# Patient Record
Sex: MALE | Race: WHITE | NOT HISPANIC OR LATINO
[De-identification: names, ages, dates, MRNs, and addresses within clinical notes are randomized per-mention and may not be internally consistent; named-entity substitution may affect disease eponyms.]

---

## 2016-12-06 RX ORDER — CELECOXIB 200 MG/1
2 CAPSULE ORAL
Qty: 0 | Refills: 0 | COMMUNITY
Start: 2016-12-06

## 2017-01-02 ENCOUNTER — RX RENEWAL (OUTPATIENT)
Age: 62
End: 2017-01-02

## 2017-01-24 ENCOUNTER — RX RENEWAL (OUTPATIENT)
Age: 62
End: 2017-01-24

## 2017-02-01 ENCOUNTER — RX RENEWAL (OUTPATIENT)
Age: 62
End: 2017-02-01

## 2017-02-03 ENCOUNTER — APPOINTMENT (OUTPATIENT)
Dept: ORTHOPEDIC SURGERY | Facility: CLINIC | Age: 62
End: 2017-02-03

## 2017-02-03 VITALS
SYSTOLIC BLOOD PRESSURE: 178 MMHG | HEART RATE: 64 BPM | DIASTOLIC BLOOD PRESSURE: 76 MMHG | WEIGHT: 164 LBS | BODY MASS INDEX: 22.96 KG/M2 | HEIGHT: 71 IN

## 2017-02-06 ENCOUNTER — APPOINTMENT (OUTPATIENT)
Dept: DERMATOLOGY | Facility: CLINIC | Age: 62
End: 2017-02-06

## 2017-02-06 VITALS — SYSTOLIC BLOOD PRESSURE: 142 MMHG | DIASTOLIC BLOOD PRESSURE: 70 MMHG

## 2017-02-06 DIAGNOSIS — Z98.890 OTHER SPECIFIED POSTPROCEDURAL STATES: ICD-10-CM

## 2017-03-06 ENCOUNTER — APPOINTMENT (OUTPATIENT)
Dept: ENDOCRINOLOGY | Facility: CLINIC | Age: 62
End: 2017-03-06

## 2017-03-10 ENCOUNTER — APPOINTMENT (OUTPATIENT)
Dept: INTERNAL MEDICINE | Facility: CLINIC | Age: 62
End: 2017-03-10

## 2017-03-10 VITALS
RESPIRATION RATE: 14 BRPM | BODY MASS INDEX: 22.04 KG/M2 | WEIGHT: 158 LBS | DIASTOLIC BLOOD PRESSURE: 60 MMHG | HEART RATE: 72 BPM | SYSTOLIC BLOOD PRESSURE: 140 MMHG

## 2017-03-10 LAB — HBA1C MFR BLD HPLC: 7

## 2017-03-13 ENCOUNTER — RX RENEWAL (OUTPATIENT)
Age: 62
End: 2017-03-13

## 2017-04-23 ENCOUNTER — RESULT REVIEW (OUTPATIENT)
Age: 62
End: 2017-04-23

## 2017-05-04 ENCOUNTER — APPOINTMENT (OUTPATIENT)
Dept: MRI IMAGING | Facility: CLINIC | Age: 62
End: 2017-05-04

## 2017-05-10 ENCOUNTER — OUTPATIENT (OUTPATIENT)
Dept: OUTPATIENT SERVICES | Facility: HOSPITAL | Age: 62
LOS: 1 days | End: 2017-05-10
Payer: COMMERCIAL

## 2017-05-10 ENCOUNTER — APPOINTMENT (OUTPATIENT)
Dept: MRI IMAGING | Facility: CLINIC | Age: 62
End: 2017-05-10

## 2017-05-10 DIAGNOSIS — Z00.8 ENCOUNTER FOR OTHER GENERAL EXAMINATION: ICD-10-CM

## 2017-05-10 DIAGNOSIS — Z98.52 VASECTOMY STATUS: Chronic | ICD-10-CM

## 2017-05-10 DIAGNOSIS — Z98.890 OTHER SPECIFIED POSTPROCEDURAL STATES: Chronic | ICD-10-CM

## 2017-05-10 DIAGNOSIS — Z98.89 OTHER SPECIFIED POSTPROCEDURAL STATES: Chronic | ICD-10-CM

## 2017-05-10 DIAGNOSIS — Z90.49 ACQUIRED ABSENCE OF OTHER SPECIFIED PARTS OF DIGESTIVE TRACT: Chronic | ICD-10-CM

## 2017-05-10 PROCEDURE — A9585: CPT

## 2017-05-10 PROCEDURE — 74183 MRI ABD W/O CNTR FLWD CNTR: CPT

## 2017-07-27 ENCOUNTER — APPOINTMENT (OUTPATIENT)
Dept: ENDOCRINOLOGY | Facility: CLINIC | Age: 62
End: 2017-07-27
Payer: COMMERCIAL

## 2017-07-27 VITALS
HEIGHT: 71 IN | WEIGHT: 162 LBS | SYSTOLIC BLOOD PRESSURE: 130 MMHG | BODY MASS INDEX: 22.68 KG/M2 | OXYGEN SATURATION: 98 % | DIASTOLIC BLOOD PRESSURE: 70 MMHG | HEART RATE: 74 BPM

## 2017-07-27 LAB
GLUCOSE BLDC GLUCOMTR-MCNC: 177
HBA1C MFR BLD HPLC: 6.8

## 2017-07-27 PROCEDURE — 82962 GLUCOSE BLOOD TEST: CPT

## 2017-07-27 PROCEDURE — 99214 OFFICE O/P EST MOD 30 MIN: CPT | Mod: 25

## 2017-07-27 PROCEDURE — 83036 HEMOGLOBIN GLYCOSYLATED A1C: CPT | Mod: QW

## 2017-08-14 ENCOUNTER — APPOINTMENT (OUTPATIENT)
Dept: DERMATOLOGY | Facility: CLINIC | Age: 62
End: 2017-08-14

## 2017-08-28 ENCOUNTER — APPOINTMENT (OUTPATIENT)
Dept: DERMATOLOGY | Facility: CLINIC | Age: 62
End: 2017-08-28
Payer: COMMERCIAL

## 2017-08-28 VITALS — SYSTOLIC BLOOD PRESSURE: 148 MMHG | DIASTOLIC BLOOD PRESSURE: 78 MMHG

## 2017-08-28 DIAGNOSIS — L60.8 OTHER NAIL DISORDERS: ICD-10-CM

## 2017-08-28 PROCEDURE — 99214 OFFICE O/P EST MOD 30 MIN: CPT | Mod: GC

## 2017-08-31 ENCOUNTER — APPOINTMENT (OUTPATIENT)
Dept: ORTHOPEDIC SURGERY | Facility: CLINIC | Age: 62
End: 2017-08-31
Payer: COMMERCIAL

## 2017-08-31 VITALS
HEIGHT: 71 IN | SYSTOLIC BLOOD PRESSURE: 137 MMHG | DIASTOLIC BLOOD PRESSURE: 73 MMHG | WEIGHT: 162 LBS | BODY MASS INDEX: 22.68 KG/M2 | HEART RATE: 80 BPM

## 2017-08-31 DIAGNOSIS — Z96.641 PRESENCE OF RIGHT ARTIFICIAL HIP JOINT: ICD-10-CM

## 2017-08-31 DIAGNOSIS — M25.551 PAIN IN RIGHT HIP: ICD-10-CM

## 2017-08-31 PROCEDURE — 73502 X-RAY EXAM HIP UNI 2-3 VIEWS: CPT | Mod: RT

## 2017-08-31 PROCEDURE — 99214 OFFICE O/P EST MOD 30 MIN: CPT

## 2017-09-11 ENCOUNTER — APPOINTMENT (OUTPATIENT)
Dept: ENDOCRINOLOGY | Facility: CLINIC | Age: 62
End: 2017-09-11

## 2017-09-11 ENCOUNTER — APPOINTMENT (OUTPATIENT)
Dept: INTERNAL MEDICINE | Facility: CLINIC | Age: 62
End: 2017-09-11
Payer: COMMERCIAL

## 2017-09-11 VITALS
HEART RATE: 103 BPM | OXYGEN SATURATION: 97 % | WEIGHT: 160 LBS | DIASTOLIC BLOOD PRESSURE: 80 MMHG | RESPIRATION RATE: 12 BRPM | SYSTOLIC BLOOD PRESSURE: 142 MMHG | BODY MASS INDEX: 22.4 KG/M2 | HEIGHT: 71 IN

## 2017-09-11 PROCEDURE — 99396 PREV VISIT EST AGE 40-64: CPT

## 2017-09-13 ENCOUNTER — LABORATORY RESULT (OUTPATIENT)
Age: 62
End: 2017-09-13

## 2017-09-14 LAB — CRP SERPL-MCNC: <0.2 MG/DL

## 2017-11-27 ENCOUNTER — APPOINTMENT (OUTPATIENT)
Dept: ENDOCRINOLOGY | Facility: CLINIC | Age: 62
End: 2017-11-27
Payer: COMMERCIAL

## 2017-11-27 VITALS
HEART RATE: 90 BPM | OXYGEN SATURATION: 97 % | HEIGHT: 71 IN | WEIGHT: 160 LBS | BODY MASS INDEX: 22.4 KG/M2 | DIASTOLIC BLOOD PRESSURE: 70 MMHG | SYSTOLIC BLOOD PRESSURE: 140 MMHG

## 2017-11-27 LAB
GLUCOSE BLDC GLUCOMTR-MCNC: 133
HBA1C MFR BLD HPLC: 6.5

## 2017-11-27 PROCEDURE — 99214 OFFICE O/P EST MOD 30 MIN: CPT | Mod: 25

## 2017-11-27 PROCEDURE — 95251 CONT GLUC MNTR ANALYSIS I&R: CPT

## 2017-11-27 PROCEDURE — 82962 GLUCOSE BLOOD TEST: CPT | Mod: GC

## 2017-11-27 PROCEDURE — 83036 HEMOGLOBIN GLYCOSYLATED A1C: CPT | Mod: QW,GC

## 2017-12-03 ENCOUNTER — RX RENEWAL (OUTPATIENT)
Age: 62
End: 2017-12-03

## 2017-12-05 ENCOUNTER — RX RENEWAL (OUTPATIENT)
Age: 62
End: 2017-12-05

## 2017-12-12 ENCOUNTER — MEDICATION RENEWAL (OUTPATIENT)
Age: 62
End: 2017-12-12

## 2018-01-15 LAB
BASOPHILS # BLD AUTO: 0.09 K/UL
BASOPHILS NFR BLD AUTO: 0.9 %
CHOLEST SERPL-MCNC: 96 MG/DL
CHOLEST/HDLC SERPL: 2.7 RATIO
CREAT SPEC-SCNC: 174 MG/DL
EOSINOPHIL # BLD AUTO: 0.25 K/UL
EOSINOPHIL NFR BLD AUTO: 2.6 %
ERYTHROCYTE [SEDIMENTATION RATE] IN BLOOD BY WESTERGREN METHOD: 32 MM/HR
HCT VFR BLD CALC: 32.7 %
HDLC SERPL-MCNC: 35 MG/DL
HGB BLD-MCNC: 11 G/DL
LDLC SERPL CALC-MCNC: 32 MG/DL
LYMPHOCYTES # BLD AUTO: 2.7 K/UL
LYMPHOCYTES NFR BLD AUTO: 28.4 %
MAN DIFF?: NORMAL
MCHC RBC-ENTMCNC: 32.2 PG
MCHC RBC-ENTMCNC: 33.6 GM/DL
MCV RBC AUTO: 95.6 FL
MICROALBUMIN 24H UR DL<=1MG/L-MCNC: 23.8 MG/DL
MICROALBUMIN/CREAT 24H UR-RTO: 137 MG/G
MONOCYTES # BLD AUTO: 0.82 K/UL
MONOCYTES NFR BLD AUTO: 8.6 %
NEUTROPHILS # BLD AUTO: 5.49 K/UL
NEUTROPHILS NFR BLD AUTO: 57.7 %
PLATELET # BLD AUTO: 181 K/UL
RBC # BLD: 3.42 M/UL
RBC # FLD: 12.5 %
TRIGL SERPL-MCNC: 143 MG/DL
WBC # FLD AUTO: 9.52 K/UL

## 2018-01-16 ENCOUNTER — APPOINTMENT (OUTPATIENT)
Dept: ENDOCRINOLOGY | Facility: CLINIC | Age: 63
End: 2018-01-16
Payer: COMMERCIAL

## 2018-01-16 PROCEDURE — G0108 DIAB MANAGE TRN  PER INDIV: CPT

## 2018-01-16 PROCEDURE — 95251 CONT GLUC MNTR ANALYSIS I&R: CPT

## 2018-02-15 ENCOUNTER — RX RENEWAL (OUTPATIENT)
Age: 63
End: 2018-02-15

## 2018-02-23 ENCOUNTER — APPOINTMENT (OUTPATIENT)
Dept: DERMATOLOGY | Facility: CLINIC | Age: 63
End: 2018-02-23

## 2018-03-01 ENCOUNTER — APPOINTMENT (OUTPATIENT)
Dept: RHEUMATOLOGY | Facility: CLINIC | Age: 63
End: 2018-03-01
Payer: COMMERCIAL

## 2018-03-01 ENCOUNTER — LABORATORY RESULT (OUTPATIENT)
Age: 63
End: 2018-03-01

## 2018-03-01 VITALS
WEIGHT: 165 LBS | TEMPERATURE: 98.7 F | SYSTOLIC BLOOD PRESSURE: 176 MMHG | OXYGEN SATURATION: 98 % | BODY MASS INDEX: 23.1 KG/M2 | HEIGHT: 71 IN | HEART RATE: 67 BPM | DIASTOLIC BLOOD PRESSURE: 70 MMHG

## 2018-03-01 DIAGNOSIS — B35.1 TINEA UNGUIUM: ICD-10-CM

## 2018-03-01 DIAGNOSIS — M40.209 UNSPECIFIED KYPHOSIS, SITE UNSPECIFIED: ICD-10-CM

## 2018-03-01 DIAGNOSIS — K22.70 BARRETT'S ESOPHAGUS W/OUT DYSPLASIA: ICD-10-CM

## 2018-03-01 LAB
ALBUMIN SERPL ELPH-MCNC: 4.2 G/DL
ALP BLD-CCNC: 106 U/L
ALT SERPL-CCNC: 39 U/L
ANION GAP SERPL CALC-SCNC: 11 MMOL/L
APPEARANCE: CLEAR
AST SERPL-CCNC: 58 U/L
BASOPHILS # BLD AUTO: 0.06 K/UL
BASOPHILS NFR BLD AUTO: 0.9 %
BILIRUB SERPL-MCNC: 0.4 MG/DL
BILIRUBIN URINE: NEGATIVE
BLOOD URINE: ABNORMAL
BUN SERPL-MCNC: 17 MG/DL
CALCIUM SERPL-MCNC: 8.8 MG/DL
CHLORIDE SERPL-SCNC: 100 MMOL/L
CO2 SERPL-SCNC: 25 MMOL/L
COLOR: YELLOW
CREAT SERPL-MCNC: 1.86 MG/DL
CRP SERPL-MCNC: <0.2 MG/DL
EOSINOPHIL # BLD AUTO: 0.24 K/UL
EOSINOPHIL NFR BLD AUTO: 3.8 %
GLUCOSE QUALITATIVE U: NEGATIVE MG/DL
GLUCOSE SERPL-MCNC: 108 MG/DL
HCT VFR BLD CALC: 34.6 %
HGB BLD-MCNC: 11.7 G/DL
IMM GRANULOCYTES NFR BLD AUTO: 0.5 %
KETONES URINE: NEGATIVE
LEUKOCYTE ESTERASE URINE: NEGATIVE
LYMPHOCYTES # BLD AUTO: 2.45 K/UL
LYMPHOCYTES NFR BLD AUTO: 38.6 %
MAN DIFF?: NORMAL
MCHC RBC-ENTMCNC: 32.7 PG
MCHC RBC-ENTMCNC: 33.8 GM/DL
MCV RBC AUTO: 96.6 FL
MONOCYTES # BLD AUTO: 0.51 K/UL
MONOCYTES NFR BLD AUTO: 8 %
NEUTROPHILS # BLD AUTO: 3.06 K/UL
NEUTROPHILS NFR BLD AUTO: 48.2 %
NITRITE URINE: NEGATIVE
PH URINE: 6
PLATELET # BLD AUTO: 146 K/UL
POTASSIUM SERPL-SCNC: 4.7 MMOL/L
PROT SERPL-MCNC: 7.7 G/DL
PROTEIN URINE: ABNORMAL MG/DL
RBC # BLD: 3.58 M/UL
RBC # FLD: 13.1 %
RHEUMATOID FACT SER QL: <7 IU/ML
SODIUM SERPL-SCNC: 136 MMOL/L
SPECIFIC GRAVITY URINE: 1.01
UROBILINOGEN URINE: NEGATIVE MG/DL
WBC # FLD AUTO: 6.35 K/UL

## 2018-03-01 PROCEDURE — 99245 OFF/OP CONSLTJ NEW/EST HI 55: CPT

## 2018-03-02 LAB
25(OH)D3 SERPL-MCNC: 23.8 NG/ML
CCP AB SER IA-ACNC: <8 UNITS
DSDNA AB SER-ACNC: 89 IU/ML
ENA RNP AB SER IA-ACNC: 0.3 AL
ENA SM AB SER IA-ACNC: <0.2 AL
ENA SS-A AB SER IA-ACNC: <0.2 AL
ENA SS-B AB SER IA-ACNC: <0.2 AL
HBV CORE IGG+IGM SER QL: NONREACTIVE
HBV SURFACE AB SER QL: NONREACTIVE
HBV SURFACE AG SER QL: NONREACTIVE
HCV AB SER QL: NONREACTIVE
HCV S/CO RATIO: 0.12 S/CO
RF+CCP IGG SER-IMP: NEGATIVE

## 2018-03-06 LAB
ADJUSTED MITOGEN: >10 IU/ML
ADJUSTED TB AG: -0.01 IU/ML
ANA PAT FLD IF-IMP: ABNORMAL
ANA SER IF-ACNC: ABNORMAL
G6PD SER-CCNC: 12.3 U/G HGB
M TB IFN-G BLD-IMP: NEGATIVE
QUANTIFERON GOLD NIL: 0.05 IU/ML

## 2018-03-16 ENCOUNTER — APPOINTMENT (OUTPATIENT)
Dept: INTERNAL MEDICINE | Facility: CLINIC | Age: 63
End: 2018-03-16
Payer: COMMERCIAL

## 2018-03-16 VITALS
HEART RATE: 68 BPM | OXYGEN SATURATION: 98 % | SYSTOLIC BLOOD PRESSURE: 150 MMHG | BODY MASS INDEX: 23.29 KG/M2 | WEIGHT: 167 LBS | DIASTOLIC BLOOD PRESSURE: 75 MMHG

## 2018-03-16 PROCEDURE — 99214 OFFICE O/P EST MOD 30 MIN: CPT

## 2018-03-19 VITALS
RESPIRATION RATE: 14 BRPM | WEIGHT: 167 LBS | SYSTOLIC BLOOD PRESSURE: 152 MMHG | HEIGHT: 71 IN | BODY MASS INDEX: 23.38 KG/M2 | HEART RATE: 62 BPM | DIASTOLIC BLOOD PRESSURE: 76 MMHG

## 2018-03-19 RX ORDER — MULTIVITAMIN
CAPSULE ORAL
Refills: 0 | Status: COMPLETED | COMMUNITY

## 2018-03-19 RX ORDER — CHROMIUM 200 MCG
TABLET ORAL
Refills: 0 | Status: COMPLETED | COMMUNITY

## 2018-03-19 RX ORDER — CLOPIDOGREL 75 MG/1
75 TABLET, FILM COATED ORAL
Refills: 0 | Status: COMPLETED | COMMUNITY

## 2018-03-19 RX ORDER — HYDROMORPHONE HYDROCHLORIDE 4 MG/1
4 TABLET ORAL
Refills: 0 | Status: COMPLETED | COMMUNITY

## 2018-04-04 ENCOUNTER — APPOINTMENT (OUTPATIENT)
Dept: CARDIOLOGY | Facility: CLINIC | Age: 63
End: 2018-04-04
Payer: COMMERCIAL

## 2018-04-04 ENCOUNTER — NON-APPOINTMENT (OUTPATIENT)
Age: 63
End: 2018-04-04

## 2018-04-04 VITALS
WEIGHT: 161 LBS | HEART RATE: 56 BPM | OXYGEN SATURATION: 100 % | DIASTOLIC BLOOD PRESSURE: 70 MMHG | BODY MASS INDEX: 22.46 KG/M2 | SYSTOLIC BLOOD PRESSURE: 158 MMHG

## 2018-04-04 VITALS — DIASTOLIC BLOOD PRESSURE: 70 MMHG | SYSTOLIC BLOOD PRESSURE: 160 MMHG

## 2018-04-04 PROCEDURE — 99406 BEHAV CHNG SMOKING 3-10 MIN: CPT

## 2018-04-04 PROCEDURE — 93000 ELECTROCARDIOGRAM COMPLETE: CPT

## 2018-04-04 PROCEDURE — 99245 OFF/OP CONSLTJ NEW/EST HI 55: CPT | Mod: 25

## 2018-04-04 RX ORDER — RIVAROXABAN 10 MG/1
10 TABLET, FILM COATED ORAL
Qty: 35 | Refills: 0 | Status: DISCONTINUED | COMMUNITY
Start: 2017-01-02 | End: 2018-04-04

## 2018-04-16 ENCOUNTER — LABORATORY RESULT (OUTPATIENT)
Age: 63
End: 2018-04-16

## 2018-04-16 ENCOUNTER — APPOINTMENT (OUTPATIENT)
Dept: RHEUMATOLOGY | Facility: CLINIC | Age: 63
End: 2018-04-16
Payer: COMMERCIAL

## 2018-04-16 VITALS
HEART RATE: 71 BPM | SYSTOLIC BLOOD PRESSURE: 158 MMHG | WEIGHT: 165 LBS | HEIGHT: 71 IN | DIASTOLIC BLOOD PRESSURE: 77 MMHG | TEMPERATURE: 98.7 F | OXYGEN SATURATION: 98 % | BODY MASS INDEX: 23.1 KG/M2

## 2018-04-16 LAB
ALBUMIN SERPL ELPH-MCNC: 3.5 G/DL
ALP BLD-CCNC: 99 U/L
ALT SERPL-CCNC: 29 U/L
ANION GAP SERPL CALC-SCNC: 9 MMOL/L
AST SERPL-CCNC: 42 U/L
BILIRUB SERPL-MCNC: 0.4 MG/DL
BUN SERPL-MCNC: 20 MG/DL
CALCIUM SERPL-MCNC: 8.3 MG/DL
CHLORIDE SERPL-SCNC: 103 MMOL/L
CO2 SERPL-SCNC: 25 MMOL/L
CREAT SERPL-MCNC: 1.9 MG/DL
GLUCOSE SERPL-MCNC: 255 MG/DL
POTASSIUM SERPL-SCNC: 5.9 MMOL/L
PROT SERPL-MCNC: 6.6 G/DL
SODIUM SERPL-SCNC: 137 MMOL/L

## 2018-04-16 PROCEDURE — 99214 OFFICE O/P EST MOD 30 MIN: CPT

## 2018-04-17 ENCOUNTER — FORM ENCOUNTER (OUTPATIENT)
Age: 63
End: 2018-04-17

## 2018-04-17 ENCOUNTER — APPOINTMENT (OUTPATIENT)
Dept: CARDIOLOGY | Facility: CLINIC | Age: 63
End: 2018-04-17
Payer: COMMERCIAL

## 2018-04-17 PROCEDURE — 93306 TTE W/DOPPLER COMPLETE: CPT

## 2018-04-18 ENCOUNTER — APPOINTMENT (OUTPATIENT)
Dept: RADIOLOGY | Facility: CLINIC | Age: 63
End: 2018-04-18

## 2018-04-18 ENCOUNTER — OUTPATIENT (OUTPATIENT)
Dept: OUTPATIENT SERVICES | Facility: HOSPITAL | Age: 63
LOS: 1 days | End: 2018-04-18
Payer: COMMERCIAL

## 2018-04-18 DIAGNOSIS — Z98.52 VASECTOMY STATUS: Chronic | ICD-10-CM

## 2018-04-18 DIAGNOSIS — Z98.890 OTHER SPECIFIED POSTPROCEDURAL STATES: Chronic | ICD-10-CM

## 2018-04-18 DIAGNOSIS — Z98.89 OTHER SPECIFIED POSTPROCEDURAL STATES: Chronic | ICD-10-CM

## 2018-04-18 DIAGNOSIS — M06.9 RHEUMATOID ARTHRITIS, UNSPECIFIED: ICD-10-CM

## 2018-04-18 DIAGNOSIS — L40.9 PSORIASIS, UNSPECIFIED: ICD-10-CM

## 2018-04-18 DIAGNOSIS — Z90.49 ACQUIRED ABSENCE OF OTHER SPECIFIED PARTS OF DIGESTIVE TRACT: Chronic | ICD-10-CM

## 2018-04-18 PROCEDURE — 72040 X-RAY EXAM NECK SPINE 2-3 VW: CPT | Mod: 26

## 2018-04-18 PROCEDURE — 73130 X-RAY EXAM OF HAND: CPT

## 2018-04-18 PROCEDURE — 73630 X-RAY EXAM OF FOOT: CPT | Mod: 26,50

## 2018-04-18 PROCEDURE — 73521 X-RAY EXAM HIPS BI 2 VIEWS: CPT

## 2018-04-18 PROCEDURE — 77080 DXA BONE DENSITY AXIAL: CPT | Mod: 26

## 2018-04-18 PROCEDURE — 72040 X-RAY EXAM NECK SPINE 2-3 VW: CPT

## 2018-04-18 PROCEDURE — 72070 X-RAY EXAM THORAC SPINE 2VWS: CPT

## 2018-04-18 PROCEDURE — 72070 X-RAY EXAM THORAC SPINE 2VWS: CPT | Mod: 26

## 2018-04-18 PROCEDURE — 77080 DXA BONE DENSITY AXIAL: CPT

## 2018-04-18 PROCEDURE — 72114 X-RAY EXAM L-S SPINE BENDING: CPT

## 2018-04-18 PROCEDURE — 73130 X-RAY EXAM OF HAND: CPT | Mod: 26,50

## 2018-04-18 PROCEDURE — 73630 X-RAY EXAM OF FOOT: CPT

## 2018-04-18 PROCEDURE — 72114 X-RAY EXAM L-S SPINE BENDING: CPT | Mod: 26

## 2018-04-18 PROCEDURE — 73521 X-RAY EXAM HIPS BI 2 VIEWS: CPT | Mod: 26

## 2018-04-19 LAB
APPEARANCE: CLEAR
BASOPHILS # BLD AUTO: 0.04 K/UL
BASOPHILS NFR BLD AUTO: 0.7 %
BILIRUBIN URINE: NEGATIVE
BLOOD URINE: NEGATIVE
C3 SERPL-MCNC: 49 MG/DL
C4 SERPL-MCNC: 7 MG/DL
COLOR: YELLOW
DSDNA AB SER-ACNC: 38 IU/ML
EOSINOPHIL # BLD AUTO: 0.11 K/UL
EOSINOPHIL NFR BLD AUTO: 1.9 %
GLUCOSE QUALITATIVE U: NEGATIVE MG/DL
HCT VFR BLD CALC: 31.8 %
HGB BLD-MCNC: 11.2 G/DL
IMM GRANULOCYTES NFR BLD AUTO: 0.4 %
KETONES URINE: NEGATIVE
LEUKOCYTE ESTERASE URINE: NEGATIVE
LYMPHOCYTES # BLD AUTO: 1.48 K/UL
LYMPHOCYTES NFR BLD AUTO: 26 %
MAN DIFF?: NORMAL
MCHC RBC-ENTMCNC: 33.3 PG
MCHC RBC-ENTMCNC: 35.2 GM/DL
MCV RBC AUTO: 94.6 FL
MONOCYTES # BLD AUTO: 0.44 K/UL
MONOCYTES NFR BLD AUTO: 7.7 %
NEUTROPHILS # BLD AUTO: 3.61 K/UL
NEUTROPHILS NFR BLD AUTO: 63.3 %
NITRITE URINE: NEGATIVE
PH URINE: 5.5
PLATELET # BLD AUTO: 132 K/UL
PROTEIN URINE: 30 MG/DL
RBC # BLD: 3.36 M/UL
RBC # FLD: 12.2 %
SPECIFIC GRAVITY URINE: 1.01
UROBILINOGEN URINE: NEGATIVE MG/DL
WBC # FLD AUTO: 5.7 K/UL

## 2018-04-23 ENCOUNTER — APPOINTMENT (OUTPATIENT)
Dept: ENDOCRINOLOGY | Facility: CLINIC | Age: 63
End: 2018-04-23
Payer: COMMERCIAL

## 2018-04-23 VITALS
DIASTOLIC BLOOD PRESSURE: 70 MMHG | WEIGHT: 166 LBS | BODY MASS INDEX: 23.24 KG/M2 | HEART RATE: 71 BPM | HEIGHT: 71 IN | OXYGEN SATURATION: 98 % | SYSTOLIC BLOOD PRESSURE: 140 MMHG

## 2018-04-23 LAB
GLUCOSE BLDC GLUCOMTR-MCNC: 224
HBA1C MFR BLD HPLC: 6.8

## 2018-04-23 PROCEDURE — 95251 CONT GLUC MNTR ANALYSIS I&R: CPT

## 2018-04-23 PROCEDURE — 82962 GLUCOSE BLOOD TEST: CPT

## 2018-04-23 PROCEDURE — 99214 OFFICE O/P EST MOD 30 MIN: CPT | Mod: 25

## 2018-04-23 PROCEDURE — 83036 HEMOGLOBIN GLYCOSYLATED A1C: CPT | Mod: QW

## 2018-05-08 ENCOUNTER — APPOINTMENT (OUTPATIENT)
Dept: CARDIOLOGY | Facility: CLINIC | Age: 63
End: 2018-05-08
Payer: COMMERCIAL

## 2018-05-08 ENCOUNTER — NON-APPOINTMENT (OUTPATIENT)
Age: 63
End: 2018-05-08

## 2018-05-08 VITALS
HEART RATE: 67 BPM | HEIGHT: 71 IN | BODY MASS INDEX: 23.8 KG/M2 | TEMPERATURE: 98.6 F | SYSTOLIC BLOOD PRESSURE: 146 MMHG | OXYGEN SATURATION: 100 % | DIASTOLIC BLOOD PRESSURE: 62 MMHG | WEIGHT: 170 LBS

## 2018-05-08 PROCEDURE — 93000 ELECTROCARDIOGRAM COMPLETE: CPT

## 2018-05-08 PROCEDURE — 99215 OFFICE O/P EST HI 40 MIN: CPT

## 2018-05-10 ENCOUNTER — APPOINTMENT (OUTPATIENT)
Dept: RHEUMATOLOGY | Facility: CLINIC | Age: 63
End: 2018-05-10
Payer: COMMERCIAL

## 2018-05-10 ENCOUNTER — LABORATORY RESULT (OUTPATIENT)
Age: 63
End: 2018-05-10

## 2018-05-10 VITALS
BODY MASS INDEX: 23.52 KG/M2 | DIASTOLIC BLOOD PRESSURE: 71 MMHG | HEART RATE: 82 BPM | SYSTOLIC BLOOD PRESSURE: 170 MMHG | TEMPERATURE: 98.2 F | WEIGHT: 168 LBS | HEIGHT: 71 IN | OXYGEN SATURATION: 98 %

## 2018-05-10 LAB
APPEARANCE: CLEAR
BASOPHILS # BLD AUTO: 0.07 K/UL
BASOPHILS NFR BLD AUTO: 0.7 %
BILIRUBIN URINE: NEGATIVE
BLOOD URINE: ABNORMAL
COLOR: YELLOW
DIRECT COOMBS: NORMAL
EOSINOPHIL # BLD AUTO: 0.14 K/UL
EOSINOPHIL NFR BLD AUTO: 1.4 %
ERYTHROCYTE [SEDIMENTATION RATE] IN BLOOD BY WESTERGREN METHOD: 32 MM/HR
GLUCOSE QUALITATIVE U: 250 MG/DL
HCT VFR BLD CALC: 33.5 %
HGB BLD-MCNC: 11.1 G/DL
IMM GRANULOCYTES NFR BLD AUTO: 0.8 %
KETONES URINE: NEGATIVE
LEUKOCYTE ESTERASE URINE: NEGATIVE
LYMPHOCYTES # BLD AUTO: 2.02 K/UL
LYMPHOCYTES NFR BLD AUTO: 19.7 %
MAN DIFF?: NORMAL
MCHC RBC-ENTMCNC: 33 PG
MCHC RBC-ENTMCNC: 33.1 GM/DL
MCV RBC AUTO: 99.7 FL
MONOCYTES # BLD AUTO: 0.82 K/UL
MONOCYTES NFR BLD AUTO: 8 %
NEUTROPHILS # BLD AUTO: 7.11 K/UL
NEUTROPHILS NFR BLD AUTO: 69.4 %
NITRITE URINE: NEGATIVE
PH URINE: 5.5
PLATELET # BLD AUTO: 153 K/UL
PROTEIN URINE: 30 MG/DL
RBC # BLD: 3.35 M/UL
RBC # BLD: 3.36 M/UL
RBC # FLD: 12.7 %
RETICS # AUTO: 2.5 %
RETICS AGGREG/RBC NFR: 82.4 K/UL
SPECIFIC GRAVITY URINE: 1.01
UROBILINOGEN URINE: NEGATIVE MG/DL
WBC # FLD AUTO: 10.24 K/UL

## 2018-05-10 PROCEDURE — 99215 OFFICE O/P EST HI 40 MIN: CPT

## 2018-05-11 ENCOUNTER — OTHER (OUTPATIENT)
Age: 63
End: 2018-05-11

## 2018-05-11 DIAGNOSIS — R80.9 PROTEINURIA, UNSPECIFIED: ICD-10-CM

## 2018-05-11 LAB
ALBUMIN SERPL ELPH-MCNC: 3.9 G/DL
ALP BLD-CCNC: 101 U/L
ALT SERPL-CCNC: 29 U/L
ANION GAP SERPL CALC-SCNC: 12 MMOL/L
AST SERPL-CCNC: 35 U/L
BILIRUB SERPL-MCNC: 0.5 MG/DL
BUN SERPL-MCNC: 26 MG/DL
C3 SERPL-MCNC: 76 MG/DL
C4 SERPL-MCNC: 19 MG/DL
CALCIUM SERPL-MCNC: 9 MG/DL
CHLORIDE SERPL-SCNC: 106 MMOL/L
CO2 SERPL-SCNC: 21 MMOL/L
CREAT SERPL-MCNC: 2.08 MG/DL
CREAT SPEC-SCNC: 22 MG/DL
CREAT/PROT UR: 1.9 RATIO
CRP SERPL-MCNC: 0.2 MG/DL
DSDNA AB SER-ACNC: 46 IU/ML
GLUCOSE SERPL-MCNC: 204 MG/DL
HAPTOGLOB SERPL-MCNC: 87 MG/DL
LDH SERPL-CCNC: 241 U/L
POTASSIUM SERPL-SCNC: 5.4 MMOL/L
PROT SERPL-MCNC: 7.2 G/DL
PROT UR-MCNC: 42 MG/DL
SODIUM SERPL-SCNC: 139 MMOL/L

## 2018-05-15 ENCOUNTER — APPOINTMENT (OUTPATIENT)
Dept: NEPHROLOGY | Facility: CLINIC | Age: 63
End: 2018-05-15
Payer: COMMERCIAL

## 2018-05-15 ENCOUNTER — LABORATORY RESULT (OUTPATIENT)
Age: 63
End: 2018-05-15

## 2018-05-15 VITALS
DIASTOLIC BLOOD PRESSURE: 72 MMHG | BODY MASS INDEX: 23.46 KG/M2 | HEIGHT: 71 IN | SYSTOLIC BLOOD PRESSURE: 152 MMHG | OXYGEN SATURATION: 97 % | HEART RATE: 83 BPM | WEIGHT: 167.55 LBS

## 2018-05-15 PROCEDURE — 99244 OFF/OP CNSLTJ NEW/EST MOD 40: CPT

## 2018-05-15 RX ORDER — AMLODIPINE BESYLATE 5 MG/1
5 TABLET ORAL DAILY
Qty: 30 | Refills: 1 | Status: DISCONTINUED | COMMUNITY
Start: 2018-04-04 | End: 2018-05-15

## 2018-05-15 RX ORDER — TACROLIMUS 1 MG/G
0.1 OINTMENT TOPICAL
Qty: 1 | Refills: 1 | Status: DISCONTINUED | COMMUNITY
Start: 2017-08-28 | End: 2018-05-15

## 2018-05-15 RX ORDER — UREA 40 G/100G
40 CREAM TOPICAL
Qty: 1 | Refills: 0 | Status: DISCONTINUED | COMMUNITY
Start: 2017-08-28 | End: 2018-05-15

## 2018-05-16 ENCOUNTER — INPATIENT (INPATIENT)
Facility: HOSPITAL | Age: 63
LOS: 0 days | Discharge: HOME | DRG: 641 | End: 2018-05-17
Attending: INTERNAL MEDICINE | Admitting: HOSPITALIST
Payer: COMMERCIAL

## 2018-05-16 VITALS
DIASTOLIC BLOOD PRESSURE: 74 MMHG | WEIGHT: 164.02 LBS | RESPIRATION RATE: 16 BRPM | TEMPERATURE: 98 F | SYSTOLIC BLOOD PRESSURE: 155 MMHG | HEART RATE: 86 BPM | OXYGEN SATURATION: 100 %

## 2018-05-16 DIAGNOSIS — E87.5 HYPERKALEMIA: ICD-10-CM

## 2018-05-16 DIAGNOSIS — Z29.9 ENCOUNTER FOR PROPHYLACTIC MEASURES, UNSPECIFIED: ICD-10-CM

## 2018-05-16 DIAGNOSIS — I10 ESSENTIAL (PRIMARY) HYPERTENSION: ICD-10-CM

## 2018-05-16 DIAGNOSIS — M06.9 RHEUMATOID ARTHRITIS, UNSPECIFIED: ICD-10-CM

## 2018-05-16 DIAGNOSIS — N17.9 ACUTE KIDNEY FAILURE, UNSPECIFIED: ICD-10-CM

## 2018-05-16 DIAGNOSIS — R60.0 LOCALIZED EDEMA: ICD-10-CM

## 2018-05-16 DIAGNOSIS — Z98.89 OTHER SPECIFIED POSTPROCEDURAL STATES: Chronic | ICD-10-CM

## 2018-05-16 DIAGNOSIS — E11.65 TYPE 2 DIABETES MELLITUS WITH HYPERGLYCEMIA: ICD-10-CM

## 2018-05-16 DIAGNOSIS — I25.10 ATHEROSCLEROTIC HEART DISEASE OF NATIVE CORONARY ARTERY WITHOUT ANGINA PECTORIS: ICD-10-CM

## 2018-05-16 DIAGNOSIS — Z90.49 ACQUIRED ABSENCE OF OTHER SPECIFIED PARTS OF DIGESTIVE TRACT: Chronic | ICD-10-CM

## 2018-05-16 DIAGNOSIS — Z98.890 OTHER SPECIFIED POSTPROCEDURAL STATES: Chronic | ICD-10-CM

## 2018-05-16 DIAGNOSIS — K86.1 OTHER CHRONIC PANCREATITIS: ICD-10-CM

## 2018-05-16 DIAGNOSIS — Z98.52 VASECTOMY STATUS: Chronic | ICD-10-CM

## 2018-05-16 LAB
ALBUMIN SERPL ELPH-MCNC: 3.7 G/DL — SIGNIFICANT CHANGE UP (ref 3.3–5)
ALP SERPL-CCNC: 85 U/L — SIGNIFICANT CHANGE UP (ref 40–120)
ALT FLD-CCNC: 31 U/L — SIGNIFICANT CHANGE UP (ref 10–45)
ANION GAP SERPL CALC-SCNC: 10 MMOL/L — SIGNIFICANT CHANGE UP (ref 5–17)
ANION GAP SERPL CALC-SCNC: 12 MMOL/L — SIGNIFICANT CHANGE UP (ref 5–17)
APTT BLD: 27.9 SEC — SIGNIFICANT CHANGE UP (ref 27.5–37.4)
AST SERPL-CCNC: 47 U/L — HIGH (ref 10–40)
BASOPHILS # BLD AUTO: 0 K/UL — SIGNIFICANT CHANGE UP (ref 0–0.2)
BASOPHILS NFR BLD AUTO: 0.5 % — SIGNIFICANT CHANGE UP (ref 0–2)
BILIRUB SERPL-MCNC: 0.3 MG/DL — SIGNIFICANT CHANGE UP (ref 0.2–1.2)
BUN SERPL-MCNC: 28 MG/DL — HIGH (ref 7–23)
BUN SERPL-MCNC: 28 MG/DL — HIGH (ref 7–23)
CALCIUM SERPL-MCNC: 8.2 MG/DL — LOW (ref 8.4–10.5)
CALCIUM SERPL-MCNC: 9 MG/DL — SIGNIFICANT CHANGE UP (ref 8.4–10.5)
CHLORIDE SERPL-SCNC: 102 MMOL/L — SIGNIFICANT CHANGE UP (ref 96–108)
CHLORIDE SERPL-SCNC: 102 MMOL/L — SIGNIFICANT CHANGE UP (ref 96–108)
CO2 SERPL-SCNC: 20 MMOL/L — LOW (ref 22–31)
CO2 SERPL-SCNC: 24 MMOL/L — SIGNIFICANT CHANGE UP (ref 22–31)
CREAT SERPL-MCNC: 2.15 MG/DL — HIGH (ref 0.5–1.3)
CREAT SERPL-MCNC: 2.35 MG/DL — HIGH (ref 0.5–1.3)
EOSINOPHIL # BLD AUTO: 0 K/UL — SIGNIFICANT CHANGE UP (ref 0–0.5)
EOSINOPHIL NFR BLD AUTO: 0.6 % — SIGNIFICANT CHANGE UP (ref 0–6)
G6PD SER-CCNC: 14.4 U/G HGB
GAS PNL BLDV: SIGNIFICANT CHANGE UP
GLUCOSE BLDC GLUCOMTR-MCNC: 279 MG/DL — HIGH (ref 70–99)
GLUCOSE SERPL-MCNC: 253 MG/DL — HIGH (ref 70–99)
GLUCOSE SERPL-MCNC: 368 MG/DL — HIGH (ref 70–99)
HCT VFR BLD CALC: 31.5 % — LOW (ref 39–50)
HGB BLD-MCNC: 10.8 G/DL — LOW (ref 13–17)
INR BLD: 1.04 RATIO — SIGNIFICANT CHANGE UP (ref 0.88–1.16)
LYMPHOCYTES # BLD AUTO: 0.8 K/UL — LOW (ref 1–3.3)
LYMPHOCYTES # BLD AUTO: 9.6 % — LOW (ref 13–44)
MAGNESIUM SERPL-MCNC: 1.7 MG/DL — SIGNIFICANT CHANGE UP (ref 1.6–2.6)
MCHC RBC-ENTMCNC: 34.2 GM/DL — SIGNIFICANT CHANGE UP (ref 32–36)
MCHC RBC-ENTMCNC: 34.8 PG — HIGH (ref 27–34)
MCV RBC AUTO: 102 FL — HIGH (ref 80–100)
MONOCYTES # BLD AUTO: 0.2 K/UL — SIGNIFICANT CHANGE UP (ref 0–0.9)
MONOCYTES NFR BLD AUTO: 2.2 % — SIGNIFICANT CHANGE UP (ref 2–14)
NEUTROPHILS # BLD AUTO: 7.5 K/UL — HIGH (ref 1.8–7.4)
NEUTROPHILS NFR BLD AUTO: 87.2 % — HIGH (ref 43–77)
PHOSPHATE SERPL-MCNC: 4.3 MG/DL — SIGNIFICANT CHANGE UP (ref 2.5–4.5)
PLATELET # BLD AUTO: 181 K/UL — SIGNIFICANT CHANGE UP (ref 150–400)
POTASSIUM SERPL-MCNC: 6.1 MMOL/L — HIGH (ref 3.5–5.3)
POTASSIUM SERPL-MCNC: 6.3 MMOL/L — CRITICAL HIGH (ref 3.5–5.3)
POTASSIUM SERPL-SCNC: 6.1 MMOL/L — HIGH (ref 3.5–5.3)
POTASSIUM SERPL-SCNC: 6.3 MMOL/L — CRITICAL HIGH (ref 3.5–5.3)
PROT SERPL-MCNC: 6.9 G/DL — SIGNIFICANT CHANGE UP (ref 6–8.3)
PROTHROM AB SERPL-ACNC: 11.4 SEC — SIGNIFICANT CHANGE UP (ref 9.8–12.7)
RBC # BLD: 3.09 M/UL — LOW (ref 4.2–5.8)
RBC # FLD: 11.1 % — SIGNIFICANT CHANGE UP (ref 10.3–14.5)
SODIUM SERPL-SCNC: 134 MMOL/L — LOW (ref 135–145)
SODIUM SERPL-SCNC: 136 MMOL/L — SIGNIFICANT CHANGE UP (ref 135–145)
WBC # BLD: 8.6 K/UL — SIGNIFICANT CHANGE UP (ref 3.8–10.5)
WBC # FLD AUTO: 8.6 K/UL — SIGNIFICANT CHANGE UP (ref 3.8–10.5)

## 2018-05-16 PROCEDURE — 99233 SBSQ HOSP IP/OBS HIGH 50: CPT

## 2018-05-16 PROCEDURE — 93971 EXTREMITY STUDY: CPT | Mod: 26

## 2018-05-16 PROCEDURE — 93010 ELECTROCARDIOGRAM REPORT: CPT

## 2018-05-16 PROCEDURE — 76770 US EXAM ABDO BACK WALL COMP: CPT | Mod: 26

## 2018-05-16 PROCEDURE — 99223 1ST HOSP IP/OBS HIGH 75: CPT | Mod: GC

## 2018-05-16 PROCEDURE — 99285 EMERGENCY DEPT VISIT HI MDM: CPT | Mod: 25

## 2018-05-16 RX ORDER — DEXTROSE 50 % IN WATER 50 %
25 SYRINGE (ML) INTRAVENOUS ONCE
Qty: 0 | Refills: 0 | Status: DISCONTINUED | OUTPATIENT
Start: 2018-05-16 | End: 2018-05-17

## 2018-05-16 RX ORDER — FUROSEMIDE 40 MG
2 TABLET ORAL
Qty: 0 | Refills: 0 | COMMUNITY

## 2018-05-16 RX ORDER — INSULIN LISPRO 100/ML
VIAL (ML) SUBCUTANEOUS AT BEDTIME
Qty: 0 | Refills: 0 | Status: DISCONTINUED | OUTPATIENT
Start: 2018-05-16 | End: 2018-05-17

## 2018-05-16 RX ORDER — PANTOPRAZOLE SODIUM 20 MG/1
40 TABLET, DELAYED RELEASE ORAL
Qty: 0 | Refills: 0 | Status: DISCONTINUED | OUTPATIENT
Start: 2018-05-16 | End: 2018-05-17

## 2018-05-16 RX ORDER — SODIUM CHLORIDE 9 MG/ML
1000 INJECTION, SOLUTION INTRAVENOUS
Qty: 0 | Refills: 0 | Status: DISCONTINUED | OUTPATIENT
Start: 2018-05-16 | End: 2018-05-16

## 2018-05-16 RX ORDER — ALBUTEROL 90 UG/1
2.5 AEROSOL, METERED ORAL ONCE
Qty: 0 | Refills: 0 | Status: COMPLETED | OUTPATIENT
Start: 2018-05-16 | End: 2018-05-16

## 2018-05-16 RX ORDER — INSULIN LISPRO 100/ML
VIAL (ML) SUBCUTANEOUS
Qty: 0 | Refills: 0 | Status: DISCONTINUED | OUTPATIENT
Start: 2018-05-16 | End: 2018-05-16

## 2018-05-16 RX ORDER — AMLODIPINE BESYLATE 2.5 MG/1
10 TABLET ORAL DAILY
Qty: 0 | Refills: 0 | Status: DISCONTINUED | OUTPATIENT
Start: 2018-05-16 | End: 2018-05-17

## 2018-05-16 RX ORDER — CLOPIDOGREL BISULFATE 75 MG/1
75 TABLET, FILM COATED ORAL DAILY
Qty: 0 | Refills: 0 | Status: DISCONTINUED | OUTPATIENT
Start: 2018-05-16 | End: 2018-05-17

## 2018-05-16 RX ORDER — DEXTROSE 50 % IN WATER 50 %
15 SYRINGE (ML) INTRAVENOUS ONCE
Qty: 0 | Refills: 0 | Status: DISCONTINUED | OUTPATIENT
Start: 2018-05-16 | End: 2018-05-17

## 2018-05-16 RX ORDER — ATORVASTATIN CALCIUM 80 MG/1
10 TABLET, FILM COATED ORAL AT BEDTIME
Qty: 0 | Refills: 0 | Status: DISCONTINUED | OUTPATIENT
Start: 2018-05-16 | End: 2018-05-17

## 2018-05-16 RX ORDER — HYDROMORPHONE HYDROCHLORIDE 2 MG/ML
8 INJECTION INTRAMUSCULAR; INTRAVENOUS; SUBCUTANEOUS EVERY 6 HOURS
Qty: 0 | Refills: 0 | Status: DISCONTINUED | OUTPATIENT
Start: 2018-05-16 | End: 2018-05-17

## 2018-05-16 RX ORDER — CALCIUM GLUCONATE 100 MG/ML
1 VIAL (ML) INTRAVENOUS ONCE
Qty: 0 | Refills: 0 | Status: COMPLETED | OUTPATIENT
Start: 2018-05-16 | End: 2018-05-16

## 2018-05-16 RX ORDER — FUROSEMIDE 40 MG
40 TABLET ORAL ONCE
Qty: 0 | Refills: 0 | Status: COMPLETED | OUTPATIENT
Start: 2018-05-16 | End: 2018-05-16

## 2018-05-16 RX ORDER — FUROSEMIDE 40 MG
40 TABLET ORAL AT BEDTIME
Qty: 0 | Refills: 0 | Status: DISCONTINUED | OUTPATIENT
Start: 2018-05-17 | End: 2018-05-17

## 2018-05-16 RX ORDER — SODIUM BICARBONATE 1 MEQ/ML
50 SYRINGE (ML) INTRAVENOUS ONCE
Qty: 0 | Refills: 0 | Status: COMPLETED | OUTPATIENT
Start: 2018-05-16 | End: 2018-05-16

## 2018-05-16 RX ORDER — AMLODIPINE BESYLATE 2.5 MG/1
1 TABLET ORAL
Qty: 0 | Refills: 0 | COMMUNITY

## 2018-05-16 RX ORDER — CLOPIDOGREL BISULFATE 75 MG/1
1 TABLET, FILM COATED ORAL
Qty: 0 | Refills: 0 | COMMUNITY

## 2018-05-16 RX ORDER — NICOTINE POLACRILEX 2 MG
1 GUM BUCCAL DAILY
Qty: 0 | Refills: 0 | Status: DISCONTINUED | OUTPATIENT
Start: 2018-05-16 | End: 2018-05-17

## 2018-05-16 RX ORDER — SODIUM BICARBONATE 1 MEQ/ML
650 SYRINGE (ML) INTRAVENOUS THREE TIMES A DAY
Qty: 0 | Refills: 0 | Status: DISCONTINUED | OUTPATIENT
Start: 2018-05-16 | End: 2018-05-17

## 2018-05-16 RX ORDER — HYDROXYCHLOROQUINE SULFATE 200 MG
200 TABLET ORAL
Qty: 0 | Refills: 0 | Status: DISCONTINUED | OUTPATIENT
Start: 2018-05-16 | End: 2018-05-17

## 2018-05-16 RX ORDER — INSULIN GLARGINE 100 [IU]/ML
12 INJECTION, SOLUTION SUBCUTANEOUS AT BEDTIME
Qty: 0 | Refills: 0 | Status: DISCONTINUED | OUTPATIENT
Start: 2018-05-16 | End: 2018-05-16

## 2018-05-16 RX ORDER — HEPARIN SODIUM 5000 [USP'U]/ML
5000 INJECTION INTRAVENOUS; SUBCUTANEOUS EVERY 8 HOURS
Qty: 0 | Refills: 0 | Status: DISCONTINUED | OUTPATIENT
Start: 2018-05-16 | End: 2018-05-17

## 2018-05-16 RX ORDER — INSULIN GLARGINE 100 [IU]/ML
12 INJECTION, SOLUTION SUBCUTANEOUS AT BEDTIME
Qty: 0 | Refills: 0 | Status: DISCONTINUED | OUTPATIENT
Start: 2018-05-16 | End: 2018-05-17

## 2018-05-16 RX ORDER — ALBUTEROL 90 UG/1
2.5 AEROSOL, METERED ORAL ONCE
Qty: 0 | Refills: 0 | Status: DISCONTINUED | OUTPATIENT
Start: 2018-05-16 | End: 2018-05-16

## 2018-05-16 RX ORDER — DEXTROSE 50 % IN WATER 50 %
12.5 SYRINGE (ML) INTRAVENOUS ONCE
Qty: 0 | Refills: 0 | Status: DISCONTINUED | OUTPATIENT
Start: 2018-05-16 | End: 2018-05-17

## 2018-05-16 RX ORDER — INSULIN LISPRO 100/ML
VIAL (ML) SUBCUTANEOUS AT BEDTIME
Qty: 0 | Refills: 0 | Status: DISCONTINUED | OUTPATIENT
Start: 2018-05-16 | End: 2018-05-16

## 2018-05-16 RX ORDER — SODIUM POLYSTYRENE SULFONATE 4.1 MEQ/G
30 POWDER, FOR SUSPENSION ORAL ONCE
Qty: 0 | Refills: 0 | Status: COMPLETED | OUTPATIENT
Start: 2018-05-16 | End: 2018-05-16

## 2018-05-16 RX ORDER — INSULIN LISPRO 100/ML
VIAL (ML) SUBCUTANEOUS
Qty: 0 | Refills: 0 | Status: DISCONTINUED | OUTPATIENT
Start: 2018-05-16 | End: 2018-05-17

## 2018-05-16 RX ORDER — GLUCAGON INJECTION, SOLUTION 0.5 MG/.1ML
1 INJECTION, SOLUTION SUBCUTANEOUS ONCE
Qty: 0 | Refills: 0 | Status: DISCONTINUED | OUTPATIENT
Start: 2018-05-16 | End: 2018-05-17

## 2018-05-16 RX ORDER — METOPROLOL TARTRATE 50 MG
25 TABLET ORAL DAILY
Qty: 0 | Refills: 0 | Status: DISCONTINUED | OUTPATIENT
Start: 2018-05-16 | End: 2018-05-17

## 2018-05-16 RX ADMIN — ATORVASTATIN CALCIUM 10 MILLIGRAM(S): 80 TABLET, FILM COATED ORAL at 22:10

## 2018-05-16 RX ADMIN — INSULIN GLARGINE 12 UNIT(S): 100 INJECTION, SOLUTION SUBCUTANEOUS at 22:29

## 2018-05-16 RX ADMIN — HEPARIN SODIUM 5000 UNIT(S): 5000 INJECTION INTRAVENOUS; SUBCUTANEOUS at 22:11

## 2018-05-16 RX ADMIN — Medication 1 PATCH: at 22:11

## 2018-05-16 RX ADMIN — HYDROMORPHONE HYDROCHLORIDE 8 MILLIGRAM(S): 2 INJECTION INTRAMUSCULAR; INTRAVENOUS; SUBCUTANEOUS at 22:11

## 2018-05-16 RX ADMIN — Medication 1: at 22:24

## 2018-05-16 RX ADMIN — Medication 50 MILLIEQUIVALENT(S): at 18:30

## 2018-05-16 RX ADMIN — Medication 40 MILLIGRAM(S): at 18:30

## 2018-05-16 RX ADMIN — ALBUTEROL 2.5 MILLIGRAM(S): 90 AEROSOL, METERED ORAL at 17:47

## 2018-05-16 RX ADMIN — Medication 200 MILLIGRAM(S): at 22:11

## 2018-05-16 RX ADMIN — SODIUM POLYSTYRENE SULFONATE 30 GRAM(S): 4.1 POWDER, FOR SUSPENSION ORAL at 22:10

## 2018-05-16 RX ADMIN — Medication 200 GRAM(S): at 17:47

## 2018-05-16 RX ADMIN — Medication 25 MILLIGRAM(S): at 22:11

## 2018-05-16 RX ADMIN — Medication 20 MILLIGRAM(S): at 22:10

## 2018-05-16 RX ADMIN — AMLODIPINE BESYLATE 10 MILLIGRAM(S): 2.5 TABLET ORAL at 22:10

## 2018-05-16 RX ADMIN — Medication 650 MILLIGRAM(S): at 22:10

## 2018-05-16 RX ADMIN — HYDROMORPHONE HYDROCHLORIDE 8 MILLIGRAM(S): 2 INJECTION INTRAMUSCULAR; INTRAVENOUS; SUBCUTANEOUS at 23:20

## 2018-05-16 NOTE — H&P ADULT - PROBLEM SELECTOR PLAN 6
- LE swelling at baseline. Likely component for CKD fluid overloading and amlodipine use.  - Continue to monitor fluid status. C/w lasix 40mg daily recommended per nephrology.

## 2018-05-16 NOTE — H&P ADULT - HISTORY OF PRESENT ILLNESS
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 Castro nephrology's office for elevated potassium level. Patient was seen by Dr. Castro yesterday in clinic for IVA on CKD. Extensive w/u including renal and bladder US underway. Patient had incidental finding of K 6.0 in office and subsequently sent in. In the ER, patient was again noted to have elevated K 6.3, s/p albuterol x 2, lasix 40mg IVP & calcium gluconate and repeat K 6.1. Patient again denied sob, chest pain, palpitation, dizziness, HA, abd pain, n/v at the time of interview. Continue to have chronic diarrhea at baseline, nonbloody in nature.    Of note, patient was last seen in rheumatology clinic 5/11. Finished with prednisone taper 5mg on that day and was started on Plaquenil 200mg BID. Was placed back on 20mg prednisone daily the next day after phone notice from rheumatology's office. Patient was sent to Dr. Castro's office on 5/15 for IVA on CKD with worsening proteinuria. This is believed likely from lupus nephritis despite dsDNA, haptoglobin all improved with prednisone and stopping biologicals. Patient continue to have bilateral LE swelling at baseline, unchanged after amlodipine dose increase after last Cardiology visit for BP control.

## 2018-05-16 NOTE — H&P ADULT - PROBLEM SELECTOR PLAN 5
- BP slightly elevated to 165/78 on admission. Consistent with outpatient record trend.  - Monitor VS per unit routine. C/w outpatient regimen 10mg amlodipine daily.  - Will give additional hydralazine as needed for bp control.

## 2018-05-16 NOTE — H&P ADULT - PROBLEM SELECTOR PLAN 9
- DVT ppx w/ heparin sq.  - Diabetic diet with low K ordered. -Spent 5-minutes counseling patient on importance of tobacco cessation. D/w patient risks of ongoing tobacco use increases risk of MI, COPD, malignancy, stroke, and other conditions. Patient reports understand and agrees with trial of nicotine patch while in the hospital. Questions answered.

## 2018-05-16 NOTE — H&P ADULT - ATTENDING COMMENTS
Patient assigned to me by night hospitalist in charge for management and care for patient for this evening only. Care to be resumed by day hospitalist in the morning and thereafter.     Patient seen and examined at bedside. Agree with the resident note above and changes made to note above where appropriate.

## 2018-05-16 NOTE — H&P ADULT - ASSESSMENT
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 Castro nephrology's office for elevated potassium level in the setting of IVA on CKD.

## 2018-05-16 NOTE — H&P ADULT - NSHPPHYSICALEXAM_GEN_ALL_CORE
T(C): 36.7 (05-16-18 @ 19:57), Max: 36.9 (05-16-18 @ 14:32)  HR: 75 (05-16-18 @ 19:57) (75 - 88)  BP: 162/70 (05-16-18 @ 19:57) (155/74 - 165/78)  RR: 18 (05-16-18 @ 19:57) (16 - 18)  SpO2: 98% (05-16-18 @ 19:57) (98% - 100%)  Wt(kg): --  GENERAL: NAD, well-developed  HEAD:  Atraumatic, Normocephalic  EYES: EOMI, PERRLA, conjunctiva and sclera clear  NECK: Supple, No JVD  CHEST/LUNG: Clear to auscultation bilaterally; No wheeze  HEART: Regular rate and rhythm; No murmurs, rubs, or gallops  ABDOMEN: Soft, Nontender, Nondistended; Bowel sounds present  EXTREMITIES:  2+ Peripheral Pulses, No clubbing, cyanosis, or edema  PSYCH: AAOx3  NEUROLOGY: non-focal  SKIN: No rashes or lesions

## 2018-05-16 NOTE — ED PROVIDER NOTE - PMH
Carotid stenosis, left    Cigarette smoker    Coronary artery disease    Diabetes mellitus  Type II, diagnosed in 2000  Gastroesophageal reflux disease    H/O acute pancreatitis  first attack 1999 then 2003  on medication-  H/O rheumatoid arthritis  on medication  H/O sleep apnea    H/O: HTN (hypertension)    History of blood clots  h/o hepatic vein thrombosis 2014- tx with blood thinnes  History of MI (myocardial infarction)  in 2002  Hyperlipidemia    Osteoarthritis    Rheumatoid arthritis

## 2018-05-16 NOTE — PROGRESS NOTE ADULT - SUBJECTIVE AND OBJECTIVE BOX
[facility] Division of Kidney Diseases & Hypertension  FOLLOW UP NOTE  --------------------------------------------------------------------------------    HPI: 63 years old man with extensive past medical history but most recently drug-induced lupus from biologics for RA.  I saw and evaluated him in the office yesterday for acute kidney injury.  On repeat labs his potassium was found to be elevated and I asked him to come to the ED.  The patient was seen and evaluated in the ED.  He has no dyspnea no chest pain.  His swelling is stable.        PAST HISTORY  --------------------------------------------------------------------------------  No significant changes to PMH, PSH, FHx, SHx, unless otherwise noted    ALLERGIES & MEDICATIONS  --------------------------------------------------------------------------------  Allergies    No Known Allergies    Intolerances      Standing Inpatient Medications  ALBUTerol    0.083%. 2.5 milliGRAM(s) Nebulizer once  calcium gluconate IVPB 1 Gram(s) IV Intermittent Once  furosemide   Injectable 40 milliGRAM(s) IV Push Once  sodium bicarbonate  Injectable 50 milliEquivalent(s) IV Push Once    PRN Inpatient Medications      REVIEW OF SYSTEMS  --------------------------------------------------------------------------------  Gen: No fever  CV: no CP  Pulm: no dyspnea  GI: no abdominal pain    VITALS/PHYSICAL EXAM  --------------------------------------------------------------------------------  T(C): 36.9 (05-16-18 @ 14:32), Max: 36.9 (05-16-18 @ 14:32)  HR: 86 (05-16-18 @ 14:32) (86 - 86)  BP: 155/74 (05-16-18 @ 14:32) (155/74 - 155/74)  RR: 16 (05-16-18 @ 14:32) (16 - 16)  SpO2: 100% (05-16-18 @ 14:32) (100% - 100%)  Wt(kg): --    Weight (kg): 74.4 (05-16-18 @ 14:32)      Physical Exam:  	Gen: NAD  	HEENT: anicteric  	Pulm: CTA B/L  	CV: S1S2; no rub  	Abd: distended non tender   	: No suprapubic tenderness  	Neuro: No focal deficits, intact gait  	Psych: Normal affect and mood  	Skin: Warm, without rashes    LABS/STUDIES  --------------------------------------------------------------------------------              10.8   8.6   >-----------<  181      [05-16-18 @ 15:53]              31.5     134  |  102  |  28  ----------------------------<  368      [05-16-18 @ 15:53]  6.3   |  20  |  2.35        Ca     8.2     [05-16-18 @ 15:53]      Mg     1.7     [05-16-18 @ 15:53]      Phos  4.3     [05-16-18 @ 15:53]    TPro  6.9  /  Alb  3.7  /  TBili  0.3  /  DBili  x   /  AST  47  /  ALT  31  /  AlkPhos  85  [05-16-18 @ 15:53]          Creatinine Trend:  SCr 2.35 [05-16 @ 15:53]

## 2018-05-16 NOTE — ED ADULT NURSE NOTE - CHPI ED SYMPTOMS NEG
no decreased eating/drinking/no chills/no pain/no nausea/no fever/no headache/no loss of consciousness/no back pain/no dizziness/no vomiting

## 2018-05-16 NOTE — H&P ADULT - NSHPLABSRESULTS_GEN_ALL_CORE
(05-16 @ 15:53)                      10.8  8.6 )-----------( 181                 31.5    Neutrophils = 7.5 (87.2%)  Lymphocytes = 0.8 (9.6%)  Eosinophils = 0.0 (0.6%)  Basophils = 0.0 (0.5%)  Monocytes = 0.2 (2.2%)  Bands = --%    05-16    136  |  102  |  28<H>  ----------------------------<  253<H>  6.1<H>   |  24  |  2.15<H>    Ca    9.0      16 May 2018 18:56  Phos  4.3     05-16  Mg     1.7     05-16    TPro  6.9  /  Alb  3.7  /  TBili  0.3  /  DBili  x   /  AST  47<H>  /  ALT  31  /  AlkPhos  85  05-16    ( 16 May 2018 16:51 )   PT: 11.4 sec;   INR: 1.04 ratio;       PTT:27.9 sec      RVP:    Venous Blood Gas:  05-16 @ 15:53  7.28/49/24/22/41  VBG Lactate: 1.6    EKG: precordial leads with peaked T waves  LE doppler: No DVT  Renal US: medical dx

## 2018-05-16 NOTE — H&P ADULT - PROBLEM SELECTOR PLAN 4
- Patient has chronic diarrhea from pancreatitis (alcohol and gallstone)  - C/w pain control dilaudid 8mg QID (outpatient use 16mg BID, istop [id number], last prescription 3/12 for one month supply.)

## 2018-05-16 NOTE — H&P ADULT - NSHPREVIEWOFSYSTEMS_GEN_ALL_CORE
REVIEW OF SYSTEMS:    CONSTITUTIONAL: No weakness, fevers or chills  EYES/ENT: No visual changes;  No vertigo or throat pain   NECK: No pain or stiffness  RESPIRATORY: No cough, wheezing, hemoptysis; No shortness of breath  CARDIOVASCULAR: No chest pain or palpitations  GASTROINTESTINAL: Chronic abd pain. Chronic diarrhea.  GENITOURINARY: No dysuria, frequency or hematuria  NEUROLOGICAL: No numbness or weakness  SKIN: No itching, burning, rashes, or lesions   All other review of systems is negative unless indicated above.

## 2018-05-16 NOTE — H&P ADULT - PROBLEM SELECTOR PLAN 8
- RA sx at baseline. Pain controlled.  - C/w outpatient regimen HCQ 200mg BID and prednisone 20mg qday.

## 2018-05-16 NOTE — PROGRESS NOTE ADULT - PROBLEM SELECTOR PLAN 2
Recommend bladder and kidney sonogram to rule out obstruction.  Likely hemodyanmic in the setting of NSAIDs (last week), Lasix, and ACE I.  Less likely related to drug-induced lupus nephritis as all parameters (hapto, complements dsdna) are getting better.  Recommend hold ACE I.  Although the patient has edema he may actually be hypovolemic as he has no dyspnea and lungs are clear.  Would decrease maintenance lasix dose to 40 daily.

## 2018-05-16 NOTE — H&P ADULT - PROBLEM SELECTOR PLAN 3
- Patient has IVA on CKD evaluated by Dr. Matthew razo in clinic.   - Renal US today c/w medical dx without structural damage at this time.   - Likely component of lupus nephritis and diabetic nephropathy.   - Trend renal functions and f/u further renal recs.  - Started sodium bicarb 650 TID.

## 2018-05-16 NOTE — PROGRESS NOTE ADULT - PROBLEM SELECTOR PLAN 1
Hyperkalemia in the setting of IVA, hyperglycemia.  For acute management agree with lasix 40 IV X 1, albuterol, bicarb, calcium.  For chronic management would recommend discontinue ACE I, start sodium bicarbonate 650 po TID.  Monitor serum potassium, resume lasix but at 40 daily.

## 2018-05-16 NOTE — H&P ADULT - PROBLEM SELECTOR PLAN 1
- Patient potassium stable 6.1 from 6.3 on admission after albuterol nebs x 2, lasix 40mg IVP with adequate UOP.  - s/p calcium gluconate x 1 for peaked T wave on EKG. Monitor on telemetry overnight.  - Will have patient pause insulin pump at bedtime, and switch to SSI with qHS basal bolus for potassium control.  - Continue to trend potassium level overnight.

## 2018-05-16 NOTE — H&P ADULT - PROBLEM SELECTOR PLAN 2
- On admission, hyperglycemic to 350s with BMP. Patient asymptomatic at this time. No anion gap noted on BMP.  - Will have patient pause insulin pump and use SSI + bedtime basal lantus to control FSG overnight.   - Likely additional insulin needed. Will have patient pause insulin pump and use solely hospital SSI in order to prevent hypoglycemia.  - Endocrine consult in the am. - On admission, hyperglycemic to 350s with BMP. Patient asymptomatic at this time. No anion gap noted on BMP.  - Will have patient pause insulin pump and use SSI + bedtime basal lantus to control FSG overnight. (Will give 12u lantus, same as patient's basal bolus).  - Likely additional insulin needed. Will have patient pause insulin pump and use solely hospital SSI in order to prevent hypoglycemia.  - Endocrine consult in the am.

## 2018-05-16 NOTE — ED ADULT NURSE NOTE - OBJECTIVE STATEMENT
63M comes to ED sent by nephrologist (Dr Castro) for hyperkalemia. He has PMH HTN, MI, DM, kidney disease. He states "I feel fine". He denies chest pain/palpitations/muscle cramps/weakness/SOB/N/V/D/dizziness/fever/chills. States no change in urine output. He has insulin dependent DM- has insulin 63M comes to ED sent by nephrologist (Dr Castro) for hyperkalemia. He has PMH HTN, MI, DM, kidney disease. He states "I feel fine". He denies chest pain/palpitations/muscle cramps/weakness/SOB/N/V/D/dizziness/fever/chills. States no change in urine output. He has insulin dependent DM- has insulin pump to LLQ abdomen. He has right lower extremity edema, but has been exacerbated over the past 3 weeks. He has soft nondistended abdomen, clear lungs, moves all extremities. A&Ox3 in no acute distress. EKG was performed immediately. Will continue to monitor.

## 2018-05-16 NOTE — H&P ADULT - NSHPOUTPATIENTPROVIDERS_GEN_ALL_CORE
Dr. Benjamin Catsro nephrology  Dr. Coello rheumatology  Dr. Lisker cardiology  Dr. Kidd [facility]

## 2018-05-16 NOTE — H&P ADULT - PROBLEM SELECTOR PLAN 7
- No signs of active CAD sx: no angina, sob, RICARDO, dizziness, palpitations.   - C/w outpatient regimen ASA and plavix.   - C/w metoprolol succinate 25mg daily.

## 2018-05-17 ENCOUNTER — TRANSCRIPTION ENCOUNTER (OUTPATIENT)
Age: 63
End: 2018-05-17

## 2018-05-17 VITALS — WEIGHT: 164.02 LBS

## 2018-05-17 DIAGNOSIS — E78.5 HYPERLIPIDEMIA, UNSPECIFIED: ICD-10-CM

## 2018-05-17 DIAGNOSIS — I10 ESSENTIAL (PRIMARY) HYPERTENSION: ICD-10-CM

## 2018-05-17 DIAGNOSIS — F17.210 NICOTINE DEPENDENCE, CIGARETTES, UNCOMPLICATED: ICD-10-CM

## 2018-05-17 DIAGNOSIS — E11.65 TYPE 2 DIABETES MELLITUS WITH HYPERGLYCEMIA: ICD-10-CM

## 2018-05-17 LAB
ALBUMIN MFR SERPL ELPH: 53 %
ALBUMIN SERPL ELPH-MCNC: 4.3 G/DL
ALBUMIN SERPL-MCNC: 3.7 G/DL
ALBUMIN/GLOB SERPL: 1.2 RATIO
ALPHA1 GLOB MFR SERPL ELPH: 5.1 %
ALPHA1 GLOB SERPL ELPH-MCNC: 0.4 G/DL
ALPHA2 GLOB MFR SERPL ELPH: 10.7 %
ALPHA2 GLOB SERPL ELPH-MCNC: 0.7 G/DL
ANION GAP SERPL CALC-SCNC: 11 MMOL/L — SIGNIFICANT CHANGE UP (ref 5–17)
ANION GAP SERPL CALC-SCNC: 12 MMOL/L
ANION GAP SERPL CALC-SCNC: 13 MMOL/L — SIGNIFICANT CHANGE UP (ref 5–17)
ANION GAP SERPL CALC-SCNC: 14 MMOL/L — SIGNIFICANT CHANGE UP (ref 5–17)
APPEARANCE: CLEAR
APTT BLD: 28.9 SEC
B-GLOBULIN MFR SERPL ELPH: 11.7 %
B-GLOBULIN SERPL ELPH-MCNC: 0.8 G/DL
BACTERIA: NEGATIVE
BILIRUBIN URINE: NEGATIVE
BLOOD URINE: ABNORMAL
BUN SERPL-MCNC: 28 MG/DL
BUN SERPL-MCNC: 28 MG/DL — HIGH (ref 7–23)
BUN SERPL-MCNC: 29 MG/DL — HIGH (ref 7–23)
BUN SERPL-MCNC: 29 MG/DL — HIGH (ref 7–23)
CALCIUM SERPL-MCNC: 7.9 MG/DL — LOW (ref 8.4–10.5)
CALCIUM SERPL-MCNC: 8.5 MG/DL — SIGNIFICANT CHANGE UP (ref 8.4–10.5)
CALCIUM SERPL-MCNC: 9.1 MG/DL — SIGNIFICANT CHANGE UP (ref 8.4–10.5)
CALCIUM SERPL-MCNC: 9.4 MG/DL
CHLORIDE SERPL-SCNC: 102 MMOL/L — SIGNIFICANT CHANGE UP (ref 96–108)
CHLORIDE SERPL-SCNC: 104 MMOL/L
CHLORIDE SERPL-SCNC: 104 MMOL/L — SIGNIFICANT CHANGE UP (ref 96–108)
CHLORIDE SERPL-SCNC: 98 MMOL/L — SIGNIFICANT CHANGE UP (ref 96–108)
CHLORIDE UR-SCNC: <35 MMOL/L — SIGNIFICANT CHANGE UP
CO2 SERPL-SCNC: 21 MMOL/L
CO2 SERPL-SCNC: 23 MMOL/L — SIGNIFICANT CHANGE UP (ref 22–31)
CO2 SERPL-SCNC: 24 MMOL/L — SIGNIFICANT CHANGE UP (ref 22–31)
CO2 SERPL-SCNC: 24 MMOL/L — SIGNIFICANT CHANGE UP (ref 22–31)
COLOR: YELLOW
CREAT ?TM UR-MCNC: 241 MG/DL — SIGNIFICANT CHANGE UP
CREAT SERPL-MCNC: 2.11 MG/DL — HIGH (ref 0.5–1.3)
CREAT SERPL-MCNC: 2.16 MG/DL — HIGH (ref 0.5–1.3)
CREAT SERPL-MCNC: 2.19 MG/DL — HIGH (ref 0.5–1.3)
CREAT SERPL-MCNC: 2.38 MG/DL
CREAT SPEC-SCNC: 81 MG/DL
CREAT/PROT UR: 1 RATIO
DEPRECATED KAPPA LC FREE/LAMBDA SER: 1.03 RATIO
DEPRECATED KAPPA LC FREE/LAMBDA SER: 1.03 RATIO
GAMMA GLOB FLD ELPH-MCNC: 1.3 G/DL
GAMMA GLOB MFR SERPL ELPH: 19.5 %
GLUCOSE BLDC GLUCOMTR-MCNC: 127 MG/DL — HIGH (ref 70–99)
GLUCOSE BLDC GLUCOMTR-MCNC: 205 MG/DL — HIGH (ref 70–99)
GLUCOSE BLDC GLUCOMTR-MCNC: 212 MG/DL — HIGH (ref 70–99)
GLUCOSE BLDC GLUCOMTR-MCNC: 220 MG/DL — HIGH (ref 70–99)
GLUCOSE QUALITATIVE U: NEGATIVE MG/DL
GLUCOSE SERPL-MCNC: 167 MG/DL — HIGH (ref 70–99)
GLUCOSE SERPL-MCNC: 177 MG/DL — HIGH (ref 70–99)
GLUCOSE SERPL-MCNC: 224 MG/DL
GLUCOSE SERPL-MCNC: 352 MG/DL — HIGH (ref 70–99)
HAPTOGLOB SERPL-MCNC: 95 MG/DL — SIGNIFICANT CHANGE UP (ref 34–200)
HBA1C BLD-MCNC: 7.1 % — HIGH (ref 4–5.6)
HCT VFR BLD CALC: 35.1 % — LOW (ref 39–50)
HGB BLD-MCNC: 11.6 G/DL — LOW (ref 13–17)
HYALINE CASTS: 1 /LPF
IGA SER QL IEP: 320 MG/DL
IGG SER QL IEP: 1290 MG/DL
IGM SER QL IEP: 277 MG/DL
INR PPP: 0.95 RATIO
INTERPRETATION SERPL IEP-IMP: NORMAL
KAPPA LC CSF-MCNC: 7.31 MG/DL
KAPPA LC CSF-MCNC: 7.31 MG/DL
KAPPA LC SERPL-MCNC: 7.53 MG/DL
KAPPA LC SERPL-MCNC: 7.53 MG/DL
KETONES URINE: NEGATIVE
LDH SERPL L TO P-CCNC: 260 U/L — HIGH (ref 50–242)
LEUKOCYTE ESTERASE URINE: NEGATIVE
M PROTEIN SPEC IFE-MCNC: NORMAL
MAGNESIUM SERPL-MCNC: 1.8 MG/DL — SIGNIFICANT CHANGE UP (ref 1.6–2.6)
MCHC RBC-ENTMCNC: 33.2 GM/DL — SIGNIFICANT CHANGE UP (ref 32–36)
MCHC RBC-ENTMCNC: 33.5 PG — SIGNIFICANT CHANGE UP (ref 27–34)
MCV RBC AUTO: 101 FL — HIGH (ref 80–100)
MICROSCOPIC-UA: NORMAL
MPO AB + PR3 PNL SER: NORMAL
NITRITE URINE: NEGATIVE
OSMOLALITY UR: 519 MOS/KG — SIGNIFICANT CHANGE UP (ref 300–900)
PH URINE: 5.5
PHOSPHATE SERPL-MCNC: 4 MG/DL
PHOSPHATE SERPL-MCNC: 4.9 MG/DL — HIGH (ref 2.5–4.5)
PLATELET # BLD AUTO: 218 K/UL — SIGNIFICANT CHANGE UP (ref 150–400)
POTASSIUM SERPL-MCNC: 4.7 MMOL/L — SIGNIFICANT CHANGE UP (ref 3.5–5.3)
POTASSIUM SERPL-MCNC: 5 MMOL/L — SIGNIFICANT CHANGE UP (ref 3.5–5.3)
POTASSIUM SERPL-MCNC: 6 MMOL/L — HIGH (ref 3.5–5.3)
POTASSIUM SERPL-SCNC: 4.7 MMOL/L — SIGNIFICANT CHANGE UP (ref 3.5–5.3)
POTASSIUM SERPL-SCNC: 5 MMOL/L — SIGNIFICANT CHANGE UP (ref 3.5–5.3)
POTASSIUM SERPL-SCNC: 6 MMOL/L
POTASSIUM SERPL-SCNC: 6 MMOL/L — HIGH (ref 3.5–5.3)
POTASSIUM UR-SCNC: 62 MMOL/L — SIGNIFICANT CHANGE UP
PROT SERPL-MCNC: 6.9 G/DL
PROT UR-MCNC: 85 MG/DL
PROTEIN URINE: 100 MG/DL
PT BLD: 10.7 SEC
RBC # BLD: 3.47 M/UL — LOW (ref 4.2–5.8)
RBC # FLD: 11.3 % — SIGNIFICANT CHANGE UP (ref 10.3–14.5)
RED BLOOD CELLS URINE: 3 /HPF
SODIUM SERPL-SCNC: 135 MMOL/L — SIGNIFICANT CHANGE UP (ref 135–145)
SODIUM SERPL-SCNC: 137 MMOL/L
SODIUM SERPL-SCNC: 139 MMOL/L — SIGNIFICANT CHANGE UP (ref 135–145)
SODIUM SERPL-SCNC: 139 MMOL/L — SIGNIFICANT CHANGE UP (ref 135–145)
SODIUM UR-SCNC: 36 MMOL/L — SIGNIFICANT CHANGE UP
SPECIFIC GRAVITY URINE: 1.01
SQUAMOUS EPITHELIAL CELLS: 0 /HPF
UROBILINOGEN URINE: NEGATIVE MG/DL
UUN UR-MCNC: 665 MG/DL — SIGNIFICANT CHANGE UP
WBC # BLD: 12.6 K/UL — HIGH (ref 3.8–10.5)
WBC # FLD AUTO: 12.6 K/UL — HIGH (ref 3.8–10.5)
WHITE BLOOD CELLS URINE: 0 /HPF

## 2018-05-17 PROCEDURE — 76770 US EXAM ABDO BACK WALL COMP: CPT

## 2018-05-17 PROCEDURE — 93971 EXTREMITY STUDY: CPT

## 2018-05-17 PROCEDURE — 99285 EMERGENCY DEPT VISIT HI MDM: CPT | Mod: 25

## 2018-05-17 PROCEDURE — 85730 THROMBOPLASTIN TIME PARTIAL: CPT

## 2018-05-17 PROCEDURE — 99239 HOSP IP/OBS DSCHRG MGMT >30: CPT

## 2018-05-17 PROCEDURE — 12345: CPT | Mod: NC

## 2018-05-17 PROCEDURE — 80053 COMPREHEN METABOLIC PANEL: CPT

## 2018-05-17 PROCEDURE — 82435 ASSAY OF BLOOD CHLORIDE: CPT

## 2018-05-17 PROCEDURE — 82947 ASSAY GLUCOSE BLOOD QUANT: CPT

## 2018-05-17 PROCEDURE — 96375 TX/PRO/DX INJ NEW DRUG ADDON: CPT

## 2018-05-17 PROCEDURE — 83935 ASSAY OF URINE OSMOLALITY: CPT

## 2018-05-17 PROCEDURE — 82436 ASSAY OF URINE CHLORIDE: CPT

## 2018-05-17 PROCEDURE — 85610 PROTHROMBIN TIME: CPT

## 2018-05-17 PROCEDURE — 96374 THER/PROPH/DIAG INJ IV PUSH: CPT

## 2018-05-17 PROCEDURE — 84295 ASSAY OF SERUM SODIUM: CPT

## 2018-05-17 PROCEDURE — 82962 GLUCOSE BLOOD TEST: CPT

## 2018-05-17 PROCEDURE — 82088 ASSAY OF ALDOSTERONE: CPT

## 2018-05-17 PROCEDURE — 83605 ASSAY OF LACTIC ACID: CPT

## 2018-05-17 PROCEDURE — 84132 ASSAY OF SERUM POTASSIUM: CPT

## 2018-05-17 PROCEDURE — 85014 HEMATOCRIT: CPT

## 2018-05-17 PROCEDURE — 94640 AIRWAY INHALATION TREATMENT: CPT

## 2018-05-17 PROCEDURE — 93005 ELECTROCARDIOGRAM TRACING: CPT

## 2018-05-17 PROCEDURE — 82330 ASSAY OF CALCIUM: CPT

## 2018-05-17 PROCEDURE — 84244 ASSAY OF RENIN: CPT

## 2018-05-17 PROCEDURE — 83010 ASSAY OF HAPTOGLOBIN QUANT: CPT

## 2018-05-17 PROCEDURE — 99233 SBSQ HOSP IP/OBS HIGH 50: CPT

## 2018-05-17 PROCEDURE — 84133 ASSAY OF URINE POTASSIUM: CPT

## 2018-05-17 PROCEDURE — 85027 COMPLETE CBC AUTOMATED: CPT

## 2018-05-17 PROCEDURE — 83615 LACTATE (LD) (LDH) ENZYME: CPT

## 2018-05-17 PROCEDURE — 99223 1ST HOSP IP/OBS HIGH 75: CPT | Mod: GC

## 2018-05-17 PROCEDURE — 82570 ASSAY OF URINE CREATININE: CPT

## 2018-05-17 PROCEDURE — 83036 HEMOGLOBIN GLYCOSYLATED A1C: CPT

## 2018-05-17 PROCEDURE — 84100 ASSAY OF PHOSPHORUS: CPT

## 2018-05-17 PROCEDURE — 80048 BASIC METABOLIC PNL TOTAL CA: CPT

## 2018-05-17 PROCEDURE — 84540 ASSAY OF URINE/UREA-N: CPT

## 2018-05-17 PROCEDURE — 83735 ASSAY OF MAGNESIUM: CPT

## 2018-05-17 PROCEDURE — 82803 BLOOD GASES ANY COMBINATION: CPT

## 2018-05-17 PROCEDURE — 84300 ASSAY OF URINE SODIUM: CPT

## 2018-05-17 RX ORDER — FUROSEMIDE 40 MG
2 TABLET ORAL
Qty: 0 | Refills: 0 | COMMUNITY

## 2018-05-17 RX ORDER — AMLODIPINE BESYLATE 2.5 MG/1
1 TABLET ORAL
Qty: 30 | Refills: 0
Start: 2018-05-17 | End: 2018-06-15

## 2018-05-17 RX ORDER — CELECOXIB 200 MG/1
400 CAPSULE ORAL DAILY
Qty: 0 | Refills: 0 | Status: DISCONTINUED | OUTPATIENT
Start: 2018-05-17 | End: 2018-05-17

## 2018-05-17 RX ORDER — SODIUM BICARBONATE 1 MEQ/ML
1 SYRINGE (ML) INTRAVENOUS
Qty: 0 | Refills: 0 | DISCHARGE
Start: 2018-05-17 | End: 2018-06-15

## 2018-05-17 RX ORDER — AMLODIPINE BESYLATE 2.5 MG/1
1 TABLET ORAL
Qty: 30 | Refills: 0 | OUTPATIENT
Start: 2018-05-17 | End: 2018-06-15

## 2018-05-17 RX ORDER — SODIUM BICARBONATE 1 MEQ/ML
1 SYRINGE (ML) INTRAVENOUS
Qty: 90 | Refills: 0
Start: 2018-05-17 | End: 2018-06-15

## 2018-05-17 RX ORDER — FUROSEMIDE 40 MG
40 TABLET ORAL ONCE
Qty: 0 | Refills: 0 | Status: COMPLETED | OUTPATIENT
Start: 2018-05-17 | End: 2018-05-17

## 2018-05-17 RX ORDER — INSULIN LISPRO 100/ML
5 VIAL (ML) SUBCUTANEOUS
Qty: 0 | Refills: 0 | Status: DISCONTINUED | OUTPATIENT
Start: 2018-05-17 | End: 2018-05-17

## 2018-05-17 RX ORDER — AMLODIPINE BESYLATE AND BENAZEPRIL HYDROCHLORIDE 10; 20 MG/1; MG/1
1 CAPSULE ORAL
Qty: 0 | Refills: 0 | COMMUNITY

## 2018-05-17 RX ORDER — SODIUM CHLORIDE 9 MG/ML
250 INJECTION INTRAMUSCULAR; INTRAVENOUS; SUBCUTANEOUS ONCE
Qty: 0 | Refills: 0 | Status: COMPLETED | OUTPATIENT
Start: 2018-05-17 | End: 2018-05-17

## 2018-05-17 RX ORDER — METOPROLOL TARTRATE 50 MG
1 TABLET ORAL
Qty: 0 | Refills: 0 | DISCHARGE
Start: 2018-05-17

## 2018-05-17 RX ORDER — METOPROLOL TARTRATE 50 MG
1 TABLET ORAL
Qty: 0 | Refills: 0 | COMMUNITY
Start: 2018-05-17

## 2018-05-17 RX ORDER — ASPIRIN/CALCIUM CARB/MAGNESIUM 324 MG
81 TABLET ORAL DAILY
Qty: 0 | Refills: 0 | Status: DISCONTINUED | OUTPATIENT
Start: 2018-05-17 | End: 2018-05-17

## 2018-05-17 RX ORDER — AMLODIPINE BESYLATE 2.5 MG/1
1 TABLET ORAL
Qty: 0 | Refills: 0 | COMMUNITY

## 2018-05-17 RX ORDER — SODIUM POLYSTYRENE SULFONATE 4.1 MEQ/G
30 POWDER, FOR SUSPENSION ORAL ONCE
Qty: 0 | Refills: 0 | Status: COMPLETED | OUTPATIENT
Start: 2018-05-17 | End: 2018-05-17

## 2018-05-17 RX ADMIN — Medication 650 MILLIGRAM(S): at 13:15

## 2018-05-17 RX ADMIN — Medication 1 PATCH: at 11:29

## 2018-05-17 RX ADMIN — Medication 650 MILLIGRAM(S): at 06:22

## 2018-05-17 RX ADMIN — PANTOPRAZOLE SODIUM 40 MILLIGRAM(S): 20 TABLET, DELAYED RELEASE ORAL at 06:22

## 2018-05-17 RX ADMIN — SODIUM CHLORIDE 1000 MILLILITER(S): 9 INJECTION INTRAMUSCULAR; INTRAVENOUS; SUBCUTANEOUS at 13:12

## 2018-05-17 RX ADMIN — Medication 200 MILLIGRAM(S): at 06:22

## 2018-05-17 RX ADMIN — HEPARIN SODIUM 5000 UNIT(S): 5000 INJECTION INTRAVENOUS; SUBCUTANEOUS at 06:22

## 2018-05-17 RX ADMIN — SODIUM POLYSTYRENE SULFONATE 30 GRAM(S): 4.1 POWDER, FOR SUSPENSION ORAL at 12:57

## 2018-05-17 RX ADMIN — Medication 20 MILLIGRAM(S): at 06:22

## 2018-05-17 RX ADMIN — HYDROMORPHONE HYDROCHLORIDE 8 MILLIGRAM(S): 2 INJECTION INTRAMUSCULAR; INTRAVENOUS; SUBCUTANEOUS at 07:13

## 2018-05-17 RX ADMIN — Medication 40 MILLIGRAM(S): at 14:01

## 2018-05-17 RX ADMIN — HEPARIN SODIUM 5000 UNIT(S): 5000 INJECTION INTRAVENOUS; SUBCUTANEOUS at 13:14

## 2018-05-17 RX ADMIN — Medication 2: at 09:27

## 2018-05-17 RX ADMIN — Medication 200 MILLIGRAM(S): at 17:20

## 2018-05-17 RX ADMIN — HYDROMORPHONE HYDROCHLORIDE 8 MILLIGRAM(S): 2 INJECTION INTRAMUSCULAR; INTRAVENOUS; SUBCUTANEOUS at 12:30

## 2018-05-17 RX ADMIN — HYDROMORPHONE HYDROCHLORIDE 8 MILLIGRAM(S): 2 INJECTION INTRAMUSCULAR; INTRAVENOUS; SUBCUTANEOUS at 06:22

## 2018-05-17 RX ADMIN — CLOPIDOGREL BISULFATE 75 MILLIGRAM(S): 75 TABLET, FILM COATED ORAL at 11:29

## 2018-05-17 RX ADMIN — Medication 25 MILLIGRAM(S): at 06:21

## 2018-05-17 RX ADMIN — AMLODIPINE BESYLATE 10 MILLIGRAM(S): 2.5 TABLET ORAL at 06:21

## 2018-05-17 RX ADMIN — HYDROMORPHONE HYDROCHLORIDE 8 MILLIGRAM(S): 2 INJECTION INTRAMUSCULAR; INTRAVENOUS; SUBCUTANEOUS at 11:28

## 2018-05-17 RX ADMIN — HYDROMORPHONE HYDROCHLORIDE 8 MILLIGRAM(S): 2 INJECTION INTRAMUSCULAR; INTRAVENOUS; SUBCUTANEOUS at 17:20

## 2018-05-17 NOTE — PROGRESS NOTE ADULT - ASSESSMENT
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 Castro nephrology's office for elevated potassium level in the setting of IVA on CKD.

## 2018-05-17 NOTE — CONSULT NOTE ADULT - PROBLEM SELECTOR RECOMMENDATION 9
- patient's insulin pump was discontinued last evening and patient was given Lantus 12u and started on low correctional scale  - hyperglycemia exacerbated by high dose steroids  - would start Humalog 5u TID with meals  - if patient discharged today, can restart insulin pump around 8pm this evening. Patient does not want to make any adjustments to insulin pump at this time  - patient to follow up with Dr. Estrada (endocrinology) at [address] [place] [address] [phone].

## 2018-05-17 NOTE — DISCHARGE NOTE ADULT - CARE PLAN
Principal Discharge DX:	Hyperkalemia  Goal:	Management  Assessment and plan of treatment:	Likely due to your chronic kidney disease and use of NSAIDs and benazepril, your kidneys have difficulty clearing potassium.  Please stop taking celecoxib and benazepril, continue to take sodium bicarbonate as prescribed, and follow up with your Nephrologist, Dr José Luis Castro, within a week of discharge.  Secondary Diagnosis:	IVA (acute kidney injury)  Goal:	Resolution  Assessment and plan of treatment:	Due to NSAID use, your kidneys became injured.  Please avoid use of celecoxib and follow up with your Nephrologist, Dr José Luis Castro, within a week of discharge. Principal Discharge DX:	Hyperkalemia  Goal:	Management  Assessment and plan of treatment:	Likely due to your chronic kidney disease and use of NSAIDs and benazepril, your kidneys have difficulty clearing potassium.  Please stop taking celecoxib and benazepril, take only one Lasix 40mg by mouth daily, and continue to take sodium bicarbonate as prescribed, and follow up with your Nephrologist, Dr José Luis Castro, within a week of discharge.  Secondary Diagnosis:	IVA (acute kidney injury)  Goal:	Resolution  Assessment and plan of treatment:	Due to NSAID use, your kidneys became injured.  Please avoid use of celecoxib and follow up with your Nephrologist, Dr José Luis Castro, within a week of discharge.

## 2018-05-17 NOTE — PROGRESS NOTE ADULT - PROBLEM SELECTOR PLAN 2
- On admission, hyperglycemic to 350s with BMP. Patient asymptomatic at this time. No anion gap noted on BMP.  - insulin pump paused and given 12u lantus overnight - same as patient's basal bolus  - Likely additional insulin needed. Will have patient pause insulin pump and use solely hospital SSI in order to prevent hypoglycemia.  - Endocrine following

## 2018-05-17 NOTE — PROGRESS NOTE ADULT - PROBLEM SELECTOR PLAN 3
- Patient has IVA on CKD evaluated by Dr. Castro yesterday in clinic.   - Renal US today c/w medical renal dz without structural damage or hydronephrosis seen at this time   - Likely component of lupus nephritis and diabetic nephropathy.   - Trend renal functions and f/u further renal recs.  - c/w sodium bicarb 650 TID. - Patient has IVA on CKD evaluated by Dr. Castro yesterday in clinic - Scr on this admission was 2.35 now downtrending to 2.15 this AM (baseline in 12/2016 ~1.6-1.7)  - Renal US today c/w medical renal dz without structural damage or hydronephrosis seen at this time   - Likely component of lupus nephritis and diabetic nephropathy - possibly POD  - Trend renal functions and f/u further renal recs.  - c/w sodium bicarb 650 TID.

## 2018-05-17 NOTE — ADVANCED PRACTICE NURSE CONSULT - RECOMMEDATIONS
S/S, prevention and appropriate treatment of hypo-hyperglycemia reviewed with pt and verbalized understanding obtained via the teach-back method.  Endocrine team to follow.

## 2018-05-17 NOTE — DISCHARGE NOTE ADULT - MEDICATION SUMMARY - MEDICATIONS TO STOP TAKING
I will STOP taking the medications listed below when I get home from the hospital:    CeleBREX 200 mg oral capsule  -- 1 cap(s) by mouth 2 times a day, As Needed, last dose 11/29/2016 MDD:2    amlodipine-benazepril 5 mg-20 mg oral capsule  -- 1 cap(s) by mouth once a day    ***see internal teressa***    CeleBREX 200 mg oral capsule  -- 2 cap(s) by mouth , As Needed

## 2018-05-17 NOTE — PROGRESS NOTE ADULT - ATTENDING COMMENTS
Hyperkalemia improved with saline and lasix. D/c home today. Discussed with Dr Castro nephrologist.     D/c time 40 mins.

## 2018-05-17 NOTE — PROGRESS NOTE ADULT - SUBJECTIVE AND OBJECTIVE BOX
Patient is a 63y old  Male who presents with a chief complaint of hyperkalemia (16 May 2018 19:58)      SUBJECTIVE / OVERNIGHT EVENTS:    MEDICATIONS  (STANDING):  amLODIPine   Tablet 10 milliGRAM(s) Oral daily  atorvastatin 10 milliGRAM(s) Oral at bedtime  clopidogrel Tablet 75 milliGRAM(s) Oral daily  dextrose 50% Injectable 12.5 Gram(s) IV Push once  dextrose 50% Injectable 25 Gram(s) IV Push once  dextrose 50% Injectable 25 Gram(s) IV Push once  furosemide    Tablet 40 milliGRAM(s) Oral at bedtime  heparin  Injectable 5000 Unit(s) SubCutaneous every 8 hours  HYDROmorphone   Tablet 8 milliGRAM(s) Oral every 6 hours  hydroxychloroquine 200 milliGRAM(s) Oral two times a day  insulin glargine Injectable (LANTUS) 12 Unit(s) SubCutaneous at bedtime  insulin lispro (HumaLOG) corrective regimen sliding scale   SubCutaneous three times a day before meals  insulin lispro (HumaLOG) corrective regimen sliding scale   SubCutaneous at bedtime  metoprolol succinate ER 25 milliGRAM(s) Oral daily  nicotine -   7 mG/24Hr(s) Patch 1 patch Transdermal daily  pantoprazole    Tablet 40 milliGRAM(s) Oral before breakfast  predniSONE   Tablet 20 milliGRAM(s) Oral daily  sodium bicarbonate 650 milliGRAM(s) Oral three times a day    MEDICATIONS  (PRN):  dextrose 40% Gel 15 Gram(s) Oral once PRN Blood Glucose LESS THAN 70 milliGRAM(s)/deciliter  glucagon  Injectable 1 milliGRAM(s) IntraMuscular once PRN Glucose LESS THAN 70 milligrams/deciliter        CAPILLARY BLOOD GLUCOSE      POCT Blood Glucose.: 279 mg/dL (16 May 2018 22:18)  POCT Blood Glucose.: 380 mg/dL (16 May 2018 15:39)    I&O's Summary      PHYSICAL EXAM:  VS: T 97.8, HR 85, /79, RR 18, SpO2 96  GENERAL: NAD, well-developed  HEAD:  Atraumatic, Normocephalic  EYES: EOMI, PERRLA, conjunctiva and sclera clear  NECK: Supple, No JVD  CHEST/LUNG: Clear to auscultation bilaterally; No wheeze  HEART: Regular rate and rhythm; No murmurs, rubs, or gallops  ABDOMEN: Soft, Nontender, Nondistended; Bowel sounds present  EXTREMITIES:  2+ Peripheral Pulses, No clubbing, cyanosis, or edema  PSYCH: AAOx3  NEUROLOGY: non-focal  SKIN: No rashes or lesions    LABS:                        10.8   8.6   )-----------( 181      ( 16 May 2018 15:53 )             31.5     05-17    139  |  104  |  29<H>  ----------------------------<  177<H>  4.7   |  24  |  2.19<H>    Ca    8.5      17 May 2018 00:32  Phos  4.3     05-16  Mg     1.7     05-16    TPro  6.9  /  Alb  3.7  /  TBili  0.3  /  DBili  x   /  AST  47<H>  /  ALT  31  /  AlkPhos  85  05-16    PT/INR - ( 16 May 2018 16:51 )   PT: 11.4 sec;   INR: 1.04 ratio         PTT - ( 16 May 2018 16:51 )  PTT:27.9 sec          RADIOLOGY & ADDITIONAL TESTS:    No new additional studies. Patient is a 63y old  Male who presents with a chief complaint of hyperkalemia (16 May 2018 19:58)      SUBJECTIVE / OVERNIGHT EVENTS:  Pt feels well this morning complaining only of chronic lower back pain but well-controlled on current pain regimen. Pt still w/ LE swelling but he states that it is much improved since admission and at his baseline. LE swelling has been an intermittent issue for him chronically over the past few years - does not recall an inciting factor/event. Reports having multiple US to r/o DVT that have all been neg. Denies SOB, CP, palps, fevers, chills, n/v/d/c.     MEDICATIONS  (STANDING):  amLODIPine   Tablet 10 milliGRAM(s) Oral daily  atorvastatin 10 milliGRAM(s) Oral at bedtime  clopidogrel Tablet 75 milliGRAM(s) Oral daily  dextrose 50% Injectable 12.5 Gram(s) IV Push once  dextrose 50% Injectable 25 Gram(s) IV Push once  dextrose 50% Injectable 25 Gram(s) IV Push once  furosemide    Tablet 40 milliGRAM(s) Oral at bedtime  heparin  Injectable 5000 Unit(s) SubCutaneous every 8 hours  HYDROmorphone   Tablet 8 milliGRAM(s) Oral every 6 hours  hydroxychloroquine 200 milliGRAM(s) Oral two times a day  insulin glargine Injectable (LANTUS) 12 Unit(s) SubCutaneous at bedtime  insulin lispro (HumaLOG) corrective regimen sliding scale   SubCutaneous three times a day before meals  insulin lispro (HumaLOG) corrective regimen sliding scale   SubCutaneous at bedtime  metoprolol succinate ER 25 milliGRAM(s) Oral daily  nicotine -   7 mG/24Hr(s) Patch 1 patch Transdermal daily  pantoprazole    Tablet 40 milliGRAM(s) Oral before breakfast  predniSONE   Tablet 20 milliGRAM(s) Oral daily  sodium bicarbonate 650 milliGRAM(s) Oral three times a day    MEDICATIONS  (PRN):  dextrose 40% Gel 15 Gram(s) Oral once PRN Blood Glucose LESS THAN 70 milliGRAM(s)/deciliter  glucagon  Injectable 1 milliGRAM(s) IntraMuscular once PRN Glucose LESS THAN 70 milligrams/deciliter        CAPILLARY BLOOD GLUCOSE      POCT Blood Glucose.: 279 mg/dL (16 May 2018 22:18)  POCT Blood Glucose.: 380 mg/dL (16 May 2018 15:39)    I&O's Summary      PHYSICAL EXAM:  VS: T 97.8, HR 85, /79, RR 18, SpO2 96  GENERAL: NAD, well-developed  HEAD:  Atraumatic, Normocephalic  EYES: EOMI, PERRLA, conjunctiva and sclera clear  NECK: Supple, No JVD  CHEST/LUNG: Clear to auscultation bilaterally; No wheeze  HEART: Regular rate and rhythm; No murmurs, rubs, or gallops  ABDOMEN: Soft, Nontender, Nondistended; Bowel sounds present  EXTREMITIES:  2+ Peripheral Pulses, No clubbing, cyanosis, or edema  PSYCH: AAOx3  NEUROLOGY: non-focal  SKIN: No rashes or lesions    LABS:                        10.8   8.6   )-----------( 181      ( 16 May 2018 15:53 )             31.5     05-17    139  |  104  |  29<H>  ----------------------------<  177<H>  4.7   |  24  |  2.19<H>    Ca    8.5      17 May 2018 00:32  Phos  4.3     05-16  Mg     1.7     05-16    TPro  6.9  /  Alb  3.7  /  TBili  0.3  /  DBili  x   /  AST  47<H>  /  ALT  31  /  AlkPhos  85  05-16    PT/INR - ( 16 May 2018 16:51 )   PT: 11.4 sec;   INR: 1.04 ratio         PTT - ( 16 May 2018 16:51 )  PTT:27.9 sec          RADIOLOGY & ADDITIONAL TESTS:    No new additional studies.

## 2018-05-17 NOTE — DISCHARGE NOTE ADULT - PATIENT PORTAL LINK FT
You can access the [facility] Patient Portal, offered by [facility], by registering with the following website: [email or web address]

## 2018-05-17 NOTE — DISCHARGE NOTE ADULT - MEDICATION SUMMARY - MEDICATIONS TO TAKE
I will START or STAY ON the medications listed below when I get home from the hospital:    predniSONE 5 mg oral tablet  -- 4 tab(s) by mouth once a day  -- Indication: For Rheumatoid arthritis    aspirin 81 mg oral tablet  -- 1 tab(s) by mouth 2 times a week on Mondays and Fridays  -- Indication: For Coronary artery disease    HYDROmorphone 16 mg oral tablet, extended release  -- 1 tab(s) by mouth 2 times a day  -- Indication: For Pain management    HYDROmorphone 4 mg oral tablet  -- 1 to 2 tab(s) by mouth 2 times a day, As Needed  -- Indication: For Pain management    sodium bicarbonate 650 mg oral tablet  -- 1 tab(s) by mouth 3 times a day  -- Indication: For Hyperkalemia    HumaLOG 100 units/mL subcutaneous solution  -- BASAL subcutaneous 12 units/day via insulin pump,   plus BOLUS to cover meals    -- Indication: For Diabetes mellitus type II    lovastatin 20 mg oral tablet  -- 1 tab(s) by mouth once a day (at bedtime)  -- Indication: For Coronary artery disease    hydroxychloroquine 200 mg oral tablet  -- 1 tab(s) by mouth 2 times a day  -- Indication: For Rheumatoid arthritis    clopidogrel 75 mg oral tablet  -- 1 tab(s) by mouth once a day (at bedtime)  -- Indication: For Coronary artery disease    metoprolol succinate 25 mg oral tablet, extended release  -- 1 tab(s) by mouth once a day  -- Indication: For Coronary artery disease    amLODIPine 10 mg oral tablet  -- 1 tab(s) by mouth once a day   -- It is very important that you take or use this exactly as directed.  Do not skip doses or discontinue unless directed by your doctor.  Some non-prescription drugs may aggravate your condition.  Read all labels carefully.  If a warning appears, check with your doctor before taking.    -- Indication: For HTN (hypertension)    calcipotriene 0.005% topical cream  -- Apply on skin to affected area , As Needed  -- Indication: For Rash    furosemide 40 mg oral tablet  -- 2 tab(s) by mouth once a day  -- Indication: For Lower extremity edema    omeprazole 20 mg oral delayed release tablet  -- 1 tab(s) by mouth once a day  -- Indication: For GERD    multivitamin  -- 1 tab(s) by mouth once a day  -- Indication: For Health maintenance    Vitamin D3  -- Indication: For Health maintenance I will START or STAY ON the medications listed below when I get home from the hospital:    predniSONE 5 mg oral tablet  -- 4 tab(s) by mouth once a day  -- Indication: For Rheumatoid arthritis    HYDROmorphone 16 mg oral tablet, extended release  -- 1 tab(s) by mouth 2 times a day  -- Indication: For Pain management    HYDROmorphone 4 mg oral tablet  -- 1 to 2 tab(s) by mouth 2 times a day, As Needed  -- Indication: For Pain management    aspirin 81 mg oral tablet  -- 1 tab(s) by mouth 2 times a week on Mondays and Fridays  -- Indication: For Coronary artery disease    sodium bicarbonate 650 mg oral tablet  -- 1 tab(s) by mouth 3 times a day  -- Indication: For Hyperkalemia    HumaLOG 100 units/mL subcutaneous solution  -- BASAL subcutaneous 12 units/day via insulin pump,   plus BOLUS to cover meals    -- Indication: For Diabetes mellitus type II    lovastatin 20 mg oral tablet  -- 1 tab(s) by mouth once a day (at bedtime)  -- Indication: For Coronary artery disease    hydroxychloroquine 200 mg oral tablet  -- 1 tab(s) by mouth 2 times a day  -- Indication: For Rheumatoid arthritis    clopidogrel 75 mg oral tablet  -- 1 tab(s) by mouth once a day (at bedtime)  -- Indication: For Coronary artery disease    metoprolol succinate 25 mg oral tablet, extended release  -- 1 tab(s) by mouth once a day  -- Indication: For Coronary artery disease    amLODIPine 10 mg oral tablet  -- 1 tab(s) by mouth once a day   -- It is very important that you take or use this exactly as directed.  Do not skip doses or discontinue unless directed by your doctor.  Some non-prescription drugs may aggravate your condition.  Read all labels carefully.  If a warning appears, check with your doctor before taking.    -- Indication: For HTN (hypertension)    calcipotriene 0.005% topical cream  -- Apply on skin to affected area , As Needed  -- Indication: For Rash    furosemide 40 mg oral tablet  -- 1 tab(s) by mouth once a day  -- Indication: For Lower extremity edema    omeprazole 20 mg oral delayed release tablet  -- 1 tab(s) by mouth once a day  -- Indication: For GERD    multivitamin  -- 1 tab(s) by mouth once a day  -- Indication: For Health maintenance    Vitamin D3  -- Indication: For Health maintenance

## 2018-05-17 NOTE — CONSULT NOTE ADULT - ATTENDING COMMENTS
Agree with assessment and plan as above by Dr. Kirby. Reviewed all pertinent labs, glucose values, and imaging studies. Modifications made as indicated above.     Manuel Jackson D.O  [phone]

## 2018-05-17 NOTE — PROGRESS NOTE ADULT - PROBLEM SELECTOR PLAN 2
Recommend bladder and kidney sonogram to rule out obstruction.  Likely hemodyanmic in the setting of NSAIDs (last week), Lasix, and ACE I.  Less likely related to drug-induced lupus nephritis as all parameters (hapto, complements dsdna) are getting better.  Recommend hold ACE I.  Although the patient has edema he may actually be hypovolemic as he has no dyspnea and lungs are clear.  Would decreased maintenance lasix dose to 40 daily.  Lasix being used now for potassium excretion more than volume.  Hold off on kayexelate given that patient already has diarrhea at baseline.

## 2018-05-17 NOTE — DISCHARGE NOTE ADULT - PLAN OF CARE
Management Likely due to your chronic kidney disease and use of NSAIDs and benazepril, your kidneys have difficulty clearing potassium.  Please stop taking celecoxib and benazepril, continue to take sodium bicarbonate as prescribed, and follow up with your Nephrologist, Dr José Luis Castro, within a week of discharge. Resolution Due to NSAID use, your kidneys became injured.  Please avoid use of celecoxib and follow up with your Nephrologist, Dr José Luis Castro, within a week of discharge. Likely due to your chronic kidney disease and use of NSAIDs and benazepril, your kidneys have difficulty clearing potassium.  Please stop taking celecoxib and benazepril, take only one Lasix 40mg by mouth daily, and continue to take sodium bicarbonate as prescribed, and follow up with your Nephrologist, Dr José Luis Castro, within a week of discharge.

## 2018-05-17 NOTE — DISCHARGE NOTE ADULT - MEDICATION SUMMARY - MEDICATIONS TO CHANGE
I will SWITCH the dose or number of times a day I take the medications listed below when I get home from the hospital:    amLODIPine 5 mg oral tablet  -- 1 tab(s) by mouth once a day    ***see internal teressa*** I will SWITCH the dose or number of times a day I take the medications listed below when I get home from the hospital:    Lasix 20 mg oral tablet  -- 2 tab(s) by mouth once a day (at bedtime)    amLODIPine 5 mg oral tablet  -- 1 tab(s) by mouth once a day    ***see internal teressa***

## 2018-05-17 NOTE — PROGRESS NOTE ADULT - PROBLEM SELECTOR PLAN 9
-Pt counseled on importance of tobacco cessation in ED. Risks of ongoing tobacco use increases risk of MI, COPD, malignancy, stroke, and other conditions d/w pt. Patient reports understand and agrees with trial of nicotine patch while in the hospital. Questions answered.

## 2018-05-17 NOTE — CONSULT NOTE ADULT - ASSESSMENT
62 yo male with PMH significant for T2DM (HbA1c 6.8%) c/b neuropathy, nephropathy, CAD, RA, chronic pancreatitis presents from nephrology office for elevated potassium levels. (high risk patient with high level decision making) 62 yo male with PMH significant for T2DM (HbA1c 6.8%) on insulin pump c/b neuropathy, nephropathy, CAD, RA, chronic pancreatitis presents from nephrology office for elevated potassium levels. (high risk patient with high level decision making)

## 2018-05-17 NOTE — PROGRESS NOTE ADULT - PROBLEM SELECTOR PLAN 1
Hyperkalemia in the setting of IVA, hyperglycemia.  For acute management recommend 250 cc bolus of normal saline followed by 40 IV lasix to promote tubular flow of sodium and potassium excretion.  For chronic management would continue to hold ACE I, continue sodium bicarbonate 650 po TID.  Monitor serum potassium, continue lasix at 40 po daily.  recheck serum potassium after two hours.  Please check urine electrolytes and serum sebastian / renin.

## 2018-05-17 NOTE — PROGRESS NOTE ADULT - SUBJECTIVE AND OBJECTIVE BOX
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  I saw and evaluated him in the office yesterday for acute kidney injury.  On repeat labs his potassium was found to be elevated and I asked him to come to the ED.  The patient was seen and evaluated in the ED.  Overnight repeat potassium was improved but then gilberto to 6.0.  He is asymptomatic on examination today.    PAST HISTORY  --------------------------------------------------------------------------------  No significant changes to PMH, PSH, FHx, SHx, unless otherwise noted    ALLERGIES & MEDICATIONS  --------------------------------------------------------------------------------  Allergies    No Known Allergies    Intolerances      Standing Inpatient Medications  amLODIPine   Tablet 10 milliGRAM(s) Oral daily  atorvastatin 10 milliGRAM(s) Oral at bedtime  clopidogrel Tablet 75 milliGRAM(s) Oral daily  dextrose 50% Injectable 12.5 Gram(s) IV Push once  dextrose 50% Injectable 25 Gram(s) IV Push once  dextrose 50% Injectable 25 Gram(s) IV Push once  furosemide    Tablet 40 milliGRAM(s) Oral at bedtime  furosemide   Injectable 40 milliGRAM(s) IV Push once  heparin  Injectable 5000 Unit(s) SubCutaneous every 8 hours  HYDROmorphone   Tablet 8 milliGRAM(s) Oral every 6 hours  hydroxychloroquine 200 milliGRAM(s) Oral two times a day  insulin glargine Injectable (LANTUS) 12 Unit(s) SubCutaneous at bedtime  insulin lispro (HumaLOG) corrective regimen sliding scale   SubCutaneous three times a day before meals  insulin lispro (HumaLOG) corrective regimen sliding scale   SubCutaneous at bedtime  metoprolol succinate ER 25 milliGRAM(s) Oral daily  nicotine -   7 mG/24Hr(s) Patch 1 patch Transdermal daily  pantoprazole    Tablet 40 milliGRAM(s) Oral before breakfast  predniSONE   Tablet 20 milliGRAM(s) Oral daily  sodium bicarbonate 650 milliGRAM(s) Oral three times a day    PRN Inpatient Medications  dextrose 40% Gel 15 Gram(s) Oral once PRN  glucagon  Injectable 1 milliGRAM(s) IntraMuscular once PRN      REVIEW OF SYSTEMS  --------------------------------------------------------------------------------  Gen: No fevers  CV: no CP  Pulm: no dyspnea  GI: no abdominal pain      VITALS/PHYSICAL EXAM  --------------------------------------------------------------------------------  T(C): 36.7 (05-17-18 @ 13:27), Max: 36.9 (05-16-18 @ 14:32)  HR: 73 (05-17-18 @ 13:27) (73 - 88)  BP: 114/60 (05-17-18 @ 13:27) (114/60 - 165/78)  RR: 19 (05-17-18 @ 13:27) (16 - 19)  SpO2: 96% (05-17-18 @ 13:27) (96% - 100%)  Wt(kg): --  Height (cm): 180.34 (05-16-18 @ 19:57)  Weight (kg): 74.4 (05-16-18 @ 19:57)  BMI (kg/m2): 22.9 (05-16-18 @ 19:57)  BSA (m2): 1.94 (05-16-18 @ 19:57)      Physical Exam:    	Gen: NAD  	HEENT: anicteric  	Pulm: CTA B/L  	CV: S1S2; no rub  	Abd: distended non tender   	: No suprapubic tenderness  	Neuro: No focal deficits, intact gait  	Psych: Normal affect and mood  	Skin: Warm, without rashes    LABS/STUDIES  --------------------------------------------------------------------------------              11.6   12.6  >-----------<  218      [05-17-18 @ 11:18]              35.1     139  |  102  |  28  ----------------------------<  167      [05-17-18 @ 11:19]  6.0   |  23  |  2.11        Ca     9.1     [05-17-18 @ 11:19]      Mg     1.8     [05-17-18 @ 11:19]      Phos  4.9     [05-17-18 @ 11:19]    TPro  6.9  /  Alb  3.7  /  TBili  0.3  /  DBili  x   /  AST  47  /  ALT  31  /  AlkPhos  85  [05-16-18 @ 15:53]    PT/INR: PT 11.4 , INR 1.04       [05-16-18 @ 16:51]  PTT: 27.9       [05-16-18 @ 16:51]          [05-17-18 @ 11:19]    Creatinine Trend:  SCr 2.11 [05-17 @ 11:19]  SCr 2.19 [05-17 @ 00:32]  SCr 2.15 [05-16 @ 18:56]  SCr 2.35 [05-16 @ 15:53]        HbA1c 6.4      [11-22-16 @ 09:15]

## 2018-05-17 NOTE — PROGRESS NOTE ADULT - PROBLEM SELECTOR PLAN 4
- Patient has chronic diarrhea from pancreatitis (alcohol and gallstone)  - C/w pain control dilaudid 8mg QID (outpatient use 16mg BID, istop [id number], last prescription 3/12 for one month supply.)

## 2018-05-17 NOTE — PROGRESS NOTE ADULT - PROBLEM SELECTOR PLAN 5
- BP slightly elevated to 165/78 on admission now with most recent SBP in 130s.  - Monitor VS per unit routine. C/w outpatient regimen 10mg amlodipine daily.  - Will give additional hydralazine as needed for bp control.

## 2018-05-17 NOTE — DISCHARGE NOTE ADULT - HOSPITAL COURSE
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 Castro nephrology's office for elevated potassium level. Patient was seen by Dr. Castro yesterday in clinic for IVA on CKD.  Patient was given treatments to lower potassium with interval success.  Patient was also initiated on sodium bicarbonate.  However, the morning after admission, patient's hyperkalemia had returned.  As such, patient's Nephrologist recommended giving the patient NS bolus 250 followed by Lasix 40 IVP, which mitigated patient's hyperkalemia.    Patient was advised to discontinue celecoxib and ACE benazepril as they likely contributed to patient's hyperkalemia and IVA.  Patient discharged on NaHCO3 and to follow up with Dr Castro within a week of discharge. 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 Castro nephrology's office for elevated potassium level. Patient was seen by Dr. aCstro yesterday in clinic for IVA on CKD.  Patient was given treatments to lower potassium with interval success.  Patient was also initiated on sodium bicarbonate.  However, the morning after admission, patient's hyperkalemia had returned.  As such, patient's Nephrologist recommended giving the patient NS bolus 250 followed by Lasix 40 IVP, which mitigated patient's hyperkalemia.    Patient was advised to discontinue celecoxib and ACE benazepril as they likely contributed to patient's hyperkalemia and IVA.  Patient discharged on half daily dose of Lasix (40 PO QD) and NaHCO3 and to follow up with Dr Castro within a week of discharge. 62yo M w/ hx of DMII on pump, HTN, CKD, RA, CAD, chronic pancreatitis with chronic diarrhea, on dilaudid for pain, RA, drug induced lupus from RA biologicals sent in from Dr. Castro nephrology's office for elevated potassium level. Patient was seen by Dr. Castro yesterday in clinic for IVA on CKD.      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 aCstro nephrology's office for elevated potassium level. Patient was seen by Dr. Castro yesterday in clinic for IVA on CKD. Extensive w/u including renal and bladder US underway. Patient had incidental finding of K 6.0 in office and subsequently sent in. In the ER, patient was again noted to have elevated K 6.3, s/p albuterol x 2, lasix 40mg IVP & calcium gluconate and repeat K 6.1. Patient again denied sob, chest pain, palpitation, dizziness, HA, abd pain, n/v at the time of interview. Continue to have chronic diarrhea at baseline, nonbloody in nature.    Of note, patient was last seen in rheumatology clinic 5/11. Finished with prednisone taper 5mg on that day and was started on Plaquenil 200mg BID. Was placed back on 20mg prednisone daily the next day after phone notice from rheumatology's office. Patient was sent to Dr. Castro's office on 5/15 for IVA on CKD with worsening proteinuria. This is believed likely from lupus nephritis despite dsDNA, haptoglobin all improved with prednisone and stopping biologicals. Patient continue to have bilateral LE swelling at baseline, unchanged after amlodipine dose increase after last Cardiology visit for BP control.      Patient was given treatments to lower potassium with interval success.  Patient was also initiated on sodium bicarbonate.  However, the morning after admission, patient's hyperkalemia had returned.  As such, patient's Nephrologist recommended giving the patient NS bolus 250 followed by Lasix 40 IVP, which mitigated patient's hyperkalemia.    Patient was advised to discontinue celecoxib and ACE benazepril as they likely contributed to patient's hyperkalemia and IVA.  Patient discharged on half daily dose of Lasix (40 PO QD) and NaHCO3 and to follow up with Dr Castro within a week of discharge.    Diagnoses: Hyperkalemia; CKD stage 3; Hyponatremia; DM-2; Chronic pancreatitis; HTN; Tobacco abuse 64yo M w/ DMII on pump, HTN, CKD, RA, CAD, chronic pancreatitis with chronic diarrhea on dilaudid for pain, RA, drug induced lupus from RA biologicals sent in from Dr. Castro nephrology's office for elevated potassium level. Seen in clinic for IVA on CKD. Extensive w/u including renal and bladder US underway.     In the ER, noted to have elevated K 6.3. Peaked T waves seen on EKG. Given albuterol x 2, lasix 40mg IVP & calcium gluconate and repeat K 6.1. Admitted to telemetry.    K subsequently improved but worsened to 6.0 again the next day. Given a bolus of NS with 40mg iv lasix. K decreased to 5.0.    Pt insisted on going home. Advised to discontinue celecoxib and ACE benazepril as they likely contributed to hyperkalemia and IVA.      Discharged on half daily dose of Lasix (40 PO QD) and NaHCO3 and to follow up with Dr Castro within a week of discharge.    Diagnoses: Hyperkalemia; CKD stage 3; Hyponatremia; Uncontrolled DM-2; Chronic pancreatitis; HTN; Tobacco abuse 64yo M w/ DMII on pump, HTN, CKD, RA, CAD, chronic pancreatitis with chronic diarrhea on dilaudid for pain, RA, drug induced lupus from RA biologicals sent in from Dr. Castro nephrology's office for elevated potassium level. Seen in clinic for IVA on CKD. Extensive w/u including renal and bladder US underway.     In the ER, noted to have elevated K 6.3. Peaked T waves seen on EKG. Given albuterol x 2, lasix 40mg IVP & calcium gluconate and repeat K 6.1. Admitted to telemetry.    K subsequently improved but worsened to 6.0 again the next day. Given a bolus of NS with 40mg iv lasix. K decreased to 5.0.    Pt insisted on going home. Advised to discontinue celecoxib and ACE benazepril as they likely contributed to hyperkalemia and IVA.      Discharged on Lasix (40 PO QD) and NaHCO3 and to follow up with Dr Castro within a week of discharge.    Diagnoses: Hyperkalemia; CKD stage 3; Hyponatremia; Uncontrolled DM-2; Chronic pancreatitis; HTN; Tobacco abuse

## 2018-05-17 NOTE — DISCHARGE NOTE ADULT - INSTRUCTIONS
please continue to eat a carbohydrate steady/diabetic diet with low potassium, sodium and cholesterol

## 2018-05-17 NOTE — DISCHARGE NOTE ADULT - CARE PROVIDER_API CALL
José Luis Castro), Internal Medicine  [address] [place] [address]  Phone: [phone]  Fax: [phone]

## 2018-05-17 NOTE — CONSULT NOTE ADULT - SUBJECTIVE AND OBJECTIVE BOX
HPI:  64 yo male with PMH significant for T2DM (HbA1c 6.8%) c/b neuropathy, nephropathy, CAD, RA, chronic pancreatitis presents from nephrology office for elevated potassium levels.    T2DM- diagnosed approximately ~2000, has been on Omnipod insulin pump since 2015. Follows with Dr. Estrada (endocrinology). Patient uses Omnipod with U-100 Humalog. Also wear Dexcom CGM however not wearing at this time. Checks FS 2x daily, AM 80-140s, however recently has been treated with Prednisone for past 3.5 weeks and fasting FS noted to be in 160-240s. Patient did not want to adjust his pump settings at this time. Settings are as follows:  Basal-0.5u/hr  ICR: 1:6  ISF: 1:65  Glucose target 120    Complications- no retinopathy, last optho visit 8/2017. + neuropathy in lower extremities, CAD (MI in 2002, no stents). + Nephropathy follows with nephrology.  Diet- AM- bagel, cereal, muffin, lunch-sandwich, dinner-meat, veggies. Poor intake. Plays golf and walks for exercise.      PAST MEDICAL & SURGICAL HISTORY:  Gastroesophageal reflux disease  Hyperlipidemia  Osteoarthritis  Cigarette smoker  H/O rheumatoid arthritis: on medication  History of blood clots: h/o hepatic vein thrombosis 2014- tx with blood thinnes  H/O sleep apnea  H/O acute pancreatitis: first attack 1999 then 2003  on medication-  Carotid stenosis, left  History of MI (myocardial infarction): in 2002  H/O: HTN (hypertension)  Coronary artery disease  Diabetes mellitus: Type II, diagnosed in 2000  Rheumatoid arthritis  S/P carotid endarterectomy: left 11/9/2016  History of inguinal herniorrhaphy: 1998, 1997  S/P insertion of intrathecal pump: placed in 2007  removed in 2008  H/O vasectomy  S/P cholecystectomy: 2003      FAMILY HISTORY:  No pertinent family history in first degree relatives  Adopted    Social History: Smokes 1 PPD for 40+ years, stopped drinking 14 years ago    Outpatient Medications:  nicotine 7 mg/24 hr transdermal film, extended release: 1 patch transdermal once a day MDD:1, Last Dose Taken:    · 	oxyCODONE 5 mg oral tablet: 2-3 tab(s) orally every 6 hours PRN Pain MDD:10, Last Dose Taken:    · 	CeleBREX 200 mg oral capsule: 1 cap(s) orally 2 times a day, As Needed, last dose 11/29/2016 MDD:2, Last Dose Taken:    · 	Dilaudid XR: 16 milligram(s) orally 2 times a day, Last Dose Taken:    · 	lovastatin 20 mg oral tablet: 1 tab(s) orally once a day (at bedtime), Last Dose Taken:    · 	metoprolol succinate 25 mg oral tablet, extended release: 1 tab(s) orally once a day (at bedtime), Last Dose Taken:    · 	omeprazole 20 mg oral delayed release tablet: 1 tab(s) orally once a day (at bedtime), Last Dose Taken:    · 	amLODIPine 10 mg oral tablet: 1 tab(s) orally once a day, Last Dose Taken:    · 	Lasix 20 mg oral tablet: 2 tab(s) orally once a day (at bedtime), Last Dose Taken:    · 	hydroxychloroquine 200 mg oral tablet: 1 tab(s) orally 2 times a day  · 	HumaLOG 100 units/mL subcutaneous solution:  subcutaneous   	12 units/day  	and insulin pump, Last Dose Taken:    · 	multivitamin: 1 tab(s) orally once a day, Last Dose Taken:      MEDICATIONS  (STANDING):  amLODIPine   Tablet 10 milliGRAM(s) Oral daily  aspirin  chewable 81 milliGRAM(s) Oral daily  atorvastatin 10 milliGRAM(s) Oral at bedtime  clopidogrel Tablet 75 milliGRAM(s) Oral daily  dextrose 50% Injectable 12.5 Gram(s) IV Push once  dextrose 50% Injectable 25 Gram(s) IV Push once  dextrose 50% Injectable 25 Gram(s) IV Push once  furosemide    Tablet 40 milliGRAM(s) Oral at bedtime  heparin  Injectable 5000 Unit(s) SubCutaneous every 8 hours  HYDROmorphone   Tablet 8 milliGRAM(s) Oral every 6 hours  hydroxychloroquine 200 milliGRAM(s) Oral two times a day  insulin glargine Injectable (LANTUS) 12 Unit(s) SubCutaneous at bedtime  insulin lispro (HumaLOG) corrective regimen sliding scale   SubCutaneous three times a day before meals  insulin lispro (HumaLOG) corrective regimen sliding scale   SubCutaneous at bedtime  metoprolol succinate ER 25 milliGRAM(s) Oral daily  nicotine -   7 mG/24Hr(s) Patch 1 patch Transdermal daily  pantoprazole    Tablet 40 milliGRAM(s) Oral before breakfast  predniSONE   Tablet 20 milliGRAM(s) Oral daily  sodium bicarbonate 650 milliGRAM(s) Oral three times a day    MEDICATIONS  (PRN):  dextrose 40% Gel 15 Gram(s) Oral once PRN Blood Glucose LESS THAN 70 milliGRAM(s)/deciliter  glucagon  Injectable 1 milliGRAM(s) IntraMuscular once PRN Glucose LESS THAN 70 milligrams/deciliter      Allergies    No Known Allergies    Review of Systems:  Constitutional: No fever  Eyes: No blurry vision  Neuro: No tremors  HEENT: No pain  Cardiovascular: No chest pain, palpitations  Respiratory: No SOB, no cough  GI: No nausea, vomiting, abdominal pain  Endocrine: no polyuria, polydipsia      ALL OTHER SYSTEMS REVIEWED AND NEGATIVE    PHYSICAL EXAM:  VITALS: T(C): 36.7 (05-17-18 @ 13:27)  T(F): 98.1 (05-17-18 @ 13:27), Max: 98.5 (05-16-18 @ 18:36)  HR: 73 (05-17-18 @ 13:27) (73 - 88)  BP: 114/60 (05-17-18 @ 13:27) (114/60 - 165/78)  RR:  (16 - 19)  SpO2:  (96% - 100%)  Wt(kg): --  GENERAL: NAD  EYES: No proptosis, no lid lag, anicteric  HEENT:  Atraumatic, Normocephalic, moist mucous membranes  THYROID: Normal size, no palpable nodules  RESPIRATORY: Clear to auscultation bilaterally; No rales, rhonchi, wheezing, or rubs  CARDIOVASCULAR: Regular rate and rhythm; No murmurs; RLE 2+ peripheral edema  GI: Soft, nontender, non distended, normal bowel sounds  SKIN: Dry, intact, No rashes or lesions  PSYCH: Alert and oriented x 3    POCT Blood Glucose.: 127 mg/dL (05-17-18 @ 12:55)  POCT Blood Glucose.: 220 mg/dL (05-17-18 @ 09:15)  POCT Blood Glucose.: 279 mg/dL (05-16-18 @ 22:18)  POCT Blood Glucose.: 205 mg/dL (05-16-18 @ 21:27)  POCT Blood Glucose.: 212 mg/dL (05-16-18 @ 20:02)  POCT Blood Glucose.: 380 mg/dL (05-16-18 @ 15:39)                            11.6   12.6  )-----------( 218      ( 17 May 2018 11:18 )             35.1       05-17    139  |  102  |  28<H>  ----------------------------<  167<H>  6.0<H>   |  23  |  2.11<H>    EGFR if : 37<L>  EGFR if non : 32<L>    Ca    9.1      05-17  Mg     1.8     05-17  Phos  4.9     05-17    TPro  6.9  /  Alb  3.7  /  TBili  0.3  /  DBili  x   /  AST  47<H>  /  ALT  31  /  AlkPhos  85  05-16

## 2018-05-17 NOTE — ADVANCED PRACTICE NURSE CONSULT - ASSESSMENT
62 y/o male with H/O T2DM for 15 plus years on Omnipod insulin pump about 2 years at home, HTN, CKD, RA, CAD, chronic pancreatitis with chronic diarrhea on dilaudid for pain.  Pt found to have K of 6.0 in office and subsequently sent in. In the ER, patient was again noted to have elevated K 6.3 when seen by Dr. Castro yesterday in clinic for IVA on CKD.  Pt denies sob, chest pain, palpitation, dizziness, HA, abd pain, and n/v.  Pt followed by Dr. Estrada for diabetes.  Pr transitioned off insulin pump to Lantus 12 units qhs and correctional scale by Dr. Hector Hernández last night.  Insulin pump disconnected and insulin pump forms all completed and in chart.  Pt has 1 extra POD with him (wife bought last night).  Pt reports he has been having extremely high BG levels r/t prednisone use and has been having a hard time managing his BG levels.  At 22:18 pt FS was 279 and at 9:15am pt FS was 220 for which pt reports he received correctional injection doses.  Endocrine team made aware and to follow.  Pt current insulin pump rates as follows:      Basal  12am -0.5 units/hour  Total daily basal – 12 units    ICR  12am-12am – 1:6    ISF  12am-12am – 65    BGT  12am-12am – 120 [150]    Insulin duration – 4 hours

## 2018-05-17 NOTE — PROGRESS NOTE ADULT - PROBLEM SELECTOR PLAN 1
- Patient potassium now 4.7 from 6.3 on admission after albuterol nebs x 2, lasix 40mg IVP with adequate UOP.  - s/p calcium gluconate x 1 for peaked T wave on EKG. CTM via telemetry  -trend BMPs

## 2018-05-18 LAB — ALDOST SERPL-MCNC: <3 NG/DL — SIGNIFICANT CHANGE UP

## 2018-05-21 ENCOUNTER — APPOINTMENT (OUTPATIENT)
Dept: ULTRASOUND IMAGING | Facility: CLINIC | Age: 63
End: 2018-05-21

## 2018-05-22 ENCOUNTER — APPOINTMENT (OUTPATIENT)
Dept: CARDIOLOGY | Facility: CLINIC | Age: 63
End: 2018-05-22
Payer: COMMERCIAL

## 2018-05-22 ENCOUNTER — NON-APPOINTMENT (OUTPATIENT)
Age: 63
End: 2018-05-22

## 2018-05-22 VITALS
DIASTOLIC BLOOD PRESSURE: 70 MMHG | WEIGHT: 173 LBS | OXYGEN SATURATION: 99 % | HEIGHT: 71 IN | TEMPERATURE: 98.5 F | HEART RATE: 76 BPM | SYSTOLIC BLOOD PRESSURE: 142 MMHG | BODY MASS INDEX: 24.22 KG/M2

## 2018-05-22 LAB
ALBUMIN SERPL ELPH-MCNC: 3.7 G/DL
ANION GAP SERPL CALC-SCNC: 13 MMOL/L
BUN SERPL-MCNC: 19 MG/DL
CALCIUM SERPL-MCNC: 8.8 MG/DL
CHLORIDE SERPL-SCNC: 97 MMOL/L
CO2 SERPL-SCNC: 27 MMOL/L
CREAT SERPL-MCNC: 2.1 MG/DL
GLUCOSE SERPL-MCNC: 305 MG/DL
MAGNESIUM SERPL-MCNC: 1.8 MG/DL
PHOSPHATE SERPL-MCNC: 4.1 MG/DL
POTASSIUM SERPL-SCNC: 5.6 MMOL/L
RENIN PLAS-CCNC: 1.07 NG/ML/HR — SIGNIFICANT CHANGE UP (ref 0.17–5.38)
SODIUM SERPL-SCNC: 137 MMOL/L

## 2018-05-22 PROCEDURE — 99214 OFFICE O/P EST MOD 30 MIN: CPT

## 2018-05-22 PROCEDURE — 93000 ELECTROCARDIOGRAM COMPLETE: CPT

## 2018-05-22 RX ORDER — AMLODIPINE BESYLATE AND BENAZEPRIL HYDROCHLORIDE 10; 20 MG/1; MG/1
10-20 CAPSULE ORAL
Qty: 30 | Refills: 0 | Status: DISCONTINUED | COMMUNITY
Start: 2017-11-20 | End: 2018-05-22

## 2018-05-22 RX ORDER — FUROSEMIDE 20 MG/1
20 TABLET ORAL
Qty: 180 | Refills: 0 | Status: DISCONTINUED | COMMUNITY
Start: 2017-12-03

## 2018-05-22 RX ORDER — AMLODIPINE BESYLATE AND BENAZEPRIL HYDROCHLORIDE 5; 20 MG/1; MG/1
5-20 CAPSULE ORAL
Qty: 90 | Refills: 0 | Status: DISCONTINUED | COMMUNITY
Start: 2017-11-20

## 2018-05-25 LAB
ALBUMIN SERPL ELPH-MCNC: 3.6 G/DL
ANION GAP SERPL CALC-SCNC: 16 MMOL/L
BUN SERPL-MCNC: 26 MG/DL
CALCIUM SERPL-MCNC: 8.6 MG/DL
CHLORIDE SERPL-SCNC: 97 MMOL/L
CO2 SERPL-SCNC: 24 MMOL/L
CREAT SERPL-MCNC: 2.17 MG/DL
GLUCOSE SERPL-MCNC: 97 MG/DL
PHOSPHATE SERPL-MCNC: 5.1 MG/DL
POTASSIUM SERPL-SCNC: 5.6 MMOL/L
SODIUM SERPL-SCNC: 137 MMOL/L

## 2018-05-29 ENCOUNTER — OTHER (OUTPATIENT)
Age: 63
End: 2018-05-29

## 2018-05-30 ENCOUNTER — APPOINTMENT (OUTPATIENT)
Dept: INTERNAL MEDICINE | Facility: CLINIC | Age: 63
End: 2018-05-30
Payer: COMMERCIAL

## 2018-05-30 VITALS
SYSTOLIC BLOOD PRESSURE: 152 MMHG | WEIGHT: 169 LBS | OXYGEN SATURATION: 98 % | BODY MASS INDEX: 23.66 KG/M2 | HEART RATE: 83 BPM | DIASTOLIC BLOOD PRESSURE: 70 MMHG | HEIGHT: 71 IN | RESPIRATION RATE: 12 BRPM

## 2018-05-30 DIAGNOSIS — R21 RASH AND OTHER NONSPECIFIC SKIN ERUPTION: ICD-10-CM

## 2018-05-30 PROCEDURE — 99214 OFFICE O/P EST MOD 30 MIN: CPT

## 2018-05-31 ENCOUNTER — LABORATORY RESULT (OUTPATIENT)
Age: 63
End: 2018-05-31

## 2018-05-31 ENCOUNTER — APPOINTMENT (OUTPATIENT)
Dept: RHEUMATOLOGY | Facility: CLINIC | Age: 63
End: 2018-05-31
Payer: COMMERCIAL

## 2018-05-31 VITALS
TEMPERATURE: 98.5 F | SYSTOLIC BLOOD PRESSURE: 154 MMHG | HEART RATE: 85 BPM | WEIGHT: 168 LBS | OXYGEN SATURATION: 98 % | HEIGHT: 71 IN | DIASTOLIC BLOOD PRESSURE: 57 MMHG | BODY MASS INDEX: 23.52 KG/M2

## 2018-05-31 LAB
ALBUMIN SERPL ELPH-MCNC: 3.6 G/DL
ANION GAP SERPL CALC-SCNC: 14 MMOL/L
APPEARANCE: CLEAR
BILIRUBIN URINE: NEGATIVE
BLOOD URINE: ABNORMAL
BUN SERPL-MCNC: 20 MG/DL
CALCIUM SERPL-MCNC: 8.7 MG/DL
CHLORIDE SERPL-SCNC: 98 MMOL/L
CO2 SERPL-SCNC: 28 MMOL/L
COLOR: YELLOW
CREAT SERPL-MCNC: 2.05 MG/DL
GLUCOSE QUALITATIVE U: 100 MG/DL
GLUCOSE SERPL-MCNC: 233 MG/DL
KETONES URINE: NEGATIVE
LEUKOCYTE ESTERASE URINE: NEGATIVE
NITRITE URINE: NEGATIVE
PH URINE: 6
PHOSPHATE SERPL-MCNC: 4 MG/DL
POTASSIUM SERPL-SCNC: 4.6 MMOL/L
PROTEIN URINE: 300 MG/DL
SODIUM SERPL-SCNC: 140 MMOL/L
SPECIFIC GRAVITY URINE: 1.02
UROBILINOGEN URINE: NEGATIVE MG/DL

## 2018-05-31 PROCEDURE — 99215 OFFICE O/P EST HI 40 MIN: CPT

## 2018-05-31 NOTE — HISTORY OF PRESENT ILLNESS
[FreeTextEntry8] : Here with rash.  Explains that he has had 1-2 days with splotchy red rash on chest and back mostly.  Itches slightly.  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  Is s/p hospitalization for inc K in light of acute on chronic RF.  Now being followed by renal along with rheum, with last K improved at 4.6.

## 2018-05-31 NOTE — PHYSICAL EXAM
[No Acute Distress] : no acute distress [No JVD] : no jugular venous distention [Supple] : supple [No Lymphadenopathy] : no lymphadenopathy [No Respiratory Distress] : no respiratory distress  [Clear to Auscultation] : lungs were clear to auscultation bilaterally [No Accessory Muscle Use] : no accessory muscle use [Normal Percussion] : the chest was normal to percussion [Normal Rate] : normal rate  [Regular Rhythm] : with a regular rhythm [Normal S1, S2] : normal S1 and S2 [No Carotid Bruits] : no carotid bruits [No Extremity Clubbing/Cyanosis] : no extremity clubbing/cyanosis [Soft] : abdomen soft [Non Tender] : non-tender [No HSM] : no HSM [Normal Supraclavicular Nodes] : no supraclavicular lymphadenopathy [Normal Posterior Cervical Nodes] : no posterior cervical lymphadenopathy [Normal Anterior Cervical Nodes] : no anterior cervical lymphadenopathy [de-identified] : no icterus, pallor [de-identified] : 3+ LE edema to knees [de-identified] : sl distended; + dullness noted to sides; ?shifting dullness note on side [de-identified] : has partly blanching splotchy patches of erythema with tengiectatic elements on close inspection; no lichenification, scale, excoriation, vesicles noted - mostly concentrates on chest, back, upper arms.

## 2018-05-31 NOTE — ASSESSMENT
[FreeTextEntry1] : 1) believe rash is either drug rash or less likely related to lupus itself.  Wonder if plaquenil is culprit.  Doesn't seem to be completely in photosensitive distribution.  Will discuss possibility of holding plaquenil, although pt explains it was being used to see if it would have a positive impact on nephritis/proteinuria.  To see rhuem tomorrow; given names/# derm to ?consider skin bx if that would be helpful.  Also seeing renal next few days - may have to proceed with renal bx silvia in light of rash with plaquenil, to help clarify overall treatment options/preferences.  2) also his bp has been up since the bump in Cr/proteinuria.  Perhaps with inc edema we can push furoseamide to BID for bp effect as well, silvia given relative hyperK lately.  On max Ca ch blocker, beta blocker since old hx CAD; being kept off ACEi.

## 2018-05-31 NOTE — REVIEW OF SYSTEMS
[Fever] : no fever [Chills] : no chills [Night Sweats] : no night sweats [Discharge] : no discharge [Pain] : no pain [Redness] : no redness [Joint Pain] : joint pain [Joint Stiffness] : joint stiffness [Negative] : Respiratory [de-identified] : see hpi

## 2018-06-01 ENCOUNTER — APPOINTMENT (OUTPATIENT)
Dept: DERMATOLOGY | Facility: CLINIC | Age: 63
End: 2018-06-01
Payer: COMMERCIAL

## 2018-06-01 ENCOUNTER — LABORATORY RESULT (OUTPATIENT)
Age: 63
End: 2018-06-01

## 2018-06-01 VITALS
DIASTOLIC BLOOD PRESSURE: 70 MMHG | BODY MASS INDEX: 23.52 KG/M2 | WEIGHT: 168 LBS | SYSTOLIC BLOOD PRESSURE: 142 MMHG | HEIGHT: 71 IN

## 2018-06-01 DIAGNOSIS — Z86.03 PERSONAL HISTORY OF NEOPLASM OF UNCERTAIN BEHAVIOR: ICD-10-CM

## 2018-06-01 DIAGNOSIS — Z87.2 PERSONAL HISTORY OF DISEASES OF THE SKIN AND SUBCUTANEOUS TISSUE: ICD-10-CM

## 2018-06-01 LAB
ALBUMIN SERPL ELPH-MCNC: 3.8 G/DL
ALP BLD-CCNC: 84 U/L
ALT SERPL-CCNC: 56 U/L
ANION GAP SERPL CALC-SCNC: 15 MMOL/L
AST SERPL-CCNC: 52 U/L
BASOPHILS # BLD AUTO: 0.03 K/UL
BASOPHILS NFR BLD AUTO: 0.3 %
BILIRUB SERPL-MCNC: 0.4 MG/DL
BUN SERPL-MCNC: 22 MG/DL
C3 SERPL-MCNC: 77 MG/DL
C4 SERPL-MCNC: 18 MG/DL
CALCIUM SERPL-MCNC: 8.4 MG/DL
CHLORIDE SERPL-SCNC: 97 MMOL/L
CO2 SERPL-SCNC: 25 MMOL/L
CREAT SERPL-MCNC: 2.51 MG/DL
CREAT SPEC-SCNC: 119 MG/DL
CREAT/PROT UR: 2 RATIO
CRP SERPL-MCNC: <0.2 MG/DL
DSDNA AB SER-ACNC: 42 IU/ML
EOSINOPHIL # BLD AUTO: 0.03 K/UL
EOSINOPHIL NFR BLD AUTO: 0.3 %
ERYTHROCYTE [SEDIMENTATION RATE] IN BLOOD BY WESTERGREN METHOD: 17 MM/HR
GLUCOSE SERPL-MCNC: 276 MG/DL
HCT VFR BLD CALC: 32.1 %
HGB BLD-MCNC: 10.8 G/DL
IGE SER-MCNC: 9 IU/ML
IMM GRANULOCYTES NFR BLD AUTO: 0.9 %
LYMPHOCYTES # BLD AUTO: 0.85 K/UL
LYMPHOCYTES NFR BLD AUTO: 7.6 %
MAN DIFF?: NORMAL
MCHC RBC-ENTMCNC: 33.2 PG
MCHC RBC-ENTMCNC: 33.6 GM/DL
MCV RBC AUTO: 98.8 FL
MONOCYTES # BLD AUTO: 0.25 K/UL
MONOCYTES NFR BLD AUTO: 2.2 %
MPO AB + PR3 PNL SER: NORMAL
NEUTROPHILS # BLD AUTO: 9.87 K/UL
NEUTROPHILS NFR BLD AUTO: 88.7 %
PLATELET # BLD AUTO: 166 K/UL
POTASSIUM SERPL-SCNC: 5.1 MMOL/L
PROT SERPL-MCNC: 6.7 G/DL
PROT UR-MCNC: 240 MG/DL
RBC # BLD: 3.25 M/UL
RBC # FLD: 12.5 %
SODIUM SERPL-SCNC: 137 MMOL/L
WBC # FLD AUTO: 11.13 K/UL

## 2018-06-01 PROCEDURE — 11100 BX SKIN SUBCUTANEOUS&/MUCOUS MEMBRANE 1 LESION: CPT

## 2018-06-01 PROCEDURE — 99213 OFFICE O/P EST LOW 20 MIN: CPT | Mod: 25

## 2018-06-05 LAB
ANA PAT FLD IF-IMP: ABNORMAL
ANA SER IF-ACNC: ABNORMAL
HISTONE AB SER QL: 0.4 UNITS

## 2018-06-13 ENCOUNTER — OUTPATIENT (OUTPATIENT)
Dept: OUTPATIENT SERVICES | Facility: HOSPITAL | Age: 63
LOS: 1 days | End: 2018-06-13
Payer: COMMERCIAL

## 2018-06-13 ENCOUNTER — RESULT REVIEW (OUTPATIENT)
Age: 63
End: 2018-06-13

## 2018-06-13 ENCOUNTER — APPOINTMENT (OUTPATIENT)
Dept: ULTRASOUND IMAGING | Facility: HOSPITAL | Age: 63
End: 2018-06-13
Payer: COMMERCIAL

## 2018-06-13 ENCOUNTER — FORM ENCOUNTER (OUTPATIENT)
Age: 63
End: 2018-06-13

## 2018-06-13 DIAGNOSIS — Z98.52 VASECTOMY STATUS: Chronic | ICD-10-CM

## 2018-06-13 DIAGNOSIS — Z98.890 OTHER SPECIFIED POSTPROCEDURAL STATES: Chronic | ICD-10-CM

## 2018-06-13 DIAGNOSIS — Z98.89 OTHER SPECIFIED POSTPROCEDURAL STATES: Chronic | ICD-10-CM

## 2018-06-13 DIAGNOSIS — Z90.49 ACQUIRED ABSENCE OF OTHER SPECIFIED PARTS OF DIGESTIVE TRACT: Chronic | ICD-10-CM

## 2018-06-13 DIAGNOSIS — N18.3 CHRONIC KIDNEY DISEASE, STAGE 3 (MODERATE): ICD-10-CM

## 2018-06-13 DIAGNOSIS — Z00.00 ENCOUNTER FOR GENERAL ADULT MEDICAL EXAMINATION WITHOUT ABNORMAL FINDINGS: ICD-10-CM

## 2018-06-13 PROCEDURE — 88313 SPECIAL STAINS GROUP 2: CPT

## 2018-06-13 PROCEDURE — 50200 RENAL BIOPSY PERQ: CPT

## 2018-06-13 PROCEDURE — 88350 IMFLUOR EA ADDL 1ANTB STN PX: CPT

## 2018-06-13 PROCEDURE — 88350 IMFLUOR EA ADDL 1ANTB STN PX: CPT | Mod: 26

## 2018-06-13 PROCEDURE — 88305 TISSUE EXAM BY PATHOLOGIST: CPT

## 2018-06-13 PROCEDURE — 76942 ECHO GUIDE FOR BIOPSY: CPT | Mod: 26

## 2018-06-13 PROCEDURE — 88312 SPECIAL STAINS GROUP 1: CPT | Mod: 26

## 2018-06-13 PROCEDURE — 76942 ECHO GUIDE FOR BIOPSY: CPT

## 2018-06-13 PROCEDURE — 50200 RENAL BIOPSY PERQ: CPT | Mod: LT

## 2018-06-13 PROCEDURE — 88348 ELECTRON MICROSCOPY DX: CPT | Mod: 26

## 2018-06-13 PROCEDURE — 88312 SPECIAL STAINS GROUP 1: CPT

## 2018-06-13 PROCEDURE — 88346 IMFLUOR 1ST 1ANTB STAIN PX: CPT | Mod: 26

## 2018-06-13 PROCEDURE — 88348 ELECTRON MICROSCOPY DX: CPT

## 2018-06-13 PROCEDURE — 88305 TISSUE EXAM BY PATHOLOGIST: CPT | Mod: 26

## 2018-06-13 PROCEDURE — 88313 SPECIAL STAINS GROUP 2: CPT | Mod: 26

## 2018-06-13 PROCEDURE — 88346 IMFLUOR 1ST 1ANTB STAIN PX: CPT

## 2018-06-14 ENCOUNTER — OUTPATIENT (OUTPATIENT)
Dept: OUTPATIENT SERVICES | Facility: HOSPITAL | Age: 63
LOS: 1 days | End: 2018-06-14
Payer: COMMERCIAL

## 2018-06-14 ENCOUNTER — APPOINTMENT (OUTPATIENT)
Dept: ULTRASOUND IMAGING | Facility: CLINIC | Age: 63
End: 2018-06-14
Payer: COMMERCIAL

## 2018-06-14 ENCOUNTER — APPOINTMENT (OUTPATIENT)
Dept: DERMATOLOGY | Facility: CLINIC | Age: 63
End: 2018-06-14
Payer: COMMERCIAL

## 2018-06-14 VITALS — SYSTOLIC BLOOD PRESSURE: 120 MMHG | DIASTOLIC BLOOD PRESSURE: 62 MMHG

## 2018-06-14 DIAGNOSIS — Z90.49 ACQUIRED ABSENCE OF OTHER SPECIFIED PARTS OF DIGESTIVE TRACT: Chronic | ICD-10-CM

## 2018-06-14 DIAGNOSIS — Z98.89 OTHER SPECIFIED POSTPROCEDURAL STATES: Chronic | ICD-10-CM

## 2018-06-14 DIAGNOSIS — Z98.890 OTHER SPECIFIED POSTPROCEDURAL STATES: Chronic | ICD-10-CM

## 2018-06-14 DIAGNOSIS — L30.9 DERMATITIS, UNSPECIFIED: ICD-10-CM

## 2018-06-14 DIAGNOSIS — Z98.52 VASECTOMY STATUS: Chronic | ICD-10-CM

## 2018-06-14 DIAGNOSIS — Z00.8 ENCOUNTER FOR OTHER GENERAL EXAMINATION: ICD-10-CM

## 2018-06-14 PROCEDURE — 99213 OFFICE O/P EST LOW 20 MIN: CPT

## 2018-06-14 PROCEDURE — 93975 VASCULAR STUDY: CPT

## 2018-06-14 PROCEDURE — 93975 VASCULAR STUDY: CPT | Mod: 26

## 2018-06-15 ENCOUNTER — OTHER (OUTPATIENT)
Age: 63
End: 2018-06-15

## 2018-06-15 ENCOUNTER — MEDICATION RENEWAL (OUTPATIENT)
Age: 63
End: 2018-06-15

## 2018-06-15 LAB — SURGICAL PATHOLOGY STUDY: SIGNIFICANT CHANGE UP

## 2018-06-20 ENCOUNTER — MEDICATION RENEWAL (OUTPATIENT)
Age: 63
End: 2018-06-20

## 2018-06-21 ENCOUNTER — OTHER (OUTPATIENT)
Age: 63
End: 2018-06-21

## 2018-06-21 LAB
ALBUMIN SERPL ELPH-MCNC: 3.4 G/DL
ANION GAP SERPL CALC-SCNC: 15 MMOL/L
BASOPHILS # BLD AUTO: 0.01 K/UL
BASOPHILS NFR BLD AUTO: 0.1 %
BUN SERPL-MCNC: 27 MG/DL
CALCIUM SERPL-MCNC: 8.2 MG/DL
CHLORIDE SERPL-SCNC: 103 MMOL/L
CO2 SERPL-SCNC: 22 MMOL/L
CREAT SERPL-MCNC: 2.37 MG/DL
EOSINOPHIL # BLD AUTO: 0.01 K/UL
EOSINOPHIL NFR BLD AUTO: 0.1 %
GLUCOSE SERPL-MCNC: 171 MG/DL
HCT VFR BLD CALC: 31.4 %
HGB BLD-MCNC: 10.5 G/DL
IMM GRANULOCYTES NFR BLD AUTO: 1 %
LYMPHOCYTES # BLD AUTO: 0.84 K/UL
LYMPHOCYTES NFR BLD AUTO: 9.4 %
MAN DIFF?: NORMAL
MCHC RBC-ENTMCNC: 33.4 GM/DL
MCHC RBC-ENTMCNC: 33.4 PG
MCV RBC AUTO: 100 FL
MONOCYTES # BLD AUTO: 0.23 K/UL
MONOCYTES NFR BLD AUTO: 2.6 %
NEUTROPHILS # BLD AUTO: 7.77 K/UL
NEUTROPHILS NFR BLD AUTO: 86.8 %
PHOSPHATE SERPL-MCNC: 4.2 MG/DL
PLATELET # BLD AUTO: 142 K/UL
POTASSIUM SERPL-SCNC: 4.7 MMOL/L
RBC # BLD: 3.14 M/UL
RBC # FLD: 13.3 %
SODIUM SERPL-SCNC: 140 MMOL/L
WBC # FLD AUTO: 8.95 K/UL

## 2018-06-22 ENCOUNTER — RX RENEWAL (OUTPATIENT)
Age: 63
End: 2018-06-22

## 2018-06-27 ENCOUNTER — MEDICATION RENEWAL (OUTPATIENT)
Age: 63
End: 2018-06-27

## 2018-06-28 ENCOUNTER — MEDICATION RENEWAL (OUTPATIENT)
Age: 63
End: 2018-06-28

## 2018-06-29 ENCOUNTER — MEDICATION RENEWAL (OUTPATIENT)
Age: 63
End: 2018-06-29

## 2018-07-06 LAB
ALBUMIN SERPL ELPH-MCNC: 3.7 G/DL
ANION GAP SERPL CALC-SCNC: 13 MMOL/L
BUN SERPL-MCNC: 34 MG/DL
CALCIUM SERPL-MCNC: 8.5 MG/DL
CHLORIDE SERPL-SCNC: 95 MMOL/L
CO2 SERPL-SCNC: 27 MMOL/L
CREAT SERPL-MCNC: 2.55 MG/DL
GLUCOSE SERPL-MCNC: 107 MG/DL
PHOSPHATE SERPL-MCNC: 4.3 MG/DL
POTASSIUM SERPL-SCNC: 4.4 MMOL/L
SODIUM SERPL-SCNC: 135 MMOL/L

## 2018-07-11 ENCOUNTER — LABORATORY RESULT (OUTPATIENT)
Age: 63
End: 2018-07-11

## 2018-07-11 ENCOUNTER — APPOINTMENT (OUTPATIENT)
Dept: RHEUMATOLOGY | Facility: CLINIC | Age: 63
End: 2018-07-11
Payer: COMMERCIAL

## 2018-07-11 VITALS
SYSTOLIC BLOOD PRESSURE: 156 MMHG | WEIGHT: 165 LBS | TEMPERATURE: 98.7 F | HEART RATE: 70 BPM | OXYGEN SATURATION: 98 % | DIASTOLIC BLOOD PRESSURE: 76 MMHG | BODY MASS INDEX: 23.1 KG/M2 | HEIGHT: 71 IN

## 2018-07-11 DIAGNOSIS — M25.472 EFFUSION, RIGHT ANKLE: ICD-10-CM

## 2018-07-11 DIAGNOSIS — M25.471 EFFUSION, RIGHT ANKLE: ICD-10-CM

## 2018-07-11 LAB
APPEARANCE: CLEAR
BASOPHILS # BLD AUTO: 0.01 K/UL
BASOPHILS NFR BLD AUTO: 0.1 %
BILIRUBIN URINE: NEGATIVE
BLOOD URINE: ABNORMAL
COLOR: YELLOW
EOSINOPHIL # BLD AUTO: 0.01 K/UL
EOSINOPHIL NFR BLD AUTO: 0.1 %
GLUCOSE QUALITATIVE U: NEGATIVE MG/DL
HCT VFR BLD CALC: 32.7 %
HGB BLD-MCNC: 11 G/DL
IMM GRANULOCYTES NFR BLD AUTO: 1 %
KETONES URINE: NEGATIVE
LEUKOCYTE ESTERASE URINE: NEGATIVE
LYMPHOCYTES # BLD AUTO: 1.23 K/UL
LYMPHOCYTES NFR BLD AUTO: 10.7 %
MAN DIFF?: NORMAL
MCHC RBC-ENTMCNC: 33.3 PG
MCHC RBC-ENTMCNC: 33.6 GM/DL
MCV RBC AUTO: 99.1 FL
MONOCYTES # BLD AUTO: 0.42 K/UL
MONOCYTES NFR BLD AUTO: 3.6 %
NEUTROPHILS # BLD AUTO: 9.74 K/UL
NEUTROPHILS NFR BLD AUTO: 84.5 %
NITRITE URINE: NEGATIVE
PH URINE: 5.5
PLATELET # BLD AUTO: 166 K/UL
PROTEIN URINE: 100 MG/DL
RBC # BLD: 3.3 M/UL
RBC # FLD: 12.4 %
SPECIFIC GRAVITY URINE: 1.01
UROBILINOGEN URINE: NEGATIVE MG/DL
WBC # FLD AUTO: 11.53 K/UL

## 2018-07-11 PROCEDURE — 99215 OFFICE O/P EST HI 40 MIN: CPT

## 2018-07-12 LAB
ALBUMIN SERPL ELPH-MCNC: 3.7 G/DL
ALP BLD-CCNC: 79 U/L
ALT SERPL-CCNC: 38 U/L
ANION GAP SERPL CALC-SCNC: 16 MMOL/L
AST SERPL-CCNC: 51 U/L
BILIRUB SERPL-MCNC: 0.3 MG/DL
BUN SERPL-MCNC: 30 MG/DL
CALCIUM SERPL-MCNC: 8.4 MG/DL
CHLORIDE SERPL-SCNC: 96 MMOL/L
CO2 SERPL-SCNC: 26 MMOL/L
CREAT SERPL-MCNC: 2.4 MG/DL
CREAT SPEC-SCNC: 79 MG/DL
CREAT/PROT UR: 1.7 RATIO
CRP SERPL-MCNC: <0.1 MG/DL
DSDNA AB SER-ACNC: 35 IU/ML
GLUCOSE SERPL-MCNC: 166 MG/DL
POTASSIUM SERPL-SCNC: 4.5 MMOL/L
PROT SERPL-MCNC: 6.1 G/DL
PROT UR-MCNC: 136 MG/DL
SODIUM SERPL-SCNC: 138 MMOL/L

## 2018-07-13 ENCOUNTER — APPOINTMENT (OUTPATIENT)
Dept: NEPHROLOGY | Facility: CLINIC | Age: 63
End: 2018-07-13
Payer: COMMERCIAL

## 2018-07-13 VITALS
WEIGHT: 168.65 LBS | DIASTOLIC BLOOD PRESSURE: 66 MMHG | SYSTOLIC BLOOD PRESSURE: 131 MMHG | HEART RATE: 72 BPM | HEIGHT: 71 IN | BODY MASS INDEX: 23.61 KG/M2 | OXYGEN SATURATION: 97 %

## 2018-07-13 LAB
C3 SERPL-MCNC: 90 MG/DL
C4 SERPL-MCNC: 20 MG/DL

## 2018-07-13 PROCEDURE — 99214 OFFICE O/P EST MOD 30 MIN: CPT

## 2018-07-13 RX ORDER — HYDROXYCHLOROQUINE SULFATE 200 MG/1
200 TABLET, FILM COATED ORAL TWICE DAILY
Qty: 60 | Refills: 3 | Status: DISCONTINUED | COMMUNITY
Start: 2018-05-10 | End: 2018-07-13

## 2018-07-16 ENCOUNTER — OTHER (OUTPATIENT)
Age: 63
End: 2018-07-16

## 2018-07-17 ENCOUNTER — MEDICATION RENEWAL (OUTPATIENT)
Age: 63
End: 2018-07-17

## 2018-07-17 ENCOUNTER — OTHER (OUTPATIENT)
Age: 63
End: 2018-07-17

## 2018-07-18 ENCOUNTER — MEDICATION RENEWAL (OUTPATIENT)
Age: 63
End: 2018-07-18

## 2018-07-23 ENCOUNTER — MEDICATION RENEWAL (OUTPATIENT)
Age: 63
End: 2018-07-23

## 2018-07-23 ENCOUNTER — RX RENEWAL (OUTPATIENT)
Age: 63
End: 2018-07-23

## 2018-07-23 ENCOUNTER — APPOINTMENT (OUTPATIENT)
Dept: ENDOCRINOLOGY | Facility: CLINIC | Age: 63
End: 2018-07-23
Payer: COMMERCIAL

## 2018-07-23 VITALS
OXYGEN SATURATION: 99 % | SYSTOLIC BLOOD PRESSURE: 130 MMHG | DIASTOLIC BLOOD PRESSURE: 80 MMHG | HEIGHT: 71 IN | WEIGHT: 165 LBS | HEART RATE: 80 BPM | BODY MASS INDEX: 23.1 KG/M2

## 2018-07-23 LAB
GLUCOSE BLDC GLUCOMTR-MCNC: 72
HBA1C MFR BLD HPLC: 7.2

## 2018-07-23 PROCEDURE — 82962 GLUCOSE BLOOD TEST: CPT | Mod: GC

## 2018-07-23 PROCEDURE — 83036 HEMOGLOBIN GLYCOSYLATED A1C: CPT | Mod: QW,GC

## 2018-07-23 PROCEDURE — 99214 OFFICE O/P EST MOD 30 MIN: CPT | Mod: 25,GC

## 2018-07-23 RX ORDER — CELECOXIB 200 MG/1
200 CAPSULE ORAL
Qty: 180 | Refills: 0 | Status: DISCONTINUED | COMMUNITY
Start: 2017-12-28 | End: 2018-07-23

## 2018-07-23 RX ORDER — METOLAZONE 5 MG/1
5 TABLET ORAL DAILY
Qty: 30 | Refills: 0 | Status: DISCONTINUED | COMMUNITY
Start: 2018-06-19 | End: 2018-07-23

## 2018-07-23 RX ORDER — ADALIMUMAB 40MG/0.8ML
40 KIT SUBCUTANEOUS
Qty: 4 | Refills: 0 | Status: DISCONTINUED | COMMUNITY
Start: 2016-11-23 | End: 2018-07-23

## 2018-07-23 RX ORDER — LANCETS 33 GAUGE
EACH MISCELLANEOUS
Qty: 6 | Refills: 0 | Status: ACTIVE | COMMUNITY
Start: 2018-07-23 | End: 1900-01-01

## 2018-07-23 RX ORDER — SARILUMAB 200 MG/1.14ML
200 INJECTION, SOLUTION SUBCUTANEOUS
Qty: 2 | Refills: 0 | Status: DISCONTINUED | COMMUNITY
Start: 2017-12-14 | End: 2018-07-23

## 2018-07-24 ENCOUNTER — MEDICATION RENEWAL (OUTPATIENT)
Age: 63
End: 2018-07-24

## 2018-07-27 ENCOUNTER — NON-APPOINTMENT (OUTPATIENT)
Age: 63
End: 2018-07-27

## 2018-07-27 ENCOUNTER — APPOINTMENT (OUTPATIENT)
Dept: CARDIOLOGY | Facility: CLINIC | Age: 63
End: 2018-07-27
Payer: COMMERCIAL

## 2018-07-27 VITALS
HEART RATE: 66 BPM | HEIGHT: 71 IN | TEMPERATURE: 97.9 F | BODY MASS INDEX: 23.66 KG/M2 | WEIGHT: 169 LBS | SYSTOLIC BLOOD PRESSURE: 120 MMHG | DIASTOLIC BLOOD PRESSURE: 60 MMHG | OXYGEN SATURATION: 98 %

## 2018-07-27 PROCEDURE — 99215 OFFICE O/P EST HI 40 MIN: CPT

## 2018-07-27 PROCEDURE — 93000 ELECTROCARDIOGRAM COMPLETE: CPT

## 2018-08-22 ENCOUNTER — MEDICATION RENEWAL (OUTPATIENT)
Age: 63
End: 2018-08-22

## 2018-08-27 ENCOUNTER — MOBILE ON CALL (OUTPATIENT)
Age: 63
End: 2018-08-27

## 2018-08-28 LAB
25(OH)D3 SERPL-MCNC: 22.4 NG/ML
ALBUMIN SERPL ELPH-MCNC: 3.8 G/DL
ANION GAP SERPL CALC-SCNC: 12 MMOL/L
APPEARANCE: CLEAR
BACTERIA: NEGATIVE
BASOPHILS # BLD AUTO: 0.03 K/UL
BASOPHILS NFR BLD AUTO: 0.2 %
BILIRUBIN URINE: NEGATIVE
BLOOD URINE: ABNORMAL
BUN SERPL-MCNC: 26 MG/DL
CALCIUM SERPL-MCNC: 8.8 MG/DL
CALCIUM SERPL-MCNC: 8.8 MG/DL
CHLORIDE SERPL-SCNC: 99 MMOL/L
CO2 SERPL-SCNC: 26 MMOL/L
COLOR: YELLOW
CREAT SERPL-MCNC: 2.28 MG/DL
CREAT SPEC-SCNC: 61 MG/DL
CREAT/PROT UR: 2 RATIO
EOSINOPHIL # BLD AUTO: 0.01 K/UL
EOSINOPHIL NFR BLD AUTO: 0.1 %
GLUCOSE QUALITATIVE U: NEGATIVE MG/DL
GLUCOSE SERPL-MCNC: 170 MG/DL
HCT VFR BLD CALC: 35.7 %
HGB BLD-MCNC: 12 G/DL
HYALINE CASTS: 1 /LPF
IMM GRANULOCYTES NFR BLD AUTO: 1.4 %
KETONES URINE: NEGATIVE
LEUKOCYTE ESTERASE URINE: NEGATIVE
LYMPHOCYTES # BLD AUTO: 1.07 K/UL
LYMPHOCYTES NFR BLD AUTO: 8 %
MAN DIFF?: NORMAL
MCHC RBC-ENTMCNC: 33.5 PG
MCHC RBC-ENTMCNC: 33.6 GM/DL
MCV RBC AUTO: 99.7 FL
MICROSCOPIC-UA: NORMAL
MONOCYTES # BLD AUTO: 0.31 K/UL
MONOCYTES NFR BLD AUTO: 2.3 %
NEUTROPHILS # BLD AUTO: 11.75 K/UL
NEUTROPHILS NFR BLD AUTO: 88 %
NITRITE URINE: NEGATIVE
PARATHYROID HORMONE INTACT: 146 PG/ML
PH URINE: 6
PHOSPHATE SERPL-MCNC: 4.1 MG/DL
PLATELET # BLD AUTO: 181 K/UL
POTASSIUM SERPL-SCNC: 5.7 MMOL/L
PROT UR-MCNC: 120 MG/DL
PROTEIN URINE: 100 MG/DL
RBC # BLD: 3.58 M/UL
RBC # FLD: 12.6 %
RED BLOOD CELLS URINE: 3 /HPF
SODIUM SERPL-SCNC: 137 MMOL/L
SPECIFIC GRAVITY URINE: 1.01
SQUAMOUS EPITHELIAL CELLS: 0 /HPF
UROBILINOGEN URINE: NEGATIVE MG/DL
WBC # FLD AUTO: 13.36 K/UL
WHITE BLOOD CELLS URINE: 0 /HPF

## 2018-08-29 ENCOUNTER — FORM ENCOUNTER (OUTPATIENT)
Age: 63
End: 2018-08-29

## 2018-08-30 ENCOUNTER — OUTPATIENT (OUTPATIENT)
Dept: OUTPATIENT SERVICES | Facility: HOSPITAL | Age: 63
LOS: 1 days | End: 2018-08-30
Payer: COMMERCIAL

## 2018-08-30 ENCOUNTER — APPOINTMENT (OUTPATIENT)
Dept: ULTRASOUND IMAGING | Facility: CLINIC | Age: 63
End: 2018-08-30
Payer: COMMERCIAL

## 2018-08-30 DIAGNOSIS — Z98.890 OTHER SPECIFIED POSTPROCEDURAL STATES: Chronic | ICD-10-CM

## 2018-08-30 DIAGNOSIS — Z00.8 ENCOUNTER FOR OTHER GENERAL EXAMINATION: ICD-10-CM

## 2018-08-30 DIAGNOSIS — Z98.89 OTHER SPECIFIED POSTPROCEDURAL STATES: Chronic | ICD-10-CM

## 2018-08-30 DIAGNOSIS — Z90.49 ACQUIRED ABSENCE OF OTHER SPECIFIED PARTS OF DIGESTIVE TRACT: Chronic | ICD-10-CM

## 2018-08-30 DIAGNOSIS — Z98.52 VASECTOMY STATUS: Chronic | ICD-10-CM

## 2018-08-30 PROCEDURE — 93971 EXTREMITY STUDY: CPT

## 2018-08-30 PROCEDURE — 93971 EXTREMITY STUDY: CPT | Mod: 26,LT

## 2018-08-31 ENCOUNTER — RX RENEWAL (OUTPATIENT)
Age: 63
End: 2018-08-31

## 2018-09-14 ENCOUNTER — APPOINTMENT (OUTPATIENT)
Dept: INTERNAL MEDICINE | Facility: CLINIC | Age: 63
End: 2018-09-14
Payer: COMMERCIAL

## 2018-09-14 VITALS
BODY MASS INDEX: 24.22 KG/M2 | OXYGEN SATURATION: 96 % | DIASTOLIC BLOOD PRESSURE: 78 MMHG | HEART RATE: 80 BPM | SYSTOLIC BLOOD PRESSURE: 158 MMHG | WEIGHT: 173 LBS | RESPIRATION RATE: 14 BRPM | HEIGHT: 71 IN

## 2018-09-14 PROCEDURE — 99397 PER PM REEVAL EST PAT 65+ YR: CPT

## 2018-09-21 LAB
ANION GAP SERPL CALC-SCNC: 14 MMOL/L
BUN SERPL-MCNC: 30 MG/DL
CALCIUM SERPL-MCNC: 8.7 MG/DL
CHLORIDE SERPL-SCNC: 102 MMOL/L
CHOLEST SERPL-MCNC: 129 MG/DL
CHOLEST/HDLC SERPL: 2.4 RATIO
CO2 SERPL-SCNC: 26 MMOL/L
CREAT SERPL-MCNC: 2.27 MG/DL
GLUCOSE SERPL-MCNC: 131 MG/DL
HDLC SERPL-MCNC: 53 MG/DL
LDLC SERPL CALC-MCNC: 50 MG/DL
POTASSIUM SERPL-SCNC: 4.6 MMOL/L
SODIUM SERPL-SCNC: 142 MMOL/L
TRIGL SERPL-MCNC: 131 MG/DL

## 2018-09-27 ENCOUNTER — APPOINTMENT (OUTPATIENT)
Dept: RHEUMATOLOGY | Facility: CLINIC | Age: 63
End: 2018-09-27
Payer: COMMERCIAL

## 2018-09-27 VITALS
HEART RATE: 71 BPM | DIASTOLIC BLOOD PRESSURE: 72 MMHG | TEMPERATURE: 98.1 F | HEIGHT: 71 IN | SYSTOLIC BLOOD PRESSURE: 131 MMHG | WEIGHT: 173 LBS | BODY MASS INDEX: 24.22 KG/M2 | OXYGEN SATURATION: 98 %

## 2018-09-27 PROCEDURE — 99214 OFFICE O/P EST MOD 30 MIN: CPT

## 2018-10-11 LAB
ALBUMIN SERPL ELPH-MCNC: 3.8 G/DL
ALP BLD-CCNC: 97 U/L
ALT SERPL-CCNC: 52 U/L
ANION GAP SERPL CALC-SCNC: 14 MMOL/L
APPEARANCE: CLEAR
AST SERPL-CCNC: 79 U/L
BILIRUB SERPL-MCNC: 0.4 MG/DL
BILIRUBIN URINE: NEGATIVE
BLOOD URINE: ABNORMAL
BUN SERPL-MCNC: 26 MG/DL
C3 SERPL-MCNC: 92 MG/DL
C4 SERPL-MCNC: 22 MG/DL
CALCIUM SERPL-MCNC: 8.7 MG/DL
CHLORIDE SERPL-SCNC: 97 MMOL/L
CO2 SERPL-SCNC: 26 MMOL/L
COLOR: YELLOW
CREAT SERPL-MCNC: 2.24 MG/DL
CREAT SPEC-SCNC: 79 MG/DL
CREAT/PROT UR: 1.9 RATIO
DSDNA AB SER-ACNC: 36 IU/ML
GLUCOSE QUALITATIVE U: NEGATIVE MG/DL
GLUCOSE SERPL-MCNC: 163 MG/DL
KETONES URINE: NEGATIVE
LEUKOCYTE ESTERASE URINE: NEGATIVE
NITRITE URINE: NEGATIVE
PH URINE: 5.5
POTASSIUM SERPL-SCNC: 4.3 MMOL/L
PROT SERPL-MCNC: 6.6 G/DL
PROT UR-MCNC: 149 MG/DL
PROTEIN URINE: 300 MG/DL
SODIUM SERPL-SCNC: 137 MMOL/L
SPECIFIC GRAVITY URINE: 1.01
UROBILINOGEN URINE: NEGATIVE MG/DL

## 2018-10-19 ENCOUNTER — APPOINTMENT (OUTPATIENT)
Dept: NEPHROLOGY | Facility: CLINIC | Age: 63
End: 2018-10-19
Payer: COMMERCIAL

## 2018-10-19 VITALS
DIASTOLIC BLOOD PRESSURE: 69 MMHG | HEIGHT: 71 IN | HEART RATE: 64 BPM | WEIGHT: 171.96 LBS | SYSTOLIC BLOOD PRESSURE: 163 MMHG | BODY MASS INDEX: 24.07 KG/M2 | OXYGEN SATURATION: 98 %

## 2018-10-19 PROCEDURE — 99214 OFFICE O/P EST MOD 30 MIN: CPT

## 2018-10-25 ENCOUNTER — OUTPATIENT (OUTPATIENT)
Dept: OUTPATIENT SERVICES | Facility: HOSPITAL | Age: 63
LOS: 1 days | End: 2018-10-25
Payer: COMMERCIAL

## 2018-10-25 ENCOUNTER — APPOINTMENT (OUTPATIENT)
Dept: MRI IMAGING | Facility: CLINIC | Age: 63
End: 2018-10-25
Payer: COMMERCIAL

## 2018-10-25 DIAGNOSIS — Z90.49 ACQUIRED ABSENCE OF OTHER SPECIFIED PARTS OF DIGESTIVE TRACT: Chronic | ICD-10-CM

## 2018-10-25 DIAGNOSIS — Z98.89 OTHER SPECIFIED POSTPROCEDURAL STATES: Chronic | ICD-10-CM

## 2018-10-25 DIAGNOSIS — K74.60 UNSPECIFIED CIRRHOSIS OF LIVER: ICD-10-CM

## 2018-10-25 DIAGNOSIS — Z98.890 OTHER SPECIFIED POSTPROCEDURAL STATES: Chronic | ICD-10-CM

## 2018-10-25 DIAGNOSIS — I81 PORTAL VEIN THROMBOSIS: ICD-10-CM

## 2018-10-25 DIAGNOSIS — K86.1 OTHER CHRONIC PANCREATITIS: ICD-10-CM

## 2018-10-25 DIAGNOSIS — Z98.52 VASECTOMY STATUS: Chronic | ICD-10-CM

## 2018-10-25 DIAGNOSIS — Z00.8 ENCOUNTER FOR OTHER GENERAL EXAMINATION: ICD-10-CM

## 2018-10-25 PROCEDURE — 74181 MRI ABDOMEN W/O CONTRAST: CPT | Mod: 26

## 2018-10-25 PROCEDURE — 82565 ASSAY OF CREATININE: CPT

## 2018-10-25 PROCEDURE — 74181 MRI ABDOMEN W/O CONTRAST: CPT

## 2018-11-01 NOTE — HISTORY OF PRESENT ILLNESS
[FreeTextEntry1] : Here for annual [de-identified] : Here for annual exam and to f/u on liver dz, CKD (Cr 2.2 range), DM, give update on PsA/RA, HTN, CAD s/p remote intervention w/o recurrence of syx x years, chronic pancreatitis/chronic pain, carotid endarterectomy, THR.  Doing all right.  For all his docs/follow ups, overall is a very consistent patient.  Pain from multiple sources/issues stays with him, but managed by pain doc, and has been on a number of biologics last few years; currently on xeljanz with Dr. Longo after had a +JIM/dsDNA perhaps associated with one of the drugs.  Has on and off exacerbation of his (likely multifactorial) LE edema.  Having no cp, sob, shah, palpitations, orthopnea.  Still able to walk nine holes with his friends, which is very important to him.  Through all his medical issues, has not given up his cigs and will not consider

## 2018-11-01 NOTE — COUNSELING
[Weight management counseling provided] : Weight management [Healthy eating counseling provided] : healthy eating [Activity counseling provided] : activity [Smoking cessation counseling provided] : smoking cessation [Decrease Portions] : Decrease food portions [Walking] : Walking [Quit Smoking] : Quit smoking [Engage in a relaxing activity] : Engage in a relaxing activity [Good understanding] : Patient has a good understanding of lifestyle changes and the steps needed to achieve self management goals

## 2018-11-01 NOTE — HEALTH RISK ASSESSMENT
[Fair] :  ~his/her~ mood as fair [] : No [No falls in past year] : Patient reported no falls in the past year [0] : 1) Little interest or pleasure doing things: Not at all (0) [1] : 2) Feeling down, depressed, or hopeless for several days (1) [Patient reported bone density results were normal] : Patient reported bone density results were normal [Patient reported colonoscopy was normal] : Patient reported colonoscopy was normal [None] : None [With Significant Other] : lives with significant other [Retired] : retired [Graduate School] : graduate school [] :  [# Of Children ___] : has [unfilled] children [High Risk Behavior] : no high risk behavior [Feels Safe at Home] : Feels safe at home [Fully functional (bathing, dressing, toileting, transferring, walking, feeding)] : Fully functional (bathing, dressing, toileting, transferring, walking, feeding) [Fully functional (using the telephone, shopping, preparing meals, housekeeping, doing laundry, using] : Fully functional and needs no help or supervision to perform IADLs (using the telephone, shopping, preparing meals, housekeeping, doing laundry, using transportation, managing medications and managing finances) [Reports changes in hearing] : Reports no changes in hearing [Reports changes in vision] : Reports no changes in vision [Reports changes in dental health] : Reports no changes in dental health [Smoke Detector] : smoke detector [Carbon Monoxide Detector] : carbon monoxide detector [Guns at Home] : no guns at home [Safety elements used in home] : safety elements used in home [Seat Belt] :  uses seat belt [Sunscreen] : uses sunscreen [Travel to Developing Areas] : travel to developing areas [TB Exposure] : is not being exposed to tuberculosis [Caregiver Concerns] : does not have caregiver concerns [BoneDensityDate] : 4/18/18 [ColonoscopyDate] : 1/17/07 [ColonoscopyComments] : FITs more recently, neg

## 2018-11-01 NOTE — ASSESSMENT
[FreeTextEntry1] : 1) HCM - getting flu vacc in a few weeks at local pharm.  Rest of vaccines utd, inc shingrix x 2.  SB, SD, sunblock, exercise/walking as tolerated.  Doesn't want optical colon screen, worries prep will be very hard on him;  FIT given.  Has multiple labs from endo/renal/rheum.  2) bp fair, has been somewhat higher since onset of CKD/Cr rise.  Lower Na diet reminded.  3) on statin since CAD, has been lovastatin x years, doesn't want to switch.  4) not even precontemplative re: cigs.  Fatalistic, has poor quality of life medically, doesn't want to give them up because one fo few things he enjoys.  Will continue to discuss.  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  s/p renal bx that favors DM as insult, however.  Now on prednisone via rheum.  See notes.  6) appreciate input of all on very complicated case.

## 2018-11-01 NOTE — PHYSICAL EXAM
[No Acute Distress] : no acute distress [Well Nourished] : well nourished [Well Developed] : well developed [Well-Appearing] : well-appearing [Normal Sclera/Conjunctiva] : normal sclera/conjunctiva [PERRL] : pupils equal round and reactive to light [EOMI] : extraocular movements intact [Normal Outer Ear/Nose] : the outer ears and nose were normal in appearance [Normal Oropharynx] : the oropharynx was normal [Normal TMs] : both tympanic membranes were normal [Normal Nasal Mucosa] : the nasal mucosa was normal [No JVD] : no jugular venous distention [Supple] : supple [No Lymphadenopathy] : no lymphadenopathy [Thyroid Normal, No Nodules] : the thyroid was normal and there were no nodules present [No Respiratory Distress] : no respiratory distress  [Clear to Auscultation] : lungs were clear to auscultation bilaterally [No Accessory Muscle Use] : no accessory muscle use [Normal Percussion] : the chest was normal to percussion [Normal Rate] : normal rate  [Regular Rhythm] : with a regular rhythm [Normal S1, S2] : normal S1 and S2 [No Murmur] : no murmur heard [No Carotid Bruits] : no carotid bruits [No Abdominal Bruit] : a ~M bruit was not heard ~T in the abdomen [No Extremity Clubbing/Cyanosis] : no extremity clubbing/cyanosis [No Palpable Aorta] : no palpable aorta [Soft] : abdomen soft [Non Tender] : non-tender [Non-distended] : non-distended [No Masses] : no abdominal mass palpated [No HSM] : no HSM [Normal Bowel Sounds] : normal bowel sounds [No Hernias] : no hernias [Normal Supraclavicular Nodes] : no supraclavicular lymphadenopathy [Normal Posterior Cervical Nodes] : no posterior cervical lymphadenopathy [Normal Anterior Cervical Nodes] : no anterior cervical lymphadenopathy [Normal Inguinal Nodes] : no inguinal lymphadenopathy [No CVA Tenderness] : no CVA  tenderness [No Spinal Tenderness] : no spinal tenderness [No Joint Swelling] : no joint swelling [Grossly Normal Strength/Tone] : grossly normal strength/tone [No Rash] : no rash [No Skin Lesions] : no skin lesions [Normal Gait] : normal gait [Coordination Grossly Intact] : coordination grossly intact [No Focal Deficits] : no focal deficits [Deep Tendon Reflexes (DTR)] : deep tendon reflexes were 2+ and symmetric [Speech Grossly Normal] : speech grossly normal [Memory Grossly Normal] : memory grossly normal [Normal Affect] : the affect was normal [Alert and Oriented x3] : oriented to person, place, and time [Normal Mood] : the mood was normal [Normal Insight/Judgement] : insight and judgment were intact [Right Foot Was Examined] : Right foot ~C was examined [Left Foot Was Examined] : left foot ~C was examined [None] : no ulcers in either foot were found [] : both feet [de-identified] : no icterus, pallor [de-identified] : 2+ non pitting edema LEs [de-identified] : no suspicious nevi

## 2018-11-01 NOTE — REVIEW OF SYSTEMS
[Fever] : no fever [Chills] : no chills [Night Sweats] : no night sweats [Chest Pain] : no chest pain [Lower Ext Edema] : lower extremity edema [Orthopena] : no orthopnea [Paroysmal Nocturnal Dyspnea] : no paroysmal nocturnal dyspnea [Joint Pain] : joint pain [Joint Stiffness] : joint stiffness [Joint Swelling] : joint swelling [Negative] : Heme/Lymph

## 2018-11-08 ENCOUNTER — FORM ENCOUNTER (OUTPATIENT)
Age: 63
End: 2018-11-08

## 2018-11-09 ENCOUNTER — OUTPATIENT (OUTPATIENT)
Dept: OUTPATIENT SERVICES | Facility: HOSPITAL | Age: 63
LOS: 1 days | End: 2018-11-09
Payer: COMMERCIAL

## 2018-11-09 ENCOUNTER — APPOINTMENT (OUTPATIENT)
Dept: MRI IMAGING | Facility: CLINIC | Age: 63
End: 2018-11-09
Payer: COMMERCIAL

## 2018-11-09 DIAGNOSIS — Z98.89 OTHER SPECIFIED POSTPROCEDURAL STATES: Chronic | ICD-10-CM

## 2018-11-09 DIAGNOSIS — Z98.890 OTHER SPECIFIED POSTPROCEDURAL STATES: Chronic | ICD-10-CM

## 2018-11-09 DIAGNOSIS — Z90.49 ACQUIRED ABSENCE OF OTHER SPECIFIED PARTS OF DIGESTIVE TRACT: Chronic | ICD-10-CM

## 2018-11-09 DIAGNOSIS — Z00.8 ENCOUNTER FOR OTHER GENERAL EXAMINATION: ICD-10-CM

## 2018-11-09 DIAGNOSIS — Z98.52 VASECTOMY STATUS: Chronic | ICD-10-CM

## 2018-11-09 PROCEDURE — 73218 MRI UPPER EXTREMITY W/O DYE: CPT | Mod: 26,RT

## 2018-11-09 PROCEDURE — 73218 MRI UPPER EXTREMITY W/O DYE: CPT

## 2018-11-12 ENCOUNTER — NON-APPOINTMENT (OUTPATIENT)
Age: 63
End: 2018-11-12

## 2018-11-12 ENCOUNTER — APPOINTMENT (OUTPATIENT)
Dept: ENDOCRINOLOGY | Facility: CLINIC | Age: 63
End: 2018-11-12
Payer: COMMERCIAL

## 2018-11-12 ENCOUNTER — APPOINTMENT (OUTPATIENT)
Dept: CARDIOLOGY | Facility: CLINIC | Age: 63
End: 2018-11-12
Payer: COMMERCIAL

## 2018-11-12 ENCOUNTER — MEDICATION RENEWAL (OUTPATIENT)
Age: 63
End: 2018-11-12

## 2018-11-12 VITALS
HEIGHT: 71 IN | OXYGEN SATURATION: 98 % | WEIGHT: 166 LBS | DIASTOLIC BLOOD PRESSURE: 60 MMHG | TEMPERATURE: 98.4 F | SYSTOLIC BLOOD PRESSURE: 118 MMHG | HEART RATE: 64 BPM | BODY MASS INDEX: 23.24 KG/M2

## 2018-11-12 VITALS
BODY MASS INDEX: 22.82 KG/M2 | WEIGHT: 163 LBS | HEIGHT: 71 IN | HEART RATE: 60 BPM | DIASTOLIC BLOOD PRESSURE: 74 MMHG | SYSTOLIC BLOOD PRESSURE: 126 MMHG | OXYGEN SATURATION: 99 %

## 2018-11-12 DIAGNOSIS — F17.210 NICOTINE DEPENDENCE, CIGARETTES, UNCOMPLICATED: ICD-10-CM

## 2018-11-12 LAB
ALBUMIN SERPL ELPH-MCNC: 3.7 G/DL
ANION GAP SERPL CALC-SCNC: 14 MMOL/L
APPEARANCE: CLEAR
BACTERIA: NEGATIVE
BASOPHILS # BLD AUTO: 0.04 K/UL
BASOPHILS NFR BLD AUTO: 0.4 %
BILIRUBIN URINE: NEGATIVE
BLOOD URINE: ABNORMAL
BUN SERPL-MCNC: 21 MG/DL
CALCIUM SERPL-MCNC: 8.6 MG/DL
CHLORIDE SERPL-SCNC: 103 MMOL/L
CHOLEST SERPL-MCNC: 129 MG/DL
CHOLEST/HDLC SERPL: 2.6 RATIO
CO2 SERPL-SCNC: 27 MMOL/L
COLOR: YELLOW
CREAT SERPL-MCNC: 2.25 MG/DL
CREAT SPEC-SCNC: 43 MG/DL
CREAT/PROT UR: 0.9 RATIO
EOSINOPHIL # BLD AUTO: 0.14 K/UL
EOSINOPHIL NFR BLD AUTO: 1.4 %
FERRITIN SERPL-MCNC: 377 NG/ML
GLUCOSE QUALITATIVE U: NEGATIVE MG/DL
GLUCOSE SERPL-MCNC: 213 MG/DL
HBA1C MFR BLD HPLC: 7.4 %
HCT VFR BLD CALC: 37.8 %
HDLC SERPL-MCNC: 50 MG/DL
HGB BLD-MCNC: 13 G/DL
HYALINE CASTS: 0 /LPF
IMM GRANULOCYTES NFR BLD AUTO: 1 %
IRON SATN MFR SERPL: 35 %
IRON SERPL-MCNC: 96 UG/DL
KETONES URINE: NEGATIVE
LDLC SERPL CALC-MCNC: 48 MG/DL
LEUKOCYTE ESTERASE URINE: NEGATIVE
LYMPHOCYTES # BLD AUTO: 2.35 K/UL
LYMPHOCYTES NFR BLD AUTO: 24.1 %
MAN DIFF?: NORMAL
MCHC RBC-ENTMCNC: 33.3 PG
MCHC RBC-ENTMCNC: 34.4 GM/DL
MCV RBC AUTO: 96.9 FL
MICROSCOPIC-UA: NORMAL
MONOCYTES # BLD AUTO: 0.82 K/UL
MONOCYTES NFR BLD AUTO: 8.4 %
NEUTROPHILS # BLD AUTO: 6.31 K/UL
NEUTROPHILS NFR BLD AUTO: 64.7 %
NITRITE URINE: NEGATIVE
PH URINE: 6.5
PHOSPHATE SERPL-MCNC: 4.1 MG/DL
PLATELET # BLD AUTO: 186 K/UL
POTASSIUM SERPL-SCNC: 4.7 MMOL/L
PROT UR-MCNC: 38 MG/DL
PROTEIN URINE: 30 MG/DL
RBC # BLD: 3.9 M/UL
RBC # FLD: 13 %
RED BLOOD CELLS URINE: 2 /HPF
SODIUM SERPL-SCNC: 144 MMOL/L
SPECIFIC GRAVITY URINE: 1.01
SQUAMOUS EPITHELIAL CELLS: 1 /HPF
TIBC SERPL-MCNC: 271 UG/DL
TRIGL SERPL-MCNC: 154 MG/DL
UIBC SERPL-MCNC: 175 UG/DL
URATE SERPL-MCNC: 10 MG/DL
UROBILINOGEN URINE: NEGATIVE MG/DL
WBC # FLD AUTO: 9.76 K/UL
WHITE BLOOD CELLS URINE: 0 /HPF

## 2018-11-12 PROCEDURE — 93000 ELECTROCARDIOGRAM COMPLETE: CPT

## 2018-11-12 PROCEDURE — 99214 OFFICE O/P EST MOD 30 MIN: CPT | Mod: 25,GC

## 2018-11-12 PROCEDURE — 99215 OFFICE O/P EST HI 40 MIN: CPT

## 2018-11-12 PROCEDURE — 95251 CONT GLUC MNTR ANALYSIS I&R: CPT | Mod: GC

## 2018-11-12 NOTE — PHYSICAL EXAM
[General Appearance - Well Developed] : well developed [General Appearance - Well Nourished] : well nourished [General Appearance - In No Acute Distress] : no acute distress [Normal Conjunctiva] : the conjunctiva exhibited no abnormalities [No Oral Pallor] : no oral pallor [Normal Jugular Venous V Waves Present] : normal jugular venous V waves present [Respiration, Rhythm And Depth] : normal respiratory rhythm and effort [Auscultation Breath Sounds / Voice Sounds] : lungs were clear to auscultation bilaterally [Heart Sounds] : normal S1 and S2 [Murmurs] : no murmurs present [Arterial Pulses Normal] : the arterial pulses were normal [Edema] : no peripheral edema present [Regular] : the rhythm was regular [Bowel Sounds] : normal bowel sounds [Abdomen Soft] : soft [Abnormal Walk] : normal gait [Nail Clubbing] : no clubbing of the fingernails [Cyanosis, Localized] : no localized cyanosis [Skin Turgor] : normal skin turgor [Oriented To Time, Place, And Person] : oriented to person, place, and time [Impaired Insight] : insight and judgment were intact [Affect] : the affect was normal

## 2018-11-12 NOTE — REVIEW OF SYSTEMS
[Recent Weight Loss (___ Lbs)] : no recent weight loss [Dyspnea on exertion] : dyspnea during exertion [Lower Ext Edema] : lower extremity edema [Cough] : no cough [see HPI] : see HPI [Negative] : Heme/Lymph

## 2018-11-12 NOTE — DISCUSSION/SUMMARY
[FreeTextEntry1] : He is a 63-year-old with a history of myocardial infarctions with minimal or no LV dysfunction, improved leg edema and improved hypertension now off prednisone. ARF on CRI improved.\par His blood pressure control is perfect. LDL is okay.\par Continue amlodipine to 10 mg daily and lasix 80 qd.\par He should continue plavix daily and ASA biw.\par We discussed the need for smoking cessation as the best way to improve his overall cardiovascular health. \par Plans to transition from opiods to med MJ\par

## 2018-11-12 NOTE — HISTORY OF PRESENT ILLNESS
[FreeTextEntry1] : Was golfing without difficulty.\par Still smokes a pack a day.\par He continues on lasix 80 bid\par No chest pains or dyspnea.\par Improved leg edema.

## 2018-11-26 ENCOUNTER — APPOINTMENT (OUTPATIENT)
Dept: RHEUMATOLOGY | Facility: CLINIC | Age: 63
End: 2018-11-26

## 2018-11-29 ENCOUNTER — APPOINTMENT (OUTPATIENT)
Dept: RHEUMATOLOGY | Facility: CLINIC | Age: 63
End: 2018-11-29
Payer: COMMERCIAL

## 2018-11-29 VITALS
HEIGHT: 71 IN | DIASTOLIC BLOOD PRESSURE: 72 MMHG | WEIGHT: 167 LBS | BODY MASS INDEX: 23.38 KG/M2 | SYSTOLIC BLOOD PRESSURE: 136 MMHG | TEMPERATURE: 98.5 F | OXYGEN SATURATION: 98 % | HEART RATE: 69 BPM

## 2018-11-29 DIAGNOSIS — M16.11 UNILATERAL PRIMARY OSTEOARTHRITIS, RIGHT HIP: ICD-10-CM

## 2018-11-29 PROCEDURE — 99214 OFFICE O/P EST MOD 30 MIN: CPT

## 2018-11-30 LAB
25(OH)D3 SERPL-MCNC: 44.3 NG/ML
ALBUMIN SERPL ELPH-MCNC: 3.7 G/DL
ALP BLD-CCNC: 105 U/L
ALT SERPL-CCNC: 40 U/L
ANION GAP SERPL CALC-SCNC: 11 MMOL/L
APPEARANCE: CLEAR
AST SERPL-CCNC: 61 U/L
BASOPHILS # BLD AUTO: 0.03 K/UL
BASOPHILS NFR BLD AUTO: 0.3 %
BILIRUB SERPL-MCNC: 0.3 MG/DL
BILIRUBIN URINE: NEGATIVE
BLOOD URINE: NEGATIVE
BUN SERPL-MCNC: 28 MG/DL
C3 SERPL-MCNC: 97 MG/DL
C4 SERPL-MCNC: 24 MG/DL
CALCIUM SERPL-MCNC: 8.8 MG/DL
CHLORIDE SERPL-SCNC: 102 MMOL/L
CO2 SERPL-SCNC: 29 MMOL/L
COLOR: YELLOW
CREAT SERPL-MCNC: 2.23 MG/DL
CREAT SPEC-SCNC: 38 MG/DL
CREAT/PROT UR: 0.4 RATIO
CRP SERPL-MCNC: <0.1 MG/DL
DSDNA AB SER-ACNC: 18 IU/ML
EOSINOPHIL # BLD AUTO: 0.14 K/UL
EOSINOPHIL NFR BLD AUTO: 1.4 %
ERYTHROCYTE [SEDIMENTATION RATE] IN BLOOD BY WESTERGREN METHOD: 18 MM/HR
GLUCOSE QUALITATIVE U: NEGATIVE MG/DL
GLUCOSE SERPL-MCNC: 147 MG/DL
HCT VFR BLD CALC: 32.9 %
HGB BLD-MCNC: 10.9 G/DL
IMM GRANULOCYTES NFR BLD AUTO: 1 %
KETONES URINE: NEGATIVE
LEUKOCYTE ESTERASE URINE: NEGATIVE
LYMPHOCYTES # BLD AUTO: 2.55 K/UL
LYMPHOCYTES NFR BLD AUTO: 25.6 %
MAN DIFF?: NORMAL
MCHC RBC-ENTMCNC: 31.1 PG
MCHC RBC-ENTMCNC: 33.1 GM/DL
MCV RBC AUTO: 93.7 FL
MONOCYTES # BLD AUTO: 0.77 K/UL
MONOCYTES NFR BLD AUTO: 7.7 %
NEUTROPHILS # BLD AUTO: 6.36 K/UL
NEUTROPHILS NFR BLD AUTO: 64 %
NITRITE URINE: NEGATIVE
PH URINE: 6
PLATELET # BLD AUTO: 177 K/UL
POTASSIUM SERPL-SCNC: 4.6 MMOL/L
PROT SERPL-MCNC: 6.5 G/DL
PROT UR-MCNC: 15 MG/DL
PROTEIN URINE: NEGATIVE MG/DL
RBC # BLD: 3.51 M/UL
RBC # FLD: 12.9 %
SODIUM SERPL-SCNC: 142 MMOL/L
SPECIFIC GRAVITY URINE: 1.01
TSH SERPL-ACNC: 2.57 UIU/ML
UROBILINOGEN URINE: NEGATIVE MG/DL
WBC # FLD AUTO: 9.95 K/UL

## 2018-12-04 LAB
ANA PAT FLD IF-IMP: ABNORMAL
ANA SER IF-ACNC: ABNORMAL

## 2018-12-06 ENCOUNTER — FORM ENCOUNTER (OUTPATIENT)
Age: 63
End: 2018-12-06

## 2018-12-07 ENCOUNTER — APPOINTMENT (OUTPATIENT)
Dept: RADIOLOGY | Facility: CLINIC | Age: 63
End: 2018-12-07
Payer: COMMERCIAL

## 2018-12-07 ENCOUNTER — OUTPATIENT (OUTPATIENT)
Dept: OUTPATIENT SERVICES | Facility: HOSPITAL | Age: 63
LOS: 1 days | End: 2018-12-07
Payer: COMMERCIAL

## 2018-12-07 DIAGNOSIS — Z98.52 VASECTOMY STATUS: Chronic | ICD-10-CM

## 2018-12-07 DIAGNOSIS — Z98.89 OTHER SPECIFIED POSTPROCEDURAL STATES: Chronic | ICD-10-CM

## 2018-12-07 DIAGNOSIS — E11.9 TYPE 2 DIABETES MELLITUS WITHOUT COMPLICATIONS: ICD-10-CM

## 2018-12-07 DIAGNOSIS — Z90.49 ACQUIRED ABSENCE OF OTHER SPECIFIED PARTS OF DIGESTIVE TRACT: Chronic | ICD-10-CM

## 2018-12-07 DIAGNOSIS — Z98.890 OTHER SPECIFIED POSTPROCEDURAL STATES: Chronic | ICD-10-CM

## 2018-12-07 PROCEDURE — 72040 X-RAY EXAM NECK SPINE 2-3 VW: CPT | Mod: 26

## 2018-12-07 PROCEDURE — 72040 X-RAY EXAM NECK SPINE 2-3 VW: CPT

## 2018-12-11 ENCOUNTER — APPOINTMENT (OUTPATIENT)
Dept: ORTHOPEDIC SURGERY | Facility: CLINIC | Age: 63
End: 2018-12-11
Payer: COMMERCIAL

## 2018-12-11 VITALS
HEART RATE: 65 BPM | WEIGHT: 164 LBS | BODY MASS INDEX: 22.96 KG/M2 | SYSTOLIC BLOOD PRESSURE: 139 MMHG | HEIGHT: 71 IN | DIASTOLIC BLOOD PRESSURE: 76 MMHG

## 2018-12-11 DIAGNOSIS — Z87.448 PERSONAL HISTORY OF OTHER DISEASES OF URINARY SYSTEM: ICD-10-CM

## 2018-12-11 DIAGNOSIS — Z87.19 PERSONAL HISTORY OF OTHER DISEASES OF THE DIGESTIVE SYSTEM: ICD-10-CM

## 2018-12-11 DIAGNOSIS — Z87.39 PERSONAL HISTORY OF OTHER DISEASES OF THE MUSCULOSKELETAL SYSTEM AND CONNECTIVE TISSUE: ICD-10-CM

## 2018-12-11 DIAGNOSIS — Z86.79 PERSONAL HISTORY OF OTHER DISEASES OF THE CIRCULATORY SYSTEM: ICD-10-CM

## 2018-12-11 PROCEDURE — 73130 X-RAY EXAM OF HAND: CPT | Mod: RT

## 2018-12-11 PROCEDURE — 99203 OFFICE O/P NEW LOW 30 MIN: CPT | Mod: 25

## 2018-12-11 PROCEDURE — 73110 X-RAY EXAM OF WRIST: CPT | Mod: RT

## 2018-12-11 PROCEDURE — 99213 OFFICE O/P EST LOW 20 MIN: CPT | Mod: 25

## 2018-12-11 PROCEDURE — 20600 DRAIN/INJ JOINT/BURSA W/O US: CPT | Mod: F5

## 2018-12-13 ENCOUNTER — RX RENEWAL (OUTPATIENT)
Age: 63
End: 2018-12-13

## 2018-12-17 ENCOUNTER — RX RENEWAL (OUTPATIENT)
Age: 63
End: 2018-12-17

## 2019-01-17 ENCOUNTER — MEDICATION RENEWAL (OUTPATIENT)
Age: 64
End: 2019-01-17

## 2019-01-17 ENCOUNTER — RX RENEWAL (OUTPATIENT)
Age: 64
End: 2019-01-17

## 2019-01-25 ENCOUNTER — APPOINTMENT (OUTPATIENT)
Dept: NEPHROLOGY | Facility: CLINIC | Age: 64
End: 2019-01-25
Payer: COMMERCIAL

## 2019-01-25 VITALS
BODY MASS INDEX: 22.96 KG/M2 | SYSTOLIC BLOOD PRESSURE: 135 MMHG | DIASTOLIC BLOOD PRESSURE: 68 MMHG | HEART RATE: 65 BPM | HEIGHT: 71 IN | WEIGHT: 164 LBS | OXYGEN SATURATION: 99 %

## 2019-01-25 PROCEDURE — 99214 OFFICE O/P EST MOD 30 MIN: CPT

## 2019-01-25 RX ORDER — PREDNISONE 5 MG/1
5 TABLET ORAL
Qty: 60 | Refills: 3 | Status: DISCONTINUED | COMMUNITY
Start: 2018-04-19 | End: 2019-01-25

## 2019-01-25 NOTE — HISTORY OF PRESENT ILLNESS
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  His medical history is significant for rheumatoid arthritis which was diagnosed decades ago.  He has followed regularly with a physician and about 18 years ago he was diagnosed with diabetes, HTN.  He also describes having had a heart attack in the past.  He reports that his diabetes has recently been well controlled on insulin pump.  He say he has neuropathy from diabetes but no retinopathy.  He says he has been aware of an abnormality in his kidney function for years and upon review of available records he has had a creatinine of about 1.5 in 2015 and has intermittently had microalbuminuria usually about 30.  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  His creatinine has gone up, his proteinuria has increased.  Since stopping biologics his complements, hapto, and dsDNA have improved and yet his proteinuria and creatinine continue to worsen.  Of note the patient has also recently had worsening swelling and worsening hypertension over the last few months.  His dose of lasix was recently increased by his cardiologist and this has subsequently improved.  On evaluation today he denies any sob or cp, he has no urinary complaints and voids normally, he has some intermittent joint pains for which he continues to take celecoxib.  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  His steroids are being tapered down.  We have been working on his swelling he was on lasix 80 BID with metolazone as needed to get weight down to 157 at home.  With this reduction in weight his edema is better, he can get on his shoes.  He has not had any dyspnea, CP, NVD, he has not been taking any NSAIDs.  His creatinine checked recently with Dr. Polo has been stable.  He continues to maintain a low potassium diet.  \par \par 10/19/18 Since our last meeting Julián is feeling markedly better and his edema is vastly improved.  His blood pressure at home has been ranging 120 to 130 / 60 to 70.  He has not been using NSAIDs at all.  He has not had any chest pain or urination issues.  He is still smoking.  He is thinking about trying to taper himself off of dilaudid.\par \par 1/25/19 -- Julián is doing really well today.  His weight has been very stable recently.  He has some body aches which is chronic for him.  He reports that his edema is substantially improved.

## 2019-01-25 NOTE — ASSESSMENT
[FreeTextEntry1] : The patient is a 63 years old man with a history of CAD, DM, and RA here for evaluation of IVA in the setting of drug induced lupus\par \par Chronic Kidney Disease Stage IV -- The etiology is biopsy proven diabetic nephropathy.  The patient's creatinine has been stable and he has no uremic signs or symptoms.  I recommended that he continue diuretics.  I spoke to him at length today about the meaning of CKD, its stages, risk for progression and strategies for prevention.  He is tolerating ramipril and his proteinuria has appropriately reducted.\par \par Hypertension -- Patient has good bp readings at home and here.  continue present management\par \par Hyperkalemia -- Most recent potassiun WNL on ramipril..  continue bicarb. low K diet.  Monitor potassium in two weeks.\par \par RTO 4 to 5 months.

## 2019-01-25 NOTE — REVIEW OF SYSTEMS
[Lower Ext Edema] : lower extremity edema [Fever] : no fever [Feeling Poorly] : not feeling poorly [Eyesight Problems] : no eyesight problems [Nosebleeds] : no nosebleeds [Chest Pain] : no chest pain [Shortness Of Breath] : no shortness of breath [Cough] : no cough [Orthopnea] : no orthopnea [Abdominal Pain] : no abdominal pain [Vomiting] : no vomiting [Dysuria] : no dysuria [Incontinence] : no incontinence [Joint Swelling] : no joint swelling [Joint Stiffness] : no joint stiffness [Convulsions] : no convulsions [Dizziness] : no dizziness [Fainting] : no fainting [Easy Bleeding] : no tendency for easy bleeding [Easy Bruising] : no tendency for easy bruising

## 2019-01-25 NOTE — PHYSICAL EXAM
[General Appearance - Alert] : alert [General Appearance - In No Acute Distress] : in no acute distress [General Appearance - Well Nourished] : well nourished [General Appearance - Well Developed] : well developed [Sclera] : the sclera and conjunctiva were normal [Examination Of The Oral Cavity] : the lips and gums were normal [Oropharynx] : the oropharynx was normal [Neck Appearance] : the appearance of the neck was normal [Neck Cervical Mass (___cm)] : no neck mass was observed [Jugular Venous Distention Increased] : there was no jugular-venous distention [Respiration, Rhythm And Depth] : normal respiratory rhythm and effort [Exaggerated Use Of Accessory Muscles For Inspiration] : no accessory muscle use [Auscultation Breath Sounds / Voice Sounds] : lungs were clear to auscultation bilaterally [Heart Sounds] : normal S1 and S2 [Heart Sounds Gallop] : no gallops [Murmurs] : no murmurs [Heart Sounds Pericardial Friction Rub] : no pericardial rub [___ +] : bilateral [unfilled]+ pretibial pitting edema [Abdomen Soft] : soft [Abdomen Tenderness] : non-tender [] : no hepato-splenomegaly [Abdomen Mass (___ Cm)] : no abdominal mass palpated [Cervical Lymph Nodes Enlarged Posterior Bilaterally] : posterior cervical [Cervical Lymph Nodes Enlarged Anterior Bilaterally] : anterior cervical [Supraclavicular Lymph Nodes Enlarged Bilaterally] : supraclavicular [No CVA Tenderness] : no ~M costovertebral angle tenderness [No Spinal Tenderness] : no spinal tenderness [Abnormal Walk] : normal gait [Oriented To Time, Place, And Person] : oriented to person, place, and time [Impaired Insight] : insight and judgment were intact [Affect] : the affect was normal [Mood] : the mood was normal [FreeTextEntry1] : midline hernia noted

## 2019-01-28 LAB
ALBUMIN SERPL ELPH-MCNC: 4 G/DL
ANION GAP SERPL CALC-SCNC: 13 MMOL/L
APPEARANCE: CLEAR
BACTERIA: NEGATIVE
BASOPHILS # BLD AUTO: 0.05 K/UL
BASOPHILS NFR BLD AUTO: 0.5 %
BILIRUBIN URINE: NEGATIVE
BLOOD URINE: NEGATIVE
BUN SERPL-MCNC: 25 MG/DL
CALCIUM SERPL-MCNC: 8.6 MG/DL
CHLORIDE SERPL-SCNC: 101 MMOL/L
CHOLEST SERPL-MCNC: 110 MG/DL
CHOLEST/HDLC SERPL: 2.4 RATIO
CO2 SERPL-SCNC: 27 MMOL/L
COLOR: YELLOW
CREAT SERPL-MCNC: 2.5 MG/DL
CREAT SPEC-SCNC: 57 MG/DL
CREAT/PROT UR: 0.2 RATIO
EOSINOPHIL # BLD AUTO: 0.13 K/UL
EOSINOPHIL NFR BLD AUTO: 1.4 %
FERRITIN SERPL-MCNC: 388 NG/ML
GLUCOSE QUALITATIVE U: NEGATIVE MG/DL
GLUCOSE SERPL-MCNC: 153 MG/DL
HBA1C MFR BLD HPLC: 7.4 %
HCT VFR BLD CALC: 34.7 %
HDLC SERPL-MCNC: 46 MG/DL
HGB BLD-MCNC: 11.4 G/DL
HYALINE CASTS: 3 /LPF
IMM GRANULOCYTES NFR BLD AUTO: 0.9 %
IRON SATN MFR SERPL: 55 %
IRON SERPL-MCNC: 148 UG/DL
KETONES URINE: NEGATIVE
LDLC SERPL CALC-MCNC: 40 MG/DL
LEUKOCYTE ESTERASE URINE: NEGATIVE
LYMPHOCYTES # BLD AUTO: 2.95 K/UL
LYMPHOCYTES NFR BLD AUTO: 31.6 %
MAN DIFF?: NORMAL
MCHC RBC-ENTMCNC: 32.3 PG
MCHC RBC-ENTMCNC: 32.9 GM/DL
MCV RBC AUTO: 98.3 FL
MICROSCOPIC-UA: NORMAL
MONOCYTES # BLD AUTO: 0.94 K/UL
MONOCYTES NFR BLD AUTO: 10.1 %
NEUTROPHILS # BLD AUTO: 5.19 K/UL
NEUTROPHILS NFR BLD AUTO: 55.5 %
NITRITE URINE: NEGATIVE
PH URINE: 6
PHOSPHATE SERPL-MCNC: 5.4 MG/DL
PLATELET # BLD AUTO: 209 K/UL
POTASSIUM SERPL-SCNC: 4.9 MMOL/L
PROT UR-MCNC: 12 MG/DL
PROTEIN URINE: NEGATIVE MG/DL
RBC # BLD: 3.53 M/UL
RBC # FLD: 13.2 %
RED BLOOD CELLS URINE: 0 /HPF
SODIUM SERPL-SCNC: 141 MMOL/L
SPECIFIC GRAVITY URINE: 1.01
SQUAMOUS EPITHELIAL CELLS: 0 /HPF
TIBC SERPL-MCNC: 268 UG/DL
TRIGL SERPL-MCNC: 118 MG/DL
UIBC SERPL-MCNC: 120 UG/DL
URATE SERPL-MCNC: 10.4 MG/DL
UROBILINOGEN URINE: NEGATIVE MG/DL
WBC # FLD AUTO: 9.34 K/UL
WHITE BLOOD CELLS URINE: 0 /HPF

## 2019-02-04 ENCOUNTER — APPOINTMENT (OUTPATIENT)
Dept: RHEUMATOLOGY | Facility: CLINIC | Age: 64
End: 2019-02-04
Payer: COMMERCIAL

## 2019-02-04 VITALS
TEMPERATURE: 98 F | HEIGHT: 71 IN | SYSTOLIC BLOOD PRESSURE: 130 MMHG | DIASTOLIC BLOOD PRESSURE: 62 MMHG | OXYGEN SATURATION: 98 % | BODY MASS INDEX: 22.96 KG/M2 | HEART RATE: 69 BPM | WEIGHT: 164 LBS

## 2019-02-04 PROCEDURE — 99213 OFFICE O/P EST LOW 20 MIN: CPT

## 2019-02-05 LAB
ALBUMIN SERPL ELPH-MCNC: 4 G/DL
ALP BLD-CCNC: 101 U/L
ALT SERPL-CCNC: 35 U/L
ANION GAP SERPL CALC-SCNC: 12 MMOL/L
APPEARANCE: CLEAR
AST SERPL-CCNC: 41 U/L
BASOPHILS # BLD AUTO: 0.03 K/UL
BASOPHILS NFR BLD AUTO: 0.3 %
BILIRUB SERPL-MCNC: 0.3 MG/DL
BILIRUBIN URINE: NEGATIVE
BLOOD URINE: NEGATIVE
BUN SERPL-MCNC: 28 MG/DL
C3 SERPL-MCNC: 101 MG/DL
C4 SERPL-MCNC: 24 MG/DL
CALCIUM SERPL-MCNC: 9 MG/DL
CHLORIDE SERPL-SCNC: 101 MMOL/L
CO2 SERPL-SCNC: 26 MMOL/L
COLOR: YELLOW
CREAT SERPL-MCNC: 2.61 MG/DL
CREAT SPEC-SCNC: 32 MG/DL
CREAT/PROT UR: 0.4 RATIO
EOSINOPHIL # BLD AUTO: 0.13 K/UL
EOSINOPHIL NFR BLD AUTO: 1.3 %
GLUCOSE QUALITATIVE U: NEGATIVE MG/DL
GLUCOSE SERPL-MCNC: 185 MG/DL
HCT VFR BLD CALC: 30.3 %
HGB BLD-MCNC: 10.1 G/DL
IMM GRANULOCYTES NFR BLD AUTO: 0.5 %
KETONES URINE: NEGATIVE
LEUKOCYTE ESTERASE URINE: NEGATIVE
LYMPHOCYTES # BLD AUTO: 2.44 K/UL
LYMPHOCYTES NFR BLD AUTO: 25 %
MAN DIFF?: NORMAL
MCHC RBC-ENTMCNC: 32.2 PG
MCHC RBC-ENTMCNC: 33.3 GM/DL
MCV RBC AUTO: 96.5 FL
MONOCYTES # BLD AUTO: 0.81 K/UL
MONOCYTES NFR BLD AUTO: 8.3 %
NEUTROPHILS # BLD AUTO: 6.29 K/UL
NEUTROPHILS NFR BLD AUTO: 64.6 %
NITRITE URINE: NEGATIVE
PH URINE: 6.5
PLATELET # BLD AUTO: 164 K/UL
POTASSIUM SERPL-SCNC: 4.9 MMOL/L
PROT SERPL-MCNC: 6.5 G/DL
PROT UR-MCNC: 12 MG/DL
PROTEIN URINE: NEGATIVE MG/DL
RBC # BLD: 3.14 M/UL
RBC # FLD: 13.3 %
SODIUM SERPL-SCNC: 139 MMOL/L
SPECIFIC GRAVITY URINE: 1.01
UROBILINOGEN URINE: NEGATIVE MG/DL
WBC # FLD AUTO: 9.75 K/UL

## 2019-02-06 LAB — DSDNA AB SER-ACNC: 22 IU/ML

## 2019-03-15 ENCOUNTER — RX RENEWAL (OUTPATIENT)
Age: 64
End: 2019-03-15

## 2019-03-20 ENCOUNTER — RX RENEWAL (OUTPATIENT)
Age: 64
End: 2019-03-20

## 2019-03-21 ENCOUNTER — TRANSCRIPTION ENCOUNTER (OUTPATIENT)
Age: 64
End: 2019-03-21

## 2019-04-19 ENCOUNTER — APPOINTMENT (OUTPATIENT)
Dept: INTERNAL MEDICINE | Facility: CLINIC | Age: 64
End: 2019-04-19
Payer: COMMERCIAL

## 2019-04-19 VITALS
HEIGHT: 71 IN | HEART RATE: 75 BPM | SYSTOLIC BLOOD PRESSURE: 118 MMHG | OXYGEN SATURATION: 98 % | WEIGHT: 163 LBS | DIASTOLIC BLOOD PRESSURE: 66 MMHG | BODY MASS INDEX: 22.82 KG/M2

## 2019-04-19 PROCEDURE — 99214 OFFICE O/P EST MOD 30 MIN: CPT

## 2019-04-21 ENCOUNTER — TRANSCRIPTION ENCOUNTER (OUTPATIENT)
Age: 64
End: 2019-04-21

## 2019-04-21 RX ORDER — HYDROMORPHONE HYDROCHLORIDE 8 MG/1
TABLET ORAL
Refills: 0 | Status: COMPLETED | COMMUNITY
End: 2019-04-21

## 2019-04-21 NOTE — HISTORY OF PRESENT ILLNESS
[FreeTextEntry1] : Here for f/u of HTN, lipids, update on RA/OA/PSA/drug induced SLE abs, CAD, chronic pain, chronic pancreatitis, cirrhosis/fatty liver (remote EtOH), CKD, DM.   [de-identified] : Doing all right; has son's wedding coming up.  Golfing will be part of the 'bachelor weekend' - still walks/plays a bit of gold when he can.  From rheum standpoint, things seem stable - on xeljans now, seems to be doing well with it.  Had labs w rheum last visit, w stable hep/renal indices.  Non oliguric with his CKD - has normal volumes/frequency.  Followed by renal/rheum/card/pain/GI.  Happy to report he has been able to wean slowly off all opiates.  Feels his pain levels haven't suffered too much - does take medical marijuana for his pain through his pain doc, that helps him to some degree both for musc skel and abd pain.  DM fair - has an appt w endo coming up, still on pump.  Over his abdomen has noticed some dusky lattice like lines on skin - showing me today.  Has not had LE edema, denies cp, sob, shah.

## 2019-04-21 NOTE — PHYSICAL EXAM
[Well Nourished] : well nourished [No Acute Distress] : no acute distress [Normal Sclera/Conjunctiva] : normal sclera/conjunctiva [Supple] : supple [No JVD] : no jugular venous distention [PERRL] : pupils equal round and reactive to light [No Respiratory Distress] : no respiratory distress  [Clear to Auscultation] : lungs were clear to auscultation bilaterally [No Accessory Muscle Use] : no accessory muscle use [Normal Rate] : normal rate  [Normal Percussion] : the chest was normal to percussion [Regular Rhythm] : with a regular rhythm [Normal S1, S2] : normal S1 and S2 [No Murmur] : no murmur heard [No Carotid Bruits] : no carotid bruits [No Edema] : there was no peripheral edema [No Extremity Clubbing/Cyanosis] : no extremity clubbing/cyanosis [No Masses] : no abdominal mass palpated [Non Tender] : non-tender [Soft] : abdomen soft [No HSM] : no HSM [Normal Bowel Sounds] : normal bowel sounds [No Joint Swelling] : no joint swelling [Coordination Grossly Intact] : coordination grossly intact [Normal Gait] : normal gait [No Focal Deficits] : no focal deficits [de-identified] : no pallor/icterus [de-identified] : no ascites appreciated; no shifting dullness - see derm exam [de-identified] : R base thumb w some squeeze tenderness [de-identified] : no tremor [de-identified] : on abd has wide dusky non blanching pattern of pigment change, lattice like - not venous appearing; has insulin pump R lower abd in place

## 2019-04-21 NOTE — ASSESSMENT
[FreeTextEntry1] : 1) had flu vacc in community.  2) continues w endo for DM, has minimal pr in urine at moment, utd; reminded retinal exam, self foot inspection.  On ins pump b/c of how brittle he'd been with his chronic pancreatitis. 3) rheum for PsA, RA serologies, drug induced SLE abs, on xeljanz seems to be doing reasonably well.  Has extensive DJD also, may be having a procedure at R thumb base - sees Dr. Troy, will provide any clearance if that proceeds.  4) keep up with GI - asking him if Dr. Culver might begin including a hep doc in their practice.  5) keeps up with renal for monitoring.  Has cr in 2.2 - 2.6 range, holding his electrolytes; on ramipril, amlodipine, furoseamide for bp and fluid management.  6) not sure if eruption on abd is caput (does not really appear like venous dilatation) vs. a livedo.  Not tender/sensitive on surface, ?related to pump/skin - doubt.  Will observe - no signs ascites today, has no syx of hep failure systemically.  7) can have next labs w endo, no need today.  8) wants no part of smoking cessation conversation - do bring it up at each visit.  Will continue - on asa/statin since CAD/intervention x 1 artery/negative ischemia evals since.

## 2019-04-21 NOTE — REVIEW OF SYSTEMS
[Fever] : no fever [Chills] : no chills [Night Sweats] : no night sweats [Recent Change In Weight] : ~T no recent weight change [Redness] : no redness [Discharge] : no discharge [Vision Problems] : no vision problems [Negative] : Genitourinary [de-identified] : see hpi [FreeTextEntry9] : see hpi

## 2019-04-22 ENCOUNTER — RX RENEWAL (OUTPATIENT)
Age: 64
End: 2019-04-22

## 2019-04-26 ENCOUNTER — MEDICATION RENEWAL (OUTPATIENT)
Age: 64
End: 2019-04-26

## 2019-05-01 ENCOUNTER — OUTPATIENT (OUTPATIENT)
Dept: OUTPATIENT SERVICES | Facility: HOSPITAL | Age: 64
LOS: 1 days | End: 2019-05-01
Payer: COMMERCIAL

## 2019-05-01 ENCOUNTER — NON-APPOINTMENT (OUTPATIENT)
Age: 64
End: 2019-05-01

## 2019-05-01 ENCOUNTER — APPOINTMENT (OUTPATIENT)
Dept: CARDIOLOGY | Facility: CLINIC | Age: 64
End: 2019-05-01
Payer: COMMERCIAL

## 2019-05-01 ENCOUNTER — APPOINTMENT (OUTPATIENT)
Dept: INTERNAL MEDICINE | Facility: CLINIC | Age: 64
End: 2019-05-01

## 2019-05-01 VITALS
HEART RATE: 72 BPM | BODY MASS INDEX: 22.46 KG/M2 | WEIGHT: 161 LBS | DIASTOLIC BLOOD PRESSURE: 58 MMHG | SYSTOLIC BLOOD PRESSURE: 122 MMHG | OXYGEN SATURATION: 100 %

## 2019-05-01 VITALS
DIASTOLIC BLOOD PRESSURE: 75 MMHG | RESPIRATION RATE: 14 BRPM | TEMPERATURE: 98 F | OXYGEN SATURATION: 98 % | WEIGHT: 162.92 LBS | HEIGHT: 71 IN | HEART RATE: 71 BPM | SYSTOLIC BLOOD PRESSURE: 143 MMHG

## 2019-05-01 DIAGNOSIS — Z98.89 OTHER SPECIFIED POSTPROCEDURAL STATES: Chronic | ICD-10-CM

## 2019-05-01 DIAGNOSIS — Z98.890 OTHER SPECIFIED POSTPROCEDURAL STATES: Chronic | ICD-10-CM

## 2019-05-01 DIAGNOSIS — Z90.49 ACQUIRED ABSENCE OF OTHER SPECIFIED PARTS OF DIGESTIVE TRACT: Chronic | ICD-10-CM

## 2019-05-01 DIAGNOSIS — M05.79 RHEUMATOID ARTHRITIS WITH RHEUMATOID FACTOR OF MULTIPLE SITES WITHOUT ORGAN OR SYSTEMS INVOLVEMENT: ICD-10-CM

## 2019-05-01 DIAGNOSIS — Z90.89 ACQUIRED ABSENCE OF OTHER ORGANS: Chronic | ICD-10-CM

## 2019-05-01 DIAGNOSIS — M79.644 PAIN IN RIGHT FINGER(S): ICD-10-CM

## 2019-05-01 DIAGNOSIS — Z98.52 VASECTOMY STATUS: Chronic | ICD-10-CM

## 2019-05-01 DIAGNOSIS — E11.9 TYPE 2 DIABETES MELLITUS WITHOUT COMPLICATIONS: ICD-10-CM

## 2019-05-01 DIAGNOSIS — M18.11 UNILATERAL PRIMARY OSTEOARTHRITIS OF FIRST CARPOMETACARPAL JOINT, RIGHT HAND: ICD-10-CM

## 2019-05-01 LAB
ALBUMIN SERPL ELPH-MCNC: 3.9 G/DL — SIGNIFICANT CHANGE UP (ref 3.3–5)
ALP SERPL-CCNC: 130 U/L — HIGH (ref 30–120)
ALT FLD-CCNC: 67 U/L DA — HIGH (ref 10–60)
AMYLASE P1 CFR SERPL: 61 U/L — SIGNIFICANT CHANGE UP (ref 25–125)
ANION GAP SERPL CALC-SCNC: 11 MMOL/L — SIGNIFICANT CHANGE UP (ref 5–17)
AST SERPL-CCNC: 69 U/L — HIGH (ref 10–40)
BILIRUB SERPL-MCNC: 0.3 MG/DL — SIGNIFICANT CHANGE UP (ref 0.2–1.2)
BUN SERPL-MCNC: 27 MG/DL — HIGH (ref 7–23)
CALCIUM SERPL-MCNC: 9.1 MG/DL — SIGNIFICANT CHANGE UP (ref 8.4–10.5)
CHLORIDE SERPL-SCNC: 103 MMOL/L — SIGNIFICANT CHANGE UP (ref 96–108)
CO2 SERPL-SCNC: 25 MMOL/L — SIGNIFICANT CHANGE UP (ref 22–31)
CREAT SERPL-MCNC: 2.26 MG/DL — HIGH (ref 0.5–1.3)
GLUCOSE SERPL-MCNC: 195 MG/DL — HIGH (ref 70–99)
HCT VFR BLD CALC: 32.1 % — LOW (ref 39–50)
HGB BLD-MCNC: 11.2 G/DL — LOW (ref 13–17)
LIDOCAIN IGE QN: 35 U/L — LOW (ref 73–393)
MCHC RBC-ENTMCNC: 34.5 PG — HIGH (ref 27–34)
MCHC RBC-ENTMCNC: 34.9 GM/DL — SIGNIFICANT CHANGE UP (ref 32–36)
MCV RBC AUTO: 98.8 FL — SIGNIFICANT CHANGE UP (ref 80–100)
NRBC # BLD: 0 /100 WBCS — SIGNIFICANT CHANGE UP (ref 0–0)
PLATELET # BLD AUTO: 170 K/UL — SIGNIFICANT CHANGE UP (ref 150–400)
POTASSIUM SERPL-MCNC: 5.1 MMOL/L — SIGNIFICANT CHANGE UP (ref 3.5–5.3)
POTASSIUM SERPL-SCNC: 5.1 MMOL/L — SIGNIFICANT CHANGE UP (ref 3.5–5.3)
PROT SERPL-MCNC: 8 G/DL — SIGNIFICANT CHANGE UP (ref 6–8.3)
RBC # BLD: 3.25 M/UL — LOW (ref 4.2–5.8)
RBC # FLD: 12.2 % — SIGNIFICANT CHANGE UP (ref 10.3–14.5)
SODIUM SERPL-SCNC: 139 MMOL/L — SIGNIFICANT CHANGE UP (ref 135–145)
WBC # BLD: 9.07 K/UL — SIGNIFICANT CHANGE UP (ref 3.8–10.5)
WBC # FLD AUTO: 9.07 K/UL — SIGNIFICANT CHANGE UP (ref 3.8–10.5)

## 2019-05-01 PROCEDURE — G0463: CPT

## 2019-05-01 PROCEDURE — 83690 ASSAY OF LIPASE: CPT

## 2019-05-01 PROCEDURE — 85027 COMPLETE CBC AUTOMATED: CPT

## 2019-05-01 PROCEDURE — 36415 COLL VENOUS BLD VENIPUNCTURE: CPT

## 2019-05-01 PROCEDURE — 80053 COMPREHEN METABOLIC PANEL: CPT

## 2019-05-01 PROCEDURE — 99215 OFFICE O/P EST HI 40 MIN: CPT

## 2019-05-01 PROCEDURE — 82150 ASSAY OF AMYLASE: CPT

## 2019-05-01 PROCEDURE — 83036 HEMOGLOBIN GLYCOSYLATED A1C: CPT

## 2019-05-01 PROCEDURE — 93000 ELECTROCARDIOGRAM COMPLETE: CPT

## 2019-05-01 RX ORDER — CHOLECALCIFEROL (VITAMIN D3) 125 MCG
0 CAPSULE ORAL
Qty: 0 | Refills: 0 | COMMUNITY

## 2019-05-01 RX ORDER — FUROSEMIDE 40 MG
1 TABLET ORAL
Qty: 0 | Refills: 0 | COMMUNITY

## 2019-05-01 NOTE — DISCUSSION/SUMMARY
[FreeTextEntry1] : He is a 64-year-old with a history of myocardial infarctions with minimal or no LV dysfunction, improved leg edema and improved hypertension now off prednisone. ARF on CRI improved.\par His blood pressure control is perfect. LDL is okay.\par Continue amlodipine to 10 mg daily and lasix 80 qd.\par He should continue plavix daily and ASA biw.\par We discussed the need for smoking cessation, again.\par \par He is stable from a cardiovascular standpoint and may proceed with the planned surgery.\par He  may discontinue plavix prior to surgery. Continue aspirin.

## 2019-05-01 NOTE — H&P PST ADULT - NSICDXPASTMEDICALHX_GEN_ALL_CORE_FT
PAST MEDICAL HISTORY:  Alcohol abuse recovering 2003, now only has an occasional drink    Carotid stenosis, left     Cataract bilateral    Cigarette smoker     Coronary artery disease     Diabetes mellitus Type II, diagnosed in 2000, insulin pump    Fatty liver     Gastroesophageal reflux disease     H/O hyperkalemia 6.4 8 months ago, treated with sodium bicarbonate    H/O insertion of insulin pump 2015, omnipod    H/O rheumatoid arthritis on medication    H/O sleep apnea mild, unable to use CPAP    H/O: HTN (hypertension)     Hearing loss bilateral    History of chronic pancreatitis started in 1999, followed by pain management for pain control    History of MI (myocardial infarction) in 2002    Hyperlipidemia     Nasal drainage post nasal drip    Osteoarthritis     Peripheral neuropathy feet    Portal vein thrombosis 2004 and treated with blood thinners    Renal failure, chronic, stage 3 (moderate)     Thumb pain, right     Vitamin D deficiency PAST MEDICAL HISTORY:  Alcohol abuse recovering 2003, now only has an occasional drink    Carotid stenosis, left     Cataract bilateral    Cigarette smoker     Coronary artery disease     Diabetes mellitus Type II, diagnosed in 2000, insulin pump    Fatty liver     Gastroesophageal reflux disease     H/O hyperkalemia 6.4 8 months ago, treated with sodium bicarbonate    H/O insertion of insulin pump 2015, omnipod    H/O rheumatoid arthritis on medication    H/O sleep apnea mild, unable to use CPAP    H/O: HTN (hypertension)     Hearing loss bilateral    History of chronic pancreatitis started in 1999, followed by pain management for pain control    History of MI (myocardial infarction) in 2002    Hyperlipidemia     Nasal drainage post nasal drip    Osteoarthritis     Peripheral neuropathy feet    Portal vein thrombosis 2004 and treated with blood thinners, no longer follows with hepatology    Renal failure, chronic, stage 3 (moderate)     Thumb pain, right     Vitamin D deficiency

## 2019-05-01 NOTE — REVIEW OF SYSTEMS
[Recent Weight Loss (___ Lbs)] : no recent weight loss [Lower Ext Edema] : lower extremity edema [Dyspnea on exertion] : dyspnea during exertion [see HPI] : see HPI [Cough] : no cough [Negative] : Psychiatric

## 2019-05-01 NOTE — H&P PST ADULT - GASTROINTESTINAL DETAILS
bowel sounds normal/soft/nontender/no distention/no masses palpable/non tender, large ventral hernia noted/no rebound tenderness

## 2019-05-01 NOTE — H&P PST ADULT - RS GEN PE MLT RESP DETAILS PC
no chest wall tenderness/no rales/no rhonchi/respirations non-labored/clear to auscultation bilaterally/airway patent/breath sounds equal/good air movement/no wheezes

## 2019-05-01 NOTE — H&P PST ADULT - MUSCULOSKELETAL
detailed exam decreased ROM due to pain/diminished strength/no joint swelling/no joint erythema/no joint warmth/no calf tenderness/decreased ROM details…

## 2019-05-01 NOTE — H&P PST ADULT - HISTORY OF PRESENT ILLNESS
65 yo male presents with right thumb pain since November 2018. Received 2 cortisone injections with only temporary relief. Pain 3/10 at rest and increased to 7/10 with activity. Pain mildly relieved with voltaren gel. Pain radiates to the wrist and intermittently to the other fingers.

## 2019-05-01 NOTE — H&P PST ADULT - NSICDXPASTSURGICALHX_GEN_ALL_CORE_FT
PAST SURGICAL HISTORY:  H/O arthroscopy of right knee 1988    H/O umbilical hernia repair 2014    H/O vasectomy 1994    History of inguinal herniorrhaphy right and left, total of 6    S/P carotid endarterectomy left 11/9/2016    S/P cholecystectomy 2003, laparoscopic    S/P colonoscopy 2014, benign polyps    S/P insertion of intrathecal pump placed in 2007  removed in 2008    S/P insertion of spinal cord stimulator inserted 2006 and removed 2007    S/P tonsillectomy and adenoidectomy age 16

## 2019-05-01 NOTE — PHYSICAL EXAM
[General Appearance - Well Nourished] : well nourished [General Appearance - In No Acute Distress] : no acute distress [General Appearance - Well Developed] : well developed [Normal Conjunctiva] : the conjunctiva exhibited no abnormalities [No Oral Pallor] : no oral pallor [Respiration, Rhythm And Depth] : normal respiratory rhythm and effort [Normal Jugular Venous V Waves Present] : normal jugular venous V waves present [Auscultation Breath Sounds / Voice Sounds] : lungs were clear to auscultation bilaterally [Arterial Pulses Normal] : the arterial pulses were normal [Murmurs] : no murmurs present [Heart Sounds] : normal S1 and S2 [Edema] : no peripheral edema present [Abdomen Soft] : soft [Bowel Sounds] : normal bowel sounds [Regular] : the rhythm was regular [Nail Clubbing] : no clubbing of the fingernails [Abnormal Walk] : normal gait [Skin Turgor] : normal skin turgor [Cyanosis, Localized] : no localized cyanosis [Oriented To Time, Place, And Person] : oriented to person, place, and time [Affect] : the affect was normal [Impaired Insight] : insight and judgment were intact

## 2019-05-01 NOTE — H&P PST ADULT - NSICDXPROBLEM_GEN_ALL_CORE_FT
PROBLEM DIAGNOSES  Problem: Thumb pain, right  Assessment and Plan: right thumb carpometacarpal arthroplasty. medical and cardiac clearances requested. stop MVI and voltaren gel today. stop plavix as directed by cardiology Patient states last dose today 5/1/19). amlodipine and omeprazole AM of surgery with sips of water. Will check LFTs today. surgical wash instructions reviewed and verbalized understanding    Problem: Type 2 diabetes mellitus  Assessment and Plan: endocrine note regarding insulin pump requested. patient asked to filled out insulin pump attestation paperwork and bring that along with extra supplies morning of surgery

## 2019-05-02 PROBLEM — I81 PORTAL VEIN THROMBOSIS: Chronic | Status: ACTIVE | Noted: 2019-05-01

## 2019-05-02 PROBLEM — G62.9 POLYNEUROPATHY, UNSPECIFIED: Chronic | Status: ACTIVE | Noted: 2019-05-01

## 2019-05-02 PROBLEM — H91.90 UNSPECIFIED HEARING LOSS, UNSPECIFIED EAR: Chronic | Status: ACTIVE | Noted: 2019-05-01

## 2019-05-02 PROBLEM — F10.10 ALCOHOL ABUSE, UNCOMPLICATED: Chronic | Status: ACTIVE | Noted: 2019-05-01

## 2019-05-02 PROBLEM — J34.89 OTHER SPECIFIED DISORDERS OF NOSE AND NASAL SINUSES: Chronic | Status: ACTIVE | Noted: 2019-05-01

## 2019-05-02 PROBLEM — H26.9 UNSPECIFIED CATARACT: Chronic | Status: ACTIVE | Noted: 2019-05-01

## 2019-05-02 PROBLEM — Z87.19 PERSONAL HISTORY OF OTHER DISEASES OF THE DIGESTIVE SYSTEM: Chronic | Status: ACTIVE | Noted: 2019-05-01

## 2019-05-02 RX ORDER — CHLORHEXIDINE GLUCONATE 213 G/1000ML
1 SOLUTION TOPICAL ONCE
Qty: 0 | Refills: 0 | Status: COMPLETED | OUTPATIENT
Start: 2019-05-06 | End: 2019-05-06

## 2019-05-02 RX ORDER — CEFAZOLIN SODIUM 1 G
2000 VIAL (EA) INJECTION ONCE
Qty: 0 | Refills: 0 | Status: COMPLETED | OUTPATIENT
Start: 2019-05-06 | End: 2019-05-06

## 2019-05-02 RX ORDER — SODIUM CHLORIDE 9 MG/ML
1000 INJECTION, SOLUTION INTRAVENOUS
Qty: 0 | Refills: 0 | Status: DISCONTINUED | OUTPATIENT
Start: 2019-05-06 | End: 2019-05-06

## 2019-05-03 NOTE — ASU PATIENT PROFILE, ADULT - PSH
H/O arthroscopy of right knee  1988  H/O umbilical hernia repair  2014  H/O vasectomy  1994  History of inguinal herniorrhaphy  right and left, total of 6  S/P carotid endarterectomy  left 11/9/2016  S/P cholecystectomy  2003, laparoscopic  S/P colonoscopy  2014, benign polyps  S/P insertion of intrathecal pump  placed in 2007  removed in 2008  S/P insertion of spinal cord stimulator  inserted 2006 and removed 2007  S/P tonsillectomy and adenoidectomy  age 16

## 2019-05-03 NOTE — ASU PATIENT PROFILE, ADULT - PMH
Alcohol abuse  recovering 2003, now only has an occasional drink  Carotid stenosis, left    Cataract  bilateral  Cigarette smoker    Coronary artery disease    Diabetes mellitus  Type II, diagnosed in 2000, insulin pump  Fatty liver    Gastroesophageal reflux disease    H/O hyperkalemia  6.4 8 months ago, treated with sodium bicarbonate  H/O insertion of insulin pump  2015, omnipod  H/O rheumatoid arthritis  on medication  H/O sleep apnea  mild, unable to use CPAP  H/O: HTN (hypertension)    Hearing loss  bilateral  History of chronic pancreatitis  started in 1999, followed by pain management for pain control  History of MI (myocardial infarction)  in 2002  Hyperlipidemia    Nasal drainage  post nasal drip  Osteoarthritis    Peripheral neuropathy  feet  Portal vein thrombosis  2004 and treated with blood thinners, no longer follows with hepatology  Renal failure, chronic, stage 3 (moderate)    Thumb pain, right    Vitamin D deficiency

## 2019-05-03 NOTE — ASU PATIENT PROFILE, ADULT - [facility] [employer]
[facility] [address]    [address] [place] [address]    Phone:  [phone]    Fax:  [phone]       Thank You for choosing us for your health care visit. We are glad to serve you and happy to provide you with this summary of your visit. Please help us to ensure we have accurate records. If you find anything that needs to be changed, please let our staff know as soon as possible.          Your Demographic Information     Patient Name Sex Nacho Coates Male 1963       Ethnic Group Patient Race    Not of  or  Origin White      Your Visit Details     Date & Time Provider Department    2017 2:15 PM MD [facility] [address]      Your To Do List     Follow-Up    Return in about 1 year (around 2018).      We Ordered or Performed the Following     SERVICE TO HOME CARE RESPIRATORY THERAPY       Conditions Discussed Today or Order-Related Diagnoses        Comments    STEPHAN (obstructive sleep apnea)    -  Primary       Your Vitals Were     BP Pulse Height Weight BMI Smoking Status    120/90 68 5' 7\" (1.702 m) 223 lb (101.2 kg) 34.93 kg/m2 Never Smoker      Medications Prescribed or Re-Ordered Today     None      Your Current Medications Are        Disp Refills Start End    DISPENSE 1 each 3 2015     Sig: CPAP supplies as needed. Dx G47.33. NPI [id number]      Allergies     Ketoconazole HIVES      Immunizations History as of 2017     Name Date    Influenza 10/7/2014      Problem List as of 2017     Preventative health care    STEPHAN (obstructive sleep apnea)    Internal hemorrhoids    Diverticulosis    Colon polyps    External hemorrhoids    Obesity (BMI 30-39.9)            Patient Instructions     None       (3) slightly limited

## 2019-05-05 ENCOUNTER — TRANSCRIPTION ENCOUNTER (OUTPATIENT)
Age: 64
End: 2019-05-05

## 2019-05-06 ENCOUNTER — RESULT REVIEW (OUTPATIENT)
Age: 64
End: 2019-05-06

## 2019-05-06 ENCOUNTER — OUTPATIENT (OUTPATIENT)
Dept: OUTPATIENT SERVICES | Facility: HOSPITAL | Age: 64
LOS: 1 days | End: 2019-05-06
Payer: COMMERCIAL

## 2019-05-06 VITALS
SYSTOLIC BLOOD PRESSURE: 135 MMHG | OXYGEN SATURATION: 97 % | HEART RATE: 69 BPM | RESPIRATION RATE: 18 BRPM | TEMPERATURE: 98 F | DIASTOLIC BLOOD PRESSURE: 57 MMHG

## 2019-05-06 VITALS
HEIGHT: 71 IN | TEMPERATURE: 97 F | SYSTOLIC BLOOD PRESSURE: 104 MMHG | OXYGEN SATURATION: 99 % | WEIGHT: 159.17 LBS | DIASTOLIC BLOOD PRESSURE: 87 MMHG | RESPIRATION RATE: 20 BRPM | HEART RATE: 67 BPM

## 2019-05-06 DIAGNOSIS — Z98.89 OTHER SPECIFIED POSTPROCEDURAL STATES: Chronic | ICD-10-CM

## 2019-05-06 DIAGNOSIS — Z90.49 ACQUIRED ABSENCE OF OTHER SPECIFIED PARTS OF DIGESTIVE TRACT: Chronic | ICD-10-CM

## 2019-05-06 DIAGNOSIS — M05.79 RHEUMATOID ARTHRITIS WITH RHEUMATOID FACTOR OF MULTIPLE SITES WITHOUT ORGAN OR SYSTEMS INVOLVEMENT: ICD-10-CM

## 2019-05-06 DIAGNOSIS — M18.11 UNILATERAL PRIMARY OSTEOARTHRITIS OF FIRST CARPOMETACARPAL JOINT, RIGHT HAND: ICD-10-CM

## 2019-05-06 DIAGNOSIS — Z98.52 VASECTOMY STATUS: Chronic | ICD-10-CM

## 2019-05-06 DIAGNOSIS — Z98.890 OTHER SPECIFIED POSTPROCEDURAL STATES: Chronic | ICD-10-CM

## 2019-05-06 DIAGNOSIS — Z90.89 ACQUIRED ABSENCE OF OTHER ORGANS: Chronic | ICD-10-CM

## 2019-05-06 LAB
HCT VFR BLD CALC: 28.4 % — LOW (ref 39–50)
HGB BLD-MCNC: 9.8 G/DL — LOW (ref 13–17)

## 2019-05-06 PROCEDURE — 76000 FLUOROSCOPY <1 HR PHYS/QHP: CPT

## 2019-05-06 PROCEDURE — C1713: CPT

## 2019-05-06 PROCEDURE — 88305 TISSUE EXAM BY PATHOLOGIST: CPT

## 2019-05-06 PROCEDURE — 36415 COLL VENOUS BLD VENIPUNCTURE: CPT

## 2019-05-06 PROCEDURE — 85014 HEMATOCRIT: CPT

## 2019-05-06 PROCEDURE — 25447 ARTHRP NTRCRP/CRP/MTCR NTRPS: CPT | Mod: RT

## 2019-05-06 PROCEDURE — 82962 GLUCOSE BLOOD TEST: CPT

## 2019-05-06 PROCEDURE — 88305 TISSUE EXAM BY PATHOLOGIST: CPT | Mod: 26

## 2019-05-06 PROCEDURE — 85018 HEMOGLOBIN: CPT

## 2019-05-06 RX ORDER — SODIUM CHLORIDE 9 MG/ML
1000 INJECTION, SOLUTION INTRAVENOUS
Qty: 0 | Refills: 0 | Status: DISCONTINUED | OUTPATIENT
Start: 2019-05-06 | End: 2019-05-06

## 2019-05-06 RX ORDER — SODIUM CHLORIDE 9 MG/ML
1000 INJECTION, SOLUTION INTRAVENOUS
Qty: 0 | Refills: 0 | Status: DISCONTINUED | OUTPATIENT
Start: 2019-05-06 | End: 2019-05-21

## 2019-05-06 RX ORDER — GLUCAGON INJECTION, SOLUTION 0.5 MG/.1ML
1 INJECTION, SOLUTION SUBCUTANEOUS ONCE
Qty: 0 | Refills: 0 | Status: DISCONTINUED | OUTPATIENT
Start: 2019-05-06 | End: 2019-05-21

## 2019-05-06 RX ORDER — DEXTROSE 50 % IN WATER 50 %
15 SYRINGE (ML) INTRAVENOUS ONCE
Qty: 0 | Refills: 0 | Status: DISCONTINUED | OUTPATIENT
Start: 2019-05-06 | End: 2019-05-21

## 2019-05-06 RX ORDER — HYDROMORPHONE HYDROCHLORIDE 2 MG/ML
0.5 INJECTION INTRAMUSCULAR; INTRAVENOUS; SUBCUTANEOUS
Qty: 0 | Refills: 0 | Status: DISCONTINUED | OUTPATIENT
Start: 2019-05-06 | End: 2019-05-06

## 2019-05-06 RX ORDER — DEXTROSE 50 % IN WATER 50 %
25 SYRINGE (ML) INTRAVENOUS ONCE
Qty: 0 | Refills: 0 | Status: DISCONTINUED | OUTPATIENT
Start: 2019-05-06 | End: 2019-05-21

## 2019-05-06 RX ORDER — DEXTROSE 50 % IN WATER 50 %
12.5 SYRINGE (ML) INTRAVENOUS ONCE
Qty: 0 | Refills: 0 | Status: DISCONTINUED | OUTPATIENT
Start: 2019-05-06 | End: 2019-05-21

## 2019-05-06 RX ORDER — INSULIN LISPRO 100/ML
1 VIAL (ML) SUBCUTANEOUS
Qty: 0 | Refills: 0 | Status: DISCONTINUED | OUTPATIENT
Start: 2019-05-06 | End: 2019-05-21

## 2019-05-06 RX ADMIN — SODIUM CHLORIDE 75 MILLILITER(S): 9 INJECTION, SOLUTION INTRAVENOUS at 09:10

## 2019-05-06 RX ADMIN — SODIUM CHLORIDE 50 MILLILITER(S): 9 INJECTION, SOLUTION INTRAVENOUS at 08:59

## 2019-05-06 RX ADMIN — CHLORHEXIDINE GLUCONATE 1 APPLICATION(S): 213 SOLUTION TOPICAL at 09:10

## 2019-05-06 NOTE — ASU DISCHARGE PLAN (ADULT/PEDIATRIC) - CARE PROVIDER_API CALL
Andres Lea)  Orthopaedic Surgery  [address] [place] [address]  Phone: [phone]  Fax: [phone]  Follow Up Time:

## 2019-05-06 NOTE — BRIEF OPERATIVE NOTE - NSICDXBRIEFPROCEDURE_GEN_ALL_CORE_FT
PROCEDURES:  Arthroplasty, carpometacarpal 06-May-2019 09:45:00 RIGHT thumb suspensionplasty Gerda Goss

## 2019-05-06 NOTE — BRIEF OPERATIVE NOTE - NSICDXBRIEFPOSTOP_GEN_ALL_CORE_FT
POST-OP DIAGNOSIS:  Rheumatoid arthritis with rheumatoid factor of multiple sites without organ or systems involvement 06-May-2019 09:47:51  Gerda Goss  Primary osteoarthritis of first carpometacarpal joint of right hand 06-May-2019 09:47:23  Gerda Goss

## 2019-05-06 NOTE — BRIEF OPERATIVE NOTE - NSICDXBRIEFPREOP_GEN_ALL_CORE_FT
PRE-OP DIAGNOSIS:  Rheu arthritis w rheu factor mult site w/o org/sys involv 06-May-2019 09:46:55  Gerda Goss  Unilateral primary osteoarthritis of first carpometacarpal joint, right hand 06-May-2019 09:46:25  Gerda Goss

## 2019-05-06 NOTE — ADVANCED PRACTICE NURSE CONSULT - ASSESSMENT
Patient is a 64y old  Male who presents  for     ROS:  Cardiovascular: No chest Pain, palpitations  Respiratory No SOB, No cough  GI: No nausea,Vomiting,abdominal pain  Endocrine: no polyuria,polydipsia    Daily Height in cm: 180.34 (06 May 2019 06:49)        Vital Signs Last 24 Hrs  T(F): 97.5 (06 May 2019 08:47), Max: 97.5 (06 May 2019 08:47)  HR: 72 (06 May 2019 09:16) (67 - 76)  BP: 131/57 (06 May 2019 09:16) (104/87 - 131/57)  RR: 17 (06 May 2019 09:16) (17 - 20)  SpO2: 97% (06 May 2019 09:16) (97% - 100%)    Physical Exam:  General:NAD well groomed well developed  HEENT: normocephalic,   Cardiovascular Regular rate and rhythm  REspiratory: easy and unlabored  Psych: Alert and oriented x3 normal affect normal mood        PAST MEDICAL & SURGICAL HISTORY:  Thumb pain, right  Fatty liver  Peripheral neuropathy: feet  Nasal drainage: post nasal drip  Cataract: bilateral  Hearing loss: bilateral  Renal failure, chronic, stage 3 (moderate)  Vitamin D deficiency  H/O hyperkalemia: 6.4 8 months ago, treated with sodium bicarbonate  H/O insertion of insulin pump: 2015, omnipod  Portal vein thrombosis: 2004 and treated with blood thinners, no longer follows with hepatology  Alcohol abuse: recovering 2003, now only has an occasional drink  History of chronic pancreatitis: started in 1999, followed by pain management for pain control  Osteoarthritis  Gastroesophageal reflux disease  Hyperlipidemia  Cigarette smoker  H/O rheumatoid arthritis: on medication  H/O sleep apnea: mild, unable to use CPAP  Carotid stenosis, left  History of MI (myocardial infarction): in 2002  H/O: HTN (hypertension)  Coronary artery disease  Diabetes mellitus: Type II, diagnosed in 2000, insulin pump  S/P colonoscopy: 2014, benign polyps  H/O arthroscopy of right knee: 1988  S/P insertion of spinal cord stimulator: inserted 2006 and removed 2007  S/P carotid endarterectomy: left 11/9/2016          No Known Allergies      MEDICATIONS  (STANDING):  dextrose 5%. 1000 milliLiter(s) (50 mL/Hr) IV Continuous <Continuous>  dextrose 50% Injectable 12.5 Gram(s) IV Push once  dextrose 50% Injectable 25 Gram(s) IV Push once  dextrose 50% Injectable 25 Gram(s) IV Push once  insulin lispro (HumaLOG) Pump 1 Each SubCutaneous Continuous Pump  lactated ringers. 1000 milliLiter(s) (50 mL/Hr) IV Continuous <Continuous>      DM Home Medications:  HumaLOG 100 units/mL subcutaneous solution: BASAL subcutaneous 12 units/day via insulin pump,   plus BOLUS to cover meals   (06 May 2019 06:32)  multivitamin: 1 tab(s) orally once a day (at bedtime) (06 May 2019 06:32)  omeprazole 20 mg oral delayed release tablet: 1 tab(s) orally once a day (06 May 2019 06:32)  ramipril 5 mg oral capsule: 1 cap(s) orally once a day (at bedtime) (06 May 2019 06:32)    Diet: Consistant Carbohydrates    A1c:        POCT Blood Glucose.: 125 mg/dL (06 May 2019 08:53)  POCT Blood Glucose.: 123 mg/dL (06 May 2019 06:43)

## 2019-05-06 NOTE — ASU DISCHARGE PLAN (ADULT/PEDIATRIC) - CALL YOUR DOCTOR IF YOU HAVE ANY OF THE FOLLOWING:
Pain not relieved by Medications/Fever greater than (need to indicate Fahrenheit or Celsius)/Numbness, tingling, color or temperature change to extremity/Swelling that gets worse/Wound/Surgical Site with redness, or foul smelling discharge or pus

## 2019-05-06 NOTE — ASU DISCHARGE PLAN (ADULT/PEDIATRIC) - ASU DC SPECIAL INSTRUCTIONSFT
Call Dr. Campos's office at [facility] [phone] to make an appointment to be seen on Tuesday 5/14. Address: [address] [place] [address]    Follow all verbal and written instructions. Take medications as prescribed. DO NOT drive, operate machinery, and/or make important decisions while on prescription pain medication. DO NOT hesitate to call Doctor's office with questions or concerns. Certain prescription pain medication can cause constipation; take a stool softener such as Colace 100mg 3 x a day, to avoid the constipating effects of prescription pain medication. Call Dr. Campos's office at [facility] [phone] to make an appointment to be seen on Tuesday 5/14. Address: [address] [place] [address]    Follow all verbal and written instructions. Take medications as prescribed. DO NOT drive, operate machinery, and/or make important decisions while on prescription pain medication. DO NOT hesitate to call Doctor's office with questions or concerns. Certain prescription pain medication can cause constipation; take a stool softener such as Colace 100mg 3 x a day, to avoid the constipating effects of prescription pain medication.    Pan medication being prescribed by MD electronically from Dr. Campos's office in [place]

## 2019-05-09 ENCOUNTER — RX RENEWAL (OUTPATIENT)
Age: 64
End: 2019-05-09

## 2019-05-20 ENCOUNTER — APPOINTMENT (OUTPATIENT)
Dept: RHEUMATOLOGY | Facility: CLINIC | Age: 64
End: 2019-05-20
Payer: COMMERCIAL

## 2019-05-20 VITALS
DIASTOLIC BLOOD PRESSURE: 71 MMHG | OXYGEN SATURATION: 98 % | SYSTOLIC BLOOD PRESSURE: 137 MMHG | HEIGHT: 71 IN | BODY MASS INDEX: 23.1 KG/M2 | WEIGHT: 165 LBS | TEMPERATURE: 98.3 F | HEART RATE: 76 BPM

## 2019-05-20 DIAGNOSIS — M18.11 UNILATERAL PRIMARY OSTEOARTHRITIS OF FIRST CARPOMETACARPAL JOINT, RIGHT HAND: ICD-10-CM

## 2019-05-20 PROBLEM — M19.90 UNSPECIFIED OSTEOARTHRITIS, UNSPECIFIED SITE: Chronic | Status: ACTIVE | Noted: 2019-05-01

## 2019-05-20 PROBLEM — M79.644 PAIN IN RIGHT FINGER(S): Chronic | Status: ACTIVE | Noted: 2019-05-01

## 2019-05-20 PROBLEM — N18.3 CHRONIC KIDNEY DISEASE, STAGE 3 (MODERATE): Chronic | Status: ACTIVE | Noted: 2019-05-01

## 2019-05-20 PROBLEM — K76.0 FATTY (CHANGE OF) LIVER, NOT ELSEWHERE CLASSIFIED: Chronic | Status: ACTIVE | Noted: 2019-05-01

## 2019-05-20 PROBLEM — E55.9 VITAMIN D DEFICIENCY, UNSPECIFIED: Chronic | Status: ACTIVE | Noted: 2019-05-01

## 2019-05-20 PROCEDURE — 99214 OFFICE O/P EST MOD 30 MIN: CPT

## 2019-05-28 ENCOUNTER — APPOINTMENT (OUTPATIENT)
Dept: NEPHROLOGY | Facility: CLINIC | Age: 64
End: 2019-05-28
Payer: COMMERCIAL

## 2019-05-28 VITALS
HEIGHT: 71 IN | HEART RATE: 77 BPM | OXYGEN SATURATION: 95 % | BODY MASS INDEX: 23.06 KG/M2 | DIASTOLIC BLOOD PRESSURE: 61 MMHG | WEIGHT: 164.68 LBS | SYSTOLIC BLOOD PRESSURE: 137 MMHG

## 2019-05-28 LAB
25(OH)D3 SERPL-MCNC: 28.5 NG/ML
ALBUMIN SERPL ELPH-MCNC: 4.4 G/DL
ALP BLD-CCNC: 124 U/L
ALT SERPL-CCNC: 54 U/L
ANA PAT FLD IF-IMP: ABNORMAL
ANA SER IF-ACNC: ABNORMAL
ANION GAP SERPL CALC-SCNC: 14 MMOL/L
APPEARANCE: CLEAR
AST SERPL-CCNC: 70 U/L
BASOPHILS # BLD AUTO: 0.08 K/UL
BASOPHILS NFR BLD AUTO: 0.7 %
BILIRUB SERPL-MCNC: 0.3 MG/DL
BILIRUBIN URINE: NEGATIVE
BLOOD URINE: NEGATIVE
BUN SERPL-MCNC: 24 MG/DL
C3 SERPL-MCNC: 98 MG/DL
C4 SERPL-MCNC: 25 MG/DL
CALCIUM SERPL-MCNC: 9.4 MG/DL
CHLORIDE SERPL-SCNC: 104 MMOL/L
CO2 SERPL-SCNC: 23 MMOL/L
COLOR: NORMAL
CREAT SERPL-MCNC: 2.24 MG/DL
CREAT SPEC-SCNC: 60 MG/DL
CREAT/PROT UR: 0.4 RATIO
CRP SERPL-MCNC: <0.1 MG/DL
DSDNA AB SER-ACNC: 51 IU/ML
EOSINOPHIL # BLD AUTO: 0.16 K/UL
EOSINOPHIL NFR BLD AUTO: 1.4 %
ERYTHROCYTE [SEDIMENTATION RATE] IN BLOOD BY WESTERGREN METHOD: 20 MM/HR
GLUCOSE QUALITATIVE U: NEGATIVE
GLUCOSE SERPL-MCNC: 142 MG/DL
HCT VFR BLD CALC: 32 %
HGB BLD-MCNC: 10.4 G/DL
IMM GRANULOCYTES NFR BLD AUTO: 1 %
KETONES URINE: NEGATIVE
LEUKOCYTE ESTERASE URINE: NEGATIVE
LYMPHOCYTES # BLD AUTO: 2.26 K/UL
LYMPHOCYTES NFR BLD AUTO: 19.6 %
MAN DIFF?: NORMAL
MCHC RBC-ENTMCNC: 32.5 GM/DL
MCHC RBC-ENTMCNC: 33.2 PG
MCV RBC AUTO: 102.2 FL
MONOCYTES # BLD AUTO: 0.79 K/UL
MONOCYTES NFR BLD AUTO: 6.9 %
NEUTROPHILS # BLD AUTO: 8.12 K/UL
NEUTROPHILS NFR BLD AUTO: 70.4 %
NITRITE URINE: NEGATIVE
PH URINE: 6
PLATELET # BLD AUTO: 183 K/UL
POTASSIUM SERPL-SCNC: 5.1 MMOL/L
PROT SERPL-MCNC: 7.3 G/DL
PROT UR-MCNC: 25 MG/DL
PROTEIN URINE: NORMAL
RBC # BLD: 3.13 M/UL
RBC # FLD: 13.1 %
SODIUM SERPL-SCNC: 141 MMOL/L
SPECIFIC GRAVITY URINE: 1.01
URATE SERPL-MCNC: 10.8 MG/DL
UROBILINOGEN URINE: NORMAL
WBC # FLD AUTO: 11.53 K/UL

## 2019-05-28 PROCEDURE — 99214 OFFICE O/P EST MOD 30 MIN: CPT

## 2019-05-28 NOTE — HISTORY OF PRESENT ILLNESS
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  His medical history is significant for rheumatoid arthritis which was diagnosed decades ago.  He has followed regularly with a physician and about 18 years ago he was diagnosed with diabetes, HTN.  He also describes having had a heart attack in the past.  He reports that his diabetes has recently been well controlled on insulin pump.  He say he has neuropathy from diabetes but no retinopathy.  He says he has been aware of an abnormality in his kidney function for years and upon review of available records he has had a creatinine of about 1.5 in 2015 and has intermittently had microalbuminuria usually about 30.  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  His creatinine has gone up, his proteinuria has increased.  Since stopping biologics his complements, hapto, and dsDNA have improved and yet his proteinuria and creatinine continue to worsen.  Of note the patient has also recently had worsening swelling and worsening hypertension over the last few months.  His dose of lasix was recently increased by his cardiologist and this has subsequently improved.  On evaluation today he denies any sob or cp, he has no urinary complaints and voids normally, he has some intermittent joint pains for which he continues to take celecoxib.  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  His steroids are being tapered down.  We have been working on his swelling he was on lasix 80 BID with metolazone as needed to get weight down to 157 at home.  With this reduction in weight his edema is better, he can get on his shoes.  He has not had any dyspnea, CP, NVD, he has not been taking any NSAIDs.  His creatinine checked recently with Dr. Polo has been stable.  He continues to maintain a low potassium diet.  \par \par 10/19/18 Since our last meeting Julián is feeling markedly better and his edema is vastly improved.  His blood pressure at home has been ranging 120 to 130 / 60 to 70.  He has not been using NSAIDs at all.  He has not had any chest pain or urination issues.  He is still smoking.  He is thinking about trying to taper himself off of dilaudid.\par \par 1/25/19 -- Julián is doing really well today.  His weight has been very stable recently.  He has some body aches which is chronic for him.  He reports that his edema is substantially improved.\par \par 5/28/19 -- feels well.  had hand surgery.  swelling improved.

## 2019-05-28 NOTE — ASSESSMENT
[FreeTextEntry1] : The patient is a 64 years old man with a history of CAD, DM, and RA here for follow up of CKD.\par \par Chronic Kidney Disease Stage IV -- The etiology is biopsy proven diabetic nephropathy.  The patient's creatinine has been stable and he has no uremic signs or symptoms.  I recommended that he continue diuretics.  I spoke to him at length today about the meaning of CKD, its stages, risk for progression and strategies for prevention.  He is tolerating ramipril and his proteinuria has appropriately reduced.  He will have blood work every three months and see me again in six months.\par \par Hypertension -- Patient has good bp readings at home and here.  continue present management\par \par Hyperkalemia -- Most recent potassiun WNL on ramipril.\par \par RTO 6 months.

## 2019-05-28 NOTE — PHYSICAL EXAM
17-Aug-2017 09:30 [General Appearance - Alert] : alert [General Appearance - In No Acute Distress] : in no acute distress [General Appearance - Well Developed] : well developed [General Appearance - Well Nourished] : well nourished [Sclera] : the sclera and conjunctiva were normal [Oropharynx] : the oropharynx was normal [Examination Of The Oral Cavity] : the lips and gums were normal [Neck Appearance] : the appearance of the neck was normal [Neck Cervical Mass (___cm)] : no neck mass was observed [Jugular Venous Distention Increased] : there was no jugular-venous distention [Respiration, Rhythm And Depth] : normal respiratory rhythm and effort [Exaggerated Use Of Accessory Muscles For Inspiration] : no accessory muscle use [Heart Sounds] : normal S1 and S2 [Heart Sounds Gallop] : no gallops [Auscultation Breath Sounds / Voice Sounds] : lungs were clear to auscultation bilaterally [Murmurs] : no murmurs [Heart Sounds Pericardial Friction Rub] : no pericardial rub [___ +] : bilateral [unfilled]+ pretibial pitting edema [Abdomen Soft] : soft [Abdomen Tenderness] : non-tender [Abdomen Mass (___ Cm)] : no abdominal mass palpated [Cervical Lymph Nodes Enlarged Posterior Bilaterally] : posterior cervical [] : no hepato-splenomegaly [Supraclavicular Lymph Nodes Enlarged Bilaterally] : supraclavicular [Cervical Lymph Nodes Enlarged Anterior Bilaterally] : anterior cervical [No Spinal Tenderness] : no spinal tenderness [Abnormal Walk] : normal gait [No CVA Tenderness] : no ~M costovertebral angle tenderness [Impaired Insight] : insight and judgment were intact [Oriented To Time, Place, And Person] : oriented to person, place, and time [Mood] : the mood was normal [Affect] : the affect was normal [FreeTextEntry1] : midline hernia noted

## 2019-05-28 NOTE — REVIEW OF SYSTEMS
[Lower Ext Edema] : lower extremity edema [Cough] : cough [Arthralgias] : arthralgias [Joint Pain] : joint pain [Fever] : no fever [Chills] : no chills [Feeling Poorly] : not feeling poorly [Feeling Tired] : not feeling tired [Eyesight Problems] : no eyesight problems [Nosebleeds] : no nosebleeds [Palpitations] : no palpitations [Shortness Of Breath] : no shortness of breath [Chest Pain] : no chest pain [Wheezing] : no wheezing [Dysuria] : no dysuria [Abdominal Pain] : no abdominal pain [Vomiting] : no vomiting [Incontinence] : no incontinence [Dizziness] : no dizziness [Fainting] : no fainting [Easy Bleeding] : no tendency for easy bleeding [Easy Bruising] : no tendency for easy bruising

## 2019-06-19 ENCOUNTER — OTHER (OUTPATIENT)
Age: 64
End: 2019-06-19

## 2019-06-19 ENCOUNTER — RX RENEWAL (OUTPATIENT)
Age: 64
End: 2019-06-19

## 2019-07-01 ENCOUNTER — APPOINTMENT (OUTPATIENT)
Dept: ENDOCRINOLOGY | Facility: CLINIC | Age: 64
End: 2019-07-01
Payer: COMMERCIAL

## 2019-07-01 VITALS
HEART RATE: 67 BPM | HEIGHT: 71 IN | BODY MASS INDEX: 22.54 KG/M2 | OXYGEN SATURATION: 97 % | SYSTOLIC BLOOD PRESSURE: 130 MMHG | DIASTOLIC BLOOD PRESSURE: 70 MMHG | WEIGHT: 161.03 LBS

## 2019-07-01 PROCEDURE — 95251 CONT GLUC MNTR ANALYSIS I&R: CPT

## 2019-07-01 PROCEDURE — 99214 OFFICE O/P EST MOD 30 MIN: CPT | Mod: 25

## 2019-07-02 ENCOUNTER — MEDICATION RENEWAL (OUTPATIENT)
Age: 64
End: 2019-07-02

## 2019-07-23 ENCOUNTER — OTHER (OUTPATIENT)
Age: 64
End: 2019-07-23

## 2019-08-06 ENCOUNTER — MEDICATION RENEWAL (OUTPATIENT)
Age: 64
End: 2019-08-06

## 2019-08-08 ENCOUNTER — MEDICATION RENEWAL (OUTPATIENT)
Age: 64
End: 2019-08-08

## 2019-08-09 ENCOUNTER — RX RENEWAL (OUTPATIENT)
Age: 64
End: 2019-08-09

## 2019-08-12 ENCOUNTER — RX RENEWAL (OUTPATIENT)
Age: 64
End: 2019-08-12

## 2019-08-26 ENCOUNTER — APPOINTMENT (OUTPATIENT)
Dept: RHEUMATOLOGY | Facility: CLINIC | Age: 64
End: 2019-08-26
Payer: COMMERCIAL

## 2019-08-26 VITALS
DIASTOLIC BLOOD PRESSURE: 72 MMHG | HEIGHT: 71 IN | HEART RATE: 62 BPM | OXYGEN SATURATION: 98 % | SYSTOLIC BLOOD PRESSURE: 138 MMHG | BODY MASS INDEX: 22.4 KG/M2 | WEIGHT: 160 LBS | TEMPERATURE: 97.8 F

## 2019-08-26 PROCEDURE — 99214 OFFICE O/P EST MOD 30 MIN: CPT

## 2019-08-27 LAB
25(OH)D3 SERPL-MCNC: 36.9 NG/ML
ALBUMIN SERPL ELPH-MCNC: 4.3 G/DL
ALP BLD-CCNC: 127 U/L
ALT SERPL-CCNC: 34 U/L
ANION GAP SERPL CALC-SCNC: 16 MMOL/L
APPEARANCE: CLEAR
AST SERPL-CCNC: 56 U/L
BASOPHILS # BLD AUTO: 0.06 K/UL
BASOPHILS NFR BLD AUTO: 0.5 %
BILIRUB SERPL-MCNC: 0.3 MG/DL
BILIRUBIN URINE: NEGATIVE
BLOOD URINE: NEGATIVE
BUN SERPL-MCNC: 34 MG/DL
C3 SERPL-MCNC: 122 MG/DL
C4 SERPL-MCNC: 30 MG/DL
CALCIUM SERPL-MCNC: 9.2 MG/DL
CHLORIDE SERPL-SCNC: 99 MMOL/L
CO2 SERPL-SCNC: 22 MMOL/L
COLOR: NORMAL
CREAT SERPL-MCNC: 2.46 MG/DL
CREAT SPEC-SCNC: 43 MG/DL
CREAT/PROT UR: 0.1 RATIO
CRP SERPL-MCNC: <0.1 MG/DL
EOSINOPHIL # BLD AUTO: 0.12 K/UL
EOSINOPHIL NFR BLD AUTO: 1.1 %
ERYTHROCYTE [SEDIMENTATION RATE] IN BLOOD BY WESTERGREN METHOD: 77 MM/HR
ESTIMATED AVERAGE GLUCOSE: 177 MG/DL
GLUCOSE QUALITATIVE U: NEGATIVE
GLUCOSE SERPL-MCNC: 171 MG/DL
HBA1C MFR BLD HPLC: 7.8 %
HCT VFR BLD CALC: 32.8 %
HGB BLD-MCNC: 10.7 G/DL
IMM GRANULOCYTES NFR BLD AUTO: 1.9 %
KETONES URINE: NEGATIVE
LEUKOCYTE ESTERASE URINE: NEGATIVE
LYMPHOCYTES # BLD AUTO: 1.97 K/UL
LYMPHOCYTES NFR BLD AUTO: 18 %
MAN DIFF?: NORMAL
MCHC RBC-ENTMCNC: 32.1 PG
MCHC RBC-ENTMCNC: 32.6 GM/DL
MCV RBC AUTO: 98.5 FL
MONOCYTES # BLD AUTO: 0.71 K/UL
MONOCYTES NFR BLD AUTO: 6.5 %
NEUTROPHILS # BLD AUTO: 7.88 K/UL
NEUTROPHILS NFR BLD AUTO: 72 %
NITRITE URINE: NEGATIVE
PH URINE: 6
PLATELET # BLD AUTO: 202 K/UL
POTASSIUM SERPL-SCNC: 5 MMOL/L
PROT SERPL-MCNC: 7.3 G/DL
PROT UR-MCNC: 6 MG/DL
PROTEIN URINE: NEGATIVE
RBC # BLD: 3.33 M/UL
RBC # FLD: 12.3 %
SODIUM SERPL-SCNC: 137 MMOL/L
SPECIFIC GRAVITY URINE: 1.01
TSH SERPL-ACNC: 2.55 UIU/ML
URATE SERPL-MCNC: 10.9 MG/DL
UROBILINOGEN URINE: NORMAL
WBC # FLD AUTO: 10.95 K/UL

## 2019-08-29 LAB — DSDNA AB SER-ACNC: 53 IU/ML

## 2019-09-03 ENCOUNTER — MEDICATION RENEWAL (OUTPATIENT)
Age: 64
End: 2019-09-03

## 2019-09-04 ENCOUNTER — APPOINTMENT (OUTPATIENT)
Dept: CARDIOLOGY | Facility: CLINIC | Age: 64
End: 2019-09-04
Payer: COMMERCIAL

## 2019-09-04 ENCOUNTER — NON-APPOINTMENT (OUTPATIENT)
Age: 64
End: 2019-09-04

## 2019-09-04 VITALS
DIASTOLIC BLOOD PRESSURE: 60 MMHG | OXYGEN SATURATION: 99 % | SYSTOLIC BLOOD PRESSURE: 130 MMHG | WEIGHT: 160 LBS | BODY MASS INDEX: 22.32 KG/M2 | HEART RATE: 68 BPM

## 2019-09-04 DIAGNOSIS — I65.29 OCCLUSION AND STENOSIS OF UNSPECIFIED CAROTID ARTERY: ICD-10-CM

## 2019-09-04 PROCEDURE — 93000 ELECTROCARDIOGRAM COMPLETE: CPT

## 2019-09-04 PROCEDURE — 99214 OFFICE O/P EST MOD 30 MIN: CPT

## 2019-09-04 RX ORDER — TOFACITINIB 11 MG/1
11 TABLET, FILM COATED, EXTENDED RELEASE ORAL
Qty: 30 | Refills: 3 | Status: DISCONTINUED | COMMUNITY
Start: 2018-07-17 | End: 2019-09-04

## 2019-09-04 NOTE — DISCUSSION/SUMMARY
[FreeTextEntry1] : He is a 64-year-old with a history of myocardial infarctions with minimal or no LV dysfunction, improved leg edema and improved hypertension now off prednisone.   CRI stable.\par His blood pressure control is perfect. LDL is well below 70\par BP is good, Continue amlodipine to 10 mg daily and lasix 80 qd.\par He should continue plavix daily and ASA biw.\par We discussed the need for smoking cessation, again. He is not interested.\par Encouraged exercise.\par

## 2019-09-04 NOTE — HISTORY OF PRESENT ILLNESS
[FreeTextEntry1] : s/p Thumb surgery  \par Still smokes a bit less than pack a day.\par He continues on lasix 80 bid\par No chest pains or dyspnea.\par Stable leg edema.\par \par Reticular abdominal pattern for the past 4 months.\par Occasional medical marajana

## 2019-09-10 ENCOUNTER — OTHER (OUTPATIENT)
Age: 64
End: 2019-09-10

## 2019-09-11 ENCOUNTER — RX RENEWAL (OUTPATIENT)
Age: 64
End: 2019-09-11

## 2019-09-13 LAB
M TB IFN-G BLD-IMP: ABNORMAL
QUANTIFERON TB PLUS MITOGEN MINUS NIL: 0.43 IU/ML
QUANTIFERON TB PLUS NIL: 0.02 IU/ML
QUANTIFERON TB PLUS TB1 MINUS NIL: 0 IU/ML
QUANTIFERON TB PLUS TB2 MINUS NIL: 0 IU/ML

## 2019-09-19 ENCOUNTER — RX RENEWAL (OUTPATIENT)
Age: 64
End: 2019-09-19

## 2019-10-23 ENCOUNTER — APPOINTMENT (OUTPATIENT)
Dept: INTERNAL MEDICINE | Facility: CLINIC | Age: 64
End: 2019-10-23
Payer: COMMERCIAL

## 2019-10-23 VITALS
SYSTOLIC BLOOD PRESSURE: 120 MMHG | HEIGHT: 71 IN | DIASTOLIC BLOOD PRESSURE: 40 MMHG | HEART RATE: 72 BPM | BODY MASS INDEX: 22.4 KG/M2 | WEIGHT: 160 LBS | OXYGEN SATURATION: 98 %

## 2019-10-23 DIAGNOSIS — Z72.0 TOBACCO USE: ICD-10-CM

## 2019-10-23 PROCEDURE — G0008: CPT

## 2019-10-23 PROCEDURE — G0442 ANNUAL ALCOHOL SCREEN 15 MIN: CPT | Mod: NC

## 2019-10-23 PROCEDURE — 90653 IIV ADJUVANT VACCINE IM: CPT

## 2019-10-23 PROCEDURE — 99397 PER PM REEVAL EST PAT 65+ YR: CPT | Mod: 25

## 2019-10-23 PROCEDURE — G0444 DEPRESSION SCREEN ANNUAL: CPT | Mod: NC,59

## 2019-11-11 ENCOUNTER — APPOINTMENT (OUTPATIENT)
Dept: ENDOCRINOLOGY | Facility: CLINIC | Age: 64
End: 2019-11-11
Payer: COMMERCIAL

## 2019-11-11 VITALS
SYSTOLIC BLOOD PRESSURE: 122 MMHG | DIASTOLIC BLOOD PRESSURE: 56 MMHG | OXYGEN SATURATION: 97 % | WEIGHT: 160 LBS | BODY MASS INDEX: 22.4 KG/M2 | HEIGHT: 71 IN | HEART RATE: 75 BPM

## 2019-11-11 LAB
GLUCOSE BLDC GLUCOMTR-MCNC: 329
HBA1C MFR BLD HPLC: 7.2

## 2019-11-11 PROCEDURE — 95251 CONT GLUC MNTR ANALYSIS I&R: CPT

## 2019-11-11 PROCEDURE — 83036 HEMOGLOBIN GLYCOSYLATED A1C: CPT | Mod: QW

## 2019-11-11 PROCEDURE — 82962 GLUCOSE BLOOD TEST: CPT

## 2019-11-11 PROCEDURE — 99214 OFFICE O/P EST MOD 30 MIN: CPT | Mod: 25

## 2019-11-11 NOTE — ASSESSMENT
[FreeTextEntry1] : 64 year old man with T2DM and chronic pancreatitis, generally well controlled,\par \par   - Insulin pump and glucose monitor data downloaded and reviewed.  Pt with postprandial hyperglycemia, after breakfast, does not always take bolus of insulin prior to breakfast.  Recommend paying in creased attension to pre-breakfast insulin, continue current pump settings.  Discussed pump options with pt, including hybrid closed loop devices.  He wants to continue with tuibeless, pump, will reorder omnipod\par  - Retinopathy screening up to date\par  - had flu shot\par \par \par HTN - blood pressure at goal, + ckd, on ACE, followed by nephrology\par \par Hyperlipidemia- continue lovastatin. \par \par vitamin D def'y - continue vitamin D supplementation\par \par f/u 3 months

## 2019-11-11 NOTE — DATA REVIEWED
[FreeTextEntry1] : poct hba1c today 6.9%\par Sept 2017 Labs:\par K 6.1\par Cr 2.15\par GFR 31\par [id number]\par -39-32\par

## 2019-11-11 NOTE — HISTORY OF PRESENT ILLNESS
[FreeTextEntry1] : cc: diabetes\par \par 64 year old man with T2DM (diagnosed in 2002, on insulin pump since late 2015), chronic pancreatitis, well controlled. Using omnipod pump (with U100 Humalog) and dexcom sensor. He has been feeling well.  Glucose values are variable.  On occasion forgets to take a bolus before a meal particularly breakfast.  Minimal hypoglycemia (~ 2 x per month) no patterns.  Pump now out of warranty. Does not want tubing.  He walks a little.   He has been limiting carbohydrate intake.\par Had flu shot\par \par Saw opthalmologist over the summer. No retinopathy, +neuropathy. h/o MI\par \par \par HTN - blood pressure has been controlled, no recent change in regimen\par \par Hyperlipidemia - taking statin, no side effects.\par \par Smoking, does not currently want to stop

## 2019-11-19 ENCOUNTER — RX RENEWAL (OUTPATIENT)
Age: 64
End: 2019-11-19

## 2019-11-29 NOTE — HEALTH RISK ASSESSMENT
[Fair] : ~his/her~ current health as fair  [Good] : ~his/her~  mood as  good [] : Yes [21-25] : 21-25 [Yes] : Yes [Monthly or less (1 pt)] : Monthly or less (1 point) [No falls in past year] : Patient reported no falls in the past year [No] : In the past 12 months have you used drugs other than those required for medical reasons? No [0] : 1) Little interest or pleasure doing things: Not at all (0) [de-identified] : medical MJ [de-identified] : occ walks golf course w friends if up for playing [Audit-CScore] : 1 [Patient reported bone density results were normal] : Patient reported bone density results were normal [None] : None [Patient reported colonoscopy was normal] : Patient reported colonoscopy was normal [With Family] : lives with family [] :  [Retired] : retired [Graduate School] : graduate school [# Of Children ___] : has [unfilled] children [High Risk Behavior] : no high risk behavior [Feels Safe at Home] : Feels safe at home [Fully functional (using the telephone, shopping, preparing meals, housekeeping, doing laundry, using] : Fully functional and needs no help or supervision to perform IADLs (using the telephone, shopping, preparing meals, housekeeping, doing laundry, using transportation, managing medications and managing finances) [Fully functional (bathing, dressing, toileting, transferring, walking, feeding)] : Fully functional (bathing, dressing, toileting, transferring, walking, feeding) [Reports changes in hearing] : Reports no changes in hearing [Reports changes in dental health] : Reports no changes in dental health [Carbon Monoxide Detector] : carbon monoxide detector [Reports changes in vision] : Reports no changes in vision [Smoke Detector] : smoke detector [Guns at Home] : no guns at home [Safety elements used in home] : safety elements used in home [TB Exposure] : is not being exposed to tuberculosis [Travel to Developing Areas] : travel to developing areas [Seat Belt] :  uses seat belt [Sunscreen] : uses sunscreen [ColonoscopyDate] : 1/17/07 [BoneDensityDate] : 4/18/18 [Caregiver Concerns] : does not have caregiver concerns [Name: ___] : Health Care Proxy's Name: [unfilled]  [HepatitisCDate] : 03/18 [ColonoscopyComments] : FITs more recently, neg [Relationship: ___] : Relationship: [unfilled]

## 2019-11-29 NOTE — PHYSICAL EXAM
[Well Nourished] : well nourished [No Acute Distress] : no acute distress [Normal Sclera/Conjunctiva] : normal sclera/conjunctiva [Well Developed] : well developed [Well-Appearing] : well-appearing [EOMI] : extraocular movements intact [PERRL] : pupils equal round and reactive to light [Normal Outer Ear/Nose] : the outer ears and nose were normal in appearance [Normal TMs] : both tympanic membranes were normal [Normal Oropharynx] : the oropharynx was normal [Normal Nasal Mucosa] : the nasal mucosa was normal [No JVD] : no jugular venous distention [No Lymphadenopathy] : no lymphadenopathy [Supple] : supple [Thyroid Normal, No Nodules] : the thyroid was normal and there were no nodules present [No Respiratory Distress] : no respiratory distress  [Clear to Auscultation] : lungs were clear to auscultation bilaterally [Normal Rate] : normal rate  [No Accessory Muscle Use] : no accessory muscle use [Normal Percussion] : the chest was normal to percussion [No Murmur] : no murmur heard [Regular Rhythm] : with a regular rhythm [Normal S1, S2] : normal S1 and S2 [No Abdominal Bruit] : a ~M bruit was not heard ~T in the abdomen [No Extremity Clubbing/Cyanosis] : no extremity clubbing/cyanosis [No Carotid Bruits] : no carotid bruits [Soft] : abdomen soft [No Palpable Aorta] : no palpable aorta [Non Tender] : non-tender [No Masses] : no abdominal mass palpated [No HSM] : no HSM [Non-distended] : non-distended [No Hernias] : no hernias [Normal Supraclavicular Nodes] : no supraclavicular lymphadenopathy [Normal Bowel Sounds] : normal bowel sounds [Normal Posterior Cervical Nodes] : no posterior cervical lymphadenopathy [Normal Anterior Cervical Nodes] : no anterior cervical lymphadenopathy [Normal Inguinal Nodes] : no inguinal lymphadenopathy [No CVA Tenderness] : no CVA  tenderness [No Joint Swelling] : no joint swelling [No Spinal Tenderness] : no spinal tenderness [Grossly Normal Strength/Tone] : grossly normal strength/tone [No Rash] : no rash [No Skin Lesions] : no skin lesions [Coordination Grossly Intact] : coordination grossly intact [Normal Gait] : normal gait [Deep Tendon Reflexes (DTR)] : deep tendon reflexes were 2+ and symmetric [No Focal Deficits] : no focal deficits [Speech Grossly Normal] : speech grossly normal [Alert and Oriented x3] : oriented to person, place, and time [Memory Grossly Normal] : memory grossly normal [Normal Affect] : the affect was normal [Normal Insight/Judgement] : insight and judgment were intact [Normal Mood] : the mood was normal [Right Foot Was Examined] : Right foot ~C was examined [None] : no ulcers in either foot were found [Left Foot Was Examined] : left foot ~C was examined [] : with full ROM [de-identified] : trace non pitting in LEs [de-identified] : no icterus, pallor [de-identified] : no suspicious nevi; continues to have livedo like lattice appearance on skin of abdomen [TWNoteComboBox5] : +1 [TWNoteComboBox2] : +1 [TWNoteComboBox1] : +1 [TWNoteComboBox3] : +2 [TWNoteComboBox6] : +1 [TWNoteComboBox4] : +2

## 2019-11-29 NOTE — ASSESSMENT
[FreeTextEntry1] : 1) HCM - flu vacc today; rest of vaccines utd.  Had shingrix x 2.  2) continues w endo for DM, has nl pr in urine at last check, utd; reminded retinal exam, self foot inspection.  On ins pump b/c of how brittle he'd been with his chronic pancreatitis. 3) rheum for PsA, RA serologies, drug induced SLE abs, on xeljanz seems to be doing reasonably well.  Has extensive DJD also, may be having a procedure at [facility] - sees Dr. Troy, will provide any clearance if that proceeds.  4) keep up with GI - asking him if Dr. Culver might begin including a hep doc in their practice.  Also needs to discuss next colon screen. For now accepts a FIT kit.   5) keeps up with renal for monitoring.  Has cr in 2.2 - 2.6 range, holding his electrolytes; on ramipril, amlodipine, furoseamide for bp and fluid management.  6) e. ab igne from heating pad.  Asked to be careful applying pad, number of hours, etc.  7) had larger labs w rheum in August, no need today, could use lipids at next endo or rheum draw.  8) wants no part of smoking cessation conversation - do bring it up at each visit.  Will continue - on asa/statin since CAD/intervention x 1 artery/negative ischemia evals since.

## 2019-11-29 NOTE — HISTORY OF PRESENT ILLNESS
[FreeTextEntry1] : Here for annual [de-identified] : Doing all right; still walks/plays a bit of golf when he can.  From rheum standpoint, things seem stable - on xeljans, seems to be doing well with it.  Had labs w rheum last visit, w stable hep/renal indices.  Non oliguric with his CKD - has normal volumes/frequency.  Followed by renal/rheum/card/pain/GI.  Still off opiates for chronic pain, feels his pain levels haven't suffered too much - does take medical marijuana for his pain through his pain doc, that helps him to some degree both for musc skel and abd pain.  DM marilyn - has an appt w endo coming up, still on pump.  Over his abdomen has noticed some dusky lattice like lines on skin - showing me today - on further review, now clear this is related to heating pad he applies to his abdomen for pain relief.  Has not had LE edema, denies cp, sob, shah.

## 2019-11-29 NOTE — REVIEW OF SYSTEMS
[Fever] : no fever [Chills] : no chills [Lower Ext Edema] : lower extremity edema [Chest Pain] : no chest pain [Night Sweats] : no night sweats [Orthopena] : no orthopnea [Joint Pain] : joint pain [Joint Stiffness] : joint stiffness [Joint Swelling] : joint swelling [Negative] : Heme/Lymph

## 2019-11-29 NOTE — COUNSELING
[Fall prevention counseling provided] : Fall prevention counseling provided [Adequate lighting] : Adequate lighting [Use recommended devices] : Use recommended devices [No throw rugs] : No throw rugs [Use proper foot wear] : Use proper foot wear [Sleep ___ hours/day] : Sleep [unfilled] hours/day [AUDIT-C Screening administered and reviewed] : AUDIT-C Screening administered and reviewed [Hazards of at-risk alcohol use discussed] : Hazards of at-risk alcohol use discussed [Encouraged to increase physical activity] : Encouraged to increase physical activity [____ min/wk Activity] : [unfilled] min/wk activity [Weight management counseling provided] : Weight management [Healthy eating counseling provided] : healthy eating [Activity counseling provided] : activity [Smoking cessation counseling provided] : smoking cessation [Decrease Portions] : Decrease food portions [Walking] : Walking [Quit Smoking] : Quit smoking [Engage in a relaxing activity] : Engage in a relaxing activity [None] : None [Good understanding] : Patient has a good understanding of lifestyle changes and the steps needed to achieve self management goals

## 2019-12-02 ENCOUNTER — LABORATORY RESULT (OUTPATIENT)
Age: 64
End: 2019-12-02

## 2019-12-02 ENCOUNTER — APPOINTMENT (OUTPATIENT)
Dept: RHEUMATOLOGY | Facility: CLINIC | Age: 64
End: 2019-12-02
Payer: COMMERCIAL

## 2019-12-02 VITALS
TEMPERATURE: 97.5 F | HEART RATE: 69 BPM | WEIGHT: 157 LBS | BODY MASS INDEX: 21.98 KG/M2 | DIASTOLIC BLOOD PRESSURE: 72 MMHG | SYSTOLIC BLOOD PRESSURE: 139 MMHG | HEIGHT: 71 IN | OXYGEN SATURATION: 99 %

## 2019-12-02 DIAGNOSIS — M19.90 UNSPECIFIED OSTEOARTHRITIS, UNSPECIFIED SITE: ICD-10-CM

## 2019-12-02 PROCEDURE — 99215 OFFICE O/P EST HI 40 MIN: CPT

## 2019-12-02 RX ORDER — TOFACITINIB 5 MG/1
5 TABLET, FILM COATED ORAL
Qty: 60 | Refills: 6 | Status: DISCONTINUED | COMMUNITY
Start: 2018-09-27 | End: 2019-12-02

## 2019-12-03 LAB
25(OH)D3 SERPL-MCNC: 34.8 NG/ML
ALBUMIN SERPL ELPH-MCNC: 4.2 G/DL
ALP BLD-CCNC: 111 U/L
ALT SERPL-CCNC: 29 U/L
ANION GAP SERPL CALC-SCNC: 13 MMOL/L
APPEARANCE: CLEAR
AST SERPL-CCNC: 43 U/L
BASOPHILS # BLD AUTO: 0.09 K/UL
BASOPHILS NFR BLD AUTO: 0.6 %
BILIRUB SERPL-MCNC: 0.2 MG/DL
BILIRUBIN URINE: NEGATIVE
BLOOD URINE: NEGATIVE
BUN SERPL-MCNC: 33 MG/DL
C3 SERPL-MCNC: 126 MG/DL
C4 SERPL-MCNC: 33 MG/DL
CALCIUM SERPL-MCNC: 9.3 MG/DL
CHLORIDE SERPL-SCNC: 100 MMOL/L
CO2 SERPL-SCNC: 23 MMOL/L
COLOR: YELLOW
CREAT SERPL-MCNC: 2.55 MG/DL
CREAT SPEC-SCNC: 202 MG/DL
CREAT/PROT UR: 0.2 RATIO
CRP SERPL-MCNC: 0.12 MG/DL
DSDNA AB SER-ACNC: 120 IU/ML
EOSINOPHIL # BLD AUTO: 0.16 K/UL
EOSINOPHIL NFR BLD AUTO: 1 %
ERYTHROCYTE [SEDIMENTATION RATE] IN BLOOD BY WESTERGREN METHOD: 65 MM/HR
GLUCOSE QUALITATIVE U: NEGATIVE
GLUCOSE SERPL-MCNC: 72 MG/DL
HCT VFR BLD CALC: 31.4 %
HGB BLD-MCNC: 10.2 G/DL
IMM GRANULOCYTES NFR BLD AUTO: 1.6 %
KETONES URINE: NEGATIVE
LEUKOCYTE ESTERASE URINE: NEGATIVE
LYMPHOCYTES # BLD AUTO: 2.63 K/UL
LYMPHOCYTES NFR BLD AUTO: 16.7 %
MAN DIFF?: NORMAL
MCHC RBC-ENTMCNC: 32.5 GM/DL
MCHC RBC-ENTMCNC: 32.8 PG
MCV RBC AUTO: 101 FL
MONOCYTES # BLD AUTO: 1.33 K/UL
MONOCYTES NFR BLD AUTO: 8.4 %
NEUTROPHILS # BLD AUTO: 11.31 K/UL
NEUTROPHILS NFR BLD AUTO: 71.7 %
NITRITE URINE: NEGATIVE
PH URINE: 6
PLATELET # BLD AUTO: 299 K/UL
POTASSIUM SERPL-SCNC: 4.4 MMOL/L
PROT SERPL-MCNC: 7.8 G/DL
PROT UR-MCNC: 35 MG/DL
PROTEIN URINE: ABNORMAL
RBC # BLD: 3.11 M/UL
RBC # FLD: 12.7 %
SODIUM SERPL-SCNC: 136 MMOL/L
SPECIFIC GRAVITY URINE: 1.02
TSH SERPL-ACNC: 2.18 UIU/ML
UROBILINOGEN URINE: NORMAL
WBC # FLD AUTO: 15.78 K/UL

## 2019-12-06 ENCOUNTER — APPOINTMENT (OUTPATIENT)
Dept: NEPHROLOGY | Facility: CLINIC | Age: 64
End: 2019-12-06
Payer: COMMERCIAL

## 2019-12-06 VITALS — SYSTOLIC BLOOD PRESSURE: 152 MMHG | DIASTOLIC BLOOD PRESSURE: 78 MMHG

## 2019-12-06 PROCEDURE — 99214 OFFICE O/P EST MOD 30 MIN: CPT

## 2019-12-06 NOTE — PHYSICAL EXAM
[General Appearance - Alert] : alert [General Appearance - Well Developed] : well developed [General Appearance - Well Nourished] : well nourished [General Appearance - In No Acute Distress] : in no acute distress [Examination Of The Oral Cavity] : the lips and gums were normal [Sclera] : the sclera and conjunctiva were normal [Oropharynx] : the oropharynx was normal [Neck Appearance] : the appearance of the neck was normal [Neck Cervical Mass (___cm)] : no neck mass was observed [Respiration, Rhythm And Depth] : normal respiratory rhythm and effort [Jugular Venous Distention Increased] : there was no jugular-venous distention [Exaggerated Use Of Accessory Muscles For Inspiration] : no accessory muscle use [Auscultation Breath Sounds / Voice Sounds] : lungs were clear to auscultation bilaterally [Heart Sounds] : normal S1 and S2 [Heart Sounds Gallop] : no gallops [Murmurs] : no murmurs [Heart Sounds Pericardial Friction Rub] : no pericardial rub [Abdomen Tenderness] : non-tender [Edema] : there was no peripheral edema [Abdomen Soft] : soft [] : no hepato-splenomegaly [Abdomen Mass (___ Cm)] : no abdominal mass palpated [FreeTextEntry1] : midline hernia noted [Supraclavicular Lymph Nodes Enlarged Bilaterally] : supraclavicular [Cervical Lymph Nodes Enlarged Posterior Bilaterally] : posterior cervical [Cervical Lymph Nodes Enlarged Anterior Bilaterally] : anterior cervical [No CVA Tenderness] : no ~M costovertebral angle tenderness [No Spinal Tenderness] : no spinal tenderness [Abnormal Walk] : normal gait [Impaired Insight] : insight and judgment were intact [Oriented To Time, Place, And Person] : oriented to person, place, and time [Affect] : the affect was normal [Mood] : the mood was normal

## 2019-12-06 NOTE — REVIEW OF SYSTEMS
[Fever] : no fever [Chills] : no chills [Feeling Poorly] : not feeling poorly [Eyesight Problems] : no eyesight problems [Feeling Tired] : not feeling tired [Chest Pain] : no chest pain [Palpitations] : no palpitations [Nosebleeds] : no nosebleeds [Lower Ext Edema] : no extremity edema [Shortness Of Breath] : no shortness of breath [Abdominal Pain] : no abdominal pain [Wheezing] : no wheezing [Cough] : cough [Vomiting] : no vomiting [Dysuria] : no dysuria [Incontinence] : no incontinence [Joint Pain] : joint pain [Dizziness] : no dizziness [Arthralgias] : arthralgias [Fainting] : no fainting [Easy Bruising] : no tendency for easy bruising [Easy Bleeding] : no tendency for easy bleeding

## 2019-12-06 NOTE — HISTORY OF PRESENT ILLNESS
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  His medical history is significant for rheumatoid arthritis which was diagnosed decades ago.  He has followed regularly with a physician and about 18 years ago he was diagnosed with diabetes, HTN.  He also describes having had a heart attack in the past.  He reports that his diabetes has recently been well controlled on insulin pump.  He say he has neuropathy from diabetes but no retinopathy.  He says he has been aware of an abnormality in his kidney function for years and upon review of available records he has had a creatinine of about 1.5 in 2015 and has intermittently had microalbuminuria usually about 30.  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  His creatinine has gone up, his proteinuria has increased.  Since stopping biologics his complements, hapto, and dsDNA have improved and yet his proteinuria and creatinine continue to worsen.  Of note the patient has also recently had worsening swelling and worsening hypertension over the last few months.  His dose of lasix was recently increased by his cardiologist and this has subsequently improved.  On evaluation today he denies any sob or cp, he has no urinary complaints and voids normally, he has some intermittent joint pains for which he continues to take celecoxib.  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  His steroids are being tapered down.  We have been working on his swelling he was on lasix 80 BID with metolazone as needed to get weight down to 157 at home.  With this reduction in weight his edema is better, he can get on his shoes.  He has not had any dyspnea, CP, NVD, he has not been taking any NSAIDs.  His creatinine checked recently with Dr. Polo has been stable.  He continues to maintain a low potassium diet.  \par \par 10/19/18 Since our last meeting Julián is feeling markedly better and his edema is vastly improved.  His blood pressure at home has been ranging 120 to 130 / 60 to 70.  He has not been using NSAIDs at all.  He has not had any chest pain or urination issues.  He is still smoking.  He is thinking about trying to taper himself off of dilaudid.\par \par 1/25/19 -- Julián is doing really well today.  His weight has been very stable recently.  He has some body aches which is chronic for him.  He reports that his edema is substantially improved.\par \par 5/28/19 -- feels well.  had hand surgery.  swelling improved.\par \par 12/6/19 -- feels well.  recently stopped xeljanz and is on steroids at the moment.  no increase in swelling no sob no cp no nausea or vomiting.  no urinary complaints.

## 2019-12-06 NOTE — ASSESSMENT
[FreeTextEntry1] : The patient is a 64 years old man with a history of CAD, DM, and RA here for follow up of CKD.\par \par Chronic Kidney Disease Stage IV -- The etiology on biopsy proven diabetic nephropathy.  The patient's creatinine has been stable and he has no uremic signs or symptoms.  I recommended that he continue diuretics - his volume status is near euvolemic.  I spoke to him at length today about the meaning of CKD, its stages, risk for progression and strategies for prevention.  He is tolerating ramipril and his proteinuria has appropriately reduced.  He will have blood work every three months and see me again in six months.\par \par Hypertension -- Patient has good bp readings at home but have been elevated here.  We are hesitant to start new medications unless it is a true trend.  he will continue to monitor it at home.  continue present management\par \par Hyperkalemia -- Most recent potassiun WNL on ramipril.\par \par RTO 6 months.

## 2019-12-10 ENCOUNTER — OTHER (OUTPATIENT)
Age: 64
End: 2019-12-10

## 2019-12-10 LAB — HISTONE AB SER QL: 0.6 UNITS

## 2019-12-11 ENCOUNTER — LABORATORY RESULT (OUTPATIENT)
Age: 64
End: 2019-12-11

## 2019-12-12 ENCOUNTER — FORM ENCOUNTER (OUTPATIENT)
Age: 64
End: 2019-12-12

## 2019-12-13 ENCOUNTER — OUTPATIENT (OUTPATIENT)
Dept: OUTPATIENT SERVICES | Facility: HOSPITAL | Age: 64
LOS: 1 days | End: 2019-12-13
Payer: COMMERCIAL

## 2019-12-13 ENCOUNTER — APPOINTMENT (OUTPATIENT)
Dept: ULTRASOUND IMAGING | Facility: CLINIC | Age: 64
End: 2019-12-13
Payer: COMMERCIAL

## 2019-12-13 DIAGNOSIS — Z98.890 OTHER SPECIFIED POSTPROCEDURAL STATES: Chronic | ICD-10-CM

## 2019-12-13 DIAGNOSIS — Z90.89 ACQUIRED ABSENCE OF OTHER ORGANS: Chronic | ICD-10-CM

## 2019-12-13 DIAGNOSIS — Z98.89 OTHER SPECIFIED POSTPROCEDURAL STATES: Chronic | ICD-10-CM

## 2019-12-13 DIAGNOSIS — Z90.49 ACQUIRED ABSENCE OF OTHER SPECIFIED PARTS OF DIGESTIVE TRACT: Chronic | ICD-10-CM

## 2019-12-13 DIAGNOSIS — R76.8 OTHER SPECIFIED ABNORMAL IMMUNOLOGICAL FINDINGS IN SERUM: ICD-10-CM

## 2019-12-13 DIAGNOSIS — Z98.52 VASECTOMY STATUS: Chronic | ICD-10-CM

## 2019-12-13 LAB
BASOPHILS # BLD AUTO: 0.06 K/UL
BASOPHILS NFR BLD AUTO: 0.6 %
DIRECT COOMBS: ABNORMAL
DSDNA AB SER-ACNC: 27 IU/ML
EOSINOPHIL # BLD AUTO: 0.14 K/UL
EOSINOPHIL NFR BLD AUTO: 1.3 %
HAPTOGLOB SERPL-MCNC: 209 MG/DL
HCT VFR BLD CALC: 28.7 %
HGB BLD-MCNC: 9.3 G/DL
IMM GRANULOCYTES NFR BLD AUTO: 1.4 %
LDH SERPL-CCNC: 186 U/L
LYMPHOCYTES # BLD AUTO: 1.78 K/UL
LYMPHOCYTES NFR BLD AUTO: 17 %
MAN DIFF?: NORMAL
MCHC RBC-ENTMCNC: 32.4 GM/DL
MCHC RBC-ENTMCNC: 32.7 PG
MCV RBC AUTO: 101.1 FL
MONOCYTES # BLD AUTO: 0.85 K/UL
MONOCYTES NFR BLD AUTO: 8.1 %
NEUTROPHILS # BLD AUTO: 7.49 K/UL
NEUTROPHILS NFR BLD AUTO: 71.6 %
PLATELET # BLD AUTO: 154 K/UL
RBC # BLD: 2.84 M/UL
RBC # BLD: 2.84 M/UL
RBC # FLD: 13.3 %
RETICS # AUTO: 3.2 %
RETICS AGGREG/RBC NFR: 90 K/UL
WBC # FLD AUTO: 10.47 K/UL

## 2019-12-13 PROCEDURE — 76881 US COMPL JOINT R-T W/IMG: CPT

## 2019-12-13 PROCEDURE — 76882 US LMTD JT/FCL EVL NVASC XTR: CPT | Mod: 26,LT

## 2019-12-19 ENCOUNTER — MEDICATION RENEWAL (OUTPATIENT)
Age: 64
End: 2019-12-19

## 2020-01-01 ENCOUNTER — TRANSCRIPTION ENCOUNTER (OUTPATIENT)
Age: 65
End: 2020-01-01

## 2020-01-01 ENCOUNTER — NON-APPOINTMENT (OUTPATIENT)
Age: 65
End: 2020-01-01

## 2020-01-01 ENCOUNTER — LABORATORY RESULT (OUTPATIENT)
Age: 65
End: 2020-01-01

## 2020-01-01 ENCOUNTER — APPOINTMENT (OUTPATIENT)
Dept: RHEUMATOLOGY | Facility: CLINIC | Age: 65
End: 2020-01-01
Payer: COMMERCIAL

## 2020-01-01 ENCOUNTER — APPOINTMENT (OUTPATIENT)
Dept: ULTRASOUND IMAGING | Facility: CLINIC | Age: 65
End: 2020-01-01

## 2020-01-01 VITALS
BODY MASS INDEX: 29.45 KG/M2 | DIASTOLIC BLOOD PRESSURE: 72 MMHG | SYSTOLIC BLOOD PRESSURE: 146 MMHG | HEART RATE: 54 BPM | OXYGEN SATURATION: 96 % | HEIGHT: 61 IN | WEIGHT: 156 LBS | TEMPERATURE: 97.4 F

## 2020-01-01 VITALS
SYSTOLIC BLOOD PRESSURE: 127 MMHG | TEMPERATURE: 98 F | HEART RATE: 69 BPM | OXYGEN SATURATION: 100 % | RESPIRATION RATE: 17 BRPM | DIASTOLIC BLOOD PRESSURE: 76 MMHG

## 2020-01-01 DIAGNOSIS — I50.9 HEART FAILURE, UNSPECIFIED: ICD-10-CM

## 2020-01-01 DIAGNOSIS — R91.1 SOLITARY PULMONARY NODULE: ICD-10-CM

## 2020-01-01 LAB
24R-OH-CALCIDIOL SERPL-MCNC: 27.3 NG/ML — LOW (ref 30–80)
ALBUMIN SERPL ELPH-MCNC: 3 G/DL — LOW (ref 3.3–5)
ALBUMIN SERPL ELPH-MCNC: 3.9 G/DL
ALP BLD-CCNC: 93 U/L
ALP SERPL-CCNC: 81 U/L — SIGNIFICANT CHANGE UP (ref 40–120)
ALT FLD-CCNC: 16 U/L — SIGNIFICANT CHANGE UP (ref 10–45)
ALT SERPL-CCNC: 14 U/L
ANA SER IF-ACNC: NEGATIVE
ANION GAP SERPL CALC-SCNC: 11 MMOL/L — SIGNIFICANT CHANGE UP (ref 5–17)
ANION GAP SERPL CALC-SCNC: 14 MMOL/L — SIGNIFICANT CHANGE UP (ref 5–17)
ANION GAP SERPL CALC-SCNC: 17 MMOL/L
APPEARANCE: CLEAR
AST SERPL-CCNC: 27 U/L — SIGNIFICANT CHANGE UP (ref 10–40)
AST SERPL-CCNC: 28 U/L
BASOPHILS # BLD AUTO: 0.09 K/UL
BASOPHILS NFR BLD AUTO: 0.8 %
BILIRUB DIRECT SERPL-MCNC: <0.1 MG/DL — SIGNIFICANT CHANGE UP (ref 0–0.2)
BILIRUB INDIRECT FLD-MCNC: >0.2 MG/DL — SIGNIFICANT CHANGE UP (ref 0.2–1)
BILIRUB SERPL-MCNC: 0.2 MG/DL
BILIRUB SERPL-MCNC: 0.3 MG/DL — SIGNIFICANT CHANGE UP (ref 0.2–1.2)
BILIRUBIN URINE: NEGATIVE
BLOOD URINE: NORMAL
BUN SERPL-MCNC: 24 MG/DL
BUN SERPL-MCNC: 27 MG/DL — HIGH (ref 7–23)
BUN SERPL-MCNC: 28 MG/DL — HIGH (ref 7–23)
C3 SERPL-MCNC: 109 MG/DL
C4 SERPL-MCNC: 35 MG/DL
CA-I BLD-SCNC: 0.99 MMOL/L — LOW (ref 1.12–1.3)
CALCIUM SERPL-MCNC: 6.9 MG/DL
CALCIUM SERPL-MCNC: 7 MG/DL — LOW (ref 8.4–10.5)
CALCIUM SERPL-MCNC: 7.1 MG/DL — LOW (ref 8.4–10.5)
CALCIUM SERPL-MCNC: 7.2 MG/DL — LOW (ref 8.4–10.5)
CHLORIDE SERPL-SCNC: 102 MMOL/L
CHLORIDE SERPL-SCNC: 103 MMOL/L — SIGNIFICANT CHANGE UP (ref 96–108)
CHLORIDE SERPL-SCNC: 103 MMOL/L — SIGNIFICANT CHANGE UP (ref 96–108)
CO2 SERPL-SCNC: 19 MMOL/L
CO2 SERPL-SCNC: 21 MMOL/L — LOW (ref 22–31)
CO2 SERPL-SCNC: 21 MMOL/L — LOW (ref 22–31)
COLOR: NORMAL
CREAT SERPL-MCNC: 2.22 MG/DL
CREAT SERPL-MCNC: 2.59 MG/DL — HIGH (ref 0.5–1.3)
CREAT SERPL-MCNC: 2.94 MG/DL — HIGH (ref 0.5–1.3)
CREAT SPEC-SCNC: 34 MG/DL
CREAT/PROT UR: 2.1 RATIO
CRP SERPL-MCNC: 0.25 MG/DL
DSDNA AB SER-ACNC: <12 IU/ML
EOSINOPHIL # BLD AUTO: 0.21 K/UL
EOSINOPHIL NFR BLD AUTO: 1.9 %
ERYTHROCYTE [SEDIMENTATION RATE] IN BLOOD BY WESTERGREN METHOD: 74 MM/HR
GLUCOSE BLDC GLUCOMTR-MCNC: 121 MG/DL — HIGH (ref 70–99)
GLUCOSE BLDC GLUCOMTR-MCNC: 160 MG/DL — HIGH (ref 70–99)
GLUCOSE BLDC GLUCOMTR-MCNC: 173 MG/DL — HIGH (ref 70–99)
GLUCOSE BLDC GLUCOMTR-MCNC: 248 MG/DL — HIGH (ref 70–99)
GLUCOSE BLDC GLUCOMTR-MCNC: 280 MG/DL — HIGH (ref 70–99)
GLUCOSE BLDC GLUCOMTR-MCNC: 82 MG/DL — SIGNIFICANT CHANGE UP (ref 70–99)
GLUCOSE BLDC GLUCOMTR-MCNC: 90 MG/DL — SIGNIFICANT CHANGE UP (ref 70–99)
GLUCOSE QUALITATIVE U: NEGATIVE
GLUCOSE SERPL-MCNC: 100 MG/DL — HIGH (ref 70–99)
GLUCOSE SERPL-MCNC: 129 MG/DL — HIGH (ref 70–99)
GLUCOSE SERPL-MCNC: 93 MG/DL
HCT VFR BLD CALC: 27 % — LOW (ref 39–50)
HCT VFR BLD CALC: 27.3 % — LOW (ref 39–50)
HCT VFR BLD CALC: 32.2 %
HGB BLD-MCNC: 10.2 G/DL
HGB BLD-MCNC: 8.8 G/DL — LOW (ref 13–17)
HGB BLD-MCNC: 9.2 G/DL — LOW (ref 13–17)
HISTONE AB SER QL: 0.3 UNITS
IMM GRANULOCYTES NFR BLD AUTO: 1.1 %
KETONES URINE: NEGATIVE
LEUKOCYTE ESTERASE URINE: NEGATIVE
LYMPHOCYTES # BLD AUTO: 1.45 K/UL
LYMPHOCYTES NFR BLD AUTO: 13.4 %
MAGNESIUM SERPL-MCNC: 1.6 MG/DL — SIGNIFICANT CHANGE UP (ref 1.6–2.6)
MAGNESIUM SERPL-MCNC: 1.8 MG/DL — SIGNIFICANT CHANGE UP (ref 1.6–2.6)
MAN DIFF?: NORMAL
MCHC RBC-ENTMCNC: 31.7 GM/DL
MCHC RBC-ENTMCNC: 32.5 PG
MCHC RBC-ENTMCNC: 32.6 GM/DL — SIGNIFICANT CHANGE UP (ref 32–36)
MCHC RBC-ENTMCNC: 33 PG — SIGNIFICANT CHANGE UP (ref 27–34)
MCHC RBC-ENTMCNC: 33 PG — SIGNIFICANT CHANGE UP (ref 27–34)
MCHC RBC-ENTMCNC: 33.7 GM/DL — SIGNIFICANT CHANGE UP (ref 32–36)
MCV RBC AUTO: 101.1 FL — HIGH (ref 80–100)
MCV RBC AUTO: 102.5 FL
MCV RBC AUTO: 97.8 FL — SIGNIFICANT CHANGE UP (ref 80–100)
MONOCYTES # BLD AUTO: 0.8 K/UL
MONOCYTES NFR BLD AUTO: 7.4 %
NEUTROPHILS # BLD AUTO: 8.18 K/UL
NEUTROPHILS NFR BLD AUTO: 75.4 %
NITRITE URINE: NEGATIVE
NRBC # BLD: 0 /100 WBCS — SIGNIFICANT CHANGE UP (ref 0–0)
NRBC # BLD: 0 /100 WBCS — SIGNIFICANT CHANGE UP (ref 0–0)
PH URINE: 6.5
PHOSPHATE SERPL-MCNC: 3.9 MG/DL — SIGNIFICANT CHANGE UP (ref 2.5–4.5)
PHOSPHATE SERPL-MCNC: 4.5 MG/DL — SIGNIFICANT CHANGE UP (ref 2.5–4.5)
PLATELET # BLD AUTO: 148 K/UL — LOW (ref 150–400)
PLATELET # BLD AUTO: 159 K/UL — SIGNIFICANT CHANGE UP (ref 150–400)
PLATELET # BLD AUTO: 253 K/UL
POTASSIUM SERPL-MCNC: 4.5 MMOL/L — SIGNIFICANT CHANGE UP (ref 3.5–5.3)
POTASSIUM SERPL-MCNC: 4.6 MMOL/L — SIGNIFICANT CHANGE UP (ref 3.5–5.3)
POTASSIUM SERPL-SCNC: 4.3 MMOL/L
POTASSIUM SERPL-SCNC: 4.5 MMOL/L — SIGNIFICANT CHANGE UP (ref 3.5–5.3)
POTASSIUM SERPL-SCNC: 4.6 MMOL/L — SIGNIFICANT CHANGE UP (ref 3.5–5.3)
PROT SERPL-MCNC: 6.2 G/DL — SIGNIFICANT CHANGE UP (ref 6–8.3)
PROT SERPL-MCNC: 7 G/DL
PROT UR-MCNC: 70 MG/DL
PROTEIN URINE: ABNORMAL
PTH-INTACT FLD-MCNC: 312 PG/ML — HIGH (ref 15–65)
RBC # BLD: 2.67 M/UL — LOW (ref 4.2–5.8)
RBC # BLD: 2.79 M/UL — LOW (ref 4.2–5.8)
RBC # BLD: 3.14 M/UL
RBC # FLD: 12.1 % — SIGNIFICANT CHANGE UP (ref 10.3–14.5)
RBC # FLD: 12.4 % — SIGNIFICANT CHANGE UP (ref 10.3–14.5)
RBC # FLD: 12.7 %
SODIUM SERPL-SCNC: 135 MMOL/L — SIGNIFICANT CHANGE UP (ref 135–145)
SODIUM SERPL-SCNC: 138 MMOL/L
SODIUM SERPL-SCNC: 138 MMOL/L — SIGNIFICANT CHANGE UP (ref 135–145)
SPECIFIC GRAVITY URINE: 1.01
UROBILINOGEN URINE: NORMAL
WBC # BLD: 7.69 K/UL — SIGNIFICANT CHANGE UP (ref 3.8–10.5)
WBC # BLD: 7.71 K/UL — SIGNIFICANT CHANGE UP (ref 3.8–10.5)
WBC # FLD AUTO: 10.85 K/UL
WBC # FLD AUTO: 7.69 K/UL — SIGNIFICANT CHANGE UP (ref 3.8–10.5)
WBC # FLD AUTO: 7.71 K/UL — SIGNIFICANT CHANGE UP (ref 3.8–10.5)

## 2020-01-01 PROCEDURE — 90715 TDAP VACCINE 7 YRS/> IM: CPT

## 2020-01-01 PROCEDURE — 73590 X-RAY EXAM OF LOWER LEG: CPT | Mod: 26,LT

## 2020-01-01 PROCEDURE — 99232 SBSQ HOSP IP/OBS MODERATE 35: CPT | Mod: GC

## 2020-01-01 PROCEDURE — 96374 THER/PROPH/DIAG INJ IV PUSH: CPT

## 2020-01-01 PROCEDURE — U0003: CPT

## 2020-01-01 PROCEDURE — 82340 ASSAY OF CALCIUM IN URINE: CPT

## 2020-01-01 PROCEDURE — 80048 BASIC METABOLIC PNL TOTAL CA: CPT

## 2020-01-01 PROCEDURE — 99232 SBSQ HOSP IP/OBS MODERATE 35: CPT

## 2020-01-01 PROCEDURE — 83970 ASSAY OF PARATHORMONE: CPT

## 2020-01-01 PROCEDURE — 99233 SBSQ HOSP IP/OBS HIGH 50: CPT | Mod: GC

## 2020-01-01 PROCEDURE — 86769 SARS-COV-2 COVID-19 ANTIBODY: CPT

## 2020-01-01 PROCEDURE — 84300 ASSAY OF URINE SODIUM: CPT

## 2020-01-01 PROCEDURE — 80053 COMPREHEN METABOLIC PANEL: CPT

## 2020-01-01 PROCEDURE — 85025 COMPLETE CBC W/AUTO DIFF WBC: CPT

## 2020-01-01 PROCEDURE — 85610 PROTHROMBIN TIME: CPT

## 2020-01-01 PROCEDURE — 99072 ADDL SUPL MATRL&STAF TM PHE: CPT

## 2020-01-01 PROCEDURE — 86803 HEPATITIS C AB TEST: CPT

## 2020-01-01 PROCEDURE — 73590 X-RAY EXAM OF LOWER LEG: CPT

## 2020-01-01 PROCEDURE — 83690 ASSAY OF LIPASE: CPT

## 2020-01-01 PROCEDURE — 71250 CT THORAX DX C-: CPT

## 2020-01-01 PROCEDURE — 93970 EXTREMITY STUDY: CPT

## 2020-01-01 PROCEDURE — 93306 TTE W/DOPPLER COMPLETE: CPT

## 2020-01-01 PROCEDURE — 84105 ASSAY OF URINE PHOSPHORUS: CPT

## 2020-01-01 PROCEDURE — 71045 X-RAY EXAM CHEST 1 VIEW: CPT

## 2020-01-01 PROCEDURE — 99215 OFFICE O/P EST HI 40 MIN: CPT

## 2020-01-01 PROCEDURE — 82330 ASSAY OF CALCIUM: CPT

## 2020-01-01 PROCEDURE — 90471 IMMUNIZATION ADMIN: CPT

## 2020-01-01 PROCEDURE — 83036 HEMOGLOBIN GLYCOSYLATED A1C: CPT

## 2020-01-01 PROCEDURE — 81001 URINALYSIS AUTO W/SCOPE: CPT

## 2020-01-01 PROCEDURE — 83735 ASSAY OF MAGNESIUM: CPT

## 2020-01-01 PROCEDURE — 93005 ELECTROCARDIOGRAM TRACING: CPT

## 2020-01-01 PROCEDURE — 80076 HEPATIC FUNCTION PANEL: CPT

## 2020-01-01 PROCEDURE — 82310 ASSAY OF CALCIUM: CPT

## 2020-01-01 PROCEDURE — 83880 ASSAY OF NATRIURETIC PEPTIDE: CPT

## 2020-01-01 PROCEDURE — 96375 TX/PRO/DX INJ NEW DRUG ADDON: CPT

## 2020-01-01 PROCEDURE — 94640 AIRWAY INHALATION TREATMENT: CPT

## 2020-01-01 PROCEDURE — 85027 COMPLETE CBC AUTOMATED: CPT

## 2020-01-01 PROCEDURE — 99285 EMERGENCY DEPT VISIT HI MDM: CPT | Mod: 25

## 2020-01-01 PROCEDURE — 84484 ASSAY OF TROPONIN QUANT: CPT

## 2020-01-01 PROCEDURE — 99239 HOSP IP/OBS DSCHRG MGMT >30: CPT | Mod: GC

## 2020-01-01 PROCEDURE — 84100 ASSAY OF PHOSPHORUS: CPT

## 2020-01-01 PROCEDURE — 82306 VITAMIN D 25 HYDROXY: CPT

## 2020-01-01 PROCEDURE — 82436 ASSAY OF URINE CHLORIDE: CPT

## 2020-01-01 PROCEDURE — 85730 THROMBOPLASTIN TIME PARTIAL: CPT

## 2020-01-01 PROCEDURE — 84133 ASSAY OF URINE POTASSIUM: CPT

## 2020-01-01 PROCEDURE — 82962 GLUCOSE BLOOD TEST: CPT

## 2020-01-01 RX ORDER — CALCIUM CARBONATE 500(1250)
1 TABLET ORAL
Qty: 60 | Refills: 0
Start: 2020-01-01 | End: 2021-01-01

## 2020-01-01 RX ORDER — DICLOFENAC SODIUM 16.05 MG/ML
1.5 SOLUTION TOPICAL
Qty: 450 | Refills: 0 | Status: DISCONTINUED | COMMUNITY
Start: 2017-01-10 | End: 2020-01-01

## 2020-01-01 RX ORDER — HYDRALAZINE HCL 50 MG
10 TABLET ORAL THREE TIMES A DAY
Refills: 0 | Status: DISCONTINUED | OUTPATIENT
Start: 2020-01-01 | End: 2020-01-01

## 2020-01-01 RX ORDER — SYRINGE-NEEDLE,INSULIN,0.5 ML 31 GX5/16"
31G X 5/16" SYRINGE, EMPTY DISPOSABLE MISCELLANEOUS
Qty: 1 | Refills: 3 | Status: DISCONTINUED | COMMUNITY
Start: 2018-07-23 | End: 2020-01-01

## 2020-01-01 RX ORDER — FUROSEMIDE 40 MG
80 TABLET ORAL DAILY
Refills: 0 | Status: DISCONTINUED | OUTPATIENT
Start: 2020-01-01 | End: 2020-01-01

## 2020-01-01 RX ORDER — GABAPENTIN 100 MG/1
100 CAPSULE ORAL
Qty: 30 | Refills: 5 | Status: DISCONTINUED | COMMUNITY
Start: 2020-09-11 | End: 2020-01-01

## 2020-01-01 RX ORDER — RAMIPRIL 10 MG/1
10 CAPSULE ORAL
Qty: 90 | Refills: 2 | Status: DISCONTINUED | COMMUNITY
Start: 2018-10-19 | End: 2020-01-01

## 2020-01-01 RX ORDER — MAGNESIUM SULFATE 500 MG/ML
2 VIAL (ML) INJECTION ONCE
Refills: 0 | Status: COMPLETED | OUTPATIENT
Start: 2020-01-01 | End: 2020-01-01

## 2020-01-01 RX ORDER — ISOSORBIDE MONONITRATE 60 MG/1
30 TABLET, EXTENDED RELEASE ORAL DAILY
Refills: 0 | Status: DISCONTINUED | OUTPATIENT
Start: 2020-01-01 | End: 2020-01-01

## 2020-01-01 RX ORDER — HYDRALAZINE HCL 50 MG
10 TABLET ORAL EVERY 8 HOURS
Refills: 0 | Status: DISCONTINUED | OUTPATIENT
Start: 2020-01-01 | End: 2020-01-01

## 2020-01-01 RX ORDER — SODIUM BICARBONATE 650 MG/1
650 TABLET ORAL EVERY 8 HOURS
Qty: 540 | Refills: 2 | Status: DISCONTINUED | COMMUNITY
Start: 2020-10-14 | End: 2020-01-01

## 2020-01-01 RX ORDER — RAMIPRIL 5 MG
1 CAPSULE ORAL
Qty: 0 | Refills: 0 | DISCHARGE

## 2020-01-01 RX ORDER — SODIUM BICARBONATE 650 MG/1
650 TABLET ORAL
Qty: 270 | Refills: 1 | Status: DISCONTINUED | COMMUNITY
Start: 2018-06-15 | End: 2020-01-01

## 2020-01-01 RX ORDER — ASPIRIN ENTERIC COATED TABLETS 81 MG 81 MG/1
81 TABLET, DELAYED RELEASE ORAL
Refills: 0 | Status: ACTIVE | COMMUNITY

## 2020-01-01 RX ORDER — METOPROLOL TARTRATE 50 MG
50 TABLET ORAL DAILY
Refills: 0 | Status: DISCONTINUED | OUTPATIENT
Start: 2020-01-01 | End: 2020-01-01

## 2020-01-01 RX ORDER — METHYLPREDNISOLONE 4 MG/1
4 TABLET ORAL
Qty: 90 | Refills: 1 | Status: DISCONTINUED | COMMUNITY
Start: 2020-08-10 | End: 2020-01-01

## 2020-01-01 RX ORDER — INSULIN GLARGINE 100 [IU]/ML
7 INJECTION, SOLUTION SUBCUTANEOUS AT BEDTIME
Refills: 0 | Status: DISCONTINUED | OUTPATIENT
Start: 2020-01-01 | End: 2020-01-01

## 2020-01-01 RX ORDER — METOPROLOL TARTRATE 50 MG
1 TABLET ORAL
Qty: 30 | Refills: 0
Start: 2020-01-01 | End: 2021-01-01

## 2020-01-01 RX ORDER — FUROSEMIDE 40 MG
1 TABLET ORAL
Qty: 0 | Refills: 0 | DISCHARGE

## 2020-01-01 RX ORDER — SODIUM BICARBONATE 1 MEQ/ML
2 SYRINGE (ML) INTRAVENOUS
Qty: 180 | Refills: 0
Start: 2020-01-01 | End: 2021-01-01

## 2020-01-01 RX ORDER — INSULIN LISPRO 100/ML
0 VIAL (ML) SUBCUTANEOUS
Qty: 0 | Refills: 0 | DISCHARGE

## 2020-01-01 RX ORDER — DENOSUMAB 60 MG/ML
60 INJECTION SUBCUTANEOUS
Qty: 1 | Refills: 0 | Status: DISCONTINUED | COMMUNITY
Start: 2020-10-29 | End: 2020-01-01

## 2020-01-01 RX ORDER — CLOPIDOGREL 75 MG/1
TABLET, FILM COATED ORAL
Refills: 0 | Status: DISCONTINUED | COMMUNITY
End: 2020-01-01

## 2020-01-01 RX ORDER — AMLODIPINE BESYLATE 10 MG/1
10 TABLET ORAL DAILY
Qty: 90 | Refills: 0 | Status: DISCONTINUED | COMMUNITY
Start: 2018-05-17 | End: 2020-01-01

## 2020-01-01 RX ORDER — HYALURONATE SODIUM 20 MG/2 ML
20 SYRINGE (ML) INTRAARTICULAR
Qty: 1 | Refills: 0 | Status: DISCONTINUED | COMMUNITY
Start: 2020-03-02 | End: 2020-01-01

## 2020-01-01 RX ORDER — ACETAMINOPHEN 500 MG
650 TABLET ORAL ONCE
Refills: 0 | Status: COMPLETED | OUTPATIENT
Start: 2020-01-01 | End: 2020-01-01

## 2020-01-01 RX ORDER — GABAPENTIN 300 MG/1
300 CAPSULE ORAL
Qty: 90 | Refills: 0 | Status: DISCONTINUED | COMMUNITY
Start: 2020-07-23 | End: 2020-01-01

## 2020-01-01 RX ADMIN — Medication 80 MILLIGRAM(S): at 21:39

## 2020-01-01 RX ADMIN — Medication 650 MILLIGRAM(S): at 06:26

## 2020-01-01 RX ADMIN — Medication 1300 MILLIGRAM(S): at 13:34

## 2020-01-01 RX ADMIN — Medication 3 UNIT(S): at 08:02

## 2020-01-01 RX ADMIN — Medication 1 TABLET(S): at 12:00

## 2020-01-01 RX ADMIN — ATORVASTATIN CALCIUM 10 MILLIGRAM(S): 80 TABLET, FILM COATED ORAL at 21:39

## 2020-01-01 RX ADMIN — HEPARIN SODIUM 5000 UNIT(S): 5000 INJECTION INTRAVENOUS; SUBCUTANEOUS at 13:17

## 2020-01-01 RX ADMIN — Medication 50 MILLIGRAM(S): at 05:59

## 2020-01-01 RX ADMIN — Medication 1 TABLET(S): at 11:20

## 2020-01-01 RX ADMIN — Medication 4 MILLIGRAM(S): at 05:59

## 2020-01-01 RX ADMIN — HEPARIN SODIUM 5000 UNIT(S): 5000 INJECTION INTRAVENOUS; SUBCUTANEOUS at 13:34

## 2020-01-01 RX ADMIN — Medication 80 MILLIGRAM(S): at 13:17

## 2020-01-01 RX ADMIN — Medication 50 GRAM(S): at 12:00

## 2020-01-01 RX ADMIN — Medication 1300 MILLIGRAM(S): at 05:59

## 2020-01-01 RX ADMIN — Medication 25 MILLIGRAM(S): at 05:27

## 2020-01-01 RX ADMIN — INSULIN GLARGINE 7 UNIT(S): 100 INJECTION, SOLUTION SUBCUTANEOUS at 22:05

## 2020-01-01 RX ADMIN — Medication 50 GRAM(S): at 09:05

## 2020-01-01 RX ADMIN — Medication 2000 UNIT(S): at 11:20

## 2020-01-01 RX ADMIN — Medication 2: at 08:59

## 2020-01-01 RX ADMIN — Medication 81 MILLIGRAM(S): at 11:19

## 2020-01-01 RX ADMIN — Medication 1300 MILLIGRAM(S): at 05:27

## 2020-01-01 RX ADMIN — Medication 1 TABLET(S): at 05:27

## 2020-01-01 RX ADMIN — Medication 3 UNIT(S): at 12:01

## 2020-01-01 RX ADMIN — CLOPIDOGREL BISULFATE 75 MILLIGRAM(S): 75 TABLET, FILM COATED ORAL at 12:00

## 2020-01-01 RX ADMIN — Medication 80 MILLIGRAM(S): at 05:59

## 2020-01-01 RX ADMIN — Medication 81 MILLIGRAM(S): at 12:00

## 2020-01-01 RX ADMIN — Medication 650 MILLIGRAM(S): at 06:49

## 2020-01-01 RX ADMIN — Medication 3 UNIT(S): at 08:59

## 2020-01-01 RX ADMIN — Medication 3 UNIT(S): at 16:48

## 2020-01-01 RX ADMIN — Medication 4 MILLIGRAM(S): at 05:27

## 2020-01-01 RX ADMIN — PANTOPRAZOLE SODIUM 40 MILLIGRAM(S): 20 TABLET, DELAYED RELEASE ORAL at 05:59

## 2020-01-01 RX ADMIN — HEPARIN SODIUM 5000 UNIT(S): 5000 INJECTION INTRAVENOUS; SUBCUTANEOUS at 05:28

## 2020-01-01 RX ADMIN — Medication 1300 MILLIGRAM(S): at 21:39

## 2020-01-01 RX ADMIN — Medication 3 UNIT(S): at 11:54

## 2020-01-01 RX ADMIN — Medication 1 TABLET(S): at 16:48

## 2020-01-01 RX ADMIN — Medication 80 MILLIGRAM(S): at 05:28

## 2020-01-01 RX ADMIN — Medication 1300 MILLIGRAM(S): at 13:17

## 2020-01-01 RX ADMIN — Medication 1: at 08:02

## 2020-01-01 RX ADMIN — PANTOPRAZOLE SODIUM 40 MILLIGRAM(S): 20 TABLET, DELAYED RELEASE ORAL at 05:27

## 2020-01-01 RX ADMIN — HEPARIN SODIUM 5000 UNIT(S): 5000 INJECTION INTRAVENOUS; SUBCUTANEOUS at 06:00

## 2020-01-01 RX ADMIN — Medication 2000 UNIT(S): at 12:00

## 2020-01-01 RX ADMIN — Medication 1 TABLET(S): at 05:59

## 2020-01-01 RX ADMIN — HEPARIN SODIUM 5000 UNIT(S): 5000 INJECTION INTRAVENOUS; SUBCUTANEOUS at 21:39

## 2020-01-01 RX ADMIN — CLOPIDOGREL BISULFATE 75 MILLIGRAM(S): 75 TABLET, FILM COATED ORAL at 11:20

## 2020-01-06 ENCOUNTER — OUTPATIENT (OUTPATIENT)
Dept: OUTPATIENT SERVICES | Facility: HOSPITAL | Age: 65
LOS: 1 days | Discharge: HOME | End: 2020-01-06

## 2020-01-06 DIAGNOSIS — Z98.89 OTHER SPECIFIED POSTPROCEDURAL STATES: Chronic | ICD-10-CM

## 2020-01-06 DIAGNOSIS — Z98.890 OTHER SPECIFIED POSTPROCEDURAL STATES: Chronic | ICD-10-CM

## 2020-01-06 DIAGNOSIS — Z90.49 ACQUIRED ABSENCE OF OTHER SPECIFIED PARTS OF DIGESTIVE TRACT: Chronic | ICD-10-CM

## 2020-01-06 DIAGNOSIS — Z90.89 ACQUIRED ABSENCE OF OTHER ORGANS: Chronic | ICD-10-CM

## 2020-01-06 DIAGNOSIS — D64.9 ANEMIA, UNSPECIFIED: ICD-10-CM

## 2020-01-06 DIAGNOSIS — Z98.52 VASECTOMY STATUS: Chronic | ICD-10-CM

## 2020-01-07 ENCOUNTER — RESULT REVIEW (OUTPATIENT)
Age: 65
End: 2020-01-07

## 2020-01-07 ENCOUNTER — OUTPATIENT (OUTPATIENT)
Dept: OUTPATIENT SERVICES | Facility: HOSPITAL | Age: 65
LOS: 1 days | End: 2020-01-07
Payer: COMMERCIAL

## 2020-01-07 ENCOUNTER — APPOINTMENT (OUTPATIENT)
Dept: HEMATOLOGY ONCOLOGY | Facility: CLINIC | Age: 65
End: 2020-01-07
Payer: COMMERCIAL

## 2020-01-07 VITALS
SYSTOLIC BLOOD PRESSURE: 151 MMHG | BODY MASS INDEX: 22.85 KG/M2 | DIASTOLIC BLOOD PRESSURE: 69 MMHG | WEIGHT: 159.59 LBS | OXYGEN SATURATION: 100 % | HEART RATE: 70 BPM | HEIGHT: 69.92 IN | TEMPERATURE: 97.9 F | RESPIRATION RATE: 15 BRPM

## 2020-01-07 DIAGNOSIS — Z98.890 OTHER SPECIFIED POSTPROCEDURAL STATES: Chronic | ICD-10-CM

## 2020-01-07 DIAGNOSIS — Z98.52 VASECTOMY STATUS: Chronic | ICD-10-CM

## 2020-01-07 DIAGNOSIS — Z90.89 ACQUIRED ABSENCE OF OTHER ORGANS: Chronic | ICD-10-CM

## 2020-01-07 DIAGNOSIS — Z98.89 OTHER SPECIFIED POSTPROCEDURAL STATES: Chronic | ICD-10-CM

## 2020-01-07 DIAGNOSIS — Z90.49 ACQUIRED ABSENCE OF OTHER SPECIFIED PARTS OF DIGESTIVE TRACT: Chronic | ICD-10-CM

## 2020-01-07 DIAGNOSIS — M06.9 RHEUMATOID ARTHRITIS, UNSPECIFIED: ICD-10-CM

## 2020-01-07 DIAGNOSIS — D64.9 ANEMIA, UNSPECIFIED: ICD-10-CM

## 2020-01-07 LAB
ALBUMIN SERPL ELPH-MCNC: 3.9 G/DL
ALP BLD-CCNC: 104 U/L
ALT SERPL-CCNC: 31 U/L
ANION GAP SERPL CALC-SCNC: 14 MMOL/L
AST SERPL-CCNC: 32 U/L
BASOPHILS # BLD AUTO: 0.1 K/UL — SIGNIFICANT CHANGE UP (ref 0–0.2)
BASOPHILS NFR BLD AUTO: 0.5 % — SIGNIFICANT CHANGE UP (ref 0–2)
BILIRUB SERPL-MCNC: 0.3 MG/DL
BLD GP AB SCN SERPL QL: NEGATIVE — SIGNIFICANT CHANGE UP
BUN SERPL-MCNC: 37 MG/DL
C3 SERPL-MCNC: 117 MG/DL
C4 SERPL-MCNC: 28 MG/DL
CALCIUM SERPL-MCNC: 8.8 MG/DL
CHLORIDE SERPL-SCNC: 101 MMOL/L
CO2 SERPL-SCNC: 21 MMOL/L
CREAT SERPL-MCNC: 2.4 MG/DL
DAT POLY-SP REAG RBC QL: NEGATIVE — SIGNIFICANT CHANGE UP
EOSINOPHIL # BLD AUTO: 0.2 K/UL — SIGNIFICANT CHANGE UP (ref 0–0.5)
EOSINOPHIL NFR BLD AUTO: 1.1 % — SIGNIFICANT CHANGE UP (ref 0–6)
FERRITIN SERPL-MCNC: 271 NG/ML
FOLATE SERPL-MCNC: >20 NG/ML
GLUCOSE SERPL-MCNC: 213 MG/DL
HAPTOGLOB SERPL-MCNC: 207 MG/DL
HCT VFR BLD CALC: 31.4 % — LOW (ref 39–50)
HGB BLD-MCNC: 10.7 G/DL — LOW (ref 13–17)
IRON SATN MFR SERPL: 17 %
IRON SERPL-MCNC: 48 UG/DL
LYMPHOCYTES # BLD AUTO: 1 K/UL — SIGNIFICANT CHANGE UP (ref 1–3.3)
LYMPHOCYTES # BLD AUTO: 7 % — LOW (ref 13–44)
MCHC RBC-ENTMCNC: 33.8 PG — SIGNIFICANT CHANGE UP (ref 27–34)
MCHC RBC-ENTMCNC: 34.1 G/DL — SIGNIFICANT CHANGE UP (ref 32–36)
MCV RBC AUTO: 99.2 FL — SIGNIFICANT CHANGE UP (ref 80–100)
MONOCYTES # BLD AUTO: 0.6 K/UL — SIGNIFICANT CHANGE UP (ref 0–0.9)
MONOCYTES NFR BLD AUTO: 4.4 % — SIGNIFICANT CHANGE UP (ref 2–14)
NEUTROPHILS # BLD AUTO: 11.9 K/UL — HIGH (ref 1.8–7.4)
NEUTROPHILS NFR BLD AUTO: 86.9 % — HIGH (ref 43–77)
PLATELET # BLD AUTO: 149 K/UL — LOW (ref 150–400)
POTASSIUM SERPL-SCNC: 5 MMOL/L
PROT SERPL-MCNC: 7.1 G/DL
RBC # BLD: 3.16 M/UL — LOW (ref 4.2–5.8)
RBC # FLD: 12.4 % — SIGNIFICANT CHANGE UP (ref 10.3–14.5)
RETICS #: 79.9 K/UL — SIGNIFICANT CHANGE UP (ref 25–125)
RETICS/RBC NFR: 2.4 % — SIGNIFICANT CHANGE UP (ref 0.5–2.5)
RH IG SCN BLD-IMP: POSITIVE — SIGNIFICANT CHANGE UP
SODIUM SERPL-SCNC: 136 MMOL/L
TIBC SERPL-MCNC: 277 UG/DL
UIBC SERPL-MCNC: 229 UG/DL
VIT B12 SERPL-MCNC: 941 PG/ML
WBC # BLD: 13.6 K/UL — HIGH (ref 3.8–10.5)
WBC # FLD AUTO: 13.6 K/UL — HIGH (ref 3.8–10.5)

## 2020-01-07 PROCEDURE — 99204 OFFICE O/P NEW MOD 45 MIN: CPT

## 2020-01-07 PROCEDURE — 86901 BLOOD TYPING SEROLOGIC RH(D): CPT

## 2020-01-07 PROCEDURE — 86880 COOMBS TEST DIRECT: CPT

## 2020-01-07 PROCEDURE — 86850 RBC ANTIBODY SCREEN: CPT

## 2020-01-07 PROCEDURE — 86900 BLOOD TYPING SEROLOGIC ABO: CPT

## 2020-01-07 NOTE — REVIEW OF SYSTEMS
[Fever] : no fever [Chills] : no chills [Fatigue] : fatigue [Night Sweats] : no night sweats [Recent Change In Weight] : ~T no recent weight change [Shortness Of Breath] : no shortness of breath [Wheezing] : no wheezing [Cough] : no cough [SOB on Exertion] : shortness of breath during exertion [Joint Pain] : joint pain [Joint Stiffness] : joint stiffness [Muscle Pain] : no muscle pain [Muscle Weakness] : no muscle weakness [Confused] : no confusion [Dizziness] : no dizziness [Fainting] : no fainting [Difficulty Walking] : difficulty walking [Negative] : Allergic/Immunologic [de-identified] : chronic neuropathy in bilateral feet

## 2020-01-07 NOTE — PHYSICAL EXAM
[Normal] : affect appropriate [Restricted in physically strenuous activity but ambulatory and able to carry out work of a light or sedentary nature] : Status 1- Restricted in physically strenuous activity but ambulatory and able to carry out work of a light or sedentary nature, e.g., light house work, office work [de-identified] : chronic well healed midline thoracic scar

## 2020-01-07 NOTE — CONSULT LETTER
[Dear  ___] : Dear  [unfilled], [Consult Letter:] : I had the pleasure of evaluating your patient, [unfilled]. [Please see my note below.] : Please see my note below. [Consult Closing:] : Thank you very much for allowing me to participate in the care of this patient.  If you have any questions, please do not hesitate to contact me. [Sincerely,] : Sincerely, [FreeTextEntry3] : Claire Stafford MD. MS. \par Hematologist/Oncologist\par [facility]\par ph. [phone]\par fx. [phone]\par

## 2020-01-07 NOTE — HISTORY OF PRESENT ILLNESS
[Disease:__________________________] : Disease: [unfilled] [de-identified] : 65 yo male with a Hx of RA, and psoriatic arthritis s/p multiple anti-IL6 and TNFi therapies now on Orencia started in Dec 2019, CAD, CKD stage 3, HTN, Diabetes referred by rheum for evaluation of acutely worsening anemia. Pt was last seen by rheum - Dr Bowei in Dec 2019, started then on Orencia. After the first dose pt had labs done to assess for toxicities noted on labs to have decline in Hb from 10g to 9g, with increased retic ct and positive DUSTIN/frandy. Pt was being bridged with prednisone. Pt was instructed to increase the dose of predniosone at the time from 15mg to 20mg, with a slow taper of 2.5mg per week, thereafter. Pt was also referred for hematology evaluation. Pt today reports clinically feeling in stable health. He denies any dark urine, or stools or jaundice. He reports stable appetite and weight. He is currently down to 7.mg daily of prednisone. He has been tolerating weekly doses of Orencia since starting a month ago.  [de-identified] : mild

## 2020-01-07 NOTE — ASSESSMENT
[FreeTextEntry1] : Anemia - chronic, multifactoral etiology\par CKD, AoCI (rheum dz), ?hemolysis, ?myelosuppressive med effects

## 2020-01-08 ENCOUNTER — OTHER (OUTPATIENT)
Age: 65
End: 2020-01-08

## 2020-01-08 ENCOUNTER — RESULT REVIEW (OUTPATIENT)
Age: 65
End: 2020-01-08

## 2020-01-08 LAB
ALBUMIN MFR SERPL ELPH: 51.8 %
ALBUMIN SERPL-MCNC: 3.7 G/DL
ALBUMIN/GLOB SERPL: 1.1 RATIO
ALPHA1 GLOB MFR SERPL ELPH: 5.1 %
ALPHA1 GLOB SERPL ELPH-MCNC: 0.4 G/DL
ALPHA2 GLOB MFR SERPL ELPH: 13.2 %
ALPHA2 GLOB SERPL ELPH-MCNC: 0.9 G/DL
B-GLOBULIN MFR SERPL ELPH: 12.3 %
B-GLOBULIN SERPL ELPH-MCNC: 0.9 G/DL
DEPRECATED KAPPA LC FREE/LAMBDA SER: 1.73 RATIO
GAMMA GLOB FLD ELPH-MCNC: 1.2 G/DL
GAMMA GLOB MFR SERPL ELPH: 17.6 %
IGA SER QL IEP: 297 MG/DL
IGG SER QL IEP: 1300 MG/DL
IGM SER QL IEP: 177 MG/DL
INTERPRETATION SERPL IEP-IMP: NORMAL
KAPPA LC CSF-MCNC: 6.67 MG/DL
KAPPA LC SERPL-MCNC: 11.56 MG/DL
M PROTEIN SPEC IFE-MCNC: NORMAL
PROT SERPL-MCNC: 7.1 G/DL
PROT SERPL-MCNC: 7.1 G/DL

## 2020-01-13 ENCOUNTER — RX RENEWAL (OUTPATIENT)
Age: 65
End: 2020-01-13

## 2020-01-13 ENCOUNTER — APPOINTMENT (OUTPATIENT)
Dept: RHEUMATOLOGY | Facility: CLINIC | Age: 65
End: 2020-01-13
Payer: COMMERCIAL

## 2020-01-13 VITALS
BODY MASS INDEX: 22.62 KG/M2 | OXYGEN SATURATION: 99 % | TEMPERATURE: 98.2 F | HEIGHT: 69.92 IN | WEIGHT: 158 LBS | SYSTOLIC BLOOD PRESSURE: 133 MMHG | DIASTOLIC BLOOD PRESSURE: 66 MMHG | HEART RATE: 80 BPM

## 2020-01-13 PROCEDURE — 99214 OFFICE O/P EST MOD 30 MIN: CPT

## 2020-02-05 RX ORDER — INSULIN PUMP CART,CONT INF,RF
CARTRIDGE (EA) SUBCUTANEOUS
Qty: 9 | Refills: 2 | Status: ACTIVE | COMMUNITY
Start: 2020-02-05 | End: 1900-01-01

## 2020-02-10 ENCOUNTER — APPOINTMENT (OUTPATIENT)
Dept: ORTHOPEDIC SURGERY | Facility: CLINIC | Age: 65
End: 2020-02-10
Payer: COMMERCIAL

## 2020-02-10 VITALS
HEART RATE: 87 BPM | BODY MASS INDEX: 21.98 KG/M2 | SYSTOLIC BLOOD PRESSURE: 114 MMHG | DIASTOLIC BLOOD PRESSURE: 68 MMHG | WEIGHT: 157 LBS | HEIGHT: 71 IN

## 2020-02-10 DIAGNOSIS — Z86.39 PERSONAL HISTORY OF OTHER ENDOCRINE, NUTRITIONAL AND METABOLIC DISEASE: ICD-10-CM

## 2020-02-10 PROCEDURE — 20610 DRAIN/INJ JOINT/BURSA W/O US: CPT | Mod: LT

## 2020-02-10 PROCEDURE — 99204 OFFICE O/P NEW MOD 45 MIN: CPT | Mod: 25

## 2020-02-10 PROCEDURE — 73562 X-RAY EXAM OF KNEE 3: CPT | Mod: LT

## 2020-02-10 NOTE — PROCEDURE
[de-identified] : Aspiration & Injection: Left knee joint.\par Indication: Effusion.\par \par A discussion was had with the patient regarding this procedure and all questions were answered. All risks, benefits and alternatives were discussed. These included but were not limited to bleeding, infection, allergic reaction and reaccumulation of fluid. A timeout was done to ensure correct side and pt agreed to the procedure.  Alcohol was used to clean the skin, and betadine was used to sterilize and prep the area in the supero-lateral aspect of the knee. Ethyl chloride spray was then used as a topical anesthetic. An 18-gauge needle was used to aspirate the knee joint and approximately 50 cc of serosanguineous fluid was aspirated from the knee without complication. In addition, following the aspiration, an injection of 2cc of 0.5% bupivacaine without epinephrine and 1cc of 40mg/ml methylprednisolone also inserted into the knee via the same needle. A sterile bandage was then applied. The patient tolerated the procedure well.

## 2020-02-10 NOTE — PHYSICAL EXAM
[de-identified] : The following radiographs were ordered and read by me during this patient's visit. I reviewed each radiograph in detail with the patient and discussed the findings as highlighted below. \par \par 3 views of the left knee were obtained today that show degenerative changes and evidence of mild to moderate tricompartmental osteoarthritis.  No fracture, or dislocation seen at this time. There is no malalignment. No other obvious osseous abnormality. Otherwise unremarkable. [de-identified] : Constitutional: Well-nourished, well-developed, No acute distress\par Respiratory:  Good respiratory effort, no SOB\par Lymphatic: No regional lymphadenopathy, no lymphedema\par Psychiatric: Pleasant and normal affect, alert and oriented x3\par Skin: Clean dry and intact B/L UE/LE\par Musculoskeletal: normal except where as noted in regional exam\par \par B/L Hips: No asymmetry, malalignment, or swelling, Full ROM, 5/5 strength in flexion/ext, IR/ER, Abd/Add, Joints stable\par B/L Ankles: No asymmetry, malalignment, or swelling, Full ROM, 5/5 strength in DF/PF/Inv/Ev, Joints stable\par \par BIOMECHANICAL EXAM: no marked leg length discrepancy, moderate hip abductor weakness b/l, no marked pes planus or foot pronation, tight hams and ITB b/l.  Normal gait and station\par \par Left Knee:\par APPEARANCE: no marked deformities, no swelling or malalignment\par POSITIVE TENDERNESS:  + crepitus of the anterior knee, and tenderness of patellar retinaculum\par NONTENDER: jt lines b/l, patellar & quadriceps tendons, MCL/LCL, ITB at the lateral femoral condyle & Gerdy's tubercle, pes bursa. \par ROM: full & painless, although some discomfort in deep knee flexion\par RESISTIVE TESTING: + discomfort with knee ext from deep knee flexion (stretched position), painless knee flexion. \par SPECIAL TESTS: stable v/v stress. painless grind. neg Lachman's. neg ant/post drawer. neg Sandra's. neg Thessaly test. neg Adrien's & Malacrae's\par NEURO: Normal sensation of LE, DTRs 2+/4 patella and achilles\par PULSES: 2+ DP/PT pulses\par \par Right Knee:\par APPEARANCE: no marked deformities, no swelling or malalignment\par POSITIVE TENDERNESS:  + crepitus of the anterior knee, and tenderness of patellar retinaculum\par NONTENDER: jt lines b/l, patellar & quadriceps tendons, MCL/LCL, ITB at the lateral femoral condyle & Gerdy's tubercle, pes bursa. \par ROM: full & painless, although some discomfort in deep knee flexion\par RESISTIVE TESTING: + discomfort with knee ext from deep knee flexion (stretched position), painless knee flexion. \par SPECIAL TESTS: stable v/v stress. painless grind. neg Lachman's. neg ant/post drawer. neg Sandra's. neg Thessaly test. neg Adrien's & Malacrae's\par NEURO: Normal sensation of LE, DTRs 2+/4 patella and achilles\par PULSES: 2+ DP/PT pulses\par

## 2020-02-10 NOTE — DISCUSSION/SUMMARY
[de-identified] : Discussed findings of today's exam and possible causes of patient's pain.  Educated patient on their most probable diagnosis of chronic left knee pain with significant effusion, likely exacerbation of underlying osteoarthritis.  Reviewed possible courses of treatment, and we collaboratively decided best course of treatment at this time will include aspiration and cortisone injection today (see procedure note).  Informed the patient that the numbing medicine in today's injection will last for about 4-6 hours. The steroid that was injected will start to work in 1 to 2 days, peak at 1-2 weeks, and may last up to 1-2 months. Patient's knee aspirate was cloudy and had a slight red tinged fluid, do not believe that represents acute internal derangement, but we'll send fluid for analysis including cell count, culture, and crystal analysis.  Patient is an insulin-dependent diabetic, I advised that the cortisone in today's injection may elevate blood glucose levels for the next 48-72 hours.  Patient should modify their diet accordingly and continue to check blood glucose levels and adjust insulin as needed. Patient will continue his course of physical therapy. If he has relief from aspiration and injection this would confirm that osteoarthritis is the underlying pathology, and patient may benefit from hyaluronic acid as a future more long-term preventative treatment option.  Follow up as needed.  Patient appreciates and agrees with current plan.\par \par This note was generated using dragon medical dictation software.  A reasonable effort has been made for proofreading its contents, but typos may still remain.  If there are any questions or points of clarification needed please notify my office.

## 2020-02-10 NOTE — HISTORY OF PRESENT ILLNESS
[de-identified] : Patient is here for left knee pain that began about 6 months ago without inciting injury. His son is a PT and has been working with him but he has not noticed much improvement. He states that he has occasional locking/buckling of his knee. He had an injury about 25 years ago where he remembers a pop but his knee has not bothered him since. He takes Tylenol for pain relief and is also currently on Prednisone. \par \par The patient's past medical history, past surgical history, medications and allergies were reviewed by me today and documented accordingly. In addition, the patient's family and social history, which were noncontributory to this visit, were reviewed also. The patient has no family history of arthritis.

## 2020-02-11 LAB
B PERT IGG+IGM PNL SER: ABNORMAL
COLOR FLD: NORMAL
EOSINOPHIL # FLD MANUAL: 0 %
FLUID INTAKE SUBSTANCE CLASS: NORMAL
LYMPHOCYTES # FLD MANUAL: 5 %
MONOS+MACROS NFR FLD MANUAL: 10 %
NEUTS SEG # FLD MANUAL: 85 %
RBC # FLD MANUAL: ABNORMAL /UL
SYCRY CLARITY: ABNORMAL
SYCRY COLOR: NORMAL
SYCRY ID: ABNORMAL
SYCRY TUBE: NORMAL
TOTAL CELLS COUNTED FLD: NORMAL /UL
TUBE TYPE: NORMAL

## 2020-02-12 ENCOUNTER — RECORD ABSTRACTING (OUTPATIENT)
Age: 65
End: 2020-02-12

## 2020-02-24 LAB — BACTERIA FLD CULT: NORMAL

## 2020-03-04 ENCOUNTER — APPOINTMENT (OUTPATIENT)
Dept: CARDIOLOGY | Facility: CLINIC | Age: 65
End: 2020-03-04
Payer: COMMERCIAL

## 2020-03-04 ENCOUNTER — NON-APPOINTMENT (OUTPATIENT)
Age: 65
End: 2020-03-04

## 2020-03-04 VITALS
OXYGEN SATURATION: 100 % | HEART RATE: 81 BPM | SYSTOLIC BLOOD PRESSURE: 110 MMHG | WEIGHT: 156 LBS | BODY MASS INDEX: 21.76 KG/M2 | DIASTOLIC BLOOD PRESSURE: 50 MMHG

## 2020-03-04 PROCEDURE — 99214 OFFICE O/P EST MOD 30 MIN: CPT

## 2020-03-04 PROCEDURE — 93000 ELECTROCARDIOGRAM COMPLETE: CPT

## 2020-03-04 NOTE — PHYSICAL EXAM
[General Appearance - Well Developed] : well developed [Normal Conjunctiva] : the conjunctiva exhibited no abnormalities [General Appearance - Well Nourished] : well nourished [General Appearance - In No Acute Distress] : no acute distress [No Oral Pallor] : no oral pallor [Normal Jugular Venous V Waves Present] : normal jugular venous V waves present [Respiration, Rhythm And Depth] : normal respiratory rhythm and effort [Auscultation Breath Sounds / Voice Sounds] : lungs were clear to auscultation bilaterally [Heart Sounds] : normal S1 and S2 [Edema] : no peripheral edema present [Murmurs] : no murmurs present [Arterial Pulses Normal] : the arterial pulses were normal [Regular] : the rhythm was regular [Abdomen Soft] : soft [Bowel Sounds] : normal bowel sounds [Abnormal Walk] : normal gait [Cyanosis, Localized] : no localized cyanosis [Nail Clubbing] : no clubbing of the fingernails [Skin Turgor] : normal skin turgor [Impaired Insight] : insight and judgment were intact [Oriented To Time, Place, And Person] : oriented to person, place, and time [Affect] : the affect was normal

## 2020-03-04 NOTE — HISTORY OF PRESENT ILLNESS
[FreeTextEntry1] : New rheumatology meds. Less effective. More fatigue. \par Still continue to smoke a bit less than pack a day.\par He continues on lasix 80 bid\par No chest pains or dyspnea. No orthopnea.\par Improved leg edema.\par PT twice weekly.

## 2020-03-04 NOTE — REVIEW OF SYSTEMS
[Recent Weight Loss (___ Lbs)] : no recent weight loss [Dyspnea on exertion] : dyspnea during exertion [Lower Ext Edema] : lower extremity edema [Cough] : no cough [see HPI] : see HPI [Negative] : Endocrine

## 2020-03-04 NOTE — DISCUSSION/SUMMARY
[FreeTextEntry1] : He is a 64-year-old with a history of myocardial infarctions with minimal or no LV dysfunction, improved leg edema and improved hypertension on his current regimen.\par His blood pressure control is perfect. LDL to be monitored.\par BP is good, Continue amlodipine to 10 mg daily and lasix 80 bid. Cr. is 2.4 and stable.\par He should continue plavix daily and ASA biw.\par We discussed the need for smoking cessation, again.  \par Encouraged exercise, bike or swimming.\par

## 2020-03-09 ENCOUNTER — LABORATORY RESULT (OUTPATIENT)
Age: 65
End: 2020-03-09

## 2020-03-09 ENCOUNTER — APPOINTMENT (OUTPATIENT)
Dept: RHEUMATOLOGY | Facility: CLINIC | Age: 65
End: 2020-03-09
Payer: COMMERCIAL

## 2020-03-09 VITALS
BODY MASS INDEX: 21.84 KG/M2 | SYSTOLIC BLOOD PRESSURE: 128 MMHG | HEART RATE: 85 BPM | WEIGHT: 156 LBS | DIASTOLIC BLOOD PRESSURE: 60 MMHG | HEIGHT: 71 IN | TEMPERATURE: 98.6 F | OXYGEN SATURATION: 98 %

## 2020-03-09 DIAGNOSIS — K86.1 OTHER CHRONIC PANCREATITIS: ICD-10-CM

## 2020-03-09 DIAGNOSIS — M25.469 EFFUSION, UNSPECIFIED KNEE: ICD-10-CM

## 2020-03-09 DIAGNOSIS — M16.12 UNILATERAL PRIMARY OSTEOARTHRITIS, LEFT HIP: ICD-10-CM

## 2020-03-09 PROCEDURE — 99215 OFFICE O/P EST HI 40 MIN: CPT

## 2020-03-10 PROBLEM — M25.469 KNEE SWELLING: Status: ACTIVE | Noted: 2019-08-26

## 2020-03-10 LAB
ALBUMIN SERPL ELPH-MCNC: 3.4 G/DL
ALP BLD-CCNC: 102 U/L
ALT SERPL-CCNC: 20 U/L
ANION GAP SERPL CALC-SCNC: 13 MMOL/L
APPEARANCE: CLEAR
AST SERPL-CCNC: 31 U/L
BASOPHILS # BLD AUTO: 0.07 K/UL
BASOPHILS NFR BLD AUTO: 0.6 %
BILIRUB SERPL-MCNC: 0.2 MG/DL
BILIRUBIN URINE: NEGATIVE
BLOOD URINE: NEGATIVE
BUN SERPL-MCNC: 27 MG/DL
C3 SERPL-MCNC: 141 MG/DL
C4 SERPL-MCNC: 34 MG/DL
CALCIUM SERPL-MCNC: 8.6 MG/DL
CHLORIDE SERPL-SCNC: 103 MMOL/L
CO2 SERPL-SCNC: 26 MMOL/L
COLOR: NORMAL
CREAT SERPL-MCNC: 2.54 MG/DL
CREAT SPEC-SCNC: 70 MG/DL
CREAT/PROT UR: 0.4 RATIO
CRP SERPL-MCNC: 1.91 MG/DL
EOSINOPHIL # BLD AUTO: 0.12 K/UL
EOSINOPHIL NFR BLD AUTO: 1 %
ERYTHROCYTE [SEDIMENTATION RATE] IN BLOOD BY WESTERGREN METHOD: 78 MM/HR
GLUCOSE QUALITATIVE U: NEGATIVE
GLUCOSE SERPL-MCNC: 64 MG/DL
HCT VFR BLD CALC: 28.6 %
HGB BLD-MCNC: 9 G/DL
IMM GRANULOCYTES NFR BLD AUTO: 1.6 %
KETONES URINE: NEGATIVE
LEUKOCYTE ESTERASE URINE: NEGATIVE
LYMPHOCYTES # BLD AUTO: 1.41 K/UL
LYMPHOCYTES NFR BLD AUTO: 12.2 %
MAN DIFF?: NORMAL
MCHC RBC-ENTMCNC: 30.7 PG
MCHC RBC-ENTMCNC: 31.5 GM/DL
MCV RBC AUTO: 97.6 FL
MONOCYTES # BLD AUTO: 0.99 K/UL
MONOCYTES NFR BLD AUTO: 8.6 %
NEUTROPHILS # BLD AUTO: 8.74 K/UL
NEUTROPHILS NFR BLD AUTO: 76 %
NITRITE URINE: NEGATIVE
PH URINE: 6
PLATELET # BLD AUTO: 342 K/UL
POTASSIUM SERPL-SCNC: 5 MMOL/L
PROT SERPL-MCNC: 6.8 G/DL
PROT UR-MCNC: 27 MG/DL
PROTEIN URINE: NORMAL
RBC # BLD: 2.93 M/UL
RBC # FLD: 12.2 %
SODIUM SERPL-SCNC: 141 MMOL/L
SPECIFIC GRAVITY URINE: 1.01
UROBILINOGEN URINE: NORMAL
WBC # FLD AUTO: 11.52 K/UL

## 2020-03-10 NOTE — ASSESSMENT
[FreeTextEntry1] : see HPI\par limited response to orencia as above \par hx complicated by +DSDNA in the past and loose stools now \par labs ordered to monitor disease activity and for medication side effects.  Will notify of results including celiac and ASCA \par trial of stelara patient counseled on risks/benefits/potential SE of medication\par chose this Rx due to above issues \par patient is aware of my assessment and plans\par labs ordered to monitor disease activity and for medication side effects.  Will notify of results\par fuv 4wks

## 2020-03-10 NOTE — HISTORY OF PRESENT ILLNESS
[FreeTextEntry1] : fuv psa, psoriasis changed to orencia from Xeljanz for possible anemia related to Rx and also joint pain\par hx of hyperuricemia, no gout attacks \par initially reported hand pain improving since changing to orencia \par no significant skin lesions \par now has bilateral wrist and finger joint pain which he believes is different from OA pain\par Am stiffness x 60min, swelling which had resolved other than R wrist \par also had recent plaques that were self limited \par has significant OA on recent hand MRI, persistent R hand pain\par had surgery by Ortho still with stiffness in R wrist, thumb base.\par was seen by HEME for evaluation of anemia- notes reviewed \par \par also has hx of HTN, cirrhosis, pancreatitis, DM, CAD\par reports daily loose stools since the pancreatitis- hasn’t had recent colonoscopy due to the difficulty with prep\par doesn’t have diagnosis of celiac or IBD\par \par \par 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 \par In the passed year based on labs 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) based on JIM, DNA, low complement, low haptoglobin (with normal LDH) with proteinuria\par recent development of +SLE serologies, proteinuria - renal bx NOT c/w SLE nephritis and most recent dsDNA neg \par developed rash on plaquenil \par \par prior meds enbrel (5yrs), cimzia, actemra, kevzara ,xeljanz\par thinks he was most well on enbrel \par \par \par  \par  [Joint Swelling] : joint swelling [Joint Pain] : joint pain [Morning Stiffness] : morning stiffness [Rash] : no rash [Ocular Symptoms] : no ocular symptoms [Chest Pain] : no chest pain [Fatigue] : fatigue

## 2020-03-10 NOTE — PHYSICAL EXAM
[General Appearance - Alert] : alert [General Appearance - In No Acute Distress] : in no acute distress [Sclera] : the sclera and conjunctiva were normal [PERRL With Normal Accommodation] : pupils were equal in size, round, and reactive to light [Oropharynx] : the oropharynx was normal [Auscultation Breath Sounds / Voice Sounds] : lungs were clear to auscultation bilaterally [Heart Sounds] : normal S1 and S2 [Murmurs] : no murmurs [Edema] : there was no peripheral edema [Abdomen Soft] : soft [Cervical Lymph Nodes Enlarged Posterior Bilaterally] : posterior cervical [Cervical Lymph Nodes Enlarged Anterior Bilaterally] : anterior cervical [Abnormal Walk] : normal gait [Nail Clubbing] : no clubbing  or cyanosis of the fingernails [Musculoskeletal - Swelling] : no joint swelling seen [Motor Tone] : muscle strength and tone were normal [FreeTextEntry1] : bilat wrist R>>>L warm, TTP    bilat PIP, DIP TTP, swellng, dec hand   knee crepitis  SI tender  [] : no rash [No Focal Deficits] : no focal deficits [Oriented To Time, Place, And Person] : oriented to person, place, and time

## 2020-03-11 LAB
BAKER'S YEAST AB QL: <5 UNITS
BAKER'S YEAST IGA QL IA: 9.8 UNITS
BAKER'S YEAST IGA QN IA: NEGATIVE
BAKER'S YEAST IGG QN IA: NEGATIVE
DSDNA AB SER-ACNC: 33 IU/ML
LKM AB SER QL IF: <20.1 UNITS
SMOOTH MUSCLE AB SER QL IF: NORMAL

## 2020-03-16 ENCOUNTER — APPOINTMENT (OUTPATIENT)
Dept: ORTHOPEDIC SURGERY | Facility: CLINIC | Age: 65
End: 2020-03-16
Payer: COMMERCIAL

## 2020-03-16 LAB
CELIAC DISEASE INTERPRETATION: NORMAL
CELIAC GENE PAIRS PRESENT: NO
DQ ALPHA 1: NORMAL
DQ BETA 1: NORMAL
IMMUNOGLOBULIN A (IGA): 314 MG/DL

## 2020-03-16 PROCEDURE — 20610 DRAIN/INJ JOINT/BURSA W/O US: CPT | Mod: LT

## 2020-03-16 NOTE — HISTORY OF PRESENT ILLNESS
[de-identified] : Patient has a history of knee osteoarthritis.  We discussed treatment options and have obtained insurance authorization to proceed with a series of hyaluronic acid injections and patient would like to proceed at this time.  No significant interval change in knee pain since last evaluated.

## 2020-03-16 NOTE — PHYSICAL EXAM
[de-identified] : Constitutional: Well-nourished, well-developed, No acute distress\par Respiratory: Good respiratory effort, no SOB\par Lymphatic: No regional lymphadenopathy, no lymphedema\par Psychiatric: Pleasant and normal affect, alert and oriented x3\par Skin: Clean dry and intact B/L UE/LE\par Musculoskeletal: normal except where as noted in regional exam\par \par B/L Hips: No asymmetry, malalignment, or swelling, Full ROM, 5/5 strength in flexion/ext, IR/ER, Abd/Add, Joints stable\par B/L Ankles: No asymmetry, malalignment, or swelling, Full ROM, 5/5 strength in DF/PF/Inv/Ev, Joints stable\par \par BIOMECHANICAL EXAM: no marked leg length discrepancy, moderate hip abductor weakness b/l, no marked pes planus or foot pronation, tight hams and ITB b/l. Normal gait and station\par \par Left Knee:\par APPEARANCE: no marked deformities, no swelling or malalignment\par POSITIVE TENDERNESS: + crepitus of the anterior knee, and tenderness of patellar retinaculum\par NONTENDER: jt lines b/l, patellar & quadriceps tendons, MCL/LCL, ITB at the lateral femoral condyle & Gerdy's tubercle, pes bursa. \par ROM: full & painless, although some discomfort in deep knee flexion\par RESISTIVE TESTING: + discomfort with knee ext from deep knee flexion (stretched position), painless knee flexion. \par SPECIAL TESTS: stable v/v stress. painless grind. neg Lachman's. neg ant/post drawer. neg Sandra's. neg Thessaly test. neg Adrien's & Malacrae's\par NEURO: Normal sensation of LE, DTRs 2+/4 patella and achilles\par PULSES: 2+ DP/PT pulses\par \par

## 2020-03-16 NOTE — PROCEDURE
[de-identified] : Injection: Left  knee joint.\par Indication: Osteoarthritis.\par \par A discussion was had with the patient regarding this procedure and all questions were answered. All risks, benefits and alternatives were discussed. These included but were not limited to bleeding, infection, and allergic reaction. Alcohol was used to clean the skin, and betadine was used to sterilize and prep the area in the lateral joint line aspect of the knee. Ethyl chloride spray was then used as a topical anesthetic. A 22-gauge needle was used to inject 2 cc of Euflexxa into the knee with ease. A sterile bandage was then applied. The patient tolerated the procedure well and there were no complications. \par \par Lot #:  [id number]\par Exp:  12/13/20\par

## 2020-03-16 NOTE — DISCUSSION/SUMMARY
[de-identified] : The first of three Euflexxa injections was given today under sterile conditions into the Left  knee joint without complication (see procedure note). The patient was instructed on modification of activities over the next 48-72 hours. I advised the patient to ice the knee as needed for control of local irritation from the injection. I advised that some patients have immediate benefit from the initial injection therapy, however it usually takes the medication a number of weeks (~8wks) to provide significant relief of osteoarthritic symptoms. \par \par I  will see the patient back for the second injection in approximately 1 week.\par \par This note was generated using dragon medical dictation software. A reasonable effort has been made for proofreading its contents, but typos may still remain. If there are any questions or points of clarification needed please notify my office.

## 2020-03-18 RX ORDER — ABATACEPT 125 MG/ML
125 INJECTION, SOLUTION SUBCUTANEOUS
Qty: 4 | Refills: 6 | Status: DISCONTINUED | COMMUNITY
Start: 2019-12-02 | End: 2020-03-18

## 2020-03-23 ENCOUNTER — APPOINTMENT (OUTPATIENT)
Dept: ORTHOPEDIC SURGERY | Facility: CLINIC | Age: 65
End: 2020-03-23

## 2020-03-27 ENCOUNTER — TRANSCRIPTION ENCOUNTER (OUTPATIENT)
Age: 65
End: 2020-03-27

## 2020-03-30 ENCOUNTER — APPOINTMENT (OUTPATIENT)
Dept: ORTHOPEDIC SURGERY | Facility: CLINIC | Age: 65
End: 2020-03-30

## 2020-04-01 ENCOUNTER — APPOINTMENT (OUTPATIENT)
Dept: INTERNAL MEDICINE | Facility: CLINIC | Age: 65
End: 2020-04-01
Payer: COMMERCIAL

## 2020-04-01 DIAGNOSIS — Z87.09 PERSONAL HISTORY OF OTHER DISEASES OF THE RESPIRATORY SYSTEM: ICD-10-CM

## 2020-04-01 PROCEDURE — 99212 OFFICE O/P EST SF 10 MIN: CPT

## 2020-04-06 LAB — ACID FAST STN FLD: NORMAL

## 2020-04-08 ENCOUNTER — TRANSCRIPTION ENCOUNTER (OUTPATIENT)
Age: 65
End: 2020-04-08

## 2020-04-09 ENCOUNTER — TRANSCRIPTION ENCOUNTER (OUTPATIENT)
Age: 65
End: 2020-04-09

## 2020-04-14 ENCOUNTER — TRANSCRIPTION ENCOUNTER (OUTPATIENT)
Age: 65
End: 2020-04-14

## 2020-04-15 ENCOUNTER — TRANSCRIPTION ENCOUNTER (OUTPATIENT)
Age: 65
End: 2020-04-15

## 2020-04-16 ENCOUNTER — TRANSCRIPTION ENCOUNTER (OUTPATIENT)
Age: 65
End: 2020-04-16

## 2020-04-17 ENCOUNTER — TRANSCRIPTION ENCOUNTER (OUTPATIENT)
Age: 65
End: 2020-04-17

## 2020-04-20 ENCOUNTER — APPOINTMENT (OUTPATIENT)
Dept: ENDOCRINOLOGY | Facility: CLINIC | Age: 65
End: 2020-04-20
Payer: COMMERCIAL

## 2020-04-20 PROCEDURE — 99442: CPT

## 2020-04-29 ENCOUNTER — APPOINTMENT (OUTPATIENT)
Dept: INTERNAL MEDICINE | Facility: CLINIC | Age: 65
End: 2020-04-29

## 2020-05-01 ENCOUNTER — TRANSCRIPTION ENCOUNTER (OUTPATIENT)
Age: 65
End: 2020-05-01

## 2020-05-06 ENCOUNTER — TRANSCRIPTION ENCOUNTER (OUTPATIENT)
Age: 65
End: 2020-05-06

## 2020-05-07 ENCOUNTER — APPOINTMENT (OUTPATIENT)
Dept: HEMATOLOGY ONCOLOGY | Facility: CLINIC | Age: 65
End: 2020-05-07

## 2020-05-18 ENCOUNTER — RX RENEWAL (OUTPATIENT)
Age: 65
End: 2020-05-18

## 2020-05-20 ENCOUNTER — TRANSCRIPTION ENCOUNTER (OUTPATIENT)
Age: 65
End: 2020-05-20

## 2020-05-20 ENCOUNTER — APPOINTMENT (OUTPATIENT)
Dept: ORTHOPEDIC SURGERY | Facility: CLINIC | Age: 65
End: 2020-05-20

## 2020-05-26 ENCOUNTER — TRANSCRIPTION ENCOUNTER (OUTPATIENT)
Age: 65
End: 2020-05-26

## 2020-05-27 ENCOUNTER — APPOINTMENT (OUTPATIENT)
Dept: ORTHOPEDIC SURGERY | Facility: CLINIC | Age: 65
End: 2020-05-27
Payer: COMMERCIAL

## 2020-05-27 PROCEDURE — 20610 DRAIN/INJ JOINT/BURSA W/O US: CPT | Mod: LT

## 2020-05-27 NOTE — PHYSICAL EXAM
[de-identified] : Constitutional: Well-nourished, well-developed, No acute distress\par Respiratory: Good respiratory effort, no SOB\par Lymphatic: No regional lymphadenopathy, no lymphedema\par Psychiatric: Pleasant and normal affect, alert and oriented x3\par Skin: Clean dry and intact B/L UE/LE\par Musculoskeletal: normal except where as noted in regional exam\par \par \par BIOMECHANICAL EXAM: no marked leg length discrepancy, moderate hip abductor weakness b/l, no marked pes planus or foot pronation, tight hams and ITB b/l. Normal gait and station\par \par Left Knee:\par APPEARANCE: no marked deformities, no swelling or malalignment\par POSITIVE TENDERNESS: + crepitus of the anterior knee, and tenderness of patellar retinaculum\par NONTENDER: jt lines b/l, patellar & quadriceps tendons, MCL/LCL, ITB at the lateral femoral condyle & Gerdy's tubercle, pes bursa. \par ROM: full & painless, although some discomfort in deep knee flexion\par RESISTIVE TESTING: + discomfort with knee ext from deep knee flexion (stretched position), painless knee flexion. \par SPECIAL TESTS: stable v/v stress. painless grind. neg Lachman's. neg ant/post drawer. neg Sandra's. neg Thessaly test. neg Adrien's & Malacrae's\par NEURO: Normal sensation of LE, DTRs 2+/4 patella and achilles\par PULSES: 2+ DP/PT pulses\par \par

## 2020-05-27 NOTE — DISCUSSION/SUMMARY
[de-identified] : The second of three Euflexxa injections was given today under sterile conditions into the left knee joint without complication (see procedure note). I again discussed the role of activity modification/icing following the injection to treat any local irritation from the injection. \par \par I will have the patient followup for the final injection in approximately 1 week.\par \par This note was generated using dragon medical dictation software. A reasonable effort has been made for proofreading its contents, but typos may still remain. If there are any questions or points of clarification needed please notify my office.

## 2020-05-27 NOTE — HISTORY OF PRESENT ILLNESS
[de-identified] : Patient is here today to follow-up on knee pain.  Patient was unable to follow-up until now secondary to the COVID-19 pandemic.  Patient is currently on prednisone 10 mg p.o. daily for other issues, it is helping alleviate some of his chronic knee pain.  There has been some mild improvement after the first hyaluronic acid injection performed at last visit.  No adverse reaction thus far.  Patient would like to proceed with the second injection in the series at this time.

## 2020-05-27 NOTE — PROCEDURE
[de-identified] : Injection: Left  knee joint.\par Indication: Osteoarthritis.\par \par A discussion was had with the patient regarding this procedure and all questions were answered. All risks, benefits and alternatives were discussed. These included but were not limited to bleeding, infection, and allergic reaction. Alcohol was used to clean the skin, and betadine was used to sterilize and prep the area in the lateral joint line aspect of the knee. Ethyl chloride spray was then used as a topical anesthetic. A 22-gauge needle was used to inject 2 cc of Euflexxa into the knee with ease. A sterile bandage was then applied. The patient tolerated the procedure well and there were no complications. \par \par Lot #:  [id number]\par Exp:  12/13/20\par

## 2020-05-28 ENCOUNTER — TRANSCRIPTION ENCOUNTER (OUTPATIENT)
Age: 65
End: 2020-05-28

## 2020-05-30 ENCOUNTER — TRANSCRIPTION ENCOUNTER (OUTPATIENT)
Age: 65
End: 2020-05-30

## 2020-05-31 ENCOUNTER — EMERGENCY (EMERGENCY)
Facility: HOSPITAL | Age: 65
LOS: 1 days | Discharge: HOME | End: 2020-05-31
Attending: EMERGENCY MEDICINE | Admitting: EMERGENCY MEDICINE
Payer: COMMERCIAL

## 2020-05-31 VITALS
RESPIRATION RATE: 18 BRPM | SYSTOLIC BLOOD PRESSURE: 136 MMHG | HEART RATE: 66 BPM | DIASTOLIC BLOOD PRESSURE: 74 MMHG | OXYGEN SATURATION: 98 %

## 2020-05-31 VITALS
SYSTOLIC BLOOD PRESSURE: 134 MMHG | DIASTOLIC BLOOD PRESSURE: 82 MMHG | HEART RATE: 76 BPM | RESPIRATION RATE: 18 BRPM | OXYGEN SATURATION: 100 % | WEIGHT: 160.06 LBS | HEIGHT: 71 IN | TEMPERATURE: 98 F

## 2020-05-31 DIAGNOSIS — Z98.890 OTHER SPECIFIED POSTPROCEDURAL STATES: Chronic | ICD-10-CM

## 2020-05-31 DIAGNOSIS — Z90.89 ACQUIRED ABSENCE OF OTHER ORGANS: Chronic | ICD-10-CM

## 2020-05-31 DIAGNOSIS — Z98.89 OTHER SPECIFIED POSTPROCEDURAL STATES: Chronic | ICD-10-CM

## 2020-05-31 DIAGNOSIS — Z98.52 VASECTOMY STATUS: Chronic | ICD-10-CM

## 2020-05-31 DIAGNOSIS — Z90.49 ACQUIRED ABSENCE OF OTHER SPECIFIED PARTS OF DIGESTIVE TRACT: Chronic | ICD-10-CM

## 2020-05-31 PROCEDURE — 12002 RPR S/N/AX/GEN/TRNK2.6-7.5CM: CPT

## 2020-05-31 PROCEDURE — 99283 EMERGENCY DEPT VISIT LOW MDM: CPT

## 2020-05-31 PROCEDURE — 99282 EMERGENCY DEPT VISIT SF MDM: CPT | Mod: 25

## 2020-05-31 NOTE — ED PROVIDER NOTE - MUSCULOSKELETAL, MLM
Spine appears normal, range of motion is not limited, no muscle or joint tenderness, no bony TTP no muscle or joint tenderness, no bony TTP. full ROM of bilateral lower ext

## 2020-05-31 NOTE — ED PROVIDER NOTE - ENMT, MLM
Airway patent, Nasal mucosa clear. Mouth with normal mucosa. Throat has no vesicles, no oropharyngeal exudates and uvula is midline. atraumatic, normocephalic Airway patent,  atraumatic, normocephalic

## 2020-05-31 NOTE — ED PROVIDER NOTE - CLINICAL SUMMARY MEDICAL DECISION MAKING FREE TEXT BOX
Pt p/w continued bleed from laceration yesterday. on blood thinner. laceration gaping and continued bleeding. will thoroughly clean and repair loosely since laceration over 24 hours d/w pt is at increased risk of inflection. will place on antibiotics and signs of infection discussed. Pt presents with  continued bleed from laceration yesterday. on blood thinner. laceration gaping and continued bleeding. will thoroughly clean and repair.  since laceration over 24 hours d/w pt is at increased risk of inflection. will place on antibiotics and signs of infection discussed.  no evidence of FB. do not suspect fx or DVT
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
## 2020-05-31 NOTE — ED PROVIDER NOTE - SKIN, MLM
+8 cm irregular flap laceration to anterior aspect R lower leg w/ continued bleeding +7 cm irregular flap laceration to anterior aspect R lower leg w/ continued bleeding
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
## 2020-05-31 NOTE — ED PROVIDER NOTE - NS_ ATTENDINGSCRIBEDETAILS _ED_A_ED_FT
Marlon Solis MD - The scribe's documentation has been prepared under my direction and personally reviewed by me in its entirety. I confirm that the note above accurately reflects all work, treatment, procedures, and medical decision making performed by me.

## 2020-05-31 NOTE — ED PROVIDER NOTE - NEUROLOGICAL, MLM
+no motor or sensory deficits. good 2 point discrimination. +no motor or sensory deficits. good 2 point discrimination. strong distal pulses

## 2020-05-31 NOTE — ED ADULT NURSE REASSESSMENT NOTE - NS ED NURSE REASSESS COMMENT FT1
pt re evaluated by md and to be d'c/d  pt discharged stable and ambulatory in nad at present d/c instruction reinforced and pt verbalized understanding vital signs as charted  pt explained how to care for wound and advised on signs of infection which include redness, swelling ,fever, pus discharge and /or pain and if any symptoms occur to return to emergency room for evaluation and pt verbalized understanding

## 2020-05-31 NOTE — ED PROVIDER NOTE - NSFOLLOWUPINSTRUCTIONS_ED_ALL_ED_FT
keep clean and dry 24 hours  apply bacitracin to laceration 1-2 times a day  elevate as much as possible and stay off leg as much as possible  continue keflex twice a day as prescribed yesterday  return to ED or follow up with primary care provider 12 days for recheck and suture removal (earlier for any signs of infection)

## 2020-05-31 NOTE — ED PROVIDER NOTE - PATIENT PORTAL LINK FT
You can access the FollowMyHealth Patient Portal offered by [facility] by registering at the following website: [email or web address]. By joining [employer]’s FollowMyHealth portal, you will also be able to view your health information using other applications (apps) compatible with our system.

## 2020-05-31 NOTE — ED ADULT NURSE NOTE - NSIMPLEMENTINTERV_GEN_ALL_ED
Implemented All Fall with Harm Risk Interventions:  [place] to call system. Call bell, personal items and telephone within reach. Instruct patient to call for assistance. Room bathroom lighting operational. Non-slip footwear when patient is off stretcher. Physically safe environment: no spills, clutter or unnecessary equipment. Stretcher in lowest position, wheels locked, appropriate side rails in place. Provide visual cue, wrist band, yellow gown, etc. Monitor gait and stability. Monitor for mental status changes and reorient to person, place, and time. Review medications for side effects contributing to fall risk. Reinforce activity limits and safety measures with patient and family. Provide visual clues: red socks.

## 2020-06-01 ENCOUNTER — APPOINTMENT (OUTPATIENT)
Dept: RHEUMATOLOGY | Facility: CLINIC | Age: 65
End: 2020-06-01
Payer: COMMERCIAL

## 2020-06-01 DIAGNOSIS — R80.9 PROTEINURIA, UNSPECIFIED: ICD-10-CM

## 2020-06-01 PROCEDURE — 99443: CPT

## 2020-06-02 ENCOUNTER — RX RENEWAL (OUTPATIENT)
Age: 65
End: 2020-06-02

## 2020-06-03 ENCOUNTER — APPOINTMENT (OUTPATIENT)
Dept: ORTHOPEDIC SURGERY | Facility: CLINIC | Age: 65
End: 2020-06-03
Payer: COMMERCIAL

## 2020-06-03 PROCEDURE — 20610 DRAIN/INJ JOINT/BURSA W/O US: CPT | Mod: LT

## 2020-06-03 NOTE — PROCEDURE
[de-identified] : Injection: Left  knee joint.\par Indication: Osteoarthritis.\par \par A discussion was had with the patient regarding this procedure and all questions were answered. All risks, benefits and alternatives were discussed. These included but were not limited to bleeding, infection, and allergic reaction. Alcohol was used to clean the skin, and betadine was used to sterilize and prep the area in the lateral joint line aspect of the knee. Ethyl chloride spray was then used as a topical anesthetic. A 22-gauge needle was used to inject 2 cc of Euflexxa into the knee with ease. A sterile bandage was then applied. The patient tolerated the procedure well and there were no complications. \par \par Lot #:  [id number]\par Exp:  12/13/20\par

## 2020-06-03 NOTE — HISTORY OF PRESENT ILLNESS
[de-identified] : Patient is here today to follow-up on knee pain.  There has been some mild improvement after the second hyaluronic acid injection performed at last visit.  No adverse reaction thus far.  Patient would like to proceed with the last injection in the series at this time.

## 2020-06-03 NOTE — DISCUSSION/SUMMARY
[de-identified] : The final Euflexxa injection was given today under sterile conditions into the left knee joint without complication (see procedure note). I discussed the effects of this medication and how long it may provide benefit. Patient has obtained moderate immediate improvement. If no significant long-term benefit, the patient may elect for additional treatment strategies as previously discussed. However, if the patient obtains good relief of symptoms, the injection therapy series can be repeated over the next 6-12 months.\par \par Will have the patient followup on an as-needed basis at this time.  Patient appreciates and agrees with current plan.\par \par This note was generated using dragon medical dictation software. A reasonable effort has been made for proofreading its contents, but typos may still remain. If there are any questions or points of clarification needed please notify my office.

## 2020-06-03 NOTE — PHYSICAL EXAM
[de-identified] : Constitutional: Well-nourished, well-developed, No acute distress\par Respiratory: Good respiratory effort, no SOB\par Lymphatic: No regional lymphadenopathy, no lymphedema\par Psychiatric: Pleasant and normal affect, alert and oriented x3\par Skin: Clean dry and intact B/L UE/LE\par Musculoskeletal: normal except where as noted in regional exam\par \par \par BIOMECHANICAL EXAM: no marked leg length discrepancy, moderate hip abductor weakness b/l, no marked pes planus or foot pronation, tight hams and ITB b/l. Normal gait and station\par \par Left Knee:\par APPEARANCE: no marked deformities, no swelling or malalignment\par POSITIVE TENDERNESS: + crepitus of the anterior knee, and tenderness of patellar retinaculum\par NONTENDER: jt lines b/l, patellar & quadriceps tendons, MCL/LCL, ITB at the lateral femoral condyle & Gerdy's tubercle, pes bursa. \par ROM: full & painless, although some discomfort in deep knee flexion\par RESISTIVE TESTING: + discomfort with knee ext from deep knee flexion (stretched position), painless knee flexion. \par SPECIAL TESTS: stable v/v stress. painless grind. neg Lachman's. neg ant/post drawer. neg Sandra's. neg Thessaly test. neg Adrien's & Malacrae's\par NEURO: Normal sensation of LE, DTRs 2+/4 patella and achilles\par PULSES: 2+ DP/PT pulses\par \par

## 2020-06-04 ENCOUNTER — LABORATORY RESULT (OUTPATIENT)
Age: 65
End: 2020-06-04

## 2020-06-05 LAB
CHOLEST SERPL-MCNC: 128 MG/DL
CHOLEST/HDLC SERPL: 2.9 RATIO
ESTIMATED AVERAGE GLUCOSE: 220 MG/DL
HBA1C MFR BLD HPLC: 9.3 %
HDLC SERPL-MCNC: 44 MG/DL
LDLC SERPL CALC-MCNC: 55 MG/DL
TRIGL SERPL-MCNC: 148 MG/DL

## 2020-06-11 ENCOUNTER — APPOINTMENT (OUTPATIENT)
Dept: ENDOCRINOLOGY | Facility: CLINIC | Age: 65
End: 2020-06-11
Payer: COMMERCIAL

## 2020-06-11 ENCOUNTER — TRANSCRIPTION ENCOUNTER (OUTPATIENT)
Age: 65
End: 2020-06-11

## 2020-06-11 LAB
25(OH)D3 SERPL-MCNC: 22.3 NG/ML
ALBUMIN SERPL ELPH-MCNC: 3.2 G/DL
ALP BLD-CCNC: 101 U/L
ALT SERPL-CCNC: 21 U/L
ANA SER IF-ACNC: NEGATIVE
ANION GAP SERPL CALC-SCNC: 11 MMOL/L
APPEARANCE: CLEAR
AST SERPL-CCNC: 25 U/L
BASOPHILS # BLD AUTO: 0.08 K/UL
BASOPHILS NFR BLD AUTO: 0.7 %
BILIRUB SERPL-MCNC: 0.3 MG/DL
BILIRUBIN URINE: NEGATIVE
BLOOD URINE: ABNORMAL
BUN SERPL-MCNC: 27 MG/DL
C3 SERPL-MCNC: 95 MG/DL
C4 SERPL-MCNC: 25 MG/DL
CALCIUM SERPL-MCNC: 8.1 MG/DL
CHLORIDE SERPL-SCNC: 103 MMOL/L
CO2 SERPL-SCNC: 21 MMOL/L
COLOR: NORMAL
CREAT SERPL-MCNC: 2.2 MG/DL
CREAT SPEC-SCNC: 54 MG/DL
CREAT/PROT UR: 1.8 RATIO
CRP SERPL-MCNC: 0.1 MG/DL
DSDNA AB SER-ACNC: 21 IU/ML
EOSINOPHIL # BLD AUTO: 0.1 K/UL
EOSINOPHIL NFR BLD AUTO: 0.8 %
ERYTHROCYTE [SEDIMENTATION RATE] IN BLOOD BY WESTERGREN METHOD: 53 MM/HR
GLUCOSE QUALITATIVE U: NEGATIVE
GLUCOSE SERPL-MCNC: 260 MG/DL
HCT VFR BLD CALC: 30.4 %
HGB BLD-MCNC: 9.7 G/DL
HISTONE AB SER QL: 0.5 UNITS
IMM GRANULOCYTES NFR BLD AUTO: 1.6 %
KETONES URINE: NEGATIVE
LEUKOCYTE ESTERASE URINE: NEGATIVE
LYMPHOCYTES # BLD AUTO: 1.42 K/UL
LYMPHOCYTES NFR BLD AUTO: 11.8 %
MAN DIFF?: NORMAL
MCHC RBC-ENTMCNC: 31.1 PG
MCHC RBC-ENTMCNC: 31.9 GM/DL
MCV RBC AUTO: 97.4 FL
MONOCYTES # BLD AUTO: 0.84 K/UL
MONOCYTES NFR BLD AUTO: 7 %
NEUTROPHILS # BLD AUTO: 9.4 K/UL
NEUTROPHILS NFR BLD AUTO: 78.1 %
NITRITE URINE: NEGATIVE
PH URINE: 6
PLATELET # BLD AUTO: 179 K/UL
POTASSIUM SERPL-SCNC: 4.2 MMOL/L
PROT SERPL-MCNC: 6.3 G/DL
PROT UR-MCNC: 97 MG/DL
PROTEIN URINE: ABNORMAL
RBC # BLD: 3.12 M/UL
RBC # FLD: 14.6 %
SODIUM SERPL-SCNC: 136 MMOL/L
SPECIFIC GRAVITY URINE: 1.01
UROBILINOGEN URINE: NORMAL
WBC # FLD AUTO: 12.03 K/UL

## 2020-06-11 PROCEDURE — 95251 CONT GLUC MNTR ANALYSIS I&R: CPT

## 2020-06-11 PROCEDURE — G0108 DIAB MANAGE TRN  PER INDIV: CPT

## 2020-06-12 ENCOUNTER — EMERGENCY (EMERGENCY)
Facility: HOSPITAL | Age: 65
LOS: 1 days | Discharge: HOME | End: 2020-06-12
Attending: EMERGENCY MEDICINE | Admitting: EMERGENCY MEDICINE
Payer: COMMERCIAL

## 2020-06-12 VITALS
TEMPERATURE: 98 F | HEIGHT: 71 IN | WEIGHT: 160.06 LBS | OXYGEN SATURATION: 99 % | RESPIRATION RATE: 16 BRPM | DIASTOLIC BLOOD PRESSURE: 68 MMHG | SYSTOLIC BLOOD PRESSURE: 144 MMHG | HEART RATE: 62 BPM

## 2020-06-12 VITALS
SYSTOLIC BLOOD PRESSURE: 144 MMHG | OXYGEN SATURATION: 99 % | RESPIRATION RATE: 16 BRPM | HEART RATE: 67 BPM | TEMPERATURE: 98 F | DIASTOLIC BLOOD PRESSURE: 77 MMHG

## 2020-06-12 DIAGNOSIS — Z98.890 OTHER SPECIFIED POSTPROCEDURAL STATES: Chronic | ICD-10-CM

## 2020-06-12 DIAGNOSIS — Z90.49 ACQUIRED ABSENCE OF OTHER SPECIFIED PARTS OF DIGESTIVE TRACT: Chronic | ICD-10-CM

## 2020-06-12 DIAGNOSIS — Z90.89 ACQUIRED ABSENCE OF OTHER ORGANS: Chronic | ICD-10-CM

## 2020-06-12 DIAGNOSIS — Z98.89 OTHER SPECIFIED POSTPROCEDURAL STATES: Chronic | ICD-10-CM

## 2020-06-12 DIAGNOSIS — Z98.52 VASECTOMY STATUS: Chronic | ICD-10-CM

## 2020-06-12 PROCEDURE — G0463: CPT

## 2020-06-12 PROCEDURE — L9995: CPT

## 2020-06-12 NOTE — ED PROVIDER NOTE - NSFOLLOWUPINSTRUCTIONS_ED_ALL_ED_FT
Suture Removal, Care After  This sheet gives you information about how to care for yourself after your procedure. Your health care provider may also give you more specific instructions. If you have problems or questions, contact your health care provider.  What can I expect after the procedure?  After your stitches (sutures) are removed, it is common to have:  Some discomfort and swelling in the area.Slight redness in the area.Follow these instructions at home:  If you have a bandage:     Wash your hands with soap and water before you change your bandage (dressing). If soap and water are not available, use hand .Change your dressing as told by your health care provider. If your dressing becomes wet or dirty, or develops a bad smell, change it as soon as possible.If your dressing sticks to your skin, soak it in warm water to loosen it.Wound care        Check your wound every day for signs of infection. Check for:  More redness, swelling, or pain.Fluid or blood.Warmth.Pus or a bad smell.Wash your hands with soap and water before and after touching your wound.Apply cream or ointment only as directed by your health care provider. If you are using cream or ointment, wash the area with soap and water 2 times a day to remove all the cream or ointment. Rinse off the soap and pat the area dry with a clean towel.If you have skin glue or adhesive strips on your wound, leave these closures in place. They may need to stay in place for 2 weeks or longer. If adhesive strip edges start to loosen and curl up, you may trim the loose edges. Do not remove adhesive strips completely unless your health care provider tells you to do that.Keep the wound area dry and clean. Do not take baths, swim, or use a hot tub until your health care provider approves.Continue to protect the wound from injury.Do not pick at your wound. Picking can cause an infection.When your wound has completely healed, wear sunscreen over it or cover it with clothing when you are outside. New scars get sunburned easily, which can make scarring worse.General instructions     Take over-the-counter and prescription medicines only as told by your health care provider.Keep all follow-up visits as told by your health care provider. This is important.Contact a health care provider if:  You have redness, swelling, or pain around your wound.You have fluid or blood coming from your wound.Your wound feels warm to the touch.You have pus or a bad smell coming from your wound.Your wound opens up.Get help right away if:  You have a fever.You have redness that is spreading from your wound.Summary  After your sutures are removed, it is common to have some discomfort and swelling in the area.Wash your hands with soap and water before you change your bandage (dressing).Keep the wound area dry and clean. Do not take baths, swim, or use a hot tub until your health care provider approves.This information is not intended to replace advice given to you by your health care provider. Make sure you discuss any questions you have with your health care provider.    Document Released: 09/12/2002 Document Revised: 11/30/2018 Document Reviewed: 01/23/2018  Elsevier Patient Education © 2020 [employer]

## 2020-06-12 NOTE — ED PROVIDER NOTE - PATIENT PORTAL LINK FT
You can access the FollowMyHealth Patient Portal offered by [facility] by registering at the following website: [email or web address]. By joining [employer]’s FollowMyHealth portal, you will also be able to view your health information using other applications (apps) compatible with our system.

## 2020-06-16 ENCOUNTER — TRANSCRIPTION ENCOUNTER (OUTPATIENT)
Age: 65
End: 2020-06-16

## 2020-06-18 ENCOUNTER — APPOINTMENT (OUTPATIENT)
Dept: NEPHROLOGY | Facility: CLINIC | Age: 65
End: 2020-06-18
Payer: COMMERCIAL

## 2020-06-18 VITALS
DIASTOLIC BLOOD PRESSURE: 72 MMHG | WEIGHT: 160 LBS | OXYGEN SATURATION: 98 % | HEART RATE: 74 BPM | SYSTOLIC BLOOD PRESSURE: 137 MMHG | BODY MASS INDEX: 22.32 KG/M2

## 2020-06-18 PROCEDURE — 99214 OFFICE O/P EST MOD 30 MIN: CPT

## 2020-06-18 RX ORDER — AMOXICILLIN AND CLAVULANATE POTASSIUM 875; 125 MG/1; MG/1
875-125 TABLET, COATED ORAL TWICE DAILY
Qty: 20 | Refills: 0 | Status: DISCONTINUED | COMMUNITY
Start: 2020-04-01 | End: 2020-06-18

## 2020-06-18 NOTE — HISTORY OF PRESENT ILLNESS
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  His medical history is significant for rheumatoid arthritis which was diagnosed decades ago.  He has followed regularly with a physician and about 18 years ago he was diagnosed with diabetes, HTN.  He also describes having had a heart attack in the past.  He reports that his diabetes has recently been well controlled on insulin pump.  He say he has neuropathy from diabetes but no retinopathy.  He says he has been aware of an abnormality in his kidney function for years and upon review of available records he has had a creatinine of about 1.5 in 2015 and has intermittently had microalbuminuria usually about 30.  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  His creatinine has gone up, his proteinuria has increased.  Since stopping biologics his complements, hapto, and dsDNA have improved and yet his proteinuria and creatinine continue to worsen.  Of note the patient has also recently had worsening swelling and worsening hypertension over the last few months.  His dose of lasix was recently increased by his cardiologist and this has subsequently improved.  On evaluation today he denies any sob or cp, he has no urinary complaints and voids normally, he has some intermittent joint pains for which he continues to take celecoxib.  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  His steroids are being tapered down.  We have been working on his swelling he was on lasix 80 BID with metolazone as needed to get weight down to 157 at home.  With this reduction in weight his edema is better, he can get on his shoes.  He has not had any dyspnea, CP, NVD, he has not been taking any NSAIDs.  His creatinine checked recently with Dr. Polo has been stable.  He continues to maintain a low potassium diet.  \par \par 10/19/18 Since our last meeting Julián is feeling markedly better and his edema is vastly improved.  His blood pressure at home has been ranging 120 to 130 / 60 to 70.  He has not been using NSAIDs at all.  He has not had any chest pain or urination issues.  He is still smoking.  He is thinking about trying to taper himself off of dilaudid.\par \par 1/25/19 -- Julián is doing really well today.  His weight has been very stable recently.  He has some body aches which is chronic for him.  He reports that his edema is substantially improved.\par \par 5/28/19 -- feels well.  had hand surgery.  swelling improved.\par \par 12/6/19 -- feels well.  recently stopped xeljanz and is on steroids at the moment.  no increase in swelling no sob no cp no nausea or vomiting.  no urinary complaints.\par \par 6/18/20 -- I saw Julián today and he is feeling well overall staying mostly distanced due to covid 19.  he reports that his edema almost completely resolved.  however in the last few weeks the swelling returned worse on the right.  he is also recently having some cramping in his hands and calves.

## 2020-06-18 NOTE — ASSESSMENT
[FreeTextEntry1] : The patient is a 64 years old man with a history of CAD, DM, and RA here for follow up of CKD.\par \par Chronic Kidney Disease Stage IV -- The etiology on biopsy proven diabetic nephropathy.  The patient's creatinine has been stable and he has no uremic signs or symptoms.  I recommended that he continue diuretics - his volume status is more hypervolemic and his urine protein is increasing.  I recommended a duplex of his right lower extremity, increase ramipril to 10 mg and recheck potassium and renal panel in two weeks.  Increasing ramipril may augment effect of furosemide.  \par \par Hypertension -- BP noted increasing ramipril for proteinuria\par \par Hyperkalemia -- Most recent potassium 4.2.  recheck after increasing ramipril. \par \par Cramps - check magnesium today\par \par RTO4 to 6 months.

## 2020-06-18 NOTE — PHYSICAL EXAM
[General Appearance - In No Acute Distress] : in no acute distress [General Appearance - Alert] : alert [Sclera] : the sclera and conjunctiva were normal [General Appearance - Well Nourished] : well nourished [General Appearance - Well Developed] : well developed [Examination Of The Oral Cavity] : the lips and gums were normal [Neck Appearance] : the appearance of the neck was normal [Neck Cervical Mass (___cm)] : no neck mass was observed [Jugular Venous Distention Increased] : there was no jugular-venous distention [Respiration, Rhythm And Depth] : normal respiratory rhythm and effort [Exaggerated Use Of Accessory Muscles For Inspiration] : no accessory muscle use [Heart Sounds] : normal S1 and S2 [Auscultation Breath Sounds / Voice Sounds] : lungs were clear to auscultation bilaterally [Heart Sounds Pericardial Friction Rub] : no pericardial rub [Murmurs] : no murmurs [Heart Sounds Gallop] : no gallops [Abdomen Tenderness] : non-tender [Abdomen Soft] : soft [] : no hepato-splenomegaly [Abdomen Mass (___ Cm)] : no abdominal mass palpated [FreeTextEntry1] : midline hernia noted [No CVA Tenderness] : no ~M costovertebral angle tenderness [No Spinal Tenderness] : no spinal tenderness [Abnormal Walk] : normal gait [Affect] : the affect was normal [Mood] : the mood was normal [Impaired Insight] : insight and judgment were intact [Oriented To Time, Place, And Person] : oriented to person, place, and time

## 2020-06-18 NOTE — REVIEW OF SYSTEMS
[Fever] : no fever [Chills] : no chills [Feeling Poorly] : not feeling poorly [Eyesight Problems] : no eyesight problems [Feeling Tired] : not feeling tired [Chest Pain] : no chest pain [Palpitations] : no palpitations [Nosebleeds] : no nosebleeds [Shortness Of Breath] : no shortness of breath [Lower Ext Edema] : no extremity edema [Wheezing] : no wheezing [Cough] : no cough [Dysuria] : no dysuria [Abdominal Pain] : no abdominal pain [Vomiting] : no vomiting [Arthralgias] : arthralgias [Incontinence] : no incontinence [Dizziness] : no dizziness [Joint Pain] : joint pain [Fainting] : no fainting [Easy Bleeding] : no tendency for easy bleeding [Easy Bruising] : no tendency for easy bruising

## 2020-06-19 LAB
ALBUMIN SERPL ELPH-MCNC: 3.5 G/DL
ANION GAP SERPL CALC-SCNC: 12 MMOL/L
BUN SERPL-MCNC: 31 MG/DL
CALCIUM SERPL-MCNC: 8.8 MG/DL
CHLORIDE SERPL-SCNC: 102 MMOL/L
CO2 SERPL-SCNC: 23 MMOL/L
CREAT SERPL-MCNC: 2.35 MG/DL
GLUCOSE SERPL-MCNC: 125 MG/DL
MAGNESIUM SERPL-MCNC: 1.8 MG/DL
PHOSPHATE SERPL-MCNC: 4.5 MG/DL
POTASSIUM SERPL-SCNC: 5.3 MMOL/L
SODIUM SERPL-SCNC: 137 MMOL/L

## 2020-06-24 ENCOUNTER — OUTPATIENT (OUTPATIENT)
Dept: OUTPATIENT SERVICES | Facility: HOSPITAL | Age: 65
LOS: 1 days | End: 2020-06-24
Payer: COMMERCIAL

## 2020-06-24 ENCOUNTER — APPOINTMENT (OUTPATIENT)
Dept: ULTRASOUND IMAGING | Facility: CLINIC | Age: 65
End: 2020-06-24
Payer: COMMERCIAL

## 2020-06-24 ENCOUNTER — RESULT REVIEW (OUTPATIENT)
Age: 65
End: 2020-06-24

## 2020-06-24 DIAGNOSIS — Z98.89 OTHER SPECIFIED POSTPROCEDURAL STATES: Chronic | ICD-10-CM

## 2020-06-24 DIAGNOSIS — Z90.89 ACQUIRED ABSENCE OF OTHER ORGANS: Chronic | ICD-10-CM

## 2020-06-24 DIAGNOSIS — Z98.890 OTHER SPECIFIED POSTPROCEDURAL STATES: Chronic | ICD-10-CM

## 2020-06-24 DIAGNOSIS — N18.3 CHRONIC KIDNEY DISEASE, STAGE 3 (MODERATE): ICD-10-CM

## 2020-06-24 DIAGNOSIS — Z98.52 VASECTOMY STATUS: Chronic | ICD-10-CM

## 2020-06-24 DIAGNOSIS — Z90.49 ACQUIRED ABSENCE OF OTHER SPECIFIED PARTS OF DIGESTIVE TRACT: Chronic | ICD-10-CM

## 2020-06-24 PROCEDURE — 93970 EXTREMITY STUDY: CPT | Mod: 26

## 2020-06-24 PROCEDURE — 93970 EXTREMITY STUDY: CPT

## 2020-07-07 ENCOUNTER — RX RENEWAL (OUTPATIENT)
Age: 65
End: 2020-07-07

## 2020-07-09 LAB
ALBUMIN SERPL ELPH-MCNC: 3.2 G/DL
ANION GAP SERPL CALC-SCNC: 12 MMOL/L
BUN SERPL-MCNC: 33 MG/DL
CALCIUM SERPL-MCNC: 7.6 MG/DL
CHLORIDE SERPL-SCNC: 99 MMOL/L
CO2 SERPL-SCNC: 21 MMOL/L
CREAT SERPL-MCNC: 2.27 MG/DL
GLUCOSE SERPL-MCNC: 391 MG/DL
PHOSPHATE SERPL-MCNC: 5 MG/DL
POTASSIUM SERPL-SCNC: 4.9 MMOL/L
SODIUM SERPL-SCNC: 132 MMOL/L

## 2020-07-27 ENCOUNTER — TRANSCRIPTION ENCOUNTER (OUTPATIENT)
Age: 65
End: 2020-07-27

## 2020-07-30 ENCOUNTER — TRANSCRIPTION ENCOUNTER (OUTPATIENT)
Age: 65
End: 2020-07-30

## 2020-07-31 ENCOUNTER — TRANSCRIPTION ENCOUNTER (OUTPATIENT)
Age: 65
End: 2020-07-31

## 2020-08-03 ENCOUNTER — APPOINTMENT (OUTPATIENT)
Dept: RHEUMATOLOGY | Facility: CLINIC | Age: 65
End: 2020-08-03
Payer: COMMERCIAL

## 2020-08-03 VITALS
BODY MASS INDEX: 22.12 KG/M2 | HEART RATE: 87 BPM | DIASTOLIC BLOOD PRESSURE: 70 MMHG | HEIGHT: 71 IN | WEIGHT: 158 LBS | OXYGEN SATURATION: 97 % | SYSTOLIC BLOOD PRESSURE: 135 MMHG | TEMPERATURE: 98.6 F

## 2020-08-03 PROCEDURE — 99214 OFFICE O/P EST MOD 30 MIN: CPT

## 2020-08-04 LAB
25(OH)D3 SERPL-MCNC: 32.3 NG/ML
ALBUMIN SERPL ELPH-MCNC: 3.3 G/DL
ALP BLD-CCNC: 97 U/L
ALT SERPL-CCNC: 20 U/L
ANION GAP SERPL CALC-SCNC: 11 MMOL/L
APPEARANCE: CLEAR
AST SERPL-CCNC: 29 U/L
BASOPHILS # BLD AUTO: 0.09 K/UL
BASOPHILS NFR BLD AUTO: 0.7 %
BILIRUB SERPL-MCNC: 0.2 MG/DL
BILIRUBIN URINE: NEGATIVE
BLOOD URINE: NORMAL
BUN SERPL-MCNC: 36 MG/DL
CALCIUM SERPL-MCNC: 8.4 MG/DL
CHLORIDE SERPL-SCNC: 102 MMOL/L
CO2 SERPL-SCNC: 23 MMOL/L
COLOR: NORMAL
CREAT SERPL-MCNC: 2.45 MG/DL
CREAT SPEC-SCNC: 52 MG/DL
CREAT/PROT UR: 0.3 RATIO
CRP SERPL-MCNC: 0.63 MG/DL
EOSINOPHIL # BLD AUTO: 0.13 K/UL
EOSINOPHIL NFR BLD AUTO: 0.9 %
ERYTHROCYTE [SEDIMENTATION RATE] IN BLOOD BY WESTERGREN METHOD: 91 MM/HR
GLUCOSE QUALITATIVE U: NEGATIVE
GLUCOSE SERPL-MCNC: 240 MG/DL
HCT VFR BLD CALC: 29.1 %
HGB BLD-MCNC: 9.1 G/DL
IMM GRANULOCYTES NFR BLD AUTO: 2.6 %
KETONES URINE: NEGATIVE
LEUKOCYTE ESTERASE URINE: NEGATIVE
LYMPHOCYTES # BLD AUTO: 1.97 K/UL
LYMPHOCYTES NFR BLD AUTO: 14.4 %
MAN DIFF?: NORMAL
MCHC RBC-ENTMCNC: 30.4 PG
MCHC RBC-ENTMCNC: 31.3 GM/DL
MCV RBC AUTO: 97.3 FL
MONOCYTES # BLD AUTO: 1.12 K/UL
MONOCYTES NFR BLD AUTO: 8.2 %
NEUTROPHILS # BLD AUTO: 10.03 K/UL
NEUTROPHILS NFR BLD AUTO: 73.2 %
NITRITE URINE: NEGATIVE
PH URINE: 6
PLATELET # BLD AUTO: 278 K/UL
POTASSIUM SERPL-SCNC: 5.1 MMOL/L
PROT SERPL-MCNC: 6.6 G/DL
PROT UR-MCNC: 18 MG/DL
PROTEIN URINE: NORMAL
RBC # BLD: 2.99 M/UL
RBC # FLD: 12.6 %
SODIUM SERPL-SCNC: 136 MMOL/L
SPECIFIC GRAVITY URINE: 1.01
UROBILINOGEN URINE: NORMAL
WBC # FLD AUTO: 13.69 K/UL

## 2020-08-04 NOTE — HISTORY OF PRESENT ILLNESS
[FreeTextEntry1] : fuv psa, psoriasis changed to orencia from Xeljanz last year for possible anemia related to Rx and also joint pain, then changed to stelara for lack of efficacy \par hx of hyperuricemia, no gout attacks \par  \par no significant skin lesions but has widespread pain, stiffness  \par R wrist swelling increased compared with chronic swelling \par here for evaluation and reassessment of medications\par he's been taking prednisone 10mg and ibuprofen daily\par \par also has hx of HTN, cirrhosis, pancreatitis, DM, CAD\par \par has significant OA on recent hand MRI, persistent R hand pain\par had surgery by Ortho still with stiffness in R wrist, thumb base.\par was seen by HEME for evaluation of anemia- notes reviewed \par \par 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 \par In the passed year based on labs 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) based on JIM, DNA, low complement, low haptoglobin (with normal LDH) with proteinuria\par recent development of +SLE serologies, proteinuria - renal bx NOT c/w SLE nephritis and most recent dsDNA neg \par developed rash on plaquenil \par \par prior meds enbrel (5yrs), cimzia, actemra, kevzara ,xeljanz\par thinks he was most well on enbrel but as above still w/ +dsDNA as of last visit  [Malaise] : no malaise [Chills] : no chills [Skin Lesions] : no lesions [Fever] : no fever [Dry Mouth] : no dry mouth [Chest Pain] : no chest pain [Shortness of Breath] : no shortness of breath [Joint Warmth] : joint warmth [Joint Swelling] : joint swelling [Arthralgias] : arthralgias [Joint Deformity] : joint deformity [Decreased ROM] : decreased range of motion [Morning Stiffness] : morning stiffness [Dry Eyes] : no dry eyes

## 2020-08-04 NOTE — PHYSICAL EXAM
[General Appearance - Alert] : alert [FreeTextEntry1] : R wrist lateral, medial swelling and synovitis  multiple tender PIP, swelling, bilat elbow, knee pain with rom mild limited  [] : no rash [General Appearance - In No Acute Distress] : in no acute distress [No Focal Deficits] : no focal deficits [Oriented To Time, Place, And Person] : oriented to person, place, and time

## 2020-08-04 NOTE — ASSESSMENT
[FreeTextEntry1] : see HPI\par reassessment reveals pain 2/2 PSA/OA \par \par responds to steroids minimally but needs new Rx\par will check labs to assess dsDna\par consider IL17 inhib next line \par patient is aware of my assessment and plans\par will call with results \par

## 2020-08-07 LAB
C3 SERPL-MCNC: 107 MG/DL
C4 SERPL-MCNC: 31 MG/DL
DSDNA AB SER-ACNC: 40 IU/ML
HISTONE AB SER QL: 0.4 UNITS
URATE SERPL-MCNC: 9.9 MG/DL

## 2020-08-07 RX ORDER — USTEKINUMAB 45 MG/.5ML
45 INJECTION, SOLUTION SUBCUTANEOUS
Qty: 6 | Refills: 0 | Status: DISCONTINUED | COMMUNITY
Start: 2020-03-10 | End: 2020-08-07

## 2020-08-10 ENCOUNTER — TRANSCRIPTION ENCOUNTER (OUTPATIENT)
Age: 65
End: 2020-08-10

## 2020-08-10 RX ORDER — PREDNISONE 5 MG/1
5 TABLET ORAL
Qty: 180 | Refills: 1 | Status: DISCONTINUED | COMMUNITY
Start: 2019-12-02 | End: 2020-08-10

## 2020-08-14 ENCOUNTER — TRANSCRIPTION ENCOUNTER (OUTPATIENT)
Age: 65
End: 2020-08-14

## 2020-08-17 ENCOUNTER — RX RENEWAL (OUTPATIENT)
Age: 65
End: 2020-08-17

## 2020-08-31 ENCOUNTER — TRANSCRIPTION ENCOUNTER (OUTPATIENT)
Age: 65
End: 2020-08-31

## 2020-09-02 ENCOUNTER — NON-APPOINTMENT (OUTPATIENT)
Age: 65
End: 2020-09-02

## 2020-09-02 ENCOUNTER — APPOINTMENT (OUTPATIENT)
Dept: CARDIOLOGY | Facility: CLINIC | Age: 65
End: 2020-09-02
Payer: COMMERCIAL

## 2020-09-02 VITALS
SYSTOLIC BLOOD PRESSURE: 132 MMHG | HEART RATE: 65 BPM | DIASTOLIC BLOOD PRESSURE: 64 MMHG | TEMPERATURE: 98 F | WEIGHT: 158 LBS | HEIGHT: 71 IN | OXYGEN SATURATION: 100 % | BODY MASS INDEX: 22.12 KG/M2

## 2020-09-02 PROCEDURE — 93000 ELECTROCARDIOGRAM COMPLETE: CPT

## 2020-09-02 PROCEDURE — 99215 OFFICE O/P EST HI 40 MIN: CPT

## 2020-09-02 NOTE — HISTORY OF PRESENT ILLNESS
[FreeTextEntry1] : Rheum flair over the summer. taking medrol and Cosentix and gabapentin.\par Improved pain. poor balance with higher dose of gabapentin.\par Leg edema worse over the summer.\par \par Still continue to smoke.\par He continues on lasix 80 bid\par \par stable labs.

## 2020-09-02 NOTE — DISCUSSION/SUMMARY
[FreeTextEntry1] : He is a 65-year-old with a history of myocardial infarctions with minimal or no LV dysfunction, improved leg edema and improved hypertension on his current regimen.\par His blood pressure control is perfect. Continue amlodipine to 10 mg daily and lasix 80 bid. Cr. is 2.4 and stable.\par He should continue plavix daily and ASA biw. as well as Statin. Monitor LDL , goal <70.\par New ECG abnormality and fatigue. so\par echo and Pharm nuclear stress test suggested.\par We discussed the need for smoking cessation, again.  \par Encouraged exercise, bike or swimming.\par

## 2020-09-02 NOTE — PHYSICAL EXAM
[General Appearance - Well Nourished] : well nourished [General Appearance - Well Developed] : well developed [General Appearance - In No Acute Distress] : no acute distress [Normal Conjunctiva] : the conjunctiva exhibited no abnormalities [No Oral Pallor] : no oral pallor [Normal Jugular Venous V Waves Present] : normal jugular venous V waves present [Respiration, Rhythm And Depth] : normal respiratory rhythm and effort [Auscultation Breath Sounds / Voice Sounds] : lungs were clear to auscultation bilaterally [Heart Sounds] : normal S1 and S2 [Murmurs] : no murmurs present [Arterial Pulses Normal] : the arterial pulses were normal [Edema] : no peripheral edema present [Regular] : the rhythm was regular [Bowel Sounds] : normal bowel sounds [Abdomen Soft] : soft [Abnormal Walk] : normal gait [Nail Clubbing] : no clubbing of the fingernails [Cyanosis, Localized] : no localized cyanosis [Skin Turgor] : normal skin turgor [Oriented To Time, Place, And Person] : oriented to person, place, and time [Impaired Insight] : insight and judgment were intact [Affect] : the affect was normal

## 2020-09-21 ENCOUNTER — LABORATORY RESULT (OUTPATIENT)
Age: 65
End: 2020-09-21

## 2020-09-21 ENCOUNTER — APPOINTMENT (OUTPATIENT)
Dept: RHEUMATOLOGY | Facility: CLINIC | Age: 65
End: 2020-09-21
Payer: COMMERCIAL

## 2020-09-21 VITALS
TEMPERATURE: 97.2 F | OXYGEN SATURATION: 98 % | BODY MASS INDEX: 21.98 KG/M2 | HEART RATE: 55 BPM | HEIGHT: 71 IN | SYSTOLIC BLOOD PRESSURE: 155 MMHG | WEIGHT: 157 LBS | DIASTOLIC BLOOD PRESSURE: 76 MMHG

## 2020-09-21 PROCEDURE — 99215 OFFICE O/P EST HI 40 MIN: CPT

## 2020-09-22 LAB
25(OH)D3 SERPL-MCNC: 23.9 NG/ML
ALBUMIN SERPL ELPH-MCNC: 4 G/DL
ALP BLD-CCNC: 135 U/L
ALT SERPL-CCNC: 36 U/L
ANION GAP SERPL CALC-SCNC: 10 MMOL/L
APPEARANCE: CLEAR
AST SERPL-CCNC: 37 U/L
BASOPHILS # BLD AUTO: 0.1 K/UL
BASOPHILS NFR BLD AUTO: 0.7 %
BILIRUB SERPL-MCNC: 0.2 MG/DL
BILIRUBIN URINE: NEGATIVE
BLOOD URINE: NEGATIVE
BUN SERPL-MCNC: 33 MG/DL
CALCIUM SERPL-MCNC: 8.7 MG/DL
CHLORIDE SERPL-SCNC: 104 MMOL/L
CO2 SERPL-SCNC: 22 MMOL/L
COLOR: NORMAL
CREAT SERPL-MCNC: 1.95 MG/DL
CREAT SPEC-SCNC: 82 MG/DL
CREAT/PROT UR: 1.5 RATIO
CRP SERPL-MCNC: <0.1 MG/DL
EOSINOPHIL # BLD AUTO: 0.09 K/UL
EOSINOPHIL NFR BLD AUTO: 0.6 %
GLUCOSE QUALITATIVE U: NORMAL
GLUCOSE SERPL-MCNC: 99 MG/DL
HCT VFR BLD CALC: 32.8 %
HGB BLD-MCNC: 10.6 G/DL
IMM GRANULOCYTES NFR BLD AUTO: 2.9 %
KETONES URINE: NEGATIVE
LEUKOCYTE ESTERASE URINE: NEGATIVE
LYMPHOCYTES # BLD AUTO: 1.62 K/UL
LYMPHOCYTES NFR BLD AUTO: 10.8 %
MAN DIFF?: NORMAL
MCHC RBC-ENTMCNC: 32.2 PG
MCHC RBC-ENTMCNC: 32.3 GM/DL
MCV RBC AUTO: 99.7 FL
MONOCYTES # BLD AUTO: 0.9 K/UL
MONOCYTES NFR BLD AUTO: 6 %
NEUTROPHILS # BLD AUTO: 11.86 K/UL
NEUTROPHILS NFR BLD AUTO: 79 %
NITRITE URINE: NEGATIVE
PH URINE: 6
PLATELET # BLD AUTO: 183 K/UL
POTASSIUM SERPL-SCNC: 5.7 MMOL/L
PROT SERPL-MCNC: 6.8 G/DL
PROT UR-MCNC: 125 MG/DL
PROTEIN URINE: ABNORMAL
RBC # BLD: 3.29 M/UL
RBC # FLD: 15.3 %
SODIUM SERPL-SCNC: 136 MMOL/L
SPECIFIC GRAVITY URINE: 1.02
TSH SERPL-ACNC: 3.39 UIU/ML
UROBILINOGEN URINE: NORMAL
WBC # FLD AUTO: 15 K/UL

## 2020-09-25 ENCOUNTER — TRANSCRIPTION ENCOUNTER (OUTPATIENT)
Age: 65
End: 2020-09-25

## 2020-09-25 LAB
C3 SERPL-MCNC: 98 MG/DL
C4 SERPL-MCNC: 25 MG/DL
DSDNA AB SER-ACNC: 31 IU/ML
HISTONE AB SER QL: 0.3 UNITS

## 2020-09-28 ENCOUNTER — RX RENEWAL (OUTPATIENT)
Age: 65
End: 2020-09-28

## 2020-10-05 ENCOUNTER — TRANSCRIPTION ENCOUNTER (OUTPATIENT)
Age: 65
End: 2020-10-05

## 2020-10-08 ENCOUNTER — APPOINTMENT (OUTPATIENT)
Dept: CARDIOLOGY | Facility: CLINIC | Age: 65
End: 2020-10-08
Payer: COMMERCIAL

## 2020-10-08 PROCEDURE — 78452 HT MUSCLE IMAGE SPECT MULT: CPT

## 2020-10-08 PROCEDURE — 93306 TTE W/DOPPLER COMPLETE: CPT

## 2020-10-08 PROCEDURE — A9500: CPT

## 2020-10-08 PROCEDURE — 93015 CV STRESS TEST SUPVJ I&R: CPT

## 2020-10-12 DIAGNOSIS — E87.5 HYPERKALEMIA: ICD-10-CM

## 2020-10-12 LAB
ALBUMIN SERPL ELPH-MCNC: 3.8 G/DL
ANION GAP SERPL CALC-SCNC: 11 MMOL/L
BUN SERPL-MCNC: 31 MG/DL
CALCIUM SERPL-MCNC: 8.4 MG/DL
CHLORIDE SERPL-SCNC: 102 MMOL/L
CO2 SERPL-SCNC: 25 MMOL/L
CREAT SERPL-MCNC: 2.27 MG/DL
GLUCOSE SERPL-MCNC: 290 MG/DL
PHOSPHATE SERPL-MCNC: 5.2 MG/DL
POTASSIUM SERPL-SCNC: 5.8 MMOL/L
SODIUM SERPL-SCNC: 138 MMOL/L

## 2020-10-19 ENCOUNTER — RX RENEWAL (OUTPATIENT)
Age: 65
End: 2020-10-19

## 2020-10-19 NOTE — PROGRESS NOTE ADULT - ASSESSMENT
Response sent via [employer]. 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

## 2020-10-20 ENCOUNTER — APPOINTMENT (OUTPATIENT)
Dept: RADIOLOGY | Facility: CLINIC | Age: 65
End: 2020-10-20
Payer: COMMERCIAL

## 2020-10-20 ENCOUNTER — OUTPATIENT (OUTPATIENT)
Dept: OUTPATIENT SERVICES | Facility: HOSPITAL | Age: 65
LOS: 1 days | End: 2020-10-20
Payer: COMMERCIAL

## 2020-10-20 DIAGNOSIS — Z98.89 OTHER SPECIFIED POSTPROCEDURAL STATES: Chronic | ICD-10-CM

## 2020-10-20 DIAGNOSIS — Z98.890 OTHER SPECIFIED POSTPROCEDURAL STATES: Chronic | ICD-10-CM

## 2020-10-20 DIAGNOSIS — Z90.89 ACQUIRED ABSENCE OF OTHER ORGANS: Chronic | ICD-10-CM

## 2020-10-20 DIAGNOSIS — Z98.52 VASECTOMY STATUS: Chronic | ICD-10-CM

## 2020-10-20 DIAGNOSIS — Z90.49 ACQUIRED ABSENCE OF OTHER SPECIFIED PARTS OF DIGESTIVE TRACT: Chronic | ICD-10-CM

## 2020-10-20 DIAGNOSIS — Z00.8 ENCOUNTER FOR OTHER GENERAL EXAMINATION: ICD-10-CM

## 2020-10-20 PROCEDURE — 77080 DXA BONE DENSITY AXIAL: CPT

## 2020-10-20 PROCEDURE — 77080 DXA BONE DENSITY AXIAL: CPT | Mod: 26

## 2020-10-27 ENCOUNTER — TRANSCRIPTION ENCOUNTER (OUTPATIENT)
Age: 65
End: 2020-10-27

## 2020-10-28 ENCOUNTER — TRANSCRIPTION ENCOUNTER (OUTPATIENT)
Age: 65
End: 2020-10-28

## 2020-10-29 ENCOUNTER — TRANSCRIPTION ENCOUNTER (OUTPATIENT)
Age: 65
End: 2020-10-29

## 2020-10-30 ENCOUNTER — NON-APPOINTMENT (OUTPATIENT)
Age: 65
End: 2020-10-30

## 2020-10-30 ENCOUNTER — TRANSCRIPTION ENCOUNTER (OUTPATIENT)
Age: 65
End: 2020-10-30

## 2020-10-30 LAB
ALBUMIN SERPL ELPH-MCNC: 3.3 G/DL
ANION GAP SERPL CALC-SCNC: 13 MMOL/L
BUN SERPL-MCNC: 31 MG/DL
CALCIUM SERPL-MCNC: 8 MG/DL
CHLORIDE SERPL-SCNC: 104 MMOL/L
CO2 SERPL-SCNC: 22 MMOL/L
CREAT SERPL-MCNC: 2.26 MG/DL
GLUCOSE SERPL-MCNC: 180 MG/DL
PHOSPHATE SERPL-MCNC: 5 MG/DL
POTASSIUM SERPL-SCNC: 4.8 MMOL/L
SODIUM SERPL-SCNC: 139 MMOL/L

## 2020-11-02 ENCOUNTER — TRANSCRIPTION ENCOUNTER (OUTPATIENT)
Age: 65
End: 2020-11-02

## 2020-11-04 ENCOUNTER — APPOINTMENT (OUTPATIENT)
Dept: INTERNAL MEDICINE | Facility: CLINIC | Age: 65
End: 2020-11-04
Payer: COMMERCIAL

## 2020-11-04 ENCOUNTER — APPOINTMENT (OUTPATIENT)
Dept: RHEUMATOLOGY | Facility: CLINIC | Age: 65
End: 2020-11-04
Payer: COMMERCIAL

## 2020-11-04 ENCOUNTER — RX RENEWAL (OUTPATIENT)
Age: 65
End: 2020-11-04

## 2020-11-04 VITALS
HEART RATE: 70 BPM | DIASTOLIC BLOOD PRESSURE: 60 MMHG | OXYGEN SATURATION: 98 % | HEIGHT: 71 IN | RESPIRATION RATE: 15 BRPM | BODY MASS INDEX: 22.4 KG/M2 | WEIGHT: 160 LBS | TEMPERATURE: 97.2 F | SYSTOLIC BLOOD PRESSURE: 120 MMHG

## 2020-11-04 VITALS
DIASTOLIC BLOOD PRESSURE: 60 MMHG | HEIGHT: 71 IN | OXYGEN SATURATION: 98 % | BODY MASS INDEX: 22.4 KG/M2 | HEART RATE: 72 BPM | WEIGHT: 160 LBS | SYSTOLIC BLOOD PRESSURE: 120 MMHG

## 2020-11-04 DIAGNOSIS — G62.9 POLYNEUROPATHY, UNSPECIFIED: ICD-10-CM

## 2020-11-04 DIAGNOSIS — Z00.00 ENCOUNTER FOR GENERAL ADULT MEDICAL EXAMINATION W/OUT ABNORMAL FINDINGS: ICD-10-CM

## 2020-11-04 LAB
ALBUMIN SERPL ELPH-MCNC: 3.6 G/DL
ALP BLD-CCNC: 104 U/L
ALT SERPL-CCNC: 22 U/L
ANION GAP SERPL CALC-SCNC: 10 MMOL/L
AST SERPL-CCNC: 28 U/L
BILIRUB SERPL-MCNC: 0.2 MG/DL
BUN SERPL-MCNC: 33 MG/DL
CALCIUM SERPL-MCNC: 8.3 MG/DL
CHLORIDE SERPL-SCNC: 103 MMOL/L
CO2 SERPL-SCNC: 26 MMOL/L
CREAT SERPL-MCNC: 2.47 MG/DL
GLUCOSE SERPL-MCNC: 93 MG/DL
POTASSIUM SERPL-SCNC: 4.8 MMOL/L
PROT SERPL-MCNC: 6.2 G/DL
SODIUM SERPL-SCNC: 139 MMOL/L

## 2020-11-04 PROCEDURE — 99072 ADDL SUPL MATRL&STAF TM PHE: CPT

## 2020-11-04 PROCEDURE — G0442 ANNUAL ALCOHOL SCREEN 15 MIN: CPT | Mod: NC

## 2020-11-04 PROCEDURE — 96401 CHEMO ANTI-NEOPL SQ/IM: CPT

## 2020-11-04 PROCEDURE — 99397 PER PM REEVAL EST PAT 65+ YR: CPT

## 2020-11-05 ENCOUNTER — MED ADMIN CHARGE (OUTPATIENT)
Age: 65
End: 2020-11-05

## 2020-11-05 RX ORDER — DENOSUMAB 60 MG/ML
60 INJECTION SUBCUTANEOUS
Qty: 1 | Refills: 0 | Status: COMPLETED | OUTPATIENT
Start: 2020-11-05

## 2020-11-05 RX ADMIN — DENOSUMAB 0 MG/ML: 60 INJECTION SUBCUTANEOUS at 00:00

## 2020-11-06 ENCOUNTER — TRANSCRIPTION ENCOUNTER (OUTPATIENT)
Age: 65
End: 2020-11-06

## 2020-11-06 LAB
AMYLASE/CREAT SERPL: 53 U/L
CANCER AG19-9 SERPL-ACNC: 37 U/ML
ESTIMATED AVERAGE GLUCOSE: 217 MG/DL
FOLATE SERPL-MCNC: >20 NG/ML
HBA1C MFR BLD HPLC: 9.2 %
LPL SERPL-CCNC: 6 U/L
VIT B12 SERPL-MCNC: 982 PG/ML

## 2020-11-16 ENCOUNTER — APPOINTMENT (OUTPATIENT)
Dept: ENDOCRINOLOGY | Facility: CLINIC | Age: 65
End: 2020-11-16

## 2020-11-16 ENCOUNTER — APPOINTMENT (OUTPATIENT)
Dept: NEPHROLOGY | Facility: CLINIC | Age: 65
End: 2020-11-16
Payer: COMMERCIAL

## 2020-11-16 VITALS
HEIGHT: 71 IN | DIASTOLIC BLOOD PRESSURE: 69 MMHG | HEART RATE: 77 BPM | WEIGHT: 167.55 LBS | OXYGEN SATURATION: 99 % | BODY MASS INDEX: 23.46 KG/M2 | SYSTOLIC BLOOD PRESSURE: 121 MMHG

## 2020-11-16 PROCEDURE — 99072 ADDL SUPL MATRL&STAF TM PHE: CPT

## 2020-11-16 PROCEDURE — 99213 OFFICE O/P EST LOW 20 MIN: CPT | Mod: 25

## 2020-11-16 NOTE — REVIEW OF SYSTEMS
[Fever] : no fever [Chills] : no chills [Feeling Poorly] : not feeling poorly [Feeling Tired] : not feeling tired [Eyesight Problems] : no eyesight problems [Nosebleeds] : no nosebleeds [Chest Pain] : no chest pain [Palpitations] : no palpitations [Lower Ext Edema] : lower extremity edema [Shortness Of Breath] : no shortness of breath [Wheezing] : no wheezing [Cough] : cough [Abdominal Pain] : no abdominal pain [Vomiting] : no vomiting [Diarrhea] : diarrhea [Dysuria] : no dysuria [Incontinence] : no incontinence [Arthralgias] : arthralgias [Joint Pain] : joint pain [Dizziness] : no dizziness [Fainting] : no fainting [Easy Bleeding] : no tendency for easy bleeding [Easy Bruising] : no tendency for easy bruising

## 2020-11-16 NOTE — ASSESSMENT
[FreeTextEntry1] : The patient is a 64 years old man with a history of CAD, DM, and RA here for follow up of CKD.\par \par Chronic Kidney Disease Stage IV -- The etiology on biopsy proven diabetic nephropathy.  His weight is up but his breathing is not worse and he does not have appreciable JVD and his edema seems similar to previous.  Possibly some contribution from steroids.  I recommended to continue the same dose of diuretics for now and i reinforced sodium restriction.  Patient continues to be on sodium bicarbonate and ramipril to prevent gfr decline.\par \par Hypertension -- BP improved\par \par Hyperkalemia -- Most recent potassium acceptable.  Stay on bicarbonate and low k diet.  ok to continue ramipril\par \par RTO4 to 6 months.

## 2020-11-16 NOTE — PHYSICAL EXAM
[General Appearance - Alert] : alert [General Appearance - In No Acute Distress] : in no acute distress [General Appearance - Well Nourished] : well nourished [General Appearance - Well Developed] : well developed [Sclera] : the sclera and conjunctiva were normal [Examination Of The Oral Cavity] : the lips and gums were normal [Neck Appearance] : the appearance of the neck was normal [Neck Cervical Mass (___cm)] : no neck mass was observed [Jugular Venous Distention Increased] : there was no jugular-venous distention [] : no respiratory distress [Respiration, Rhythm And Depth] : normal respiratory rhythm and effort [Exaggerated Use Of Accessory Muscles For Inspiration] : no accessory muscle use [Auscultation Breath Sounds / Voice Sounds] : lungs were clear to auscultation bilaterally [Heart Sounds] : normal S1 and S2 [Heart Sounds Gallop] : no gallops [Murmurs] : no murmurs [Heart Sounds Pericardial Friction Rub] : no pericardial rub [FreeTextEntry1] : bilateral lower extremity edema seem similar if not improved compared to previous.  trace periorbital edema [No CVA Tenderness] : no ~M costovertebral angle tenderness [No Spinal Tenderness] : no spinal tenderness [Abnormal Walk] : normal gait [Oriented To Time, Place, And Person] : oriented to person, place, and time [Impaired Insight] : insight and judgment were intact [Affect] : the affect was normal [Mood] : the mood was normal

## 2020-11-29 PROBLEM — G62.9 NEUROPATHY: Status: ACTIVE | Noted: 2020-04-20

## 2020-11-29 NOTE — REVIEW OF SYSTEMS
[Fever] : no fever [Chills] : no chills [Night Sweats] : no night sweats [Chest Pain] : no chest pain [Lower Ext Edema] : lower extremity edema [Orthopena] : no orthopnea [Shortness Of Breath] : no shortness of breath [Dyspnea on Exertion] : dyspnea on exertion [Joint Pain] : joint pain [Joint Stiffness] : joint stiffness [Joint Swelling] : joint swelling [Negative] : Heme/Lymph [FreeTextEntry7] : can deal with looser stools

## 2020-11-29 NOTE — HEALTH RISK ASSESSMENT
[Fair] : ~his/her~ current health as fair  [Good] : ~his/her~  mood as  good [] : Yes [21-25] : 21-25 [Monthly or less (1 pt)] : Monthly or less (1 point) [No] : In the past 12 months have you used drugs other than those required for medical reasons? No [No falls in past year] : Patient reported no falls in the past year [0] : 2) Feeling down, depressed, or hopeless: Not at all (0) [Audit-CScore] : 0 [de-identified] : medical MJ [de-identified] : occ walks golf course w friends if up for playing [Patient reported bone density results were normal] : Patient reported bone density results were normal [Patient reported colonoscopy was normal] : Patient reported colonoscopy was normal [None] : None [With Family] : lives with family [Retired] : retired [Graduate School] : graduate school [] :  [# Of Children ___] : has [unfilled] children [High Risk Behavior] : no high risk behavior [Feels Safe at Home] : Feels safe at home [Fully functional (bathing, dressing, toileting, transferring, walking, feeding)] : Fully functional (bathing, dressing, toileting, transferring, walking, feeding) [Fully functional (using the telephone, shopping, preparing meals, housekeeping, doing laundry, using] : Fully functional and needs no help or supervision to perform IADLs (using the telephone, shopping, preparing meals, housekeeping, doing laundry, using transportation, managing medications and managing finances) [Reports changes in hearing] : Reports no changes in hearing [Reports changes in vision] : Reports no changes in vision [Reports changes in dental health] : Reports no changes in dental health [Smoke Detector] : smoke detector [Carbon Monoxide Detector] : carbon monoxide detector [Guns at Home] : no guns at home [Safety elements used in home] : safety elements used in home [Seat Belt] :  uses seat belt [Sunscreen] : uses sunscreen [Travel to Developing Areas] : travel to developing areas [TB Exposure] : is not being exposed to tuberculosis [Caregiver Concerns] : does not have caregiver concerns [BoneDensityDate] : 10/20 [ColonoscopyDate] : 1/17/07 [ColonoscopyComments] : FITs more recently, neg [HepatitisCDate] : 03/18 [Name: ___] : Health Care Proxy's Name: [unfilled]  [Relationship: ___] : Relationship: [unfilled]

## 2020-11-29 NOTE — PHYSICAL EXAM
[No Acute Distress] : no acute distress [Well Nourished] : well nourished [Well Developed] : well developed [Well-Appearing] : well-appearing [Normal Sclera/Conjunctiva] : normal sclera/conjunctiva [PERRL] : pupils equal round and reactive to light [EOMI] : extraocular movements intact [Normal Outer Ear/Nose] : the outer ears and nose were normal in appearance [Normal Oropharynx] : the oropharynx was normal [Normal TMs] : both tympanic membranes were normal [Normal Nasal Mucosa] : the nasal mucosa was normal [No JVD] : no jugular venous distention [Supple] : supple [No Lymphadenopathy] : no lymphadenopathy [Thyroid Normal, No Nodules] : the thyroid was normal and there were no nodules present [No Respiratory Distress] : no respiratory distress  [Clear to Auscultation] : lungs were clear to auscultation bilaterally [No Accessory Muscle Use] : no accessory muscle use [Normal Percussion] : the chest was normal to percussion [Normal Rate] : normal rate  [Regular Rhythm] : with a regular rhythm [Normal S1, S2] : normal S1 and S2 [No Murmur] : no murmur heard [No Carotid Bruits] : no carotid bruits [No Abdominal Bruit] : a ~M bruit was not heard ~T in the abdomen [No Extremity Clubbing/Cyanosis] : no extremity clubbing/cyanosis [No Palpable Aorta] : no palpable aorta [Soft] : abdomen soft [Non Tender] : non-tender [Non-distended] : non-distended [No Masses] : no abdominal mass palpated [No HSM] : no HSM [Normal Bowel Sounds] : normal bowel sounds [No Hernias] : no hernias [Normal Supraclavicular Nodes] : no supraclavicular lymphadenopathy [Normal Posterior Cervical Nodes] : no posterior cervical lymphadenopathy [Normal Anterior Cervical Nodes] : no anterior cervical lymphadenopathy [Normal Inguinal Nodes] : no inguinal lymphadenopathy [No CVA Tenderness] : no CVA  tenderness [No Spinal Tenderness] : no spinal tenderness [No Joint Swelling] : no joint swelling [Grossly Normal Strength/Tone] : grossly normal strength/tone [No Rash] : no rash [No Skin Lesions] : no skin lesions [Normal Gait] : normal gait [Coordination Grossly Intact] : coordination grossly intact [No Focal Deficits] : no focal deficits [Deep Tendon Reflexes (DTR)] : deep tendon reflexes were 2+ and symmetric [Speech Grossly Normal] : speech grossly normal [Memory Grossly Normal] : memory grossly normal [Normal Affect] : the affect was normal [Alert and Oriented x3] : oriented to person, place, and time [Normal Mood] : the mood was normal [Normal Insight/Judgement] : insight and judgment were intact [Right Foot Was Examined] : Right foot ~C was examined [Left Foot Was Examined] : left foot ~C was examined [None] : no ulcers in either foot were found [] : both feet [de-identified] : no icterus, pallor [de-identified] : trace non pitting in LEs [de-identified] : no suspicious nevi; continues to have livedo like lattice appearance on skin of abdomen [de-identified] : scattered loss of sensation for filament.   [TWNoteComboBox1] : +1 [TWNoteComboBox2] : +1 [TWNoteComboBox3] : +2 [TWNoteComboBox5] : +1 [TWNoteComboBox6] : +1 [TWNoteComboBox4] : +2

## 2020-11-29 NOTE — COUNSELING
[Fall prevention counseling provided] : Fall prevention counseling provided [Adequate lighting] : Adequate lighting [No throw rugs] : No throw rugs [Use proper foot wear] : Use proper foot wear [Use recommended devices] : Use recommended devices [Sleep ___ hours/day] : Sleep [unfilled] hours/day [AUDIT-C Screening administered and reviewed] : AUDIT-C Screening administered and reviewed [Hazards of at-risk alcohol use discussed] : Hazards of at-risk alcohol use discussed [Encouraged to increase physical activity] : Encouraged to increase physical activity [____ min/wk Activity] : [unfilled] min/wk activity [Weight management counseling provided] : Weight management [Healthy eating counseling provided] : healthy eating [Activity counseling provided] : activity [Smoking cessation counseling provided] : smoking cessation [Decrease Portions] : Decrease food portions [Walking] : Walking [Quit Smoking] : Quit smoking [Engage in a relaxing activity] : Engage in a relaxing activity [None] : None [Good understanding] : Patient has a good understanding of lifestyle changes and the steps needed to achieve self management goals

## 2020-11-29 NOTE — HISTORY OF PRESENT ILLNESS
[FreeTextEntry1] : Here for annual exam [de-identified] : Here for annual.  He is dealing with a complicated medical hx the best he can.  He has had some more fatigue, muscle cramping, feeling of fatigue going up stairs, some moments of decreased balance, can't feel feet well.  Has had no exposures/syx of COVID he thinks; has for most part stayed home during surges.  Has no cp w exertion, can feel winded going up stairs.  Did a stress thall with Dr. Lisker, which had some artifact and essentially did not appear to show any new ischemia, EF of 51%, hypokinesis of distal inferior and apical wall.  He has intermittent edema in LEs, which is likely multifactorial.  Is currently on maintenance steroid for his RA/PA, and taking Cosentyx.  Has had ups and downs in his K and Ca2+, his Cr/GFR has held stable for the most part - seeing Dr. Castro and Maury consistently.  To take prolia for advancing osteoporosis which is likely related to his RA/rx of same/remote EtOH intake/CKD, etc.  Has proteinuria, ACEi being held carefully.

## 2020-11-29 NOTE — ASSESSMENT
[FreeTextEntry1] : 1) HCM - flu vacc already in community . TDAP utd . PNVX utd . Shingrix done 2018 . SB, SD, sunblock, exercise/wt loss strategy discussed.  Colon screen - as his comorbidities have advanced, have pulled back to FIT - has kit from last visit, encouraged . Has had myriad of labs - due for A1C, he's asking for lipase/ [place] [facility] [place] was in - done.  2) DM - sees Dr. Estrada, on sensor/pump given brittle nature of his DM/chronic pancreatitis factor.  Microalb followed with renal/rheum, up; on ramipril 10 daily with sometimes lower bp based on his PO intake/syx of his pancreatitis/pain, etc.  Self foot inspection and seeing podiatry reminded, given his neuropathy.  Gabapentin has been used but has its ceiling, has trouble tolerating it past 200 daily; also needs care w dose given CKD.  Keeps up w ophtho annually. 3) bp excellent.  Keeps to lower Na diet given his CKD, tendency for LE edema.  4) CAD - recent surveillance seems unremarkable; has been on statin he can tolerate x years, lovastatin.  Maintaining - also on asa/clopidogrel carefully given subocclusive carotid dz and any residual MI risk.  Seeing card.  5) balance - ?all neuropathy.  Adding B12 given his risk for malabsorption.  ?vestibular rehab at some post COVID point.  6) very grateful for renal/rheum monitoring - he is engaged with that follow up well.  7) 6 mos fu.  \par

## 2020-12-09 ENCOUNTER — RX RENEWAL (OUTPATIENT)
Age: 65
End: 2020-12-09

## 2020-12-20 ENCOUNTER — INPATIENT (INPATIENT)
Facility: HOSPITAL | Age: 65
LOS: 2 days | Discharge: HOME | DRG: 291 | End: 2020-12-23
Attending: STUDENT IN AN ORGANIZED HEALTH CARE EDUCATION/TRAINING PROGRAM | Admitting: HOSPITALIST
Payer: COMMERCIAL

## 2020-12-20 VITALS
OXYGEN SATURATION: 89 % | SYSTOLIC BLOOD PRESSURE: 151 MMHG | RESPIRATION RATE: 24 BRPM | HEIGHT: 71 IN | TEMPERATURE: 98 F | WEIGHT: 160.06 LBS | DIASTOLIC BLOOD PRESSURE: 78 MMHG | HEART RATE: 106 BPM

## 2020-12-20 DIAGNOSIS — Z90.89 ACQUIRED ABSENCE OF OTHER ORGANS: Chronic | ICD-10-CM

## 2020-12-20 DIAGNOSIS — Z29.9 ENCOUNTER FOR PROPHYLACTIC MEASURES, UNSPECIFIED: ICD-10-CM

## 2020-12-20 DIAGNOSIS — Z87.39 PERSONAL HISTORY OF OTHER DISEASES OF THE MUSCULOSKELETAL SYSTEM AND CONNECTIVE TISSUE: ICD-10-CM

## 2020-12-20 DIAGNOSIS — Z98.890 OTHER SPECIFIED POSTPROCEDURAL STATES: Chronic | ICD-10-CM

## 2020-12-20 DIAGNOSIS — D72.829 ELEVATED WHITE BLOOD CELL COUNT, UNSPECIFIED: ICD-10-CM

## 2020-12-20 DIAGNOSIS — Z87.19 PERSONAL HISTORY OF OTHER DISEASES OF THE DIGESTIVE SYSTEM: ICD-10-CM

## 2020-12-20 DIAGNOSIS — R06.02 SHORTNESS OF BREATH: ICD-10-CM

## 2020-12-20 DIAGNOSIS — R06.00 DYSPNEA, UNSPECIFIED: ICD-10-CM

## 2020-12-20 DIAGNOSIS — E11.9 TYPE 2 DIABETES MELLITUS WITHOUT COMPLICATIONS: ICD-10-CM

## 2020-12-20 DIAGNOSIS — E83.51 HYPOCALCEMIA: ICD-10-CM

## 2020-12-20 DIAGNOSIS — I10 ESSENTIAL (PRIMARY) HYPERTENSION: ICD-10-CM

## 2020-12-20 DIAGNOSIS — I25.10 ATHEROSCLEROTIC HEART DISEASE OF NATIVE CORONARY ARTERY WITHOUT ANGINA PECTORIS: ICD-10-CM

## 2020-12-20 DIAGNOSIS — Z98.52 VASECTOMY STATUS: Chronic | ICD-10-CM

## 2020-12-20 DIAGNOSIS — Z02.9 ENCOUNTER FOR ADMINISTRATIVE EXAMINATIONS, UNSPECIFIED: ICD-10-CM

## 2020-12-20 DIAGNOSIS — Z98.89 OTHER SPECIFIED POSTPROCEDURAL STATES: Chronic | ICD-10-CM

## 2020-12-20 DIAGNOSIS — E87.5 HYPERKALEMIA: ICD-10-CM

## 2020-12-20 DIAGNOSIS — Z90.49 ACQUIRED ABSENCE OF OTHER SPECIFIED PARTS OF DIGESTIVE TRACT: Chronic | ICD-10-CM

## 2020-12-20 DIAGNOSIS — I65.22 OCCLUSION AND STENOSIS OF LEFT CAROTID ARTERY: ICD-10-CM

## 2020-12-20 DIAGNOSIS — N18.30 CHRONIC KIDNEY DISEASE, STAGE 3 UNSPECIFIED: ICD-10-CM

## 2020-12-20 LAB
ALBUMIN SERPL ELPH-MCNC: 3.4 G/DL — SIGNIFICANT CHANGE UP (ref 3.3–5)
ALBUMIN SERPL ELPH-MCNC: 3.4 G/DL — SIGNIFICANT CHANGE UP (ref 3.3–5)
ALP SERPL-CCNC: 93 U/L — SIGNIFICANT CHANGE UP (ref 40–120)
ALP SERPL-CCNC: 99 U/L — SIGNIFICANT CHANGE UP (ref 40–120)
ALT FLD-CCNC: 23 U/L — SIGNIFICANT CHANGE UP (ref 10–45)
ALT FLD-CCNC: 24 U/L — SIGNIFICANT CHANGE UP (ref 10–45)
ANION GAP SERPL CALC-SCNC: 12 MMOL/L — SIGNIFICANT CHANGE UP (ref 5–17)
ANION GAP SERPL CALC-SCNC: 13 MMOL/L — SIGNIFICANT CHANGE UP (ref 5–17)
ANION GAP SERPL CALC-SCNC: 14 MMOL/L — SIGNIFICANT CHANGE UP (ref 5–17)
APTT BLD: 33.4 SEC — SIGNIFICANT CHANGE UP (ref 27.5–35.5)
AST SERPL-CCNC: 30 U/L — SIGNIFICANT CHANGE UP (ref 10–40)
AST SERPL-CCNC: 34 U/L — SIGNIFICANT CHANGE UP (ref 10–40)
BASOPHILS # BLD AUTO: 0.08 K/UL — SIGNIFICANT CHANGE UP (ref 0–0.2)
BASOPHILS NFR BLD AUTO: 0.6 % — SIGNIFICANT CHANGE UP (ref 0–2)
BILIRUB SERPL-MCNC: 0.3 MG/DL — SIGNIFICANT CHANGE UP (ref 0.2–1.2)
BILIRUB SERPL-MCNC: 0.4 MG/DL — SIGNIFICANT CHANGE UP (ref 0.2–1.2)
BUN SERPL-MCNC: 26 MG/DL — HIGH (ref 7–23)
BUN SERPL-MCNC: 28 MG/DL — HIGH (ref 7–23)
BUN SERPL-MCNC: 28 MG/DL — HIGH (ref 7–23)
CALCIUM SERPL-MCNC: 6.5 MG/DL — CRITICAL LOW (ref 8.4–10.5)
CALCIUM SERPL-MCNC: 7 MG/DL — LOW (ref 8.4–10.5)
CALCIUM SERPL-MCNC: 7.2 MG/DL — LOW (ref 8.4–10.5)
CHLORIDE SERPL-SCNC: 105 MMOL/L — SIGNIFICANT CHANGE UP (ref 96–108)
CHLORIDE SERPL-SCNC: 105 MMOL/L — SIGNIFICANT CHANGE UP (ref 96–108)
CHLORIDE SERPL-SCNC: 106 MMOL/L — SIGNIFICANT CHANGE UP (ref 96–108)
CO2 SERPL-SCNC: 16 MMOL/L — LOW (ref 22–31)
CO2 SERPL-SCNC: 19 MMOL/L — LOW (ref 22–31)
CO2 SERPL-SCNC: 19 MMOL/L — LOW (ref 22–31)
CREAT SERPL-MCNC: 2.41 MG/DL — HIGH (ref 0.5–1.3)
CREAT SERPL-MCNC: 2.42 MG/DL — HIGH (ref 0.5–1.3)
CREAT SERPL-MCNC: 2.53 MG/DL — HIGH (ref 0.5–1.3)
EOSINOPHIL # BLD AUTO: 0.03 K/UL — SIGNIFICANT CHANGE UP (ref 0–0.5)
EOSINOPHIL NFR BLD AUTO: 0.2 % — SIGNIFICANT CHANGE UP (ref 0–6)
GLUCOSE SERPL-MCNC: 163 MG/DL — HIGH (ref 70–99)
GLUCOSE SERPL-MCNC: 238 MG/DL — HIGH (ref 70–99)
GLUCOSE SERPL-MCNC: 289 MG/DL — HIGH (ref 70–99)
HCT VFR BLD CALC: 32.8 % — LOW (ref 39–50)
HGB BLD-MCNC: 10.6 G/DL — LOW (ref 13–17)
IMM GRANULOCYTES NFR BLD AUTO: 1 % — SIGNIFICANT CHANGE UP (ref 0–1.5)
INR BLD: 0.98 RATIO — SIGNIFICANT CHANGE UP (ref 0.88–1.16)
LYMPHOCYTES # BLD AUTO: 1.07 K/UL — SIGNIFICANT CHANGE UP (ref 1–3.3)
LYMPHOCYTES # BLD AUTO: 8.1 % — LOW (ref 13–44)
MCHC RBC-ENTMCNC: 32.3 GM/DL — SIGNIFICANT CHANGE UP (ref 32–36)
MCHC RBC-ENTMCNC: 32.9 PG — SIGNIFICANT CHANGE UP (ref 27–34)
MCV RBC AUTO: 101.9 FL — HIGH (ref 80–100)
MONOCYTES # BLD AUTO: 1.16 K/UL — HIGH (ref 0–0.9)
MONOCYTES NFR BLD AUTO: 8.8 % — SIGNIFICANT CHANGE UP (ref 2–14)
NEUTROPHILS # BLD AUTO: 10.77 K/UL — HIGH (ref 1.8–7.4)
NEUTROPHILS NFR BLD AUTO: 81.3 % — HIGH (ref 43–77)
NRBC # BLD: 0 /100 WBCS — SIGNIFICANT CHANGE UP (ref 0–0)
NT-PROBNP SERPL-SCNC: HIGH PG/ML (ref 0–300)
PLATELET # BLD AUTO: 221 K/UL — SIGNIFICANT CHANGE UP (ref 150–400)
POTASSIUM SERPL-MCNC: 5.9 MMOL/L — HIGH (ref 3.5–5.3)
POTASSIUM SERPL-MCNC: 6 MMOL/L — HIGH (ref 3.5–5.3)
POTASSIUM SERPL-MCNC: 6.1 MMOL/L — HIGH (ref 3.5–5.3)
POTASSIUM SERPL-SCNC: 5.9 MMOL/L — HIGH (ref 3.5–5.3)
POTASSIUM SERPL-SCNC: 6 MMOL/L — HIGH (ref 3.5–5.3)
POTASSIUM SERPL-SCNC: 6.1 MMOL/L — HIGH (ref 3.5–5.3)
PROT SERPL-MCNC: 6.6 G/DL — SIGNIFICANT CHANGE UP (ref 6–8.3)
PROT SERPL-MCNC: 6.7 G/DL — SIGNIFICANT CHANGE UP (ref 6–8.3)
PROTHROM AB SERPL-ACNC: 11.8 SEC — SIGNIFICANT CHANGE UP (ref 10.6–13.6)
RBC # BLD: 3.22 M/UL — LOW (ref 4.2–5.8)
RBC # FLD: 12.7 % — SIGNIFICANT CHANGE UP (ref 10.3–14.5)
SARS-COV-2 RNA SPEC QL NAA+PROBE: SIGNIFICANT CHANGE UP
SODIUM SERPL-SCNC: 136 MMOL/L — SIGNIFICANT CHANGE UP (ref 135–145)
SODIUM SERPL-SCNC: 136 MMOL/L — SIGNIFICANT CHANGE UP (ref 135–145)
SODIUM SERPL-SCNC: 137 MMOL/L — SIGNIFICANT CHANGE UP (ref 135–145)
TROPONIN T, HIGH SENSITIVITY RESULT: 112 NG/L — HIGH (ref 0–51)
TROPONIN T, HIGH SENSITIVITY RESULT: 120 NG/L — HIGH (ref 0–51)
WBC # BLD: 13.24 K/UL — HIGH (ref 3.8–10.5)
WBC # FLD AUTO: 13.24 K/UL — HIGH (ref 3.8–10.5)

## 2020-12-20 PROCEDURE — 99223 1ST HOSP IP/OBS HIGH 75: CPT | Mod: GC

## 2020-12-20 PROCEDURE — 71045 X-RAY EXAM CHEST 1 VIEW: CPT | Mod: 26

## 2020-12-20 PROCEDURE — 99285 EMERGENCY DEPT VISIT HI MDM: CPT

## 2020-12-20 RX ORDER — INSULIN LISPRO 100/ML
VIAL (ML) SUBCUTANEOUS AT BEDTIME
Refills: 0 | Status: DISCONTINUED | OUTPATIENT
Start: 2020-12-20 | End: 2020-01-01

## 2020-12-20 RX ORDER — CALCIUM GLUCONATE 100 MG/ML
1 VIAL (ML) INTRAVENOUS ONCE
Refills: 0 | Status: COMPLETED | OUTPATIENT
Start: 2020-12-20 | End: 2020-12-20

## 2020-12-20 RX ORDER — CALCIUM GLUCONATE 100 MG/ML
2 VIAL (ML) INTRAVENOUS ONCE
Refills: 0 | Status: COMPLETED | OUTPATIENT
Start: 2020-12-20 | End: 2020-12-20

## 2020-12-20 RX ORDER — SODIUM ZIRCONIUM CYCLOSILICATE 10 G/10G
10 POWDER, FOR SUSPENSION ORAL ONCE
Refills: 0 | Status: COMPLETED | OUTPATIENT
Start: 2020-12-20 | End: 2020-12-20

## 2020-12-20 RX ORDER — FUROSEMIDE 40 MG
80 TABLET ORAL
Refills: 0 | Status: DISCONTINUED | OUTPATIENT
Start: 2020-12-20 | End: 2020-12-21

## 2020-12-20 RX ORDER — SODIUM BICARBONATE 1 MEQ/ML
650 SYRINGE (ML) INTRAVENOUS THREE TIMES A DAY
Refills: 0 | Status: DISCONTINUED | OUTPATIENT
Start: 2020-12-20 | End: 2020-12-21

## 2020-12-20 RX ORDER — INSULIN HUMAN 100 [IU]/ML
10 INJECTION, SOLUTION SUBCUTANEOUS ONCE
Refills: 0 | Status: COMPLETED | OUTPATIENT
Start: 2020-12-20 | End: 2020-12-20

## 2020-12-20 RX ORDER — ASPIRIN/CALCIUM CARB/MAGNESIUM 324 MG
81 TABLET ORAL DAILY
Refills: 0 | Status: DISCONTINUED | OUTPATIENT
Start: 2020-12-20 | End: 2020-01-01

## 2020-12-20 RX ORDER — FUROSEMIDE 40 MG
80 TABLET ORAL ONCE
Refills: 0 | Status: COMPLETED | OUTPATIENT
Start: 2020-12-20 | End: 2020-12-20

## 2020-12-20 RX ORDER — INSULIN LISPRO 100/ML
5 VIAL (ML) SUBCUTANEOUS
Refills: 0 | Status: DISCONTINUED | OUTPATIENT
Start: 2020-12-20 | End: 2020-12-21

## 2020-12-20 RX ORDER — METOPROLOL TARTRATE 50 MG
25 TABLET ORAL DAILY
Refills: 0 | Status: DISCONTINUED | OUTPATIENT
Start: 2020-12-21 | End: 2020-01-01

## 2020-12-20 RX ORDER — DEXTROSE 50 % IN WATER 50 %
50 SYRINGE (ML) INTRAVENOUS ONCE
Refills: 0 | Status: COMPLETED | OUTPATIENT
Start: 2020-12-20 | End: 2020-12-20

## 2020-12-20 RX ORDER — INSULIN GLARGINE 100 [IU]/ML
12 INJECTION, SOLUTION SUBCUTANEOUS AT BEDTIME
Refills: 0 | Status: DISCONTINUED | OUTPATIENT
Start: 2020-12-20 | End: 2020-01-01

## 2020-12-20 RX ORDER — PANTOPRAZOLE SODIUM 20 MG/1
40 TABLET, DELAYED RELEASE ORAL
Refills: 0 | Status: DISCONTINUED | OUTPATIENT
Start: 2020-12-20 | End: 2020-01-01

## 2020-12-20 RX ORDER — HEPARIN SODIUM 5000 [USP'U]/ML
5000 INJECTION INTRAVENOUS; SUBCUTANEOUS EVERY 8 HOURS
Refills: 0 | Status: DISCONTINUED | OUTPATIENT
Start: 2020-12-20 | End: 2020-01-01

## 2020-12-20 RX ORDER — CLOPIDOGREL BISULFATE 75 MG/1
75 TABLET, FILM COATED ORAL DAILY
Refills: 0 | Status: DISCONTINUED | OUTPATIENT
Start: 2020-12-20 | End: 2020-01-01

## 2020-12-20 RX ORDER — TETANUS TOXOID, REDUCED DIPHTHERIA TOXOID AND ACELLULAR PERTUSSIS VACCINE, ADSORBED 5; 2.5; 8; 8; 2.5 [IU]/.5ML; [IU]/.5ML; UG/.5ML; UG/.5ML; UG/.5ML
0.5 SUSPENSION INTRAMUSCULAR ONCE
Refills: 0 | Status: COMPLETED | OUTPATIENT
Start: 2020-12-20 | End: 2020-12-20

## 2020-12-20 RX ORDER — INSULIN LISPRO 100/ML
VIAL (ML) SUBCUTANEOUS
Refills: 0 | Status: DISCONTINUED | OUTPATIENT
Start: 2020-12-20 | End: 2020-01-01

## 2020-12-20 RX ORDER — CHOLECALCIFEROL (VITAMIN D3) 125 MCG
2000 CAPSULE ORAL DAILY
Refills: 0 | Status: DISCONTINUED | OUTPATIENT
Start: 2020-12-20 | End: 2020-01-01

## 2020-12-20 RX ORDER — ALBUTEROL 90 UG/1
10 AEROSOL, METERED ORAL ONCE
Refills: 0 | Status: COMPLETED | OUTPATIENT
Start: 2020-12-20 | End: 2020-12-20

## 2020-12-20 RX ADMIN — INSULIN GLARGINE 12 UNIT(S): 100 INJECTION, SOLUTION SUBCUTANEOUS at 23:09

## 2020-12-20 RX ADMIN — Medication 50 MILLILITER(S): at 18:30

## 2020-12-20 RX ADMIN — ALBUTEROL 10 MILLIGRAM(S): 90 AEROSOL, METERED ORAL at 18:30

## 2020-12-20 RX ADMIN — Medication 200 GRAM(S): at 15:35

## 2020-12-20 RX ADMIN — SODIUM ZIRCONIUM CYCLOSILICATE 10 GRAM(S): 10 POWDER, FOR SUSPENSION ORAL at 23:09

## 2020-12-20 RX ADMIN — INSULIN HUMAN 10 UNIT(S): 100 INJECTION, SOLUTION SUBCUTANEOUS at 18:34

## 2020-12-20 RX ADMIN — Medication 200 GRAM(S): at 23:46

## 2020-12-20 RX ADMIN — Medication 80 MILLIGRAM(S): at 21:21

## 2020-12-20 RX ADMIN — CLOPIDOGREL BISULFATE 75 MILLIGRAM(S): 75 TABLET, FILM COATED ORAL at 21:45

## 2020-12-20 RX ADMIN — Medication 650 MILLIGRAM(S): at 21:45

## 2020-12-20 RX ADMIN — HEPARIN SODIUM 5000 UNIT(S): 5000 INJECTION INTRAVENOUS; SUBCUTANEOUS at 21:23

## 2020-12-20 RX ADMIN — Medication 80 MILLIGRAM(S): at 14:21

## 2020-12-20 RX ADMIN — Medication 2: at 23:09

## 2020-12-20 RX ADMIN — Medication 50 GRAM(S): at 18:30

## 2020-12-20 RX ADMIN — TETANUS TOXOID, REDUCED DIPHTHERIA TOXOID AND ACELLULAR PERTUSSIS VACCINE, ADSORBED 0.5 MILLILITER(S): 5; 2.5; 8; 8; 2.5 SUSPENSION INTRAMUSCULAR at 14:20

## 2020-12-20 NOTE — H&P ADULT - PROBLEM SELECTOR PLAN 3
Possibly secondary to CKD, with prolonged  S/p calcium gluconate x 2  - Follow up repeat BMP Possibly secondary to CKD, with prolonged QT  S/p calcium gluconate x 2  - Follow up repeat BMP, will consider repeat calcium gluconate if < 8 on repeat

## 2020-12-20 NOTE — H&P ADULT - PROBLEM SELECTOR PLAN 5
Management of diabetes as below  - Patient not on any pancreatic enzymes WBC 13.24 with neutrophilic predominance  - Afebrile, no signs of infection  - CXR clear  - Continue to monitor, will consider full fever work up if febrile or worsening leukocytosis

## 2020-12-20 NOTE — H&P ADULT - PROBLEM SELECTOR PLAN 2
History of recurrent hyperkalemia 2/2 renal disease  K of 6.1 in ED without peaked T waves but prolonged QT interval  - Now s/p lasix 80 mg IV, albuterol, insulin/D50  - Follow up repeat BMP History of recurrent hyperkalemia 2/2 renal disease  K of 6.1 in ED without peaked T waves but prolonged QT interval  - Now s/p lasix 80 mg IV, albuterol, insulin/D50  - Follow up repeat BMP, will give Lokelma if K > 5 on repeat

## 2020-12-20 NOTE — H&P ADULT - NSHPPHYSICALEXAM_GEN_ALL_CORE
PHYSICAL EXAM:  GENERAL: NAD, lying in bed comfortably, on 3L NC  HEENT: NC/AT, EOMI, PERRL  NECK: Supple, No JVD  CHEST/LUNG: CTAB, no increased WOB  HEART: RRR, no m/r/g  ABDOMEN: soft, NT, ND, BS+  EXTREMITIES: 2+ pitting edema up to knees b/l  NERVOUS SYSTEM: A&Ox3, no focal deficits  MSK: FROM all 4 extremities, full and equal strength  SKIN: No rashes or lesions Vital Signs Last 24 Hrs  T(C): 36.6 (20 Dec 2020 18:02), Max: 36.9 (20 Dec 2020 13:10)  HR: 88 (20 Dec 2020 18:02) (83 - 110)  BP: 128/78 (20 Dec 2020 18:02) (122/73 - 151/78)  BP(mean): 89 (20 Dec 2020 16:45) (89 - 93)  RR: 18 (20 Dec 2020 18:02) (16 - 27)  SpO2: 95% (20 Dec 2020 18:02) (89% - 99%)    PHYSICAL EXAM:  GENERAL: NAD, lying in bed comfortably, on 3L NC  HEENT: NC/AT, EOMI, PERRL  NECK: Supple, no thyromegaly  CHEST/LUNG: CTAB, no increased WOB  HEART: RRR, no m/r/g  ABDOMEN: soft, NT, Distended  EXTREMITIES: 2+ pitting edema up to knees b/l  NERVOUS SYSTEM: A&Ox3, no focal deficits  MSK: FROM all 4 extremities, full and equal strength  SKIN: No rashes or lesions

## 2020-12-20 NOTE — H&P ADULT - PROBLEM SELECTOR PLAN 10
DVT prophylaxis - Heparin subq 5000 units q8hr  GI prophylaxis - Omeprazole 20 mg (or therapeutic interchange)  Diet - carb controlled/DASH diet      Dispo - Admit to inpatient pending dyspnea workup

## 2020-12-20 NOTE — H&P ADULT - PROBLEM SELECTOR PLAN 6
S/p CEA  - Continue Plavix, ASA, lovastatin    #Hypertension  - Hold amlodipine due to lower extremity swelling  - - Resume beta-blocker in AM  - Plan to resume amlodipine tomorrow  - Hold Ramipril given elevated K  -

## 2020-12-20 NOTE — ED ADULT NURSE REASSESSMENT NOTE - NS ED NURSE REASSESS COMMENT FT1
Pt states improvement in breathing, no longer tachypneic, 96%-97% on 3LNC. Will continue to monitor.

## 2020-12-20 NOTE — H&P ADULT - NSHPLABSRESULTS_GEN_ALL_CORE
Labs personally reviewed:                        10.6   13.24 )-----------( 221      ( 20 Dec 2020 14:11 )             32.8       12-20    137  |  105  |  28<H>  ----------------------------<  163<H>  6.1<H>   |  19<L>  |  2.41<H>    Ca    7.2<L>      20 Dec 2020 16:53  Phos  4.4     12-20  Mg     1.8     12-20    TPro  6.6  /  Alb  3.4  /  TBili  0.4  /  DBili  x   /  AST  30  /  ALT  23  /  AlkPhos  93  12-20            LIVER FUNCTIONS - ( 20 Dec 2020 16:53 )  Alb: 3.4 g/dL / Pro: 6.6 g/dL / ALK PHOS: 93 U/L / ALT: 23 U/L / AST: 30 U/L / GGT: x             PT/INR - ( 20 Dec 2020 14:11 )   PT: 11.8 sec;   INR: 0.98 ratio         PTT - ( 20 Dec 2020 14:11 )  PTT:33.4 sec Labs personally reviewed:                        10.6   13.24 )-----------( 221      ( 20 Dec 2020 14:11 )             32.8       12-20    137  |  105  |  28<H>  ----------------------------<  163<H>  6.1<H>   |  19<L>  |  2.41<H>    Ca    7.2<L>      20 Dec 2020 16:53  Phos  4.4     12-20  Mg     1.8     12-20    TPro  6.6  /  Alb  3.4  /  TBili  0.4  /  DBili  x   /  AST  30  /  ALT  23  /  AlkPhos  93  12-20            LIVER FUNCTIONS - ( 20 Dec 2020 16:53 )  Alb: 3.4 g/dL / Pro: 6.6 g/dL / ALK PHOS: 93 U/L / ALT: 23 U/L / AST: 30 U/L / GGT: x             PT/INR - ( 20 Dec 2020 14:11 )   PT: 11.8 sec;   INR: 0.98 ratio         PTT - ( 20 Dec 2020 14:11 )  PTT:33.4 sec      TTE 8/20/2020  EF 50%  Mitral annular calcification. Mild mitral regurgitation.  Calcified trileaflet aortic valve with normal opening. Mild aortic regurgitation.   Normal aortic root size.  Mildly dilated left atrium. LA volume index - 36 cc/m2  Normal left ventricular internal dimensions and wall thickness  Mild segmental left ventricular systolic function. Hypokinetic mid to distal inferior and apical wall.  Mild diastolic dysfunction (Stage I)  Normal RV size and function  Normal pericardium with no pericardial effusion

## 2020-12-20 NOTE — ED PROVIDER NOTE - PHYSICAL EXAMINATION
NAD, A&Ox4, Tachardia, Afebrile, Hypoxia (88%) with RA. 99% with 4L NC. Neck supple. Diminished lung sounds bilaterally. ABD distended without tender. No CVA tender. + B/L peripheral edema. No focal neuro deficit.

## 2020-12-20 NOTE — H&P ADULT - HISTORY OF PRESENT ILLNESS
88% on RA and started on 4L NC Patient is a 65 year old man with PMH MI (2002), CKD, HTN, chronic pancreatitis, Hx of past EtOH abuse, DM on insulin pump, RA, OA, and current smoker who presents with dyspnea. Patient reports that he was in his usual state of health until last night when he went bed. Patient states that when he was laying down in bed he felt very short of breath. He endorses that he has had previous episodes of shortness of breath but states that they tended to happen with ambulation and would usually resolve within minutes. This most recent episode lasted all night and because it was not improving the patient came in to the hospital. Currently, he states that the shortness of breath has improved. He denies any fever, cough, runny nose, CP, n/v or abdominal pain. He states that he does occasionally get leg swelling but states that it is minor around his ankles and it tends to improve overnight.    In the ED the patient was noted to be 88% on RA and started on 4L NC. He was given 1 dose of lasix 80mg IV. He was also given calcium gluconate 2g for hypocalcemia and hyperkalemia noted on initial labs. Patient is a 65 year old man with PMH MI (2002), CKD, HTN, chronic pancreatitis, Hx of past EtOH abuse, DM on insulin pump, RA, OA, and current smoker who presents with dyspnea. Patient states that when he went outside to smoke he felt short of breath. He states that the shortness of breath didn't resolve overnight which was unusual for him. He states previously over the last few weeks he intermittently felt short of breath when exerting but not similar to overnight. He also states that at baseline he is able to lay flat but overnight he was unable to do so and had to sit on the couch and only got 1 hour of sleep. He says that he has a cough that is chronic and unchanged. He states that he does occasionally get leg swelling but states that it is minor around his ankles and it tends to improve overnight. He endorses that he has had previous episodes of shortness of breath but states that they tended to happen with ambulation and would usually resolve within minutes. This most recent episode lasted all night and because it was not improving the patient came in to the hospital. He denies any fever, runny nose, CP, n/v or abdominal pain. He denies dysuria. He states that he had a regular bowel movement yesterday and says that he might sometimes have between 1 to 7 soft BMs a day due to pancreatitis.     In the ED the patient was noted to be 88% on RA and started on 4L NC. He was given 1 dose of lasix 80mg IV. He was also given calcium gluconate 2g for hypocalcemia and hyperkalemia noted on initial labs. He states his baseline weight is around 160 lbs.

## 2020-12-20 NOTE — H&P ADULT - ATTENDING COMMENTS
Delia Neil MD  Division of Hospital Medicine  [facility]   Pager: [phone]    Patient seen and examined today with Team 1 Intern. Agree with above findings, assessment, and plan with the following additions/exceptions:    Overall, 64 yo male with PMH of MI/CAD, HTN, CKD (baseline Cr 2.2-2.4), DM on insulin pump, RA+OA on chronic steroids, chronic pancreatitis,   65 year old man with PMH MI (2002), CKD, HTN, chronic pancreatitis who acute onset dyspnea x 1 day associated with orthopnea and worsening lower extremity edema. BNP elevated to 24K (no baseline) with volume overload on exam though surprisingly clear lungs. hypoxic on RA requiring supplemental O2. suspect right-sided HF. will diurese with lasix 80mg IV BID with goal net neg 1-2L/day. strict I/Os. daily weights. check repeat TTE to evaluate for RV and/or LV dysfunction. trend trop to peak. lokelma for hyperkalemia if still elevated on repeat BMP. replete hypocalcemia as needed as well. insulin pump currently off, endo consulted but in interim will start lantus 12 units tonight with low dose correctional scale (patient could not recall his basal dosing, thus will start with 12 tonight which is his previously documented basal dosing).     Rest as detailed in note above.

## 2020-12-20 NOTE — ED ADULT NURSE NOTE - NSIMPLEMENTINTERV_GEN_ALL_ED
Implemented All Fall Risk Interventions:  [place] to call system. Call bell, personal items and telephone within reach. Instruct patient to call for assistance. Room bathroom lighting operational. Non-slip footwear when patient is off stretcher. Physically safe environment: no spills, clutter or unnecessary equipment. Stretcher in lowest position, wheels locked, appropriate side rails in place. Provide visual cue, wrist band, yellow gown, etc. Monitor gait and stability. Monitor for mental status changes and reorient to person, place, and time. Review medications for side effects contributing to fall risk. Reinforce activity limits and safety measures with patient and family.

## 2020-12-20 NOTE — H&P ADULT - ASSESSMENT
Patient is a 65 year old man with PMH MI (2002), CKD, HTN, chronic pancreatitis, Hx of past EtOH abuse, DM on insulin pump, RA, OA, and current smoker who presents with increased dyspnea for 1 day and found to have elevated BNP and lower extremity swelling concerning for CHF exacerbation.

## 2020-12-20 NOTE — H&P ADULT - PROBLEM SELECTOR PLAN 4
Continue Sodium bicarbonate Continue Sodium bicarbonate  - Strict I&Os  - Avoid nephrotoxic medications as able

## 2020-12-20 NOTE — H&P ADULT - PROBLEM SELECTOR PLAN 9
DVT prophylaxis -   Diet - carb controlled/DASH diet    Dispo - Admit to inpatient pending dyspnea workup States on long term prednisone for several months which he adjusts, currently on 4 mg medrol  - Continue 4 mg medrol

## 2020-12-20 NOTE — H&P ADULT - NSHPSOCIALHISTORY_GEN_ALL_CORE
Lives at home with his wife.  Patient is current daily smoker. Endorses 1 pack per day use "for many years." Patient is current daily smoker. Endorses 1 pack per day for 40 years.  History of heavy alcohol use previously, now only rarely in the past year  Uses marijuana occasionally

## 2020-12-20 NOTE — ED PROVIDER NOTE - CARE PLAN
Principal Discharge DX:	SOB (shortness of breath)  Secondary Diagnosis:	Fluid retention   Principal Discharge DX:	SOB (shortness of breath)  Secondary Diagnosis:	Fluid retention  Secondary Diagnosis:	Hypocalcemia

## 2020-12-20 NOTE — H&P ADULT - NSICDXPASTMEDICALHX_GEN_ALL_CORE_FT
PAST MEDICAL HISTORY:  Alcohol abuse recovering 2003, now only has an occasional drink    Carotid stenosis, left     Cataract bilateral    Cigarette smoker     Coronary artery disease     Diabetes mellitus Type II, diagnosed in 2000, insulin pump    Fatty liver     Gastroesophageal reflux disease     H/O hyperkalemia 6.4 8 months ago, treated with sodium bicarbonate    H/O insertion of insulin pump 2015, omnipod    H/O rheumatoid arthritis on medication    H/O sleep apnea mild, unable to use CPAP    H/O: HTN (hypertension)     Hearing loss bilateral    History of chronic pancreatitis started in 1999, followed by pain management for pain control    History of MI (myocardial infarction) in 2002    Hyperlipidemia     Nasal drainage post nasal drip    Osteoarthritis     Peripheral neuropathy feet    Portal vein thrombosis 2004 and treated with blood thinners, no longer follows with hepatology    Renal failure, chronic, stage 3 (moderate)     Thumb pain, right     Vitamin D deficiency

## 2020-12-20 NOTE — CHART NOTE - NSCHARTNOTEFT_GEN_A_CORE
Endocrine consulted regarding patient. In brief:    64 yo male with Type 2 Diabetes (Omnipod, Dexcom) presenting with SOB, currently being treated for CHF exacerbation.  Patient reports that he removed his pump after getting a Xray this afternoon and was not able to put it back on because he does not have any supplies with him.  Patient does not know his pump setting, however per last outpatient visit (Dr. Estrada 06/2020) basal rate was 0.7 units/hour, ICR 1:4, and ISF 55. Patient reports he is taking steroids at home, but isn't currently on it in the hospital.  - At this time can give Lantus 12 units   - Admelog 5 units AC meals  - Low dose correctional AC meals and qhs    Full Endocrine consult to follow in the morning Endocrine consulted regarding patient. In brief:    64 yo male with Type 2 Diabetes (Omnipod, Dexcom) presenting with SOB, currently being treated for CHF exacerbation.  Patient reports that he removed his pump after getting a Xray this afternoon and was not able to put it back on because he does not have any supplies with him.  Patient does not know his pump setting, however per last outpatient visit (Dr. Estrada 06/2020) basal rate was 0.7 units/hour, ICR 1:4, and ISF 55. Patient reports his pump settings have not changed since then. Patient reports he is taking steroids at home, but isn't currently on it in the hospital.  - At this time can give Lantus 12 units   - Admelog 5 units AC meals  - Low dose correctional AC meals and qhs    Full Endocrine consult to follow in the morning

## 2020-12-20 NOTE — H&P ADULT - PROBLEM SELECTOR PLAN 1
- Daily weights  - I&Os  - Well's Score for PE - 3, Well's Score for DVT - 0 Likely 2/2 to CHF given elevated BNP, lower extremity edema.  TTE on 8/20 showed preserved EF and mild diastolic dysfunction and hypokinetic distal and apical walls  - Lasix 80 BID  - Trend I&Os  - Daily weights  - Wean to room air as able  - Telemetry  - Well's Score for PE - 3, Well's Score for DVT - 0  - Repeat ECHO Likely 2/2 to CHF given elevated BNP, lower extremity edema.  TTE on 8/20 showed preserved EF and mild diastolic dysfunction and hypokinetic distal and apical walls  - Lasix 80 BID  - Trend I&Os  - Daily weights  - Wean to room air as able  - Telemetry  - ECG with no ST elevations/depressions, but prolonged QT and elevated troponin: 112 -> 120 - follow up repeat troponin  - Well's Score for PE - 3, Well's Score for DVT - 0  - Repeat ECHO

## 2020-12-20 NOTE — H&P ADULT - NSHPREVIEWOFSYSTEMS_GEN_ALL_CORE
CONSTITUTIONAL: Negative for fever, chills, fatigue  ENT/MOUTH: Negative for hearing difficulties, ear pain, sore throat, rhinorrhea  EYES: Negative for eye pain, vision changes  CARDIOVASCULAR: As per HPI  RESPIRATORY: Negative for respiratory distress, cough, dyspnea  GASTROINTESTINAL: Negative for nausea, vomiting, diarrhea, constipation  GENITOURINARY: Negative for dysuria, decreased urinary frequency, hematuria  MUSCULOSKELETAL: Negative for joint pain, back pain, arthralgias, myalgias  SKIN: Negative for skin lesions, rashes, hair changes  NEUROLOGICAL: Negative for headache, weakness, numbness, parasthesias  PSYCHIATRIC: Negative for depression, anxiety  HEME/LYMPH: Negative for bruising, easy bleeding, lymphadenopathy  ENDOCRINE: Negative for polyuria, polydipsia, temperature intolerance

## 2020-12-20 NOTE — ED PROVIDER NOTE - ATTENDING CONTRIBUTION TO CARE
Patient presenting complaining of shortness of breath.  Began two days ago and worsened since that point.  Having difficulty lying flat due to shortness of breath and requiring more pillows than usual.  On lasix 160mg daily at home which he has been compliant with, also history of kidney disease, former still active smoker.  Not on O2 at home.  No known COVID-19 contacts.    A 14 point review of systems is negative except as in HPI or otherwise documented.    Exam:  General: Patient ill appearing, hypoxic on room air improved with NC, tachypneic, otherwise vital signs within normal limits  HEENT: airway patent with moist mucous membranes, + JVD  Cardiac: RRR S1/S2 with strong peripheral pulses  Respiratory: rales bilaterally  GI: abdomen soft, non tender, non distended  Neuro: no gross neurologic deficits  Ext: 2+ pitting edema bilaterally  Skin: warm, well perfused  Psych: normal mood and affect    Suspect pulmonary edema is driving underlying dyspnea, possible secondary to fluid overload, plan for labs, IV lasix, will require admission at this time.

## 2020-12-20 NOTE — ED ADULT NURSE NOTE - OBJECTIVE STATEMENT
65 year old A&Ox3 male presents to ED in wheelchair complaining of worsening SOB x 1 day. PMH CAD, MI, fatty liver, HTN, HLD, CAD, DM, renal failure, osteoarthritis, RA, GERD, . Pt states he noticed he was having more trouble sleeping, +orthopnea.  +chronic cough, current smoker. Arrives to ED tachypneic with respirations in high 20s, tachycardic, 89% on room air, placed on 4L with improvement to 99%. Afebrile rectally. Lungs diminished bilaterally, with fine crackles heard in left lower lung. Denies  n/v/d, fevers, chills, abdominal pain, urinary symptoms, numbness, tingling in upper and lower extremities, HA, blurry vision. Updated on plan of care.

## 2020-12-20 NOTE — H&P ADULT - PROBLEM SELECTOR PLAN 8
States on long term prednisone for several months which he adjusts, currently on 4 mg medrol  - Continue 4 mg medrol Patient on insulin pump  - Endocrine consult for management of insulin pump    #Chronic pancreatitis  Management of diabetes as below  - Patient not on any pancreatic enzymes

## 2020-12-20 NOTE — H&P ADULT - PROBLEM SELECTOR PLAN 7
Patient on insulin pump  - Endocrine consult for management of insulin pump S/p CEA  - Continue Plavix, ASA, lovastatin

## 2020-12-20 NOTE — ED ADULT TRIAGE NOTE - CHIEF COMPLAINT QUOTE
pt has Shortness of breath  pt on water pills some swelling of the lower extremities pt has a cough but he is a smoker

## 2020-12-20 NOTE — ED PROVIDER NOTE - OBJECTIVE STATEMENT
64yo male pt with PMHx of DM, CAD, HLD, HTN, CKD, Chronic Pancreatitis, RA, Current Smoker presents to ED with worsening SOB since last night. Pt stated he takes Lasix 80mg BID and noticed recent worsening peripheral edema. He normally sleeps with one pillow but was unable to sleep last night due to orthopnea with chest discomfort. Denies fever, chills, sore throat or congestion. Denies ABD pain or N/V/D. Denies sensory changes or weakness to extremities. 64yo male pt with PMHx of DM, CAD, HLD, HTN, CKD, Chronic Pancreatitis, RA, Current Smoker presents to ED with worsening SOB since last night. Pt stated he takes Lasix 80mg BID and noticed recent worsening peripheral edema. He normally sleeps with one pillow but was unable to sleep last night due to orthopnea with chest discomfort. Denies fever, chills, sore throat or congestion. Denies ABD pain or N/V/D. Denies sensory changes or weakness to extremities.  PMD- Dr. Cr Kidd.  Cardiologist Dr. Lisker

## 2020-12-21 DIAGNOSIS — E78.49 OTHER HYPERLIPIDEMIA: ICD-10-CM

## 2020-12-21 DIAGNOSIS — E11.65 TYPE 2 DIABETES MELLITUS WITH HYPERGLYCEMIA: ICD-10-CM

## 2020-12-21 DIAGNOSIS — M81.0 AGE-RELATED OSTEOPOROSIS WITHOUT CURRENT PATHOLOGICAL FRACTURE: ICD-10-CM

## 2020-12-21 LAB
A1C WITH ESTIMATED AVERAGE GLUCOSE RESULT: 7.6 % — HIGH (ref 4–5.6)
ANION GAP SERPL CALC-SCNC: 10 MMOL/L — SIGNIFICANT CHANGE UP (ref 5–17)
ANION GAP SERPL CALC-SCNC: 11 MMOL/L — SIGNIFICANT CHANGE UP (ref 5–17)
APPEARANCE UR: CLEAR — SIGNIFICANT CHANGE UP
BACTERIA # UR AUTO: NEGATIVE — SIGNIFICANT CHANGE UP
BASOPHILS # BLD AUTO: 0.07 K/UL — SIGNIFICANT CHANGE UP (ref 0–0.2)
BASOPHILS NFR BLD AUTO: 0.9 % — SIGNIFICANT CHANGE UP (ref 0–2)
BILIRUB UR-MCNC: NEGATIVE — SIGNIFICANT CHANGE UP
BUN SERPL-MCNC: 24 MG/DL — HIGH (ref 7–23)
BUN SERPL-MCNC: 25 MG/DL — HIGH (ref 7–23)
CALCIUM SERPL-MCNC: 7 MG/DL — LOW (ref 8.4–10.5)
CALCIUM SERPL-MCNC: 7.3 MG/DL — LOW (ref 8.4–10.5)
CALCIUM UR-MCNC: 1.4 MG/DL — SIGNIFICANT CHANGE UP
CHLORIDE SERPL-SCNC: 105 MMOL/L — SIGNIFICANT CHANGE UP (ref 96–108)
CHLORIDE SERPL-SCNC: 107 MMOL/L — SIGNIFICANT CHANGE UP (ref 96–108)
CHLORIDE UR-SCNC: 115 MMOL/L — SIGNIFICANT CHANGE UP
CO2 SERPL-SCNC: 20 MMOL/L — LOW (ref 22–31)
CO2 SERPL-SCNC: 20 MMOL/L — LOW (ref 22–31)
COLOR SPEC: COLORLESS — SIGNIFICANT CHANGE UP
CREAT SERPL-MCNC: 2.43 MG/DL — HIGH (ref 0.5–1.3)
CREAT SERPL-MCNC: 2.82 MG/DL — HIGH (ref 0.5–1.3)
DIFF PNL FLD: ABNORMAL
EOSINOPHIL # BLD AUTO: 0.12 K/UL — SIGNIFICANT CHANGE UP (ref 0–0.5)
EOSINOPHIL NFR BLD AUTO: 1.6 % — SIGNIFICANT CHANGE UP (ref 0–6)
EPI CELLS # UR: 0 /HPF — SIGNIFICANT CHANGE UP
ESTIMATED AVERAGE GLUCOSE: 171 MG/DL — HIGH (ref 68–114)
GLUCOSE BLDC GLUCOMTR-MCNC: 256 MG/DL — HIGH (ref 70–99)
GLUCOSE BLDC GLUCOMTR-MCNC: 287 MG/DL — HIGH (ref 70–99)
GLUCOSE SERPL-MCNC: 251 MG/DL — HIGH (ref 70–99)
GLUCOSE SERPL-MCNC: 69 MG/DL — LOW (ref 70–99)
GLUCOSE UR QL: ABNORMAL
HCT VFR BLD CALC: 27.3 % — LOW (ref 39–50)
HCV AB S/CO SERPL IA: 0.06 S/CO — SIGNIFICANT CHANGE UP (ref 0–0.99)
HCV AB SERPL-IMP: SIGNIFICANT CHANGE UP
HGB BLD-MCNC: 8.9 G/DL — LOW (ref 13–17)
HYALINE CASTS # UR AUTO: 1 /LPF — SIGNIFICANT CHANGE UP (ref 0–2)
IMM GRANULOCYTES NFR BLD AUTO: 1.1 % — SIGNIFICANT CHANGE UP (ref 0–1.5)
KETONES UR-MCNC: NEGATIVE — SIGNIFICANT CHANGE UP
LEUKOCYTE ESTERASE UR-ACNC: NEGATIVE — SIGNIFICANT CHANGE UP
LYMPHOCYTES # BLD AUTO: 1.43 K/UL — SIGNIFICANT CHANGE UP (ref 1–3.3)
LYMPHOCYTES # BLD AUTO: 18.9 % — SIGNIFICANT CHANGE UP (ref 13–44)
MAGNESIUM SERPL-MCNC: 1.7 MG/DL — SIGNIFICANT CHANGE UP (ref 1.6–2.6)
MAGNESIUM UR-MCNC: 4.2 MG/DL — SIGNIFICANT CHANGE UP
MCHC RBC-ENTMCNC: 32.4 PG — SIGNIFICANT CHANGE UP (ref 27–34)
MCHC RBC-ENTMCNC: 32.6 GM/DL — SIGNIFICANT CHANGE UP (ref 32–36)
MCV RBC AUTO: 99.3 FL — SIGNIFICANT CHANGE UP (ref 80–100)
MONOCYTES # BLD AUTO: 0.77 K/UL — SIGNIFICANT CHANGE UP (ref 0–0.9)
MONOCYTES NFR BLD AUTO: 10.2 % — SIGNIFICANT CHANGE UP (ref 2–14)
NEUTROPHILS # BLD AUTO: 5.09 K/UL — SIGNIFICANT CHANGE UP (ref 1.8–7.4)
NEUTROPHILS NFR BLD AUTO: 67.3 % — SIGNIFICANT CHANGE UP (ref 43–77)
NITRITE UR-MCNC: NEGATIVE — SIGNIFICANT CHANGE UP
NRBC # BLD: 0 /100 WBCS — SIGNIFICANT CHANGE UP (ref 0–0)
PH UR: 6 — SIGNIFICANT CHANGE UP (ref 5–8)
PHOSPHATE 24H UR-MCNC: 24.2 MG/DL — SIGNIFICANT CHANGE UP
PHOSPHATE SERPL-MCNC: 4.4 MG/DL — SIGNIFICANT CHANGE UP (ref 2.5–4.5)
PLATELET # BLD AUTO: 142 K/UL — LOW (ref 150–400)
POTASSIUM SERPL-MCNC: 5.4 MMOL/L — HIGH (ref 3.5–5.3)
POTASSIUM SERPL-MCNC: 6.4 MMOL/L — CRITICAL HIGH (ref 3.5–5.3)
POTASSIUM SERPL-SCNC: 5.4 MMOL/L — HIGH (ref 3.5–5.3)
POTASSIUM SERPL-SCNC: 6.4 MMOL/L — CRITICAL HIGH (ref 3.5–5.3)
POTASSIUM UR-SCNC: 20 MMOL/L — SIGNIFICANT CHANGE UP
PROT UR-MCNC: ABNORMAL
RBC # BLD: 2.75 M/UL — LOW (ref 4.2–5.8)
RBC # FLD: 12.4 % — SIGNIFICANT CHANGE UP (ref 10.3–14.5)
RBC CASTS # UR COMP ASSIST: 2 /HPF — SIGNIFICANT CHANGE UP (ref 0–4)
SARS-COV-2 IGG SERPL QL IA: NEGATIVE — SIGNIFICANT CHANGE UP
SARS-COV-2 IGM SERPL IA-ACNC: 0.58 RATIO — SIGNIFICANT CHANGE UP
SODIUM SERPL-SCNC: 135 MMOL/L — SIGNIFICANT CHANGE UP (ref 135–145)
SODIUM SERPL-SCNC: 138 MMOL/L — SIGNIFICANT CHANGE UP (ref 135–145)
SODIUM UR-SCNC: 124 MMOL/L — SIGNIFICANT CHANGE UP
SP GR SPEC: 1.01 — LOW (ref 1.01–1.02)
UROBILINOGEN FLD QL: NEGATIVE — SIGNIFICANT CHANGE UP
WBC # BLD: 7.56 K/UL — SIGNIFICANT CHANGE UP (ref 3.8–10.5)
WBC # FLD AUTO: 7.56 K/UL — SIGNIFICANT CHANGE UP (ref 3.8–10.5)
WBC UR QL: 0 /HPF — SIGNIFICANT CHANGE UP (ref 0–5)

## 2020-12-21 PROCEDURE — 99222 1ST HOSP IP/OBS MODERATE 55: CPT | Mod: GC

## 2020-12-21 PROCEDURE — 99233 SBSQ HOSP IP/OBS HIGH 50: CPT | Mod: GC

## 2020-12-21 PROCEDURE — 93970 EXTREMITY STUDY: CPT | Mod: 26

## 2020-12-21 PROCEDURE — 93306 TTE W/DOPPLER COMPLETE: CPT | Mod: 26

## 2020-12-21 PROCEDURE — 71250 CT THORAX DX C-: CPT | Mod: 26

## 2020-12-21 PROCEDURE — 99223 1ST HOSP IP/OBS HIGH 75: CPT | Mod: GC

## 2020-12-21 RX ORDER — CALCIUM CARBONATE 500(1250)
1 TABLET ORAL DAILY
Refills: 0 | Status: DISCONTINUED | OUTPATIENT
Start: 2020-12-21 | End: 2020-12-21

## 2020-12-21 RX ORDER — CALCIUM GLUCONATE 100 MG/ML
2 VIAL (ML) INTRAVENOUS ONCE
Refills: 0 | Status: COMPLETED | OUTPATIENT
Start: 2020-12-21 | End: 2020-12-21

## 2020-12-21 RX ORDER — SODIUM BICARBONATE 1 MEQ/ML
1300 SYRINGE (ML) INTRAVENOUS THREE TIMES A DAY
Refills: 0 | Status: DISCONTINUED | OUTPATIENT
Start: 2020-12-21 | End: 2020-01-01

## 2020-12-21 RX ORDER — INSULIN LISPRO 100/ML
3 VIAL (ML) SUBCUTANEOUS
Refills: 0 | Status: DISCONTINUED | OUTPATIENT
Start: 2020-12-21 | End: 2020-01-01

## 2020-12-21 RX ORDER — SODIUM ZIRCONIUM CYCLOSILICATE 10 G/10G
10 POWDER, FOR SUSPENSION ORAL ONCE
Refills: 0 | Status: COMPLETED | OUTPATIENT
Start: 2020-12-21 | End: 2020-12-21

## 2020-12-21 RX ORDER — FUROSEMIDE 40 MG
80 TABLET ORAL THREE TIMES A DAY
Refills: 0 | Status: DISCONTINUED | OUTPATIENT
Start: 2020-12-21 | End: 2020-01-01

## 2020-12-21 RX ORDER — ATORVASTATIN CALCIUM 80 MG/1
10 TABLET, FILM COATED ORAL AT BEDTIME
Refills: 0 | Status: DISCONTINUED | OUTPATIENT
Start: 2020-12-21 | End: 2020-01-01

## 2020-12-21 RX ORDER — CALCIUM CARBONATE 500(1250)
1 TABLET ORAL
Refills: 0 | Status: DISCONTINUED | OUTPATIENT
Start: 2020-12-21 | End: 2020-01-01

## 2020-12-21 RX ADMIN — HEPARIN SODIUM 5000 UNIT(S): 5000 INJECTION INTRAVENOUS; SUBCUTANEOUS at 05:07

## 2020-12-21 RX ADMIN — Medication 650 MILLIGRAM(S): at 05:07

## 2020-12-21 RX ADMIN — ATORVASTATIN CALCIUM 10 MILLIGRAM(S): 80 TABLET, FILM COATED ORAL at 21:22

## 2020-12-21 RX ADMIN — Medication 650 MILLIGRAM(S): at 13:14

## 2020-12-21 RX ADMIN — Medication 1 TABLET(S): at 12:17

## 2020-12-21 RX ADMIN — PANTOPRAZOLE SODIUM 40 MILLIGRAM(S): 20 TABLET, DELAYED RELEASE ORAL at 05:07

## 2020-12-21 RX ADMIN — Medication 2000 UNIT(S): at 12:17

## 2020-12-21 RX ADMIN — Medication 4 MILLIGRAM(S): at 12:17

## 2020-12-21 RX ADMIN — Medication 81 MILLIGRAM(S): at 12:17

## 2020-12-21 RX ADMIN — Medication 3 UNIT(S): at 17:51

## 2020-12-21 RX ADMIN — SODIUM ZIRCONIUM CYCLOSILICATE 10 GRAM(S): 10 POWDER, FOR SUSPENSION ORAL at 21:15

## 2020-12-21 RX ADMIN — Medication 1: at 21:35

## 2020-12-21 RX ADMIN — Medication 25 MILLIGRAM(S): at 05:07

## 2020-12-21 RX ADMIN — SODIUM ZIRCONIUM CYCLOSILICATE 10 GRAM(S): 10 POWDER, FOR SUSPENSION ORAL at 08:35

## 2020-12-21 RX ADMIN — CLOPIDOGREL BISULFATE 75 MILLIGRAM(S): 75 TABLET, FILM COATED ORAL at 12:17

## 2020-12-21 RX ADMIN — Medication 1300 MILLIGRAM(S): at 21:15

## 2020-12-21 RX ADMIN — Medication 3: at 17:51

## 2020-12-21 RX ADMIN — HEPARIN SODIUM 5000 UNIT(S): 5000 INJECTION INTRAVENOUS; SUBCUTANEOUS at 21:15

## 2020-12-21 RX ADMIN — INSULIN GLARGINE 12 UNIT(S): 100 INJECTION, SOLUTION SUBCUTANEOUS at 21:35

## 2020-12-21 RX ADMIN — HEPARIN SODIUM 5000 UNIT(S): 5000 INJECTION INTRAVENOUS; SUBCUTANEOUS at 13:14

## 2020-12-21 RX ADMIN — Medication 200 GRAM(S): at 06:46

## 2020-12-21 RX ADMIN — Medication 80 MILLIGRAM(S): at 21:22

## 2020-12-21 RX ADMIN — Medication 80 MILLIGRAM(S): at 05:06

## 2020-12-21 NOTE — PROGRESS NOTE ADULT - PROBLEM SELECTOR PLAN 8
Patient on insulin pump  - Endocrine consult for management of insulin pump    #Chronic pancreatitis  Management of diabetes as below  - Patient not on any pancreatic enzymes Patient on insulin pump  - Endocrine consult for management of insulin pump (off since 12/20)  - Hypoglycemic on basal lantus - hold premeals today. Will need to redose basal or resume pump  - Endocrine recs appreciated    #Chronic pancreatitis  Management of diabetes as below  - Patient not on any pancreatic enzymes

## 2020-12-21 NOTE — PROVIDER CONTACT NOTE (CRITICAL VALUE NOTIFICATION) - BACKGROUND
Pt is a 64yo male pt with PMHx of DM, CAD, HLD, HTN, CKD, Chronic Pancreatitis, RA, Current Smoker p/w shortness of breath with elevated troponins, pro-BNP as well as potassium.

## 2020-12-21 NOTE — CONSULT NOTE ADULT - SUBJECTIVE AND OBJECTIVE BOX
HPI:  Patient is a 65 year old man with PMH MI (2002), CKD, HTN, chronic pancreatitis, Hx of past EtOH abuse, T2DM on insulin pump complicated by CKD/peripheral neuropathy/CAD, RA, OA, and current smoker who presents with dyspnea.     Endocrine History:  T2DM- diagnosed approximately ~2000, has been on Omnipod insulin pump since 2015. Follows with Dr. Estrada (endocrinology). Patient uses Omnipod with Humalog and dexcom G6. Currently taking medrol 2 mg BID, previously on 6 mg daily dose 3 weeks ago. No recent changes in insulin pump changes. Does not have clarity tara but from memory fasting is around 100-150, prelunch 150s, predinner 150, bedtime 120s. More frequent hypoglycemic events the past week usually overnight to 50-60s. No changes in diet usually controls carbs to 20-30 per meal but does have frequent snacks which he covers with insulin.   Basal-0.7u/hr  ICR: 1:4  ISF: 1:55  Glucose target 120  Complications- no retinopathy, + neuropathy in lower extremities, CAD (MI in 2002, no stents). + Nephropathy follows with nephrology.    Osteoporosis- follows with Rheum, first prolia shot 11/2020. No falls or fractures. Takes vitamin D 2000 IU qday and calcium citrate 600 mg BID at home. Chronic steroid use. No sxs of muscle cramps or numbness.       PAST MEDICAL & SURGICAL HISTORY:  Thumb pain, right    Fatty liver    Peripheral neuropathy  feet    Nasal drainage  post nasal drip    Cataract  bilateral    Hearing loss  bilateral    Renal failure, chronic, stage 3 (moderate)    Vitamin D deficiency    H/O hyperkalemia  6.4 8 months ago, treated with sodium bicarbonate    H/O insertion of insulin pump  2015, omnipod    Portal vein thrombosis  2004 and treated with blood thinners, no longer follows with hepatology    Alcohol abuse  recovering 2003, now only has an occasional drink    History of chronic pancreatitis  started in 1999, followed by pain management for pain control    Osteoarthritis    Gastroesophageal reflux disease    Hyperlipidemia    Cigarette smoker    H/O rheumatoid arthritis  on medication    H/O sleep apnea  mild, unable to use CPAP    Carotid stenosis, left    History of MI (myocardial infarction)  in 2002    H/O: HTN (hypertension)    Coronary artery disease    Diabetes mellitus  Type II, diagnosed in 2000, insulin pump    S/P colonoscopy  2014, benign polyps    H/O arthroscopy of right knee  1988    S/P tonsillectomy and adenoidectomy  age 16    S/P insertion of spinal cord stimulator  inserted 2006 and removed 2007    H/O umbilical hernia repair  2014    S/P carotid endarterectomy  left 11/9/2016    History of inguinal herniorrhaphy  right and left, total of 6    S/P insertion of intrathecal pump  placed in 2007  removed in 2008    H/O vasectomy  1994    S/P cholecystectomy  2003, laparoscopic        FAMILY HISTORY:  No pertinent family history in first degree relatives  adopted        Social History: Tobacco 50 pack yr. No etoh or illicit drug use.     Outpatient Medications: Home Medications:  aspirin 81 mg oral tablet: 1 tab(s) orally 2 times a week on Mondays and Fridays at night (12 Jun 2020 09:31)  CBD: 1 vape inhaled 3 times a week (12 Jun 2020 09:31)  clopidogrel 75 mg oral tablet: 1 tab(s) orally once a day (at bedtime) (12 Jun 2020 09:31)  furosemide 80 mg oral tablet: 1 tab(s) orally 2 times a day (12 Jun 2020 09:31)  HumaLOG 100 units/mL injectable solution: per sliding scale (12 Jun 2020 09:31)  lovastatin 20 mg oral tablet: 1 tab(s) orally once a day (at bedtime) (12 Jun 2020 09:31)  metoprolol succinate 25 mg oral tablet, extended release: 1 tab(s) orally once a day (at bedtime) (12 Jun 2020 09:31)  multivitamin: 1 tab(s) orally once a day (at bedtime) (12 Jun 2020 09:31)  omeprazole 20 mg oral delayed release tablet: 1 tab(s) orally once a day (12 Jun 2020 09:31)  Orencia: 100  subcutaneous once a week (12 Jun 2020 09:31)  ramipril 5 mg oral capsule: 1 cap(s) orally once a day (at bedtime) (12 Jun 2020 09:31)  sodium bicarbonate 650 mg oral tablet: 1 tab(s) orally 3 times a day (20 Dec 2020 19:58)  Stelara 45 mg/0.5 mL subcutaneous solution: subcutaneous every 3 months (12 Jun 2020 09:31)  Vitamin D3 2000 intl units oral tablet: 1 tab(s) orally once a day (at bedtime) (12 Jun 2020 09:31)      MEDICATIONS  (STANDING):  aspirin enteric coated 81 milliGRAM(s) Oral daily  calcium carbonate    500 mG (Tums) Chewable 1 Tablet(s) Chew daily  cholecalciferol 2000 Unit(s) Oral daily  clopidogrel Tablet 75 milliGRAM(s) Oral daily  furosemide   Injectable 80 milliGRAM(s) IV Push two times a day  heparin   Injectable 5000 Unit(s) SubCutaneous every 8 hours  insulin glargine Injectable (LANTUS) 12 Unit(s) SubCutaneous at bedtime  insulin lispro (ADMELOG) corrective regimen sliding scale   SubCutaneous three times a day before meals  insulin lispro (ADMELOG) corrective regimen sliding scale   SubCutaneous at bedtime  insulin lispro Injectable (ADMELOG) 5 Unit(s) SubCutaneous Before meals and at bedtime  methylPREDNISolone 4 milliGRAM(s) Oral daily  metoprolol succinate ER 25 milliGRAM(s) Oral daily  multivitamin 1 Tablet(s) Oral daily  pantoprazole    Tablet 40 milliGRAM(s) Oral before breakfast  sodium bicarbonate 1300 milliGRAM(s) Oral three times a day    MEDICATIONS  (PRN):      Allergies    No Known Allergies    Intolerances      Review of Systems:  Constitutional: No fever, chills   Neuro: No tremors, headache   Cardiovascular: No chest pain, palpitations  Respiratory: No SOB, no cough  GI: No nausea, vomiting, abdominal pain  : No dysuria, polyuria   Skin: no rash, ulcers   Psych: no depression, anxiety   Endocrine: no polyphagia, polydipsia     ALL OTHER SYSTEMS REVIEWED AND NEGATIVE        PHYSICAL EXAM:  VITALS: T(C): 37.2 (12-21-20 @ 11:14)  T(F): 99 (12-21-20 @ 11:14), Max: 99.2 (12-21-20 @ 04:35)  HR: 77 (12-21-20 @ 15:36) (77 - 97)  BP: 132/72 (12-21-20 @ 15:36) (104/55 - 135/72)  RR:  (16 - 18)  SpO2:  (94% - 99%)  Wt(kg): --  GENERAL: NAD, well-groomed, well-developed  EYES: No proptosis, anicteric  HEENT:  Atraumatic, Normocephalic, moist mucous membranes  RESPIRATORY: Clear to auscultation bilaterally; No rales, rhonchi, wheezing  CARDIOVASCULAR: Regular rate and rhythm; No murmurs  GI: Soft, nontender, non distended, normal bowel sounds  SKIN: Dry, intact, No rashes or lesions  NEURO: AOx3, moves all extremities spontaneously   PSYCH: Reactive affect, euthymic mood    POCT Blood Glucose.: 75 mg/dL (12-21-20 @ 11:24)  POCT Blood Glucose.: 116 mg/dL (12-21-20 @ 07:57)  POCT Blood Glucose.: 97 mg/dL (12-21-20 @ 07:36)  POCT Blood Glucose.: 60 mg/dL (12-21-20 @ 07:12)  POCT Blood Glucose.: 322 mg/dL (12-20-20 @ 23:06)  POCT Blood Glucose.: 255 mg/dL (12-20-20 @ 21:26)  POCT Blood Glucose.: 164 mg/dL (12-20-20 @ 18:18)                            8.9    7.56  )-----------( 142      ( 21 Dec 2020 05:01 )             27.3       12-21    138  |  107  |  25<H>  ----------------------------<  69<L>  5.4<H>   |  20<L>  |  2.43<H>    EGFR if : 31<L>  EGFR if non : 26<L>    Ca    7.0<L>      12-21  Mg     1.7     12-21  Phos  4.4     12-21    TPro  6.6  /  Alb  3.4  /  TBili  0.4  /  DBili  x   /  AST  30  /  ALT  23  /  AlkPhos  93  12-20      Thyroid Function Tests:          Hemoglobin A1C     Radiology:                HPI:  Patient is a 65 year old man with PMH MI (2002), CKD IV, HTN, chronic pancreatitis, Hx of past EtOH abuse, T2DM on insulin pump complicated by CKD/peripheral neuropathy/CAD, RA, OA, and current smoker who presents with dyspnea.     Endocrine History:  T2DM- diagnosed approximately ~2000, has been on Omnipod insulin pump since 2015. Follows with Dr. Estrada (endocrinology). Patient uses Omnipod with Humalog and dexcom G6. Currently taking medrol 2 mg BID, previously on 6 mg daily dose 3 weeks ago. No recent changes in insulin pump changes. Does not have clarity tara but from memory fasting is around 100-150, prelunch 150s, predinner 150, bedtime 120s. More frequent hypoglycemic events the past week usually overnight to 50-60s. No changes in diet usually controls carbs to 20-30 per meal but does have frequent snacks which he covers with insulin.   Basal-0.7u/hr  ICR: 1:4  ISF: 1:55  Glucose target 120  Complications- no retinopathy, + neuropathy in lower extremities, CAD (MI in 2002, no stents). + Nephropathy follows with nephrology.    Osteoporosis- follows with Rheum, first prolia shot 11/2020. No falls or fractures. Takes vitamin D 2000 IU qday and calcium citrate 600 mg BID at home. Chronic steroid use. No sxs of muscle cramps or numbness.       PAST MEDICAL & SURGICAL HISTORY:  Thumb pain, right    Fatty liver    Peripheral neuropathy  feet    Nasal drainage  post nasal drip    Cataract  bilateral    Hearing loss  bilateral    Renal failure, chronic, stage 3 (moderate)    Vitamin D deficiency    H/O hyperkalemia  6.4 8 months ago, treated with sodium bicarbonate    H/O insertion of insulin pump  2015, omnipod    Portal vein thrombosis  2004 and treated with blood thinners, no longer follows with hepatology    Alcohol abuse  recovering 2003, now only has an occasional drink    History of chronic pancreatitis  started in 1999, followed by pain management for pain control    Osteoarthritis    Gastroesophageal reflux disease    Hyperlipidemia    Cigarette smoker    H/O rheumatoid arthritis  on medication    H/O sleep apnea  mild, unable to use CPAP    Carotid stenosis, left    History of MI (myocardial infarction)  in 2002    H/O: HTN (hypertension)    Coronary artery disease    Diabetes mellitus  Type II, diagnosed in 2000, insulin pump    S/P colonoscopy  2014, benign polyps    H/O arthroscopy of right knee  1988    S/P tonsillectomy and adenoidectomy  age 16    S/P insertion of spinal cord stimulator  inserted 2006 and removed 2007    H/O umbilical hernia repair  2014    S/P carotid endarterectomy  left 11/9/2016    History of inguinal herniorrhaphy  right and left, total of 6    S/P insertion of intrathecal pump  placed in 2007  removed in 2008    H/O vasectomy  1994    S/P cholecystectomy  2003, laparoscopic        FAMILY HISTORY:  No pertinent family history in first degree relatives  adopted        Social History: Tobacco 50 pack yr. No etoh or illicit drug use.     Outpatient Medications: Home Medications:  aspirin 81 mg oral tablet: 1 tab(s) orally 2 times a week on Mondays and Fridays at night (12 Jun 2020 09:31)  CBD: 1 vape inhaled 3 times a week (12 Jun 2020 09:31)  clopidogrel 75 mg oral tablet: 1 tab(s) orally once a day (at bedtime) (12 Jun 2020 09:31)  furosemide 80 mg oral tablet: 1 tab(s) orally 2 times a day (12 Jun 2020 09:31)  HumaLOG 100 units/mL injectable solution: per sliding scale (12 Jun 2020 09:31)  lovastatin 20 mg oral tablet: 1 tab(s) orally once a day (at bedtime) (12 Jun 2020 09:31)  metoprolol succinate 25 mg oral tablet, extended release: 1 tab(s) orally once a day (at bedtime) (12 Jun 2020 09:31)  multivitamin: 1 tab(s) orally once a day (at bedtime) (12 Jun 2020 09:31)  omeprazole 20 mg oral delayed release tablet: 1 tab(s) orally once a day (12 Jun 2020 09:31)  Orencia: 100  subcutaneous once a week (12 Jun 2020 09:31)  ramipril 5 mg oral capsule: 1 cap(s) orally once a day (at bedtime) (12 Jun 2020 09:31)  sodium bicarbonate 650 mg oral tablet: 1 tab(s) orally 3 times a day (20 Dec 2020 19:58)  Stelara 45 mg/0.5 mL subcutaneous solution: subcutaneous every 3 months (12 Jun 2020 09:31)  Vitamin D3 2000 intl units oral tablet: 1 tab(s) orally once a day (at bedtime) (12 Jun 2020 09:31)      MEDICATIONS  (STANDING):  aspirin enteric coated 81 milliGRAM(s) Oral daily  calcium carbonate    500 mG (Tums) Chewable 1 Tablet(s) Chew daily  cholecalciferol 2000 Unit(s) Oral daily  clopidogrel Tablet 75 milliGRAM(s) Oral daily  furosemide   Injectable 80 milliGRAM(s) IV Push two times a day  heparin   Injectable 5000 Unit(s) SubCutaneous every 8 hours  insulin glargine Injectable (LANTUS) 12 Unit(s) SubCutaneous at bedtime  insulin lispro (ADMELOG) corrective regimen sliding scale   SubCutaneous three times a day before meals  insulin lispro (ADMELOG) corrective regimen sliding scale   SubCutaneous at bedtime  insulin lispro Injectable (ADMELOG) 5 Unit(s) SubCutaneous Before meals and at bedtime  methylPREDNISolone 4 milliGRAM(s) Oral daily  metoprolol succinate ER 25 milliGRAM(s) Oral daily  multivitamin 1 Tablet(s) Oral daily  pantoprazole    Tablet 40 milliGRAM(s) Oral before breakfast  sodium bicarbonate 1300 milliGRAM(s) Oral three times a day    MEDICATIONS  (PRN):      Allergies    No Known Allergies    Intolerances      Review of Systems:  Constitutional: No fever, chills   Neuro: No tremors, headache   Cardiovascular: No chest pain, palpitations  Respiratory: No SOB, no cough  GI: No nausea, vomiting, abdominal pain  : No dysuria, polyuria   Skin: no rash, ulcers   Psych: no depression, anxiety   Endocrine: no polyphagia, polydipsia     ALL OTHER SYSTEMS REVIEWED AND NEGATIVE        PHYSICAL EXAM:  VITALS: T(C): 37.2 (12-21-20 @ 11:14)  T(F): 99 (12-21-20 @ 11:14), Max: 99.2 (12-21-20 @ 04:35)  HR: 77 (12-21-20 @ 15:36) (77 - 97)  BP: 132/72 (12-21-20 @ 15:36) (104/55 - 135/72)  RR:  (16 - 18)  SpO2:  (94% - 99%)  Wt(kg): --  GENERAL: NAD, well-groomed, well-developed  EYES: No proptosis, anicteric  HEENT:  Atraumatic, Normocephalic, moist mucous membranes  RESPIRATORY: Clear to auscultation bilaterally; No rales, rhonchi, wheezing  CARDIOVASCULAR: Regular rate and rhythm; No murmurs  GI: Soft, nontender, non distended, normal bowel sounds  SKIN: Dry, intact, No rashes or lesions  NEURO: AOx3, moves all extremities spontaneously   PSYCH: Reactive affect, euthymic mood    POCT Blood Glucose.: 75 mg/dL (12-21-20 @ 11:24)  POCT Blood Glucose.: 116 mg/dL (12-21-20 @ 07:57)  POCT Blood Glucose.: 97 mg/dL (12-21-20 @ 07:36)  POCT Blood Glucose.: 60 mg/dL (12-21-20 @ 07:12)  POCT Blood Glucose.: 322 mg/dL (12-20-20 @ 23:06)  POCT Blood Glucose.: 255 mg/dL (12-20-20 @ 21:26)  POCT Blood Glucose.: 164 mg/dL (12-20-20 @ 18:18)                            8.9    7.56  )-----------( 142      ( 21 Dec 2020 05:01 )             27.3       12-21    138  |  107  |  25<H>  ----------------------------<  69<L>  5.4<H>   |  20<L>  |  2.43<H>    EGFR if : 31<L>  EGFR if non : 26<L>    Ca    7.0<L>      12-21  Mg     1.7     12-21  Phos  4.4     12-21    TPro  6.6  /  Alb  3.4  /  TBili  0.4  /  DBili  x   /  AST  30  /  ALT  23  /  AlkPhos  93  12-20      Thyroid Function Tests:          Hemoglobin A1C     Radiology:                HPI:  Patient is a 65 year old man with PMH MI (2002), CKD IV, HTN, chronic pancreatitis, Hx of past EtOH abuse, T2DM on insulin pump complicated by CKD/peripheral neuropathy/CAD, RA, OA, and current smoker who presents with dyspnea.     Endocrine History:  T2DM- diagnosed approximately ~2000, has been on Omnipod insulin pump since 2015. Follows with Dr. Estrada (endocrinology). Patient uses Omnipod with Humalog and dexcom G6. Currently taking medrol 2 mg BID, previously on 6 mg daily dose 3 weeks ago. No recent changes in insulin pump changes. Does not have clarity tara but from memory fasting is around 100-150, prelunch 150s, predinner 150, bedtime 120s. More frequent hypoglycemic events the past week usually overnight to 50-60s. No changes in diet usually controls carbs to 20-30 per meal but does have frequent snacks which he covers with insulin.   Basal-0.7u/hr  ICR: 1:4  ISF: 1:55  Glucose target 120  Complications- no retinopathy, + neuropathy in lower extremities, CAD (MI in 2002, no stents). + Nephropathy follows with nephrology.    Osteoporosis- follows with Rheum, first prolia shot 11/2020. No falls or fractures. Takes vitamin D 2000 IU qday and calcium citrate 600 mg BID at home. Chronic steroid use. No sxs of muscle cramps or numbness.       PAST MEDICAL & SURGICAL HISTORY:  Thumb pain, right    Fatty liver    Peripheral neuropathy  feet    Nasal drainage  post nasal drip    Cataract  bilateral    Hearing loss  bilateral    Renal failure, chronic, stage 3 (moderate)    Vitamin D deficiency    H/O hyperkalemia  6.4 8 months ago, treated with sodium bicarbonate    H/O insertion of insulin pump  2015, omnipod    Portal vein thrombosis  2004 and treated with blood thinners, no longer follows with hepatology    Alcohol abuse  recovering 2003, now only has an occasional drink    History of chronic pancreatitis  started in 1999, followed by pain management for pain control    Osteoarthritis    Gastroesophageal reflux disease    Hyperlipidemia    Cigarette smoker    H/O rheumatoid arthritis  on medication    H/O sleep apnea  mild, unable to use CPAP    Carotid stenosis, left    History of MI (myocardial infarction)  in 2002    H/O: HTN (hypertension)    Coronary artery disease    Diabetes mellitus  Type II, diagnosed in 2000, insulin pump    S/P colonoscopy  2014, benign polyps    H/O arthroscopy of right knee  1988    S/P tonsillectomy and adenoidectomy  age 16    S/P insertion of spinal cord stimulator  inserted 2006 and removed 2007    H/O umbilical hernia repair  2014    S/P carotid endarterectomy  left 11/9/2016    History of inguinal herniorrhaphy  right and left, total of 6    S/P insertion of intrathecal pump  placed in 2007  removed in 2008    H/O vasectomy  1994    S/P cholecystectomy  2003, laparoscopic        FAMILY HISTORY:  adopted    Social History: Tobacco 50 pack yr. No etoh or illicit drug use.     Outpatient Medications: Home Medications:  aspirin 81 mg oral tablet: 1 tab(s) orally 2 times a week on Mondays and Fridays at night (12 Jun 2020 09:31)  CBD: 1 vape inhaled 3 times a week (12 Jun 2020 09:31)  clopidogrel 75 mg oral tablet: 1 tab(s) orally once a day (at bedtime) (12 Jun 2020 09:31)  furosemide 80 mg oral tablet: 1 tab(s) orally 2 times a day (12 Jun 2020 09:31)  HumaLOG 100 units/mL injectable solution: per sliding scale (12 Jun 2020 09:31)  lovastatin 20 mg oral tablet: 1 tab(s) orally once a day (at bedtime) (12 Jun 2020 09:31)  metoprolol succinate 25 mg oral tablet, extended release: 1 tab(s) orally once a day (at bedtime) (12 Jun 2020 09:31)  multivitamin: 1 tab(s) orally once a day (at bedtime) (12 Jun 2020 09:31)  omeprazole 20 mg oral delayed release tablet: 1 tab(s) orally once a day (12 Jun 2020 09:31)  Orencia: 100  subcutaneous once a week (12 Jun 2020 09:31)  ramipril 5 mg oral capsule: 1 cap(s) orally once a day (at bedtime) (12 Jun 2020 09:31)  sodium bicarbonate 650 mg oral tablet: 1 tab(s) orally 3 times a day (20 Dec 2020 19:58)  Stelara 45 mg/0.5 mL subcutaneous solution: subcutaneous every 3 months (12 Jun 2020 09:31)  Vitamin D3 2000 intl units oral tablet: 1 tab(s) orally once a day (at bedtime) (12 Jun 2020 09:31)      MEDICATIONS  (STANDING):  aspirin enteric coated 81 milliGRAM(s) Oral daily  calcium carbonate    500 mG (Tums) Chewable 1 Tablet(s) Chew daily  cholecalciferol 2000 Unit(s) Oral daily  clopidogrel Tablet 75 milliGRAM(s) Oral daily  furosemide   Injectable 80 milliGRAM(s) IV Push two times a day  heparin   Injectable 5000 Unit(s) SubCutaneous every 8 hours  insulin glargine Injectable (LANTUS) 12 Unit(s) SubCutaneous at bedtime  insulin lispro (ADMELOG) corrective regimen sliding scale   SubCutaneous three times a day before meals  insulin lispro (ADMELOG) corrective regimen sliding scale   SubCutaneous at bedtime  insulin lispro Injectable (ADMELOG) 5 Unit(s) SubCutaneous Before meals and at bedtime  methylPREDNISolone 4 milliGRAM(s) Oral daily  metoprolol succinate ER 25 milliGRAM(s) Oral daily  multivitamin 1 Tablet(s) Oral daily  pantoprazole    Tablet 40 milliGRAM(s) Oral before breakfast  sodium bicarbonate 1300 milliGRAM(s) Oral three times a day    MEDICATIONS  (PRN):      Allergies  No Known Allergies        Review of Systems:  Constitutional: No fever, chills   Neuro: No tremors, headache   Cardiovascular: No chest pain, palpitations  Respiratory: No SOB, no cough  GI: No nausea, vomiting, abdominal pain  : No dysuria, polyuria   Skin: no rash, ulcers   Psych: no depression, anxiety   Endocrine: no polyphagia, polydipsia     ALL OTHER SYSTEMS REVIEWED AND NEGATIVE        PHYSICAL EXAM:  VITALS: T(C): 37.2 (12-21-20 @ 11:14)  T(F): 99 (12-21-20 @ 11:14), Max: 99.2 (12-21-20 @ 04:35)  HR: 77 (12-21-20 @ 15:36) (77 - 97)  BP: 132/72 (12-21-20 @ 15:36) (104/55 - 135/72)  RR:  (16 - 18)  SpO2:  (94% - 99%)  Wt(kg): --  GENERAL: NAD, well-groomed, well-developed  EYES: No proptosis, anicteric  HEENT:  Atraumatic, Normocephalic, moist mucous membranes  RESPIRATORY: Clear to auscultation bilaterally; No rales, rhonchi, wheezing  CARDIOVASCULAR: Regular rate and rhythm; No murmurs  GI: Soft, nontender, non distended, normal bowel sounds  SKIN: Dry, intact, No rashes or lesions on feet b/l  NEURO: AOx3, moves all extremities spontaneously   PSYCH: Reactive affect, euthymic mood    POCT Blood Glucose.: 75 mg/dL (12-21-20 @ 11:24)  POCT Blood Glucose.: 116 mg/dL (12-21-20 @ 07:57)  POCT Blood Glucose.: 97 mg/dL (12-21-20 @ 07:36)  POCT Blood Glucose.: 60 mg/dL (12-21-20 @ 07:12)  POCT Blood Glucose.: 322 mg/dL (12-20-20 @ 23:06)  POCT Blood Glucose.: 255 mg/dL (12-20-20 @ 21:26)  POCT Blood Glucose.: 164 mg/dL (12-20-20 @ 18:18)                            8.9    7.56  )-----------( 142      ( 21 Dec 2020 05:01 )             27.3       12-21    138  |  107  |  25<H>  ----------------------------<  69<L>  5.4<H>   |  20<L>  |  2.43<H>    EGFR if : 31<L>  EGFR if non : 26<L>    Ca    7.0<L>      12-21  Mg     1.7     12-21  Phos  4.4     12-21    TPro  6.6  /  Alb  3.4  /  TBili  0.4  /  DBili  x   /  AST  30  /  ALT  23  /  AlkPhos  93  12-20

## 2020-12-21 NOTE — PROGRESS NOTE ADULT - PROBLEM SELECTOR PLAN 9
States on long term prednisone for several months which he adjusts, currently on 4 mg medrol  - Continue 4 mg medrol

## 2020-12-21 NOTE — PROGRESS NOTE ADULT - PROBLEM SELECTOR PLAN 3
Possibly secondary to CKD, with prolonged QT  S/p calcium gluconate x 2  - Follow up repeat BMP, will consider repeat calcium gluconate if < 8 on repeat Possibly secondary to CKD, with prolonged QT  S/p calcium gluconate x 4  - Repeat BMP in PM  - 600 mg calcium carbonate daily

## 2020-12-21 NOTE — CONSULT NOTE ADULT - PROBLEM SELECTOR RECOMMENDATION 3
corrected calcium 7.5, asymptomatic. Suspect due to CKD and prolia injection.   -start calcium carbonate 1250 mg 1 tab BID w/ meals   -continue cholecalciferol 2000 IU qday  -replete mg to >2.0   -check vitamin D   -monitor for sxs

## 2020-12-21 NOTE — CONSULT NOTE ADULT - ATTENDING COMMENTS
CKD4, HTN, metabolic acidosis, hyperkalemia, hyperphosphatemia, hypocalcemia, anemia of CKD    Has some edema.    - Bicarb  - Diuresis  - Calcium carbonate  - Outpatient nephrology f/u  - Remainder per fellow, will follow
Patient seen and examined. Agree with note as above with addendum: patient will need adjustment to his overnight settings to avoif further hypoglycemia.  Gisella Hernandez MD  Pager: [phone], between 9am-6pm  Nights and Weekends: [phone]

## 2020-12-21 NOTE — CONSULT NOTE ADULT - PROBLEM SELECTOR RECOMMENDATION 9
T2DM a1c 7.6% in setting of anemia. Recent more freqeutn hypoglycemic events due to tapering steroids.   -medrol 4mg home dose was given today at Noon and half dose was given yesterday, suspect that was the reason for hypoglycemia today  -continue Lantus 12 units qhs   -decrease admelog to 3 units tidac   -admelog low dose correction scale tidac and qhs     Discharge  Will likely decrease basal for discharge. No supplies with him   f/u with Dr. Estrada 1/4 9:15 AM   [facility]  [address] [place] [address], [address] [place] [id number]  [phone] T2DM a1c 7.6% in setting of anemia. Recent more frequent hypoglycemic events due to tapering steroids.   -medrol 4mg home dose was given today at Noon and half dose was given yesterday, suspect that was the reason for hypoglycemia today  -continue Lantus 12 units qhs   -decrease admelog to 3 units tidac   -admelog low dose correction scale tidac and qhs     Discharge  Will likely decrease basal for discharge. No supplies with him   f/u with Dr. Estrada 1/4 9:15 AM   [facility]  [address] [place] [id number]  [phone]

## 2020-12-21 NOTE — PROGRESS NOTE ADULT - PROBLEM SELECTOR PLAN 1
Likely 2/2 to CHF given elevated BNP, lower extremity edema.  TTE on 8/20 showed preserved EF and mild diastolic dysfunction and hypokinetic distal and apical walls  - Lasix 80 BID  - Trend I&Os  - Daily weights  - Wean to room air as able  - Telemetry  - ECG with no ST elevations/depressions, but prolonged QT and elevated troponin: 112 -> 120 - follow up repeat troponin  - Well's Score for PE - 3, Well's Score for DVT - 0  - Repeat ECHO Likely 2/2 to CHF given elevated BNP, lower extremity edema. Possibly lung pathology as well given extensive smoking history.  TTE on 8/20 showed preserved EF and mild diastolic dysfunction and hypokinetic distal and apical walls  - Lasix 80 BID  - Trend I&Os  - Daily weights  - Wean to room air as able  - Telemetry  - Troponins 112 -> 120 -> 116  - Tender to palpation in bilateral lower extremities. LLE pain - DVT US bilateral  - Repeat ECHO

## 2020-12-21 NOTE — PROGRESS NOTE ADULT - PROBLEM SELECTOR PLAN 2
History of recurrent hyperkalemia 2/2 renal disease  K of 6.1 in ED without peaked T waves but prolonged QT interval  - Now s/p lasix 80 mg IV, albuterol, insulin/D50  - Follow up repeat BMP, will give Lokelma if K > 5 on repeat History of recurrent hyperkalemia 2/2 renal disease  K of 6.1 in ED without peaked T waves but prolonged QT interval. Now improved to 5.4 s/p lasix 80 mg IV, albuterol, insulin/D50, lokelma x 2  - Repeat BMP in PM

## 2020-12-21 NOTE — PROGRESS NOTE ADULT - PROBLEM SELECTOR PLAN 5
WBC 13.24 with neutrophilic predominance  - Afebrile, no signs of infection  - CXR clear  - Continue to monitor, will consider full fever work up if febrile or worsening leukocytosis WBC 13.24 with neutrophilic predominance at admission now resolved  - Afebrile, no signs of infection  - CXR clear

## 2020-12-21 NOTE — PROGRESS NOTE ADULT - PROBLEM SELECTOR PLAN 10
DVT prophylaxis - Heparin subq 5000 units q8hr  GI prophylaxis - Omeprazole 20 mg (or therapeutic interchange)  Diet - carb controlled/DASH diet      Dispo - Admit to inpatient pending dyspnea workup DVT prophylaxis - Heparin subq 5000 units q8hr  GI prophylaxis - Omeprazole 20 mg (or therapeutic interchange)  Diet - carb controlled/DASH diet      Dispo - Pending wean to RA and TTE

## 2020-12-21 NOTE — PROGRESS NOTE ADULT - PROBLEM SELECTOR PLAN 6
- Resume beta-blocker in AM  - Plan to resume amlodipine tomorrow  - Hold Ramipril given elevated K  - - Resume beta-blocker in AM  - Plan to resume amlodipine tomorrow  - Hold Ramipril given elevated K - Continue metoprolol  - Resume amlodipine today  - Hold Ramipril given elevated K

## 2020-12-21 NOTE — CONSULT NOTE ADULT - ASSESSMENT
65y M with   Male with a pmhx of MI (2002), CKD4, HTN, chronic pancreatitis, Hx of past EtOH abuse, DM on insulin pump, RA, OA, and current smoker who presents with dyspnea on 12/20, found to be hyperkalemic      #Hyperkalemia  - Patient with K of 5.4 in setting of CKD4 and DM2.  - Would increase bicarb to 1300mg TID, consider increasing IV lasix to TID given the persistent SOB/LE edema. CT chest with b/l pleural effusions  - Can give lokelma 10g QD if K>6, otherwise would hold off  - Recommend tight glycemic control      #CKD4  - Patient with baseline CKD4, Scr at baseline ~2-2.4mg/dl which is near his baseline  - Would increase bicarb to 1300mg TID, consider increasing IV lasix to TID given the persistent SOB/LE edema.  - Dose medications to eGFR<30 65y M with   Male with a pmhx of MI (2002), CKD4, HTN, chronic pancreatitis, Hx of past EtOH abuse, DM on insulin pump, RA, OA, and current smoker who presents with dyspnea on 12/20, found to be hyperkalemic      #Hyperkalemia  - Patient with K of 5.4 in setting of CKD4 and DM2.  - Would increase bicarb to 1300mg TID, consider increasing IV lasix to TID given the persistent SOB/LE edema. CT chest with b/l pleural effusions  - Can give lokelma 10g QD if K>6, otherwise would hold off  - Recommend tight glycemic control  - As an outpatient we instructed the patient to try taking 1 teaspoon of baking soda and mixing it with water to drink twice a day to help with the acidemia instead of sodium bicarb tabs      #CKD4  - Patient with baseline CKD4, Scr at baseline ~2-2.4mg/dl which is near his baseline  - Would increase bicarb to 1300mg TID, consider increasing IV lasix to TID given the persistent SOB/LE edema.  - Dose medications to eGFR<30 65y M with   Male with a pmhx of MI (2002), CKD4, HTN, chronic pancreatitis, Hx of past EtOH abuse, DM on insulin pump, RA, OA, and current smoker who presents with dyspnea on 12/20, found to be hyperkalemic      #Hyperkalemia  - Patient with K of 5.4 in setting of CKD4 and DM2.  - Would increase bicarb to 1300mg TID, consider increasing IV lasix to TID given the persistent SOB/LE edema. CT chest with b/l pleural effusions  - Can give lokelma 10g QD if K>6, otherwise would hold off  - Recommend tight glycemic control  - As an outpatient we instructed the patient to try taking 1 teaspoon of baking soda and mixing it with water to drink twice a day to help with the acidemia instead of sodium bicarb tabs      #CKD4  - Patient with baseline CKD4, Scr at baseline ~2-2.4mg/dl which is near his baseline  - Would increase bicarb to 1300mg TID, consider increasing IV lasix to TID given the persistent SOB/LE edema.  - Hypocalcemia 2/2 denusomab use, recommend to start calcium carbonate 1250mg BID, c/w vit D at 2000u QD  - Dose medications to eGFR<30

## 2020-12-21 NOTE — CONSULT NOTE ADULT - PROBLEM SELECTOR RECOMMENDATION 4
continue statin last LDL 55 on lovastatin    Adam Velasquez MD, Endocrine Fellow   Pager  from 9-5PM. After hours and on weekends please call [phone]

## 2020-12-21 NOTE — CONSULT NOTE ADULT - SUBJECTIVE AND OBJECTIVE BOX
[facility] DIVISION OF KIDNEY DISEASES AND HYPERTENSION -- INITIAL CONSULT NOTE  --------------------------------------------------------------------------------  Amari Carty   Nephrology Fellow  Pager NS: [phone]/ [facility]: [phone]  (After 5 pm or on weekends please page the on-call fellow)    HPI: Patient is a 65y old  Male with a pmhx of MI (2002), CKD4, HTN, chronic pancreatitis, Hx of past EtOH abuse, DM on insulin pump, RA, OA, and current smoker who presents with dyspnea on 12/20. Pt first noted the SOB on 12/18 and over the last few weeks he intermittently felt short of breath with exertion, like taking the stairs. States he had a normal nuclear stress test with his cardiologist roughly 3 months ago. Follows with Dr. Castro from nephrology for his CKD management. States his potassium has been an issue for years, generally around mid 5's, never above 6. Is on sodium bicarb 1300mg TID at home, lasix 80mg BID. Nephrology consulted for CKD/ Hyperkalemia management.        PAST HISTORY  --------------------------------------------------------------------------------  PAST MEDICAL & SURGICAL HISTORY:  Thumb pain, right    Fatty liver    Peripheral neuropathy  feet    Nasal drainage  post nasal drip    Cataract  bilateral    Hearing loss  bilateral    Renal failure, chronic, stage 3 (moderate)    Vitamin D deficiency    H/O hyperkalemia  6.4 8 months ago, treated with sodium bicarbonate    H/O insertion of insulin pump  2015, omnipod    Portal vein thrombosis  2004 and treated with blood thinners, no longer follows with hepatology    Alcohol abuse  recovering 2003, now only has an occasional drink    History of chronic pancreatitis  started in 1999, followed by pain management for pain control    Osteoarthritis    Gastroesophageal reflux disease    Hyperlipidemia    Cigarette smoker    H/O rheumatoid arthritis  on medication    H/O sleep apnea  mild, unable to use CPAP    Carotid stenosis, left    History of MI (myocardial infarction)  in 2002    H/O: HTN (hypertension)    Coronary artery disease    Diabetes mellitus  Type II, diagnosed in 2000, insulin pump    S/P colonoscopy  2014, benign polyps    H/O arthroscopy of right knee  1988    S/P tonsillectomy and adenoidectomy  age 16    S/P insertion of spinal cord stimulator  inserted 2006 and removed 2007    H/O umbilical hernia repair  2014    S/P carotid endarterectomy  left 11/9/2016    History of inguinal herniorrhaphy  right and left, total of 6    S/P insertion of intrathecal pump  placed in 2007  removed in 2008    H/O vasectomy  1994    S/P cholecystectomy  2003, laparoscopic      FAMILY HISTORY:  No pertinent family history in first degree relatives  adopted      PAST SOCIAL HISTORY: Active smoker    ALLERGIES & MEDICATIONS  --------------------------------------------------------------------------------  Allergies    No Known Allergies    Intolerances      Standing Inpatient Medications  aspirin enteric coated 81 milliGRAM(s) Oral daily  calcium carbonate    500 mG (Tums) Chewable 1 Tablet(s) Chew daily  cholecalciferol 2000 Unit(s) Oral daily  clopidogrel Tablet 75 milliGRAM(s) Oral daily  furosemide   Injectable 80 milliGRAM(s) IV Push two times a day  heparin   Injectable 5000 Unit(s) SubCutaneous every 8 hours  insulin glargine Injectable (LANTUS) 12 Unit(s) SubCutaneous at bedtime  insulin lispro (ADMELOG) corrective regimen sliding scale   SubCutaneous three times a day before meals  insulin lispro (ADMELOG) corrective regimen sliding scale   SubCutaneous at bedtime  insulin lispro Injectable (ADMELOG) 5 Unit(s) SubCutaneous Before meals and at bedtime  methylPREDNISolone 4 milliGRAM(s) Oral daily  metoprolol succinate ER 25 milliGRAM(s) Oral daily  multivitamin 1 Tablet(s) Oral daily  pantoprazole    Tablet 40 milliGRAM(s) Oral before breakfast  sodium bicarbonate 650 milliGRAM(s) Oral three times a day    PRN Inpatient Medications      REVIEW OF SYSTEMS  --------------------------------------------------------------------------------  Gen: No fevers/chills  Skin: No rashes  Head/Eyes/Ears: Normal hearing, no difficulty seeing  Respiratory: +dyspnea, no cough  CV: No chest pain  GI: No abdominal pain, diarrhea  : No dysuria, hematuria  MSK: +edema    VITALS/PHYSICAL EXAM  --------------------------------------------------------------------------------  T(C): 37.2 (12-21-20 @ 11:14), Max: 37.3 (12-21-20 @ 04:35)  HR: 77 (12-21-20 @ 11:14) (77 - 97)  BP: 116/64 (12-21-20 @ 11:14) (104/55 - 135/72)  RR: 18 (12-21-20 @ 11:14) (16 - 18)  SpO2: 99% (12-21-20 @ 11:14) (94% - 99%)  Wt(kg): --  Height (cm): 180.3 (12-20-20 @ 13:10)  Weight (kg): 72.6 (12-20-20 @ 13:10)  BMI (kg/m2): 22.3 (12-20-20 @ 13:10)  BSA (m2): 1.92 (12-20-20 @ 13:10)      12-20-20 @ 07:01  -  12-21-20 @ 07:00  --------------------------------------------------------  IN: 0 mL / OUT: 1050 mL / NET: -1050 mL    12-21-20 @ 07:01  -  12-21-20 @ 14:41  --------------------------------------------------------  IN: 390 mL / OUT: 0 mL / NET: 390 mL      Physical Exam:    	Gen: NAD  	HEENT: Anicteric  	Pulm: CTA B/L  	CV: S1S2  	Abd: Soft, +BS   	MSK: 2+ LE edema B/L with ecchymosis   	Neuro: Awake  	Skin: Warm and dry  	Vascular access:      LABS/STUDIES  --------------------------------------------------------------------------------              8.9    7.56  >-----------<  142      [12-21-20 @ 05:01]              27.3     138  |  107  |  25  ----------------------------<  69      [12-21-20 @ 05:01]  5.4   |  20  |  2.43        Ca     7.0     [12-21-20 @ 05:01]      Mg     1.7     [12-21-20 @ 05:01]      Phos  4.4     [12-21-20 @ 05:01]    TPro  6.6  /  Alb  3.4  /  TBili  0.4  /  DBili  x   /  AST  30  /  ALT  23  /  AlkPhos  93  [12-20-20 @ 16:53]    PT/INR: PT 11.8 , INR 0.98       [12-20-20 @ 14:11]  PTT: 33.4       [12-20-20 @ 14:11]      Creatinine Trend:  SCr 2.43 [12-21 @ 05:01]  SCr 2.53 [12-20 @ 21:29]  SCr 2.41 [12-20 @ 16:53]  SCr 2.42 [12-20 @ 14:11]    Urinalysis - [12-20-20 @ 23:47]      Color Colorless / Appearance Clear / SG 1.007 / pH 6.0      Gluc Trace / Ketone Negative  / Bili Negative / Urobili Negative       Blood Trace / Protein Trace / Leuk Est Negative / Nitrite Negative      RBC 2 / WBC 0 / Hyaline 1 / Gran  / Sq Epi  / Non Sq Epi 0 / Bacteria Negative    Urine Sodium 124      [12-20-20 @ 23:47]  Urine Potassium 20      [12-20-20 @ 23:47]  Urine Chloride 115      [12-20-20 @ 23:47]  Urine Phosphorus 24.2      [12-21-20 @ 04:30]    HbA1c 7.8      [05-02-19 @ 00:50]    HCV 0.06, Nonreact      [12-21-20 @ 07:24]

## 2020-12-21 NOTE — PROGRESS NOTE ADULT - SUBJECTIVE AND OBJECTIVE BOX
PROGRESS NOTE:   Authored by Genia Guardado MD  Pager [phone] [facility]     Patient is a 65y old  Male who presents with a chief complaint of Dyspnea (20 Dec 2020 17:24)      SUBJECTIVE / OVERNIGHT EVENTS:    MEDICATIONS  (STANDING):  aspirin enteric coated 81 milliGRAM(s) Oral daily  cholecalciferol 2000 Unit(s) Oral daily  clopidogrel Tablet 75 milliGRAM(s) Oral daily  furosemide   Injectable 80 milliGRAM(s) IV Push two times a day  heparin   Injectable 5000 Unit(s) SubCutaneous every 8 hours  insulin glargine Injectable (LANTUS) 12 Unit(s) SubCutaneous at bedtime  insulin lispro (ADMELOG) corrective regimen sliding scale   SubCutaneous three times a day before meals  insulin lispro (ADMELOG) corrective regimen sliding scale   SubCutaneous at bedtime  insulin lispro Injectable (ADMELOG) 5 Unit(s) SubCutaneous Before meals and at bedtime  metoprolol succinate ER 25 milliGRAM(s) Oral daily  multivitamin 1 Tablet(s) Oral daily  pantoprazole    Tablet 40 milliGRAM(s) Oral before breakfast  sodium bicarbonate 650 milliGRAM(s) Oral three times a day    MEDICATIONS  (PRN):      I&O's Summary    20 Dec 2020 07:01  -  21 Dec 2020 06:57  --------------------------------------------------------  IN: 0 mL / OUT: 1050 mL / NET: -1050 mL        PHYSICAL EXAM:  Vital Signs Last 24 Hrs  T(C): 37.3 (21 Dec 2020 04:35), Max: 37.3 (21 Dec 2020 04:35)  T(F): 99.2 (21 Dec 2020 04:35), Max: 99.2 (21 Dec 2020 04:35)  HR: 97 (21 Dec 2020 05:06) (83 - 110)  BP: 123/67 (21 Dec 2020 05:06) (104/55 - 151/78)  BP(mean): 89 (20 Dec 2020 16:45) (89 - 93)  RR: 18 (21 Dec 2020 04:35) (16 - 27)  SpO2: 96% (21 Dec 2020 04:35) (89% - 99%)        LABS:                        8.9    7.56  )-----------( 142      ( 21 Dec 2020 05:01 )             27.3     12    138  |  107  |  25<H>  ----------------------------<  69<L>  5.4<H>   |  20<L>  |  2.43<H>    Ca    7.0<L>      21 Dec 2020 05:01  Phos  4.4       Mg     1.7         TPro  6.6  /  Alb  3.4  /  TBili  0.4  /  DBili  x   /  AST  30  /  ALT  23  /  AlkPhos  93  12    PT/INR - ( 20 Dec 2020 14:11 )   PT: 11.8 sec;   INR: 0.98 ratio         PTT - ( 20 Dec 2020 14:11 )  PTT:33.4 sec      Urinalysis Basic - ( 20 Dec 2020 23:47 )    Color: Colorless / Appearance: Clear / S.007 / pH: x  Gluc: x / Ketone: Negative  / Bili: Negative / Urobili: Negative   Blood: x / Protein: Trace / Nitrite: Negative   Leuk Esterase: Negative / RBC: 2 /hpf / WBC 0 /HPF   Sq Epi: x / Non Sq Epi: 0 /hpf / Bacteria: Negative         PROGRESS NOTE:   Authored by Genia Guardado MD  Pager [phone] [facility]     Patient is a 65y old  Male who presents with a chief complaint of Dyspnea (20 Dec 2020 17:24)      SUBJECTIVE / OVERNIGHT EVENTS:    Afebrile. Net 1 L yesterday. States feels well, as good as he did prior to episode that brought him to hospital. Cough chronic. Just had soft BM, normal for him. Up and out of bed but not exerted self yet. On 2.5 L this AM.    MEDICATIONS  (STANDING):  aspirin enteric coated 81 milliGRAM(s) Oral daily  cholecalciferol 2000 Unit(s) Oral daily  clopidogrel Tablet 75 milliGRAM(s) Oral daily  furosemide   Injectable 80 milliGRAM(s) IV Push two times a day  heparin   Injectable 5000 Unit(s) SubCutaneous every 8 hours  insulin glargine Injectable (LANTUS) 12 Unit(s) SubCutaneous at bedtime  insulin lispro (ADMELOG) corrective regimen sliding scale   SubCutaneous three times a day before meals  insulin lispro (ADMELOG) corrective regimen sliding scale   SubCutaneous at bedtime  insulin lispro Injectable (ADMELOG) 5 Unit(s) SubCutaneous Before meals and at bedtime  metoprolol succinate ER 25 milliGRAM(s) Oral daily  multivitamin 1 Tablet(s) Oral daily  pantoprazole    Tablet 40 milliGRAM(s) Oral before breakfast  sodium bicarbonate 650 milliGRAM(s) Oral three times a day    MEDICATIONS  (PRN):      I&O's Summary    20 Dec 2020 07:01  -  21 Dec 2020 06:57  --------------------------------------------------------  IN: 0 mL / OUT: 1050 mL / NET: -1050 mL        PHYSICAL EXAM:  Vital Signs Last 24 Hrs  T(C): 37.3 (21 Dec 2020 04:35), Max: 37.3 (21 Dec 2020 04:35)  T(F): 99.2 (21 Dec 2020 04:35), Max: 99.2 (21 Dec 2020 04:35)  HR: 97 (21 Dec 2020 05:06) (83 - 110)  BP: 123/67 (21 Dec 2020 05:06) (104/55 - 151/78)  BP(mean): 89 (20 Dec 2020 16:45) (89 - 93)  RR: 18 (21 Dec 2020 04:35) (16 - 27)  SpO2: 96% (21 Dec 2020 04:35) (89% - 99%)        LABS:                        8.9    7.56  )-----------( 142      ( 21 Dec 2020 05:01 )             27.3     12-    138  |  107  |  25<H>  ----------------------------<  69<L>  5.4<H>   |  20<L>  |  2.43<H>    Ca    7.0<L>      21 Dec 2020 05:01  Phos  4.4     12-  Mg     1.7     12    TPro  6.6  /  Alb  3.4  /  TBili  0.4  /  DBili  x   /  AST  30  /  ALT  23  /  AlkPhos  93  12-20    PT/INR - ( 20 Dec 2020 14:11 )   PT: 11.8 sec;   INR: 0.98 ratio         PTT - ( 20 Dec 2020 14:11 )  PTT:33.4 sec      Urinalysis Basic - ( 20 Dec 2020 23:47 )    Color: Colorless / Appearance: Clear / S.007 / pH: x  Gluc: x / Ketone: Negative  / Bili: Negative / Urobili: Negative   Blood: x / Protein: Trace / Nitrite: Negative   Leuk Esterase: Negative / RBC: 2 /hpf / WBC 0 /HPF   Sq Epi: x / Non Sq Epi: 0 /hpf / Bacteria: Negative         PROGRESS NOTE:   Authored by Genia Guardado MD  Pager [phone] [facility]     Patient is a 65y old  Male who presents with a chief complaint of Dyspnea (20 Dec 2020 17:24)      SUBJECTIVE / OVERNIGHT EVENTS:    Afebrile. Net 1 L yesterday. States feels well, as good as he did prior to episode that brought him to hospital. Cough chronic. Just had soft BM, normal for him. Up and out of bed but not exerted self yet. On 2.5 L this AM.    MEDICATIONS  (STANDING):  aspirin enteric coated 81 milliGRAM(s) Oral daily  cholecalciferol 2000 Unit(s) Oral daily  clopidogrel Tablet 75 milliGRAM(s) Oral daily  furosemide   Injectable 80 milliGRAM(s) IV Push two times a day  heparin   Injectable 5000 Unit(s) SubCutaneous every 8 hours  insulin glargine Injectable (LANTUS) 12 Unit(s) SubCutaneous at bedtime  insulin lispro (ADMELOG) corrective regimen sliding scale   SubCutaneous three times a day before meals  insulin lispro (ADMELOG) corrective regimen sliding scale   SubCutaneous at bedtime  insulin lispro Injectable (ADMELOG) 5 Unit(s) SubCutaneous Before meals and at bedtime  metoprolol succinate ER 25 milliGRAM(s) Oral daily  multivitamin 1 Tablet(s) Oral daily  pantoprazole    Tablet 40 milliGRAM(s) Oral before breakfast  sodium bicarbonate 650 milliGRAM(s) Oral three times a day    MEDICATIONS  (PRN):      I&O's Summary    20 Dec 2020 07:01  -  21 Dec 2020 06:57  --------------------------------------------------------  IN: 0 mL / OUT: 1050 mL / NET: -1050 mL        PHYSICAL EXAM:  Vital Signs Last 24 Hrs  T(C): 37.3 (21 Dec 2020 04:35), Max: 37.3 (21 Dec 2020 04:35)  T(F): 99.2 (21 Dec 2020 04:35), Max: 99.2 (21 Dec 2020 04:35)  HR: 97 (21 Dec 2020 05:06) (83 - 110)  BP: 123/67 (21 Dec 2020 05:06) (104/55 - 151/78)  BP(mean): 89 (20 Dec 2020 16:45) (89 - 93)  RR: 18 (21 Dec 2020 04:35) (16 - 27)  SpO2: 96% (21 Dec 2020 04:35) (89% - 99%)    GENERAL: NAD, lying in bed comfortably, on 2.5L NC  HEENT: NC/AT, EOMI, PERRL  NECK: Supple, no thyromegaly  CHEST/LUNG: CTAB, no increased WOB  HEART: RRR, no m/r/g  ABDOMEN: soft, NT, Distended  EXTREMITIES: 2+ pitting edema up to knees b/l  NERVOUS SYSTEM: A&Ox3, no focal deficits  MSK: FROM all 4 extremities, full and equal strength  SKIN: No rashes or lesions    LABS:                        8.9    7.56  )-----------( 142      ( 21 Dec 2020 05:01 )             27.3     12-21    138  |  107  |  25<H>  ----------------------------<  69<L>  5.4<H>   |  20<L>  |  2.43<H>    Ca    7.0<L>      21 Dec 2020 05:01  Phos  4.4     12-21  Mg     1.7     12-    TPro  6.6  /  Alb  3.4  /  TBili  0.4  /  DBili  x   /  AST  30  /  ALT  23  /  AlkPhos  93  12-20    PT/INR - ( 20 Dec 2020 14:11 )   PT: 11.8 sec;   INR: 0.98 ratio         PTT - ( 20 Dec 2020 14:11 )  PTT:33.4 sec      Urinalysis Basic - ( 20 Dec 2020 23:47 )    Color: Colorless / Appearance: Clear / S.007 / pH: x  Gluc: x / Ketone: Negative  / Bili: Negative / Urobili: Negative   Blood: x / Protein: Trace / Nitrite: Negative   Leuk Esterase: Negative / RBC: 2 /hpf / WBC 0 /HPF   Sq Epi: x / Non Sq Epi: 0 /hpf / Bacteria: Negative         PROGRESS NOTE:   Authored by Genia Guardado MD  Pager [phone] [facility]     Patient is a 65y old  Male who presents with a chief complaint of Dyspnea (20 Dec 2020 17:24)      SUBJECTIVE / OVERNIGHT EVENTS:    Afebrile. Net 1 L yesterday. States feels well, as good as he did prior to episode that brought him to hospital. Cough chronic. Just had soft BM, normal for him. Up and out of bed but not exerted self yet. On 2.5 L this AM.    MEDICATIONS  (STANDING):  aspirin enteric coated 81 milliGRAM(s) Oral daily  cholecalciferol 2000 Unit(s) Oral daily  clopidogrel Tablet 75 milliGRAM(s) Oral daily  furosemide   Injectable 80 milliGRAM(s) IV Push two times a day  heparin   Injectable 5000 Unit(s) SubCutaneous every 8 hours  insulin glargine Injectable (LANTUS) 12 Unit(s) SubCutaneous at bedtime  insulin lispro (ADMELOG) corrective regimen sliding scale   SubCutaneous three times a day before meals  insulin lispro (ADMELOG) corrective regimen sliding scale   SubCutaneous at bedtime  insulin lispro Injectable (ADMELOG) 5 Unit(s) SubCutaneous Before meals and at bedtime  metoprolol succinate ER 25 milliGRAM(s) Oral daily  multivitamin 1 Tablet(s) Oral daily  pantoprazole    Tablet 40 milliGRAM(s) Oral before breakfast  sodium bicarbonate 650 milliGRAM(s) Oral three times a day    MEDICATIONS  (PRN):      I&O's Summary    20 Dec 2020 07:01  -  21 Dec 2020 06:57  --------------------------------------------------------  IN: 0 mL / OUT: 1050 mL / NET: -1050 mL        PHYSICAL EXAM:  Vital Signs Last 24 Hrs  T(C): 37.3 (21 Dec 2020 04:35), Max: 37.3 (21 Dec 2020 04:35)  T(F): 99.2 (21 Dec 2020 04:35), Max: 99.2 (21 Dec 2020 04:35)  HR: 97 (21 Dec 2020 05:06) (83 - 110)  BP: 123/67 (21 Dec 2020 05:06) (104/55 - 151/78)  BP(mean): 89 (20 Dec 2020 16:45) (89 - 93)  RR: 18 (21 Dec 2020 04:35) (16 - 27)  SpO2: 96% (21 Dec 2020 04:35) (89% - 99%)    GENERAL: NAD, lying in bed comfortably, on 2.5L NC  HEENT: NC/AT, EOMI, PERRL  NECK: Supple, no thyromegaly  CHEST/LUNG: CTAB, no increased WOB  HEART: RRR, no m/r/g  ABDOMEN: soft, NT, Distended  EXTREMITIES: 2+ pitting edema up to knees b/l, tender to palpation L>R  NERVOUS SYSTEM: A&Ox3, no focal deficits  MSK: FROM all 4 extremities, full and equal strength  SKIN: No rashes or lesions    LABS:                        8.9    7.56  )-----------( 142      ( 21 Dec 2020 05:01 )             27.3     12-21    138  |  107  |  25<H>  ----------------------------<  69<L>  5.4<H>   |  20<L>  |  2.43<H>    Ca    7.0<L>      21 Dec 2020 05:01  Phos  4.4     12-21  Mg     1.7     12-    TPro  6.6  /  Alb  3.4  /  TBili  0.4  /  DBili  x   /  AST  30  /  ALT  23  /  AlkPhos  93  12-20    PT/INR - ( 20 Dec 2020 14:11 )   PT: 11.8 sec;   INR: 0.98 ratio         PTT - ( 20 Dec 2020 14:11 )  PTT:33.4 sec      Urinalysis Basic - ( 20 Dec 2020 23:47 )    Color: Colorless / Appearance: Clear / S.007 / pH: x  Gluc: x / Ketone: Negative  / Bili: Negative / Urobili: Negative   Blood: x / Protein: Trace / Nitrite: Negative   Leuk Esterase: Negative / RBC: 2 /hpf / WBC 0 /HPF   Sq Epi: x / Non Sq Epi: 0 /hpf / Bacteria: Negative

## 2020-12-22 NOTE — PROGRESS NOTE ADULT - SUBJECTIVE AND OBJECTIVE BOX
Diabetes Follow up note:    Chief complaint: T2DM    Interval Hx: Pt reports felt hypoglycemic again overnight. treated the low but was never confirmed w/FS. Ate 2 hard boiled eggs and a roll for breakfast this AM. Hoping he will be discharged today or tomorrow.     Review of Systems:  General: denies pain  GI: Tolerating POs. Denies N/V/D/Abd pain  CV: Denies CP/SOB  ENDO: No S&Sx of hypoglycemia    MEDS:  atorvastatin 10 milliGRAM(s) Oral at bedtime  insulin glargine Injectable (LANTUS) 7 Unit(s) SubCutaneous at bedtime  insulin lispro (ADMELOG) corrective regimen sliding scale   SubCutaneous three times a day before meals  insulin lispro (ADMELOG) corrective regimen sliding scale   SubCutaneous at bedtime  insulin lispro Injectable (ADMELOG) 3 Unit(s) SubCutaneous three times a day with meals  methylPREDNISolone 4 milliGRAM(s) Oral daily      Allergies    No Known Allergies        PE:  General: Male sitting in chair. NAD.   Vital Signs Last 24 Hrs  T(C): 36.8 (22 Dec 2020 11:22), Max: 37.4 (21 Dec 2020 20:40)  T(F): 98.3 (22 Dec 2020 11:22), Max: 99.3 (21 Dec 2020 20:40)  HR: 77 (22 Dec 2020 11:22) (77 - 96)  BP: 119/70 (22 Dec 2020 11:22) (109/57 - 135/69)  BP(mean): --  RR: 18 (22 Dec 2020 11:22) (18 - 18)  SpO2: 97% (22 Dec 2020 11:22) (94% - 99%)  Abd: Soft, NT,ND,   Extremities: Warm. no edema x 4 ext.   Neuro: A&O X3    LABS:  POCT Blood Glucose.: 90 mg/dL (12-22-20 @ 11:31)  POCT Blood Glucose.: 160 mg/dL (12-22-20 @ 07:08)  POCT Blood Glucose.: 173 mg/dL (12-22-20 @ 05:49)  POCT Blood Glucose.: 256 mg/dL (12-21-20 @ 21:23)  POCT Blood Glucose.: 287 mg/dL (12-21-20 @ 17:47)  POCT Blood Glucose.: 75 mg/dL (12-21-20 @ 11:24)  POCT Blood Glucose.: 116 mg/dL (12-21-20 @ 07:57)  POCT Blood Glucose.: 97 mg/dL (12-21-20 @ 07:36)  POCT Blood Glucose.: 60 mg/dL (12-21-20 @ 07:12)  POCT Blood Glucose.: 322 mg/dL (12-20-20 @ 23:06)  POCT Blood Glucose.: 255 mg/dL (12-20-20 @ 21:26)  POCT Blood Glucose.: 164 mg/dL (12-20-20 @ 18:18)                            8.8    7.71  )-----------( 148      ( 22 Dec 2020 06:45 )             27.0       12-22    138  |  103  |  27<H>  ----------------------------<  129<H>  4.6   |  21<L>  |  2.59<H>    Ca    7.2<L>      22 Dec 2020 06:44  Phos  3.9     12-22  Mg     1.6     12-22    TPro  6.6  /  Alb  3.4  /  TBili  0.4  /  DBili  x   /  AST  30  /  ALT  23  /  AlkPhos  93  12-20    A1C with Estimated Average Glucose Result: 7.6 % (12-21-20 @ 07:25)          Contact number: john [phone] or [phone]

## 2020-12-22 NOTE — PROGRESS NOTE ADULT - SUBJECTIVE AND OBJECTIVE BOX
[facility] DIVISION OF KIDNEY DISEASES AND HYPERTENSION -- FOLLOW UP NOTE  --------------------------------------------------------------------------------  Amari Carty   Nephrology Fellow  Pager [phone]: [phone]/ [phone]: [phone]  (After 5 pm or on weekends please page the on-call fellow)      Patient is a 65y old  Male who presents with a chief complaint of Dyspnea (22 Dec 2020 12:10)      24 hour events/subjective: Patient seen and examined at the bedside. Vital signs, labs, medications reviewed. No acute overnight events, on RA now. Denies any CP. Still with LE edema and pain in leg        PAST HISTORY  --------------------------------------------------------------------------------  No significant changes to PMH, PSH, FHx, SHx, unless otherwise noted    ALLERGIES & MEDICATIONS  --------------------------------------------------------------------------------  Allergies    No Known Allergies    Intolerances      Standing Inpatient Medications  aspirin enteric coated 81 milliGRAM(s) Oral daily  atorvastatin 10 milliGRAM(s) Oral at bedtime  calcium carbonate   1250 mG (OsCal) 1 Tablet(s) Oral two times a day  cholecalciferol 2000 Unit(s) Oral daily  clopidogrel Tablet 75 milliGRAM(s) Oral daily  furosemide   Injectable 80 milliGRAM(s) IV Push three times a day  heparin   Injectable 5000 Unit(s) SubCutaneous every 8 hours  insulin glargine Injectable (LANTUS) 7 Unit(s) SubCutaneous at bedtime  insulin lispro (ADMELOG) corrective regimen sliding scale   SubCutaneous three times a day before meals  insulin lispro (ADMELOG) corrective regimen sliding scale   SubCutaneous at bedtime  insulin lispro Injectable (ADMELOG) 3 Unit(s) SubCutaneous three times a day with meals  methylPREDNISolone 4 milliGRAM(s) Oral daily  metoprolol succinate ER 25 milliGRAM(s) Oral daily  multivitamin 1 Tablet(s) Oral daily  pantoprazole    Tablet 40 milliGRAM(s) Oral before breakfast  sodium bicarbonate 1300 milliGRAM(s) Oral three times a day    PRN Inpatient Medications      REVIEW OF SYSTEMS  --------------------------------------------------------------------------------  Gen: No fevers/chills  Skin: No rashes  Head/Eyes/Ears: Normal hearing, no difficulty seeing  Respiratory: No dyspnea, cough  CV: No chest pain  GI: No abdominal pain, diarrhea  : No dysuria, hematuria  MSK: +edema    >>> <<<    VITALS/PHYSICAL EXAM  --------------------------------------------------------------------------------  T(C): 36.8 (12-22-20 @ 11:22), Max: 37.4 (12-21-20 @ 20:40)  HR: 77 (12-22-20 @ 11:22) (77 - 96)  BP: 119/70 (12-22-20 @ 11:22) (109/57 - 135/69)  RR: 18 (12-22-20 @ 11:22) (18 - 18)  SpO2: 97% (12-22-20 @ 11:22) (94% - 99%)  Wt(kg): --  Height (cm): 180.3 (12-20-20 @ 13:10)  Weight (kg): 72.6 (12-20-20 @ 13:10)  BMI (kg/m2): 22.3 (12-20-20 @ 13:10)  BSA (m2): 1.92 (12-20-20 @ 13:10)      12-21-20 @ 07:01  -  12-22-20 @ 07:00  --------------------------------------------------------  IN: 630 mL / OUT: 0 mL / NET: 630 mL    12-22-20 @ 07:01  -  12-22-20 @ 12:47  --------------------------------------------------------  IN: 490 mL / OUT: 0 mL / NET: 490 mL      Physical Exam:    	Gen: NAD  	HEENT: Anicteric  	Pulm: CTA B/L  	CV: S1S2  	Abd: Soft, +BS   	MSK: 1+ LE edema B/L  	Neuro: Awake  	Skin: Warm and dry  	Vascular access:      LABS/STUDIES  --------------------------------------------------------------------------------              8.8    7.71  >-----------<  148      [12-22-20 @ 06:45]              27.0     138  |  103  |  27  ----------------------------<  129      [12-22-20 @ 06:44]  4.6   |  21  |  2.59        Ca     7.2     [12-22-20 @ 06:44]      iCa    0.99     [12-22 @ 07:27]      Mg     1.6     [12-22-20 @ 06:44]      Phos  3.9     [12-22-20 @ 06:44]    TPro  6.6  /  Alb  3.4  /  TBili  0.4  /  DBili  x   /  AST  30  /  ALT  23  /  AlkPhos  93  [12-20-20 @ 16:53]    PT/INR: PT 11.8 , INR 0.98       [12-20-20 @ 14:11]  PTT: 33.4       [12-20-20 @ 14:11]      Creatinine Trend:  SCr 2.59 [12-22 @ 06:44]  SCr 2.82 [12-21 @ 20:04]  SCr 2.43 [12-21 @ 05:01]  SCr 2.53 [12-20 @ 21:29]  SCr 2.41 [12-20 @ 16:53]    Urinalysis - [12-20-20 @ 23:47]      Color Colorless / Appearance Clear / SG 1.007 / pH 6.0      Gluc Trace / Ketone Negative  / Bili Negative / Urobili Negative       Blood Trace / Protein Trace / Leuk Est Negative / Nitrite Negative      RBC 2 / WBC 0 / Hyaline 1 / Gran  / Sq Epi  / Non Sq Epi 0 / Bacteria Negative    Urine Sodium 124      [12-20-20 @ 23:47]  Urine Potassium 20      [12-20-20 @ 23:47]  Urine Chloride 115      [12-20-20 @ 23:47]  Urine Phosphorus 24.2      [12-21-20 @ 04:30]    HbA1c 7.8      [05-02-19 @ 00:50]    HCV 0.06, Nonreact      [12-21-20 @ 07:24]

## 2020-12-22 NOTE — DISCHARGE NOTE PROVIDER - NSDCCPCAREPLAN_GEN_ALL_CORE_FT
PRINCIPAL DISCHARGE DIAGNOSIS  Diagnosis: Congestive heart failure  Assessment and Plan of Treatment: You presented with shortness of breath and evidence of "fluid overload" and elevated cardiac enzymes (signs of injury to the heart). You were treated with medications to remove the excess fluid (caused by back up from poor heart function). The exacerbation was possibly due to progressive heart failure or even a heart attack. You are recommended for a procedure called a cardiac catheterization, which you deferred till later. Please ensure you follow up with your cardiologist after you leave the hospital.  You are started on new medications for your heart, please take them as prescribed and follow up with your cardiologist, primary care doctor, and nephrologist for continued management of these medications.  Please attempt to quit smoking, eat a healthy diet, avoid sugary foods and sodas, as these are harmful to heart health.      SECONDARY DISCHARGE DIAGNOSES  Diagnosis: Diabetes  Assessment and Plan of Treatment: You have a history of diabetes. Poorly controlled diabetes can lead to several problems including worsening your heart disease. Please resume using your insulin pump at discharge and follow up with your endocrinologist at the given appointment.  Please avoid sodas and sogary foods to ensure proper management of your blood sugar. Please also avoid smoking as it can exacerbate the disease in your blood vessels already caused due to high blood sugar.    Diagnosis: Renal failure, chronic, stage 3 (moderate)  Assessment and Plan of Treatment: Renal failure, chronic, stage 3 (moderate)    Diagnosis: Pulmonary nodule  Assessment and Plan of Treatment: Pulmonary nodule     PRINCIPAL DISCHARGE DIAGNOSIS  Diagnosis: Congestive heart failure  Assessment and Plan of Treatment: You presented with shortness of breath and evidence of "fluid overload" and elevated cardiac enzymes (signs of injury to the heart). You were treated with medications to remove the excess fluid (caused by back up from poor heart function). The exacerbation was possibly due to progressive heart failure or even a heart attack. You are recommended for a procedure called a cardiac catheterization, which you deferred till later. Please ensure you follow up with your cardiologist after you leave the hospital.  Your metoprolol (Toprol) was increased in dosage. Please follow up with your cardiologist, primary care doctor, and nephrologist for continued management of your medications.  Please attempt to quit smoking, eat a healthy diet, avoid sugary foods and sodas, as these are harmful to heart health.      SECONDARY DISCHARGE DIAGNOSES  Diagnosis: Pulmonary nodule  Assessment and Plan of Treatment: You were found to have new masses in your lung. You were noted to have a mass in your lung back in 2014, which has now grown in size and there are new masses. Due to your smoking history and the characteristics of these masses on the CT scan, they are concerning for possible cancer. You have been provided a referral to Pulmonology (lung doctors) whom you should follow up with in the next few weeks. These masses need further work up and should not be ignored.  Please also attempt to quit smoking and consider resuming the smoking cessation program you were part of previously.    Diagnosis: Diabetes  Assessment and Plan of Treatment: You have a history of diabetes. Poorly controlled diabetes can lead to several problems including worsening your heart disease. Please resume using your insulin pump at discharge and follow up with your endocrinologist at the given appointment.  Please avoid sodas and sogary foods to ensure proper management of your blood sugar. Please also avoid smoking as it can exacerbate the disease in your blood vessels already caused due to high blood sugar.    Diagnosis: Renal failure, chronic, stage 3 (moderate)  Assessment and Plan of Treatment: You have a history of kidney disease. This is primarily due to your diabetes. High blood pressure and heart disease can also cause kidney disease. Please manage your diabetes as above to help with your kidney disease.  Please follow up with your nephrologist next week. You have been on Ramipril at home, do not resume this medication till you follow up with nephrology.    Diagnosis: Fall  Assessment and Plan of Treatment: You had a fall recently causing an injury to your left leg. X-rays were done which showed no fracture. You do have swelling and this area will likely bruise and turn different colors in the next few days. If the pain does not improve over the next few weeks or you develop difficulty walking please seek medical attention. You may take Tylenol for the pain and continue to bear weight for walking.     PRINCIPAL DISCHARGE DIAGNOSIS  Diagnosis: Congestive heart failure  Assessment and Plan of Treatment: You presented with shortness of breath and evidence of "fluid overload" and elevated cardiac enzymes (signs of injury to the heart). You were treated with medications to remove the excess fluid (caused by back up from poor heart function). The exacerbation was possibly due to progressive heart failure or even a heart attack. You are recommended for a procedure called a cardiac catheterization, which you deferred till later. Please ensure you follow up with your cardiologist after you leave the hospital.  Your metoprolol (Toprol) was increased in dosage. Please follow up with your cardiologist, primary care doctor, and nephrologist for continued management of your medications.  Please attempt to quit smoking, eat a healthy diet, avoid sugary foods and sodas, as these are harmful to heart health. If you experience shortness of breath, chest pain, difficulty laying flat, lightheadedness, leg swelling or sudden increase in your weight, please return to the emergency room.      SECONDARY DISCHARGE DIAGNOSES  Diagnosis: Fall  Assessment and Plan of Treatment: You had a fall recently causing an injury to your left leg. X-rays were done which showed no fracture. You do have swelling and this area will likely bruise and turn different colors in the next few days. If the pain does not improve over the next few weeks or you develop difficulty walking please seek medical attention. You may take Tylenol for the pain and continue to bear weight for walking.    Diagnosis: Renal failure, chronic, stage 3 (moderate)  Assessment and Plan of Treatment: You have a history of kidney disease. This is primarily due to your diabetes. High blood pressure and heart disease can also cause kidney disease. Please manage your diabetes as above to help with your kidney disease.  Please follow up with your nephrologist next week. You have been on Ramipril at home, do not resume this medication till you follow up with nephrology.    Diagnosis: Diabetes  Assessment and Plan of Treatment: You have a history of diabetes. Poorly controlled diabetes can lead to several problems including worsening your heart disease. Please resume using your insulin pump at discharge and follow up with your endocrinologist at the given appointment.  Please avoid sodas and sogary foods to ensure proper management of your blood sugar. Please also avoid smoking as it can exacerbate the disease in your blood vessels already caused due to high blood sugar.    Diagnosis: Pulmonary nodule  Assessment and Plan of Treatment: You were found to have new masses in your lung. You were noted to have a mass in your lung back in 2014, which has now grown in size and there are new masses. Due to your smoking history and the characteristics of these masses on the CT scan, they are concerning for possible cancer. You have been provided a referral to Pulmonology (lung doctors) whom you should follow up with in the next few weeks. These masses need further work up and should not be ignored.  Please also attempt to quit smoking and consider resuming the smoking cessation program you were part of previously.     PRINCIPAL DISCHARGE DIAGNOSIS  Diagnosis: Congestive heart failure  Assessment and Plan of Treatment: You presented with shortness of breath and evidence of "fluid overload" and elevated cardiac enzymes (signs of injury to the heart). You were treated with medications to remove the excess fluid (caused by back up from poor heart function). The exacerbation was possibly due to progressive heart failure or even a heart attack. You are recommended for a procedure called a cardiac catheterization, which you deferred till later. Please ensure you follow up with your cardiologist after you leave the hospital.  Your metoprolol (Toprol) was increased in dosage. Please follow up with your cardiologist, primary care doctor, and nephrologist for continued management of your medications. Your ramipril was held due to high potassium and changes in your kidney function. Do not take this medication at home until you see your nephrologist. You likely resume this medication next week after a lab check to confirm your potassium and renal function are stable.  Please attempt to quit smoking, eat a healthy diet, avoid sugary foods and sodas, as these are harmful to heart health. If you experience shortness of breath, chest pain, difficulty laying flat, lightheadedness, leg swelling or sudden increase in your weight, please return to the emergency room.      SECONDARY DISCHARGE DIAGNOSES  Diagnosis: Fall  Assessment and Plan of Treatment: You had a fall recently causing an injury to your left leg. X-rays were done which showed no fracture. You do have swelling and this area will likely bruise and turn different colors in the next few days. If the pain does not improve over the next few weeks or you develop difficulty walking please seek medical attention. You may take Tylenol for the pain and continue to bear weight for walking.    Diagnosis: Renal failure, chronic, stage 3 (moderate)  Assessment and Plan of Treatment: You have a history of kidney disease. This is primarily due to your diabetes. High blood pressure and heart disease can also cause kidney disease. Please manage your diabetes as above to help with your kidney disease.  Please follow up with your nephrologist next week. You have been on Ramipril at home, do not resume this medication till you follow up with nephrology.    Diagnosis: Diabetes  Assessment and Plan of Treatment: You have a history of diabetes. Poorly controlled diabetes can lead to several problems including worsening your heart disease. Please resume using your insulin pump at discharge and follow up with your endocrinologist at the given appointment.  Please avoid sodas and sogary foods to ensure proper management of your blood sugar. Please also avoid smoking as it can exacerbate the disease in your blood vessels already caused due to high blood sugar.    Diagnosis: Pulmonary nodule  Assessment and Plan of Treatment: You were found to have new masses in your lung. You were noted to have a mass in your lung back in 2014, which has now grown in size and there are new masses. Due to your smoking history and the characteristics of these masses on the CT scan, they are concerning for possible cancer. You have been provided a referral to Pulmonology (lung doctors) whom you should follow up with in the next few weeks. These masses need further work up and should not be ignored.  Please also attempt to quit smoking and consider resuming the smoking cessation program you were part of previously.

## 2020-12-22 NOTE — DISCHARGE NOTE PROVIDER - NSDCFUSCHEDAPPT_GEN_ALL_CORE_FT
NORM DEAN ; 12/28/2020 ; [facility] Rheum [address] [facility] NORM Staley ; 01/04/2021 ; [facility] Endocr [address] NORM [address]quivel ; 03/12/2021 ; [facility] Nephro [address]

## 2020-12-22 NOTE — DISCHARGE NOTE PROVIDER - NSDCMRMEDTOKEN_GEN_ALL_CORE_FT
amLODIPine 10 mg oral tablet: 1 tab(s) orally once a day   aspirin 81 mg oral tablet: 1 tab(s) orally 2 times a week on Mondays and Fridays at night  CBD: 1 vape inhaled 3 times a week  clopidogrel 75 mg oral tablet: 1 tab(s) orally once a day (at bedtime)  furosemide 80 mg oral tablet: 1 tab(s) orally 2 times a day  HumaLOG 100 units/mL injectable solution: per sliding scale  lovastatin 20 mg oral tablet: 1 tab(s) orally once a day (at bedtime)  metoprolol succinate 25 mg oral tablet, extended release: 1 tab(s) orally once a day (at bedtime)  multivitamin: 1 tab(s) orally once a day (at bedtime)  omeprazole 20 mg oral delayed release tablet: 1 tab(s) orally once a day  Orencia: 100  subcutaneous once a week  ramipril 5 mg oral capsule: 1 cap(s) orally once a day (at bedtime)  sodium bicarbonate 650 mg oral tablet: 1 tab(s) orally 3 times a day  Stelara 45 mg/0.5 mL subcutaneous solution: subcutaneous every 3 months  Vitamin D3 2000 intl units oral tablet: 1 tab(s) orally once a day (at bedtime)   aspirin 81 mg oral tablet: 1 tab(s) orally 2 times a week on Mondays and Fridays at night  calcium carbonate 1250 mg (500 mg elemental calcium) oral tablet: 1 tab(s) orally 2 times a day  clopidogrel 75 mg oral tablet: 1 tab(s) orally once a day (at bedtime)  furosemide 80 mg oral tablet: 1 tab(s) orally 2 times a day. Resume on 12/25 Friday.  lovastatin 20 mg oral tablet: 1 tab(s) orally once a day (at bedtime)  methylPREDNISolone 4 mg oral tablet: 1 tab(s) orally once a day  Metoprolol Succinate ER 50 mg oral tablet, extended release: 1 tab(s) orally once a day  multivitamin: 1 tab(s) orally once a day (at bedtime)  omeprazole 20 mg oral delayed release tablet: 1 tab(s) orally once a day  Orencia: 100  subcutaneous once a week  sodium bicarbonate 650 mg oral tablet: 2 tab(s) orally 3 times a day  Stelara 45 mg/0.5 mL subcutaneous solution: subcutaneous every 3 months  Vitamin D3 2000 intl units oral tablet: 1 tab(s) orally once a day (at bedtime)

## 2020-12-22 NOTE — PROGRESS NOTE ADULT - PROBLEM SELECTOR PLAN 10
DVT prophylaxis - Heparin subq 5000 units q8hr  GI prophylaxis - Omeprazole 20 mg (or therapeutic interchange)  Diet - carb controlled/DASH diet      Dispo - Pending wean to RA and TTE DVT prophylaxis - Heparin subq 5000 units q8hr  GI prophylaxis - Omeprazole 20 mg (or therapeutic interchange)  Diet - carb controlled/DASH diet      Dispo - Pending TTE. Now on room air

## 2020-12-22 NOTE — CONSULT NOTE ADULT - SUBJECTIVE AND OBJECTIVE BOX
CHIEF COMPLAINT:Patient is a 65y old  Male who presents with a chief complaint of Dyspnea (22 Dec 2020 16:16)      HISTORY OF PRESENT ILLNESS:HPI:  Patient is a 65 year old man with PMH MI (2002), CKD, HTN, chronic pancreatitis, Hx of past EtOH abuse, DM on insulin pump, RA, OA, and current smoker who presents with dyspnea. Patient states that when he went outside to smoke he felt short of breath. He states that the shortness of breath didn't resolve overnight which was unusual for him. He states previously over the last few weeks he intermittently felt short of breath when exerting but not similar to overnight. He also states that at baseline he is able to lay flat but overnight he was unable to do so and had to sit on the couch and only got 1 hour of sleep. He says that he has a cough that is chronic and unchanged. He states that he does occasionally get leg swelling but states that it is minor around his ankles and it tends to improve overnight. He endorses that he has had previous episodes of shortness of breath but states that they tended to happen with ambulation and would usually resolve within minutes. This most recent episode lasted all night and because it was not improving the patient came in to the hospital. He denies any fever, runny nose, CP, n/v or abdominal pain. He denies dysuria. He states that he had a regular bowel movement yesterday and says that he might sometimes have between 1 to 7 soft BMs a day due to pancreatitis.     In the ED the patient was noted to be 88% on RA and started on 4L NC. He was given 1 dose of lasix 80mg IV. He was also given calcium gluconate 2g for hypocalcemia and hyperkalemia noted on initial labs. He states his baseline weight is around 160 lbs. (20 Dec 2020 17:24)      PAST MEDICAL & SURGICAL HISTORY:  Thumb pain, right    Fatty liver    Peripheral neuropathy  feet    Nasal drainage  post nasal drip    Cataract  bilateral    Hearing loss  bilateral    Renal failure, chronic, stage 3 (moderate)    Vitamin D deficiency    H/O hyperkalemia  6.4 8 months ago, treated with sodium bicarbonate    H/O insertion of insulin pump  2015, omnipod    Portal vein thrombosis  2004 and treated with blood thinners, no longer follows with hepatology    Alcohol abuse  recovering 2003, now only has an occasional drink    History of chronic pancreatitis  started in 1999, followed by pain management for pain control    Osteoarthritis    Gastroesophageal reflux disease    Hyperlipidemia    Cigarette smoker    H/O rheumatoid arthritis  on medication    H/O sleep apnea  mild, unable to use CPAP    Carotid stenosis, left    History of MI (myocardial infarction)  in 2002    H/O: HTN (hypertension)    Coronary artery disease    Diabetes mellitus  Type II, diagnosed in 2000, insulin pump    S/P colonoscopy  2014, benign polyps    H/O arthroscopy of right knee  1988    S/P tonsillectomy and adenoidectomy  age 16    S/P insertion of spinal cord stimulator  inserted 2006 and removed 2007    H/O umbilical hernia repair  2014    S/P carotid endarterectomy  left 11/9/2016    History of inguinal herniorrhaphy  right and left, total of 6    S/P insertion of intrathecal pump  placed in 2007  removed in 2008    H/O vasectomy  1994    S/P cholecystectomy  2003, laparoscopic            MEDICATIONS:  aspirin enteric coated 81 milliGRAM(s) Oral daily  clopidogrel Tablet 75 milliGRAM(s) Oral daily  furosemide   Injectable 80 milliGRAM(s) IV Push three times a day  heparin   Injectable 5000 Unit(s) SubCutaneous every 8 hours  metoprolol succinate ER 25 milliGRAM(s) Oral daily          pantoprazole    Tablet 40 milliGRAM(s) Oral before breakfast    atorvastatin 10 milliGRAM(s) Oral at bedtime  insulin glargine Injectable (LANTUS) 7 Unit(s) SubCutaneous at bedtime  insulin lispro (ADMELOG) corrective regimen sliding scale   SubCutaneous three times a day before meals  insulin lispro (ADMELOG) corrective regimen sliding scale   SubCutaneous at bedtime  insulin lispro Injectable (ADMELOG) 3 Unit(s) SubCutaneous three times a day with meals  methylPREDNISolone 4 milliGRAM(s) Oral daily    calcium carbonate   1250 mG (OsCal) 1 Tablet(s) Oral two times a day  cholecalciferol 2000 Unit(s) Oral daily  multivitamin 1 Tablet(s) Oral daily  sodium bicarbonate 1300 milliGRAM(s) Oral three times a day      FAMILY HISTORY:  No pertinent family history in first degree relatives  adopted        Non-contributory    SOCIAL HISTORY:    50 pack year smoker    Allergies    No Known Allergies    Intolerances    	    REVIEW OF SYSTEMS:  CONSTITUTIONAL: No fever  EYES: No eye pain, visual disturbances, or discharge  ENMT:  No difficulty hearing, tinnitus  NECK: No pain or stiffness  RESPIRATORY: No cough, wheezing, +SOB  CARDIOVASCULAR: No chest pain, palpitations, passing out, dizziness, or leg swelling  GASTROINTESTINAL:  No nausea, vomiting, diarrhea or constipation. No melena.  GENITOURINARY: No dysuria, hematuria  NEUROLOGICAL: No stroke like symptoms  SKIN: No burning or lesions   ENDOCRINE: No heat or cold intolerance  MUSCULOSKELETAL: No joint pain or swelling  PSYCHIATRIC: No  anxiety, mood swings  HEME/LYMPH: No bleeding gums  ALLERGY AND IMMUNOLOGIC: No hives or eczema	    All other ROS negative    PHYSICAL EXAM:  T(C): 36.9 (12-22-20 @ 19:06), Max: 37.2 (12-22-20 @ 00:41)  HR: 87 (12-22-20 @ 21:22) (71 - 96)  BP: 131/73 (12-22-20 @ 21:22) (109/57 - 131/73)  RR: 18 (12-22-20 @ 19:06) (17 - 18)  SpO2: 95% (12-22-20 @ 19:06) (94% - 97%)  Wt(kg): --  I&O's Summary    21 Dec 2020 07:01  -  22 Dec 2020 07:00  --------------------------------------------------------  IN: 630 mL / OUT: 0 mL / NET: 630 mL    22 Dec 2020 07:01  -  22 Dec 2020 23:15  --------------------------------------------------------  IN: 730 mL / OUT: 1225 mL / NET: -495 mL        Appearance: Normal	  HEENT:   Normal oral mucosa, EOMI	  Cardiovascular:  S1 S2, No JVD,    Respiratory: Lungs clear to auscultation	  Psychiatry: Alert  Gastrointestinal:  Soft, Non-tender, + BS	  Skin: No rashes   Neurologic: Non-focal  Extremities:  No edema  Vascular: Peripheral pulses palpable    	    	  	  CARDIAC MARKERS:  Labs personally reviewed by me                                  8.8    7.71  )-----------( 148      ( 22 Dec 2020 06:45 )             27.0     12-22    138  |  103  |  27<H>  ----------------------------<  129<H>  4.6   |  21<L>  |  2.59<H>    Ca    7.2<L>      22 Dec 2020 06:44  Phos  3.9     12-22  Mg     1.6     12-22            EKG: Personally reviewed by me - NSR LVH Qtc >500  Radiology: Personally reviewed by me -   < from: CT Chest No Cont (12.21.20 @ 14:32) >  IMPRESSION:  Mild pulmonary edema and small bilateral pleural effusions.  2.4 x 2.5 cm  solid nodule in the medial right lower lobe, likely primary neoplasm, new since 2014 chest CT examination.  Groundglass opacity in the left lower lobe, increased in size since 7/25/2014, suspicious for slow-growing primary neoplasm. Follow-up chest CT at 3 months is recommended.       < from: Transthoracic Echocardiogram (12.21.20 @ 15:20) >  Conclusions:  1. Mitral annular calcification. Tethered mitral valve  leaflets with normal opening. Moderate-severe mitral  regurgitation.  2. Calcified trileaflet aortic valve with normal opening.  Mild-moderate aortic regurgitation.  3. Severely dilated left atrium.  LA volume index = 54  cc/m2.  4. Severe global left ventricular systolic dysfunction.  5. Normal right ventricular size and systolic function.  *** Compared with echocardiogram of 7/30/2014, therehas  been an interval decline in left ventricular systolic  function and an increase in the degree of mitral  regurgitation.  ---------------------------        Assessment and Plan:   · Assessment	  Patient is a 65 year old man with PMH MI (2002), CKD, HTN, chronic pancreatitis, Hx of past EtOH abuse, DM on insulin pump, RA, OA, and current smoker who presents with increased dyspnea for 1 day and found to have elevated BNP and lower extremity swelling concerning for CHF exacerbation.    Problem/Plan - 1:  ·  Problem: Acute systolic HF  Plan: Now s/p diuresis and breathing much improved  Reported TTE on 8/20 showed preserved EF and mild diastolic dysfunction and hypokinetic distal and apical walls (?as outpt)  - Now Echo with significant decline in global LV function with EF 30-35%  - given 50 pack year smoker, known PAD, elevated trop  with other risk factors ischemia is a definite possibility   - ideally would go directly to cath to evaluate coronary anatomy but given CKD would rather pursue stress test first  - I will call and first discuss with his outpt cardio Dr Lisker in the AM when office is open  - Continue with  Lasix 80 BID  - given sHF will need to start GDMT  - he is on Metoprolol succ 25mg daily -- increase to 50mg  - would add Hydral 10mg TID and uptitrate to 25mg TID   - will resume ACE in place of hydral if okay with nephrology     Problem/Plan - 2:  ·  Problem: Hyperkalemia.  Plan: History of recurrent hyperkalemia 2/2 renal disease  now resolved s/p Lokelma   - Lokelma for K > 6.    Problem/Plan - 3:  ·  Problem:  Elevated troponin. Plan: Likely 2/2 sHF and CKD, but ischemia can not be ruled out in this 66 YO M with 50 pack year smoker, known PAD. IDDM and new sHF  Plan as noted above    Problem/Plan - 4:  ·  Problem: CKD. Plan: - Nephrology following, appreciate recs.- Cr appears to be around baseline    Problem/Plan - 5:  Problem: Hypertension. Plan: - Continue metoprolol, d/c amlodipine   - resume Ramipril if okay with nephrology and give maintenance daily Lokelma if need be to allow ACE dosing  - hydral if can't start ACE   - Tx of HF as noted above    Problem/Plan - 6:  ·  Problem: Carotid stenosis, left.  Plan: S/p CEA  - Continue Plavix, ASA, lovastatin.     Problem/Plan - 7:  ·  Problem: Diabetes.  Plan: Patient on insulin pump  - Endocrine recs appreciated  - Farxiga for sHF can not be added as GFR <30       Problem/Plan - 8:  Problem: Prophylactic measure. Plan; DVT prophylaxis - Heparin subq 5000 units q8hr  GI prophylaxis - Omeprazole 20 mg (or therapeutic interchange)  Diet - carb controlled/DASH diet            Differential diagnosis and plan of care discussed with patient after the evaluation. Counseling on diet, nutritional counseling, weight management, exercise and medication compliance was done. One hundred forty minutes spent on total encounter, of which more than fifty percent of the encounter was spent counseling and/or coordinating care by the attending physician    Elvin Yan DO [facility]  Cardiovascular Medicine  [address] [place] [address], [address]  Office [phone]  Cell [phone]

## 2020-12-22 NOTE — DISCHARGE NOTE PROVIDER - NSDCFUADDINST_GEN_ALL_CORE_FT
Please follow up with your cardiologist (Dr. Lisker) as you are recommended for a cardiac catheterization and your presenting symptoms were likely due to disease in your heart.    Please follow up with your nephrologist due to your chronic kidney disease, continued monitoring of your kidney function and medications.    Please make a follow up appointment with pulmonology, a referral is provided for you. This is for the lung masses found on imaging that is concerning for possible cancer.    You have a follow up appointment already scheduled with your endocrinologist for your diabetes at the above listed time.    Please continue to follow up with your primary care provider.

## 2020-12-22 NOTE — DISCHARGE NOTE PROVIDER - HOSPITAL COURSE
HPI  Patient is a 65 year old man with PMH MI (2002), CKD, HTN, chronic pancreatitis, Hx of past EtOH abuse, DM on insulin pump, RA, OA, and current smoker who presents with dyspnea. Patient states that when he went outside to smoke he felt short of breath. He states that the shortness of breath didn't resolve overnight which was unusual for him. He states previously over the last few weeks he intermittently felt short of breath when exerting but not similar to overnight. He also states that at baseline he is able to lay flat but overnight he was unable to do so and had to sit on the couch and only got 1 hour of sleep. He says that he has a cough that is chronic and unchanged. He states that he does occasionally get leg swelling but states that it is minor around his ankles and it tends to improve overnight. He endorses that he has had previous episodes of shortness of breath but states that they tended to happen with ambulation and would usually resolve within minutes. This most recent episode lasted all night and because it was not improving the patient came in to the hospital. He denies any fever, runny nose, CP, n/v or abdominal pain. He denies dysuria. He states that he had a regular bowel movement yesterday and says that he might sometimes have between 1 to 7 soft BMs a day due to pancreatitis.     In the ED the patient was noted to be 88% on RA and started on 4L NC. He was given 1 dose of lasix 80mg IV. He was also given calcium gluconate 2g for hypocalcemia and hyperkalemia noted on initial labs. He states his baseline weight is around 160 lbs.      Hospital Course HPI  Patient is a 65 year old man with PMH MI (2002), CKD, HTN, chronic pancreatitis, Hx of past EtOH abuse, DM on insulin pump, RA, OA, and current smoker who presents with dyspnea. Patient states that when he went outside to smoke he felt short of breath. He states that the shortness of breath didn't resolve overnight which was unusual for him. He states previously over the last few weeks he intermittently felt short of breath when exerting but not similar to overnight. He also states that at baseline he is able to lay flat but overnight he was unable to do so and had to sit on the couch and only got 1 hour of sleep. He says that he has a cough that is chronic and unchanged. He states that he does occasionally get leg swelling but states that it is minor around his ankles and it tends to improve overnight. He endorses that he has had previous episodes of shortness of breath but states that they tended to happen with ambulation and would usually resolve within minutes. This most recent episode lasted all night and because it was not improving the patient came in to the hospital. He denies any fever, runny nose, CP, n/v or abdominal pain. He denies dysuria. He states that he had a regular bowel movement yesterday and says that he might sometimes have between 1 to 7 soft BMs a day due to pancreatitis.     In the ED the patient was noted to be 88% on RA and started on 4L NC. He was given 1 dose of lasix 80mg IV. He was also given calcium gluconate 2g for hypocalcemia and hyperkalemia noted on initial labs. He states his baseline weight is around 160 lbs.      Hospital Course  Patient admitted to the hospital for work up of dyspnea. The differential including cardiac and pulmonary etiologies. He was noted to have elevated BNP, T wave inversions, signs of volume overload, and elevated troponins concerning for CHF exacerbation but an ischemic event could not be ruled out. He was diuresed on IV lasix with good response. He was back to room air within a day and was feeling like his baseline 2 days prior to episode that brought him to hospital. He was able to ambulate with PT without dyspnea and maintaining saturations above 95%. He was seen by nephrology, endocrinology, and cardiology during his stay. Patient's echocardiogram showed worsening ejection fraction to 30-35% from prior 50% in 8/2020. He was recommended for cardiac catheterization and despite understanding of risks, patient declined at this time, preferring to follow up later. His dose of Toprol was increased to 50 mg a day which he tolerated.    Patient was monitored for worsening kidney function on diuresis and his sodium bicarbonate and calcium supplementation was increased. His Ramipril was held due to course complicated by hyperkalemia and concern for kidney injury. He will follow up with Nephrology and Cardiology regarding when to resume Ramipril.    He was seen by endocrinology and calcium supplementation increased as above and he was advised to resume his pump upon discharge. He has a follow up scheduled with endocrinology.    Patient was advised to quit smoking but was not ready at this time. He was also advised regarding diet and removing sodas and sugary drinks from his diet.    On 12/23, patient afebrile, hemodynamically stable, chest pain free, without dyspnea, and choosing to defer cardiac catheterization this admission. He was medically optimized as possible for discharge home on 12/23 with plans in place for outpatient follow up and understanding of signs and symptoms that would require urgent medical attention.

## 2020-12-22 NOTE — PROGRESS NOTE ADULT - SUBJECTIVE AND OBJECTIVE BOX
PROGRESS NOTE:   Authored by Genia Guardado MD  Pager [phone] [facility]     Patient is a 65y old  Male who presents with a chief complaint of Dyspnea (21 Dec 2020 15:51)      SUBJECTIVE / OVERNIGHT EVENTS:    MEDICATIONS  (STANDING):  aspirin enteric coated 81 milliGRAM(s) Oral daily  atorvastatin 10 milliGRAM(s) Oral at bedtime  calcium carbonate   1250 mG (OsCal) 1 Tablet(s) Oral two times a day  cholecalciferol 2000 Unit(s) Oral daily  clopidogrel Tablet 75 milliGRAM(s) Oral daily  furosemide   Injectable 80 milliGRAM(s) IV Push three times a day  heparin   Injectable 5000 Unit(s) SubCutaneous every 8 hours  insulin glargine Injectable (LANTUS) 12 Unit(s) SubCutaneous at bedtime  insulin lispro (ADMELOG) corrective regimen sliding scale   SubCutaneous three times a day before meals  insulin lispro (ADMELOG) corrective regimen sliding scale   SubCutaneous at bedtime  insulin lispro Injectable (ADMELOG) 3 Unit(s) SubCutaneous three times a day with meals  methylPREDNISolone 4 milliGRAM(s) Oral daily  metoprolol succinate ER 25 milliGRAM(s) Oral daily  multivitamin 1 Tablet(s) Oral daily  pantoprazole    Tablet 40 milliGRAM(s) Oral before breakfast  sodium bicarbonate 1300 milliGRAM(s) Oral three times a day    MEDICATIONS  (PRN):      I&O's Summary    21 Dec 2020 07:01  -  22 Dec 2020 07:00  --------------------------------------------------------  IN: 630 mL / OUT: 0 mL / NET: 630 mL        PHYSICAL EXAM:  Vital Signs Last 24 Hrs  T(C): 36.8 (22 Dec 2020 04:44), Max: 37.4 (21 Dec 2020 20:40)  T(F): 98.2 (22 Dec 2020 04:44), Max: 99.3 (21 Dec 2020 20:40)  HR: 77 (22 Dec 2020 05:23) (77 - 94)  BP: 127/63 (22 Dec 2020 05:23) (109/57 - 135/69)  BP(mean): --  RR: 18 (22 Dec 2020 04:44) (18 - 18)  SpO2: 94% (22 Dec 2020 04:44) (94% - 99%)        LABS:                        8.8    7.71  )-----------( 148      ( 22 Dec 2020 06:45 )             27.0     12-    135  |  105  |  24<H>  ----------------------------<  251<H>  6.4<HH>   |  20<L>  |  2.82<H>    Ca    7.3<L>      21 Dec 2020 20:04  Phos  4.4     12-  Mg     1.7     -    TPro  6.6  /  Alb  3.4  /  TBili  0.4  /  DBili  x   /  AST  30  /  ALT  23  /  AlkPhos  93  12-20    PT/INR - ( 20 Dec 2020 14:11 )   PT: 11.8 sec;   INR: 0.98 ratio         PTT - ( 20 Dec 2020 14:11 )  PTT:33.4 sec      Urinalysis Basic - ( 20 Dec 2020 23:47 )    Color: Colorless / Appearance: Clear / S.007 / pH: x  Gluc: x / Ketone: Negative  / Bili: Negative / Urobili: Negative   Blood: x / Protein: Trace / Nitrite: Negative   Leuk Esterase: Negative / RBC: 2 /hpf / WBC 0 /HPF   Sq Epi: x / Non Sq Epi: 0 /hpf / Bacteria: Negative         PROGRESS NOTE:   Authored by Genia Guardado MD  Pager [phone] [facility]     Patient is a 65y old  Male who presents with a chief complaint of Dyspnea (21 Dec 2020 15:51)      SUBJECTIVE / OVERNIGHT EVENTS:    Afebrile. On 2L NC, but tolerating on RA this AM.    MEDICATIONS  (STANDING):  aspirin enteric coated 81 milliGRAM(s) Oral daily  atorvastatin 10 milliGRAM(s) Oral at bedtime  calcium carbonate   1250 mG (OsCal) 1 Tablet(s) Oral two times a day  cholecalciferol 2000 Unit(s) Oral daily  clopidogrel Tablet 75 milliGRAM(s) Oral daily  furosemide   Injectable 80 milliGRAM(s) IV Push three times a day  heparin   Injectable 5000 Unit(s) SubCutaneous every 8 hours  insulin glargine Injectable (LANTUS) 12 Unit(s) SubCutaneous at bedtime  insulin lispro (ADMELOG) corrective regimen sliding scale   SubCutaneous three times a day before meals  insulin lispro (ADMELOG) corrective regimen sliding scale   SubCutaneous at bedtime  insulin lispro Injectable (ADMELOG) 3 Unit(s) SubCutaneous three times a day with meals  methylPREDNISolone 4 milliGRAM(s) Oral daily  metoprolol succinate ER 25 milliGRAM(s) Oral daily  multivitamin 1 Tablet(s) Oral daily  pantoprazole    Tablet 40 milliGRAM(s) Oral before breakfast  sodium bicarbonate 1300 milliGRAM(s) Oral three times a day    MEDICATIONS  (PRN):      I&O's Summary    21 Dec 2020 07:01  -  22 Dec 2020 07:00  --------------------------------------------------------  IN: 630 mL / OUT: 0 mL / NET: 630 mL        PHYSICAL EXAM:  Vital Signs Last 24 Hrs  T(C): 36.8 (22 Dec 2020 04:44), Max: 37.4 (21 Dec 2020 20:40)  T(F): 98.2 (22 Dec 2020 04:44), Max: 99.3 (21 Dec 2020 20:40)  HR: 77 (22 Dec 2020 05:23) (77 - 94)  BP: 127/63 (22 Dec 2020 05:23) (109/57 - 135/69)  BP(mean): --  RR: 18 (22 Dec 2020 04:44) (18 - 18)  SpO2: 94% (22 Dec 2020 04:44) (94% - 99%)        LABS:                        8.8    7.71  )-----------( 148      ( 22 Dec 2020 06:45 )             27.0     12-21    135  |  105  |  24<H>  ----------------------------<  251<H>  6.4<HH>   |  20<L>  |  2.82<H>    Ca    7.3<L>      21 Dec 2020 20:04  Phos  4.4       Mg     1.7         TPro  6.6  /  Alb  3.4  /  TBili  0.4  /  DBili  x   /  AST  30  /  ALT  23  /  AlkPhos  93  12-20    PT/INR - ( 20 Dec 2020 14:11 )   PT: 11.8 sec;   INR: 0.98 ratio         PTT - ( 20 Dec 2020 14:11 )  PTT:33.4 sec      Urinalysis Basic - ( 20 Dec 2020 23:47 )    Color: Colorless / Appearance: Clear / S.007 / pH: x  Gluc: x / Ketone: Negative  / Bili: Negative / Urobili: Negative   Blood: x / Protein: Trace / Nitrite: Negative   Leuk Esterase: Negative / RBC: 2 /hpf / WBC 0 /HPF   Sq Epi: x / Non Sq Epi: 0 /hpf / Bacteria: Negative         PROGRESS NOTE:   Authored by Genia Guardado MD  Pager [phone] [facility]     Patient is a 65y old  Male who presents with a chief complaint of Dyspnea (21 Dec 2020 15:51)      SUBJECTIVE / OVERNIGHT EVENTS:    Afebrile. On 2L NC, but tolerating on RA this AM.    MEDICATIONS  (STANDING):  aspirin enteric coated 81 milliGRAM(s) Oral daily  atorvastatin 10 milliGRAM(s) Oral at bedtime  calcium carbonate   1250 mG (OsCal) 1 Tablet(s) Oral two times a day  cholecalciferol 2000 Unit(s) Oral daily  clopidogrel Tablet 75 milliGRAM(s) Oral daily  furosemide   Injectable 80 milliGRAM(s) IV Push three times a day  heparin   Injectable 5000 Unit(s) SubCutaneous every 8 hours  insulin glargine Injectable (LANTUS) 12 Unit(s) SubCutaneous at bedtime  insulin lispro (ADMELOG) corrective regimen sliding scale   SubCutaneous three times a day before meals  insulin lispro (ADMELOG) corrective regimen sliding scale   SubCutaneous at bedtime  insulin lispro Injectable (ADMELOG) 3 Unit(s) SubCutaneous three times a day with meals  methylPREDNISolone 4 milliGRAM(s) Oral daily  metoprolol succinate ER 25 milliGRAM(s) Oral daily  multivitamin 1 Tablet(s) Oral daily  pantoprazole    Tablet 40 milliGRAM(s) Oral before breakfast  sodium bicarbonate 1300 milliGRAM(s) Oral three times a day    MEDICATIONS  (PRN):      I&O's Summary    21 Dec 2020 07:01  -  22 Dec 2020 07:00  --------------------------------------------------------  IN: 630 mL / OUT: 0 mL / NET: 630 mL        PHYSICAL EXAM:  Vital Signs Last 24 Hrs  T(C): 36.8 (22 Dec 2020 04:44), Max: 37.4 (21 Dec 2020 20:40)  T(F): 98.2 (22 Dec 2020 04:44), Max: 99.3 (21 Dec 2020 20:40)  HR: 77 (22 Dec 2020 05:23) (77 - 94)  BP: 127/63 (22 Dec 2020 05:23) (109/57 - 135/69)  BP(mean): --  RR: 18 (22 Dec 2020 04:44) (18 - 18)  SpO2: 94% (22 Dec 2020 04:44) (94% - 99%)    GENERAL: NAD, lying in chair comfortably on room air  HEENT: NC/AT, EOMI, PERRL  NECK: Supple, JVD elevated  CHEST/LUNG: CTAB, no increased WOB  HEART: RRR, no m/r/g  ABDOMEN: soft, NT, Distended  EXTREMITIES: pitting edema up to knees b/l improved from yesterday, tender to palpation L>R with L sided fluctuant mass  NERVOUS SYSTEM: A&Ox3, no focal deficits  MSK: FROM all 4 extremities, full and equal strength  SKIN: No rashes or lesions  LABS:                        8.8    7.71  )-----------( 148      ( 22 Dec 2020 06:45 )             27.0     12-21    135  |  105  |  24<H>  ----------------------------<  251<H>  6.4<HH>   |  20<L>  |  2.82<H>    Ca    7.3<L>      21 Dec 2020 20:04  Phos  4.4     12-21  Mg     1.7     12-21    TPro  6.6  /  Alb  3.4  /  TBili  0.4  /  DBili  x   /  AST  30  /  ALT  23  /  AlkPhos  93  12-20    PT/INR - ( 20 Dec 2020 14:11 )   PT: 11.8 sec;   INR: 0.98 ratio         PTT - ( 20 Dec 2020 14:11 )  PTT:33.4 sec      Urinalysis Basic - ( 20 Dec 2020 23:47 )    Color: Colorless / Appearance: Clear / S.007 / pH: x  Gluc: x / Ketone: Negative  / Bili: Negative / Urobili: Negative   Blood: x / Protein: Trace / Nitrite: Negative   Leuk Esterase: Negative / RBC: 2 /hpf / WBC 0 /HPF   Sq Epi: x / Non Sq Epi: 0 /hpf / Bacteria: Negative

## 2020-12-22 NOTE — DISCHARGE NOTE PROVIDER - CARE PROVIDER_API CALL
Brenda Estrada)  EndocrinologyMetabDiabetes  [address] [facility], [address]  [place] [address]  Phone: [phone]  Fax: [phone]  Scheduled Appointment: 01/04/2021 09:15 AM    José Luis Castro  INTERNAL MEDICINE  [address] [place] [address]  Phone: [phone]  Fax: [phone]  Established Patient  Follow Up Time: 2 weeks    Cr Kidd  INTERNAL MEDICINE  [address] [address]  Phone: [phone]  Fax: [phone]  Established Patient  Follow Up Time:     Raghav Webb)  Internal Medicine; Pulmonary Disease  [address] [facility] [address]  [place] [address]  Phone: [phone]  Fax: [phone]  Follow Up Time:    Brenda Estrada)  EndocrinologyMetabDiabetes  [address] [facility], [address]  [place] [address]  Phone: [phone]  Fax: [phone]  Scheduled Appointment: 01/04/2021 09:15 AM    José Luis Castro  INTERNAL MEDICINE  [address] [place] [address]  Phone: [phone]  Fax: [phone]  Established Patient  Follow Up Time: 2 weeks    Cr Kidd  INTERNAL MEDICINE  [address] [facility] [address]  [place] [address]  Phone: [phone]  Fax: [phone]  Established Patient  Follow Up Time:     Raghav Webb)  Internal Medicine; Pulmonary Disease  [address] [facility] [facility] [address]  [place] [address]  Phone: [phone]  Fax: [phone]  Follow Up Time:     Lisker, Jay J  CARDIOVASCULAR DISEASE  [address] [facility], [facility] [address]  [place] [address]  Phone: [phone]  Fax: [phone]  Established Patient  Follow Up Time: 1 week

## 2020-12-22 NOTE — PROGRESS NOTE ADULT - PROBLEM SELECTOR PLAN 4
Continue Sodium bicarbonate  - Strict I&Os  - Avoid nephrotoxic medications as able CT chest (12/21) with several > 2 cm nodules (RLL, RUL, LLL), new and increased in size compared to 2014, in setting of extensive smoking history, and imaging characteristics very concerning for malignancy  - Patient preferring outpatient evaluation. Will set up at discharge.

## 2020-12-22 NOTE — PROGRESS NOTE ADULT - PROBLEM SELECTOR PLAN 8
Patient on insulin pump  - Endocrine consult for management of insulin pump (off since 12/20)  - Hypoglycemic on basal lantus - hold premeals today. Will need to redose basal or resume pump  - Endocrine recs appreciated    #Chronic pancreatitis  Management of diabetes as below  - Patient not on any pancreatic enzymes Patient on insulin pump  - Endocrine consult for management of insulin pump (off since 12/20)  - Episodes of hypoglycemia improved.  - Endocrine recs appreciated    #Chronic pancreatitis  Management of diabetes as below  - Patient not on any pancreatic enzymes

## 2020-12-22 NOTE — PROGRESS NOTE ADULT - PROBLEM SELECTOR PLAN 5
WBC 13.24 with neutrophilic predominance at admission now resolved  - Afebrile, no signs of infection  - CXR clear Continue Sodium bicarbonate  - 1300 mg sodium bicarb TID  - Strict I&Os  - Avoid nephrotoxic medications as able  - Nephrology following, appreciate recs

## 2020-12-22 NOTE — PROGRESS NOTE ADULT - PROBLEM SELECTOR PLAN 1
Likely 2/2 to CHF given elevated BNP, lower extremity edema. Possibly lung pathology as well given extensive smoking history.  TTE on 8/20 showed preserved EF and mild diastolic dysfunction and hypokinetic distal and apical walls  - Lasix 80 BID  - Trend I&Os  - Daily weights  - Wean to room air as able  - Telemetry  - Troponins 112 -> 120 -> 116  - Tender to palpation in bilateral lower extremities. LLE pain - DVT US bilateral  - Repeat ECHO Likely 2/2 to CHF given elevated BNP, lower extremity edema. Possibly lung pathology as well given extensive smoking history.  TTE on 8/20 showed preserved EF and mild diastolic dysfunction and hypokinetic distal and apical walls  - Lasix 80 BID  - Trend I&Os  - Daily weights  - Wean to room air as able  - Telemetry  - Troponins 112 -> 120 -> 116  - Tender to palpation in bilateral lower extremities. LLE pain - US with no DVT but apparent hematoma - will follow up with Xray  - Follow up ECHO

## 2020-12-22 NOTE — DISCHARGE NOTE PROVIDER - CARE PROVIDERS DIRECT ADDRESSES
[email or web address]

## 2020-12-22 NOTE — DISCHARGE NOTE PROVIDER - NSDCFUADDAPPT_GEN_ALL_CORE_FT
Do not take any further doses of ramipril until you follow you with your nephrologist.  Do not take any doses of Lasix (Furosemide) until Friday, 12/25/2020.   Please take the increased dosage of Metoprolol Succinate (Toprol). You will now take Toprol 50mg daily. You received your first dose in the hospital today. Please take your first dose at home tomorrow, 12/24/2020. Your new prescription was sent to [facility].

## 2020-12-22 NOTE — DISCHARGE NOTE PROVIDER - PROVIDER TOKENS
PROVIDER:[TOKEN:[[id number]],SCHEDULEDAPPT:[01/04/2021],SCHEDULEDAPPTTIME:[09:15 AM]],PROVIDER:[TOKEN:[[id number]],FOLLOWUP:[2 weeks],ESTABLISHEDPATIENT:[T]],PROVIDER:[TOKEN:[[id number]],ESTABLISHEDPATIENT:[T]],PROVIDER:[TOKEN:[[id number]]] PROVIDER:[TOKEN:[[id number]],SCHEDULEDAPPT:[01/04/2021],SCHEDULEDAPPTTIME:[09:15 AM]],PROVIDER:[TOKEN:[[id number]],FOLLOWUP:[2 weeks],ESTABLISHEDPATIENT:[T]],PROVIDER:[TOKEN:[[id number]],ESTABLISHEDPATIENT:[T]],PROVIDER:[TOKEN:[[id number]]],PROVIDER:[TOKEN:[[id number]],FOLLOWUP:[1 week],ESTABLISHEDPATIENT:[T]]

## 2020-12-22 NOTE — PROGRESS NOTE ADULT - PROBLEM SELECTOR PLAN 4
corrected calcium 7.5, asymptomatic. Suspect due to CKD and prolia injection.   -c.w calcium carbonate 1250 mg 1 tab BID w/ meals   -continue cholecalciferol 2000 IU qday  -replete mg to >2.0     discussed w/pt and team  pager: [phone]   office:  [phone] (M-F 9a-5pm)               [phone] (nights/weekends)    -I spent a total time of 25 mins with the patient at bedside/on unit of which more than 50% of time was spent on counseling/coordination of care.   -check vitamin D   -monitor for sxs.

## 2020-12-22 NOTE — PROGRESS NOTE ADULT - PROBLEM SELECTOR PLAN 3
Possibly secondary to CKD, with prolonged QT  S/p calcium gluconate x 4  - Repeat BMP in PM  - 600 mg calcium carbonate daily Possibly secondary to CKD, with prolonged QT  S/p calcium gluconate x 4  - 1250 calcium carbonate BID

## 2020-12-22 NOTE — PROGRESS NOTE ADULT - PROBLEM SELECTOR PLAN 1
-test BG AC/HS  -Decrease Lantus 7 units QHS (0.1 unit/kg)  -c/w Admelog 3 units AC meals for now.   -c/w Admelog low correction scale AC and Low HS scale. .    Discharge  Restart insulin pump. May need temp basal if going home prior to dinner time.   f/u with Dr. Estrada 1/4 9:15 AM   [facility]  [address] [place] [address], [place] [address], [place] [address]  [phone].

## 2020-12-23 PROBLEM — Z87.09 HISTORY OF ACUTE SINUSITIS: Status: RESOLVED | Noted: 2020-04-01 | Resolved: 2020-01-01

## 2020-12-23 NOTE — PROGRESS NOTE ADULT - PROBLEM SELECTOR PLAN 1
test BG AC/HS  -c/w Lantus 7 units QHS (0.1 unit/kg)  -c/w Admelog 3 units AC meals for now.   -c/w Admelog low correction scale AC and Low HS scale. .    Discharge  Restart insulin pump. Will connect in evening time on day he goes home.   f/u with Dr. Estrada 1/4 9:15 AM   [facility]  [address] [place] [address], [address] [address]  [phone].

## 2020-12-23 NOTE — PROGRESS NOTE ADULT - PROBLEM SELECTOR PLAN 4
corrected calcium 7.5, asymptomatic. Suspect due to CKD and prolia injection.   -c.w calcium carbonate 1250 mg 1 tab BID w/ meals   -continue cholecalciferol 2000 IU qday  -replete mg to >2.0     discussed w/pt and RN  pager: [phone]   office:  [phone] (M-F 9a-5pm)               [phone] (nights/weekends)    -I spent a total time of 25 mins with the patient at bedside/on unit of which more than 50% of time was spent on counseling/coordination of care.

## 2020-12-23 NOTE — PROGRESS NOTE ADULT - PROBLEM SELECTOR PLAN 2
History of recurrent hyperkalemia 2/2 renal disease  K of 6.1 in ED without peaked T waves but prolonged QT interval. Now improved to 5.4 s/p lasix 80 mg IV, albuterol, insulin/D50, lokelma x 2  - Lokelma for K > 6 Likely 2/2 to CHF given elevated BNP, lower extremity edema. Possibly lung pathology as well given extensive smoking history.  TTE on 8/20 showed preserved EF and mild diastolic dysfunction and hypokinetic distal and apical walls  - Lasix 80 BID  - Trend I&Os  - Daily weights  - Wean to room air as able  - Telemetry  - Troponins 112 -> 120 -> 116  - Tender to palpation in bilateral lower extremities. LLE pain - US with no DVT but apparent hematoma - will follow up with Xray  - Follow up ECHO    #Hypertension  - Continue metoprolol  - Hold Ramipril given elevated K

## 2020-12-23 NOTE — PROGRESS NOTE ADULT - PROBLEM SELECTOR PLAN 1
Likely 2/2 to CHF given elevated BNP, lower extremity edema. Possibly lung pathology as well given extensive smoking history.  TTE on 8/20 showed preserved EF and mild diastolic dysfunction and hypokinetic distal and apical walls  - Lasix 80 BID  - Trend I&Os  - Daily weights  - Wean to room air as able  - Telemetry  - Troponins 112 -> 120 -> 116  - Tender to palpation in bilateral lower extremities. LLE pain - US with no DVT but apparent hematoma - will follow up with Xray  - Follow up ECHO - Echo with  - Cardiology following - possible cath w

## 2020-12-23 NOTE — PROGRESS NOTE ADULT - PROBLEM SELECTOR PLAN 8
Patient on insulin pump  - Endocrine consult for management of insulin pump (off since 12/20)  - Episodes of hypoglycemia improved.  - Endocrine recs appreciated    #Chronic pancreatitis  Management of diabetes as below  - Patient not on any pancreatic enzymes States on long term prednisone for several months which he adjusts, currently on 4 mg medrol  - Continue 4 mg medrol

## 2020-12-23 NOTE — PROGRESS NOTE ADULT - PROBLEM SELECTOR PLAN 10
DVT prophylaxis - Heparin subq 5000 units q8hr  GI prophylaxis - Omeprazole 20 mg (or therapeutic interchange)  Diet - carb controlled/DASH diet      Dispo - Pending TTE. Now on room air

## 2020-12-23 NOTE — DISCHARGE NOTE NURSING/CASE MANAGEMENT/SOCIAL WORK - PATIENT PORTAL LINK FT
You can access the FollowMyHealth Patient Portal offered by [facility] by registering at the following website: [email or web address]. By joining [employer]’s FollowMyHealth portal, you will also be able to view your health information using other applications (apps) compatible with our system.

## 2020-12-23 NOTE — PROGRESS NOTE ADULT - ATTENDING COMMENTS
Advised on inpatient cath given above findings as outlined in the plan. Pt adamantly declined. Understands risks. States he will follow up as outpt for cath. Started med therapy with hydral and Imdur. Continue bb. Holding ACE given IVA. Lasix 80mg BID. Close outpt follow up with Dr Lisker outpt cardio. Close outpt follow up with renal. WIll need repeat echo in 3 months to assess EF (+/- revascularization) with potential for ICD if EF remains low. Pt care and plan discussed and reviewed with NP. Plan as outlined above edited by my to reflect our discussion. We had a prolonged conversation with the patient regarding hospital course, differential diagnosis and results of diagnostic tests thus far. Plan of care discussed with patient after the evaluation. Patient expresses clear understanding and satisfaction with the plan of care. Sixty five minutes spent on total encounter, of which more than fifty percent of the encounter was spent on counseling and/or coordinating care by the attending physician.
CKD4, HTN, metabolic acidosis, hyperkalemia, hyperphosphatemia, hypocalcemia, anemia of CKD    Has some edema.    - Bicarb  - Diuresis; change to oral  - Calcium carbonate  - Discharge with outpatient labs and nephrology f/u; his outpatient nephrologist, Dr. Castro, can further adjust meds for diuresis, calcium, and potassium  - Remainder per fellow, will follow
CKD4, HTN, metabolic acidosis, hyperkalemia, hyperphosphatemia, hypocalcemia, anemia of CKD    Has some edema.    - Bicarb  - Hold for now; restart Friday  - Calcium carbonate  - Discharge today with labs Monday  - Outpatient restart of RAASi  - Remainder per fellow, will follow
65m w/ PMhx of HTN, CKD, active smoking, CAD p/w progressive sob on exertion and LE edema. Suspect HFpEF exacerbation. TTE pending.    #CHF exacerbation  -off oxygen  -goal 1-2L negative daily  -per nephro can transition to oral diuretics  -repeat TTE pending    #incidental lung mass  -c/f malignancy-d/w patient  -he prefers outpatient workup  -will arrange for pulmonary follow up     #CKD  -hyperkalemia resolving  -increase bicarb and ca supplementation  -creatinine at baseline  -diuresis as above  -appreciate nephrology recs    #DM2  -on insulin pump  -endo recs pending    Brett Haji MD  Division of Hospital Medicine  Pager: [phone]  Office: [phone]
65m w/ PMhx of HTN, CKD, active smoking, CAD p/w progressive sob on exertion and LE edema. Suspect HFpEF exacerbation, although CXR relatively clear. Given large volumes, blunted diaphragm will obtain chest CT to r/o COPD contribution.    #CHF exacerbation  -increase to IV lasix 80mg tid  -goal 1-2L negative daily  -repeat TTE  -will d/w Dr. Lisker    #CKD  -hyperkalemia resolving  -increase bicarb and ca supplementation  -creatinine at baseline  -diuresis as above  -appreciate nephrology recs    #DM2  -on insulin pump  -endo recs pending    Brett Haji MD  Division of Hospital Medicine  Pager: [phone]  Office: [phone]
65m w/ PMhx of HTN, CKD, active smoking, CAD p/w progressive sob on exertion and LE edema. Suspect HFpEF exacerbation. TTE w/ new EF drop, pt will have ischemic w/u with Dr. Lisker as outpt.    #CHF exacerbation  -off oxygen  -hold further lasix today  -per nephro can transition to oral diuretics, will have pt resume at home on 12/25  -repeat TTE w/ new EF drop; ultimately will need cath for ischemic workup  -increase metop to 50 mg daily  -hold ACEi for hyperkalemia/creatinine bump  -will have labs w/ renal on Monday and likely resume next week    #incidental lung mass  -c/f malignancy-d/w patient - he will arrange close follow up  -he prefers outpatient workup  -will arrange for pulmonary follow up     #CKD  -hyperkalemia resolving  -increase bicarb and ca supplementation per renal  -slight creatinine bump  -hold lasix today as above  -hold ACEi for now  -appreciate nephrology recs    #DM2  -on insulin pump  -endo recs pending    medically stable for discharge home today. 40min spent coordinating care and planning for discharge. Advised pt of risks of delaying cardiac intervention including MI, severe CHF. Advised on warning signs-chest pain, RICARDO, worsening swelling which should prompt further eval.    Brett Haji MD  Division of Hospital Medicine  Pager: [phone]  Office: [phone]

## 2020-12-23 NOTE — PROGRESS NOTE ADULT - SUBJECTIVE AND OBJECTIVE BOX
[facility] DIVISION OF KIDNEY DISEASES AND HYPERTENSION -- FOLLOW UP NOTE  --------------------------------------------------------------------------------  Amari Carty   Nephrology Fellow  Pager NS: [phone]/ [facility]: [phone]  (After 5 pm or on weekends please page the on-call fellow)      Patient is a 65y old  Male who presents with a chief complaint of Dyspnea (23 Dec 2020 06:59)      24 hour events/subjective: Patient seen and examined at the bedside. Vital signs, labs, medications reviewed. Pt denies CP/SOB. Eager to be discharged        PAST HISTORY  --------------------------------------------------------------------------------  No significant changes to PMH, PSH, FHx, SHx, unless otherwise noted    ALLERGIES & MEDICATIONS  --------------------------------------------------------------------------------  Allergies    No Known Allergies    Intolerances      Standing Inpatient Medications  aspirin enteric coated 81 milliGRAM(s) Oral daily  atorvastatin 10 milliGRAM(s) Oral at bedtime  calcium carbonate   1250 mG (OsCal) 1 Tablet(s) Oral two times a day  cholecalciferol 2000 Unit(s) Oral daily  clopidogrel Tablet 75 milliGRAM(s) Oral daily  furosemide    Tablet 80 milliGRAM(s) Oral daily  heparin   Injectable 5000 Unit(s) SubCutaneous every 8 hours  hydrALAZINE 10 milliGRAM(s) Oral every 8 hours  insulin glargine Injectable (LANTUS) 7 Unit(s) SubCutaneous at bedtime  insulin lispro (ADMELOG) corrective regimen sliding scale   SubCutaneous at bedtime  insulin lispro (ADMELOG) corrective regimen sliding scale   SubCutaneous three times a day before meals  insulin lispro Injectable (ADMELOG) 3 Unit(s) SubCutaneous three times a day with meals  isosorbide   mononitrate ER Tablet (IMDUR) 30 milliGRAM(s) Oral daily  magnesium sulfate  IVPB 2 Gram(s) IV Intermittent once  methylPREDNISolone 4 milliGRAM(s) Oral daily  metoprolol succinate ER 50 milliGRAM(s) Oral daily  multivitamin 1 Tablet(s) Oral daily  pantoprazole    Tablet 40 milliGRAM(s) Oral before breakfast  sodium bicarbonate 1300 milliGRAM(s) Oral three times a day    PRN Inpatient Medications      REVIEW OF SYSTEMS  --------------------------------------------------------------------------------  Gen: No fevers/chills  Skin: No rashes  Head/Eyes/Ears: Normal hearing, no difficulty seeing  Respiratory: No dyspnea, cough  CV: No chest pain  GI: No abdominal pain, diarrhea  : No dysuria, hematuria  MSK: +edema      >>> <<<    VITALS/PHYSICAL EXAM  --------------------------------------------------------------------------------  T(C): 36.7 (12-23-20 @ 07:43), Max: 36.9 (12-22-20 @ 16:19)  HR: 69 (12-23-20 @ 07:43) (69 - 95)  BP: 114/69 (12-23-20 @ 07:43) (114/59 - 134/70)  RR: 17 (12-23-20 @ 07:43) (17 - 18)  SpO2: 97% (12-23-20 @ 07:43) (94% - 98%)  Wt(kg): --        12-22-20 @ 07:01  -  12-23-20 @ 07:00  --------------------------------------------------------  IN: 730 mL / OUT: 1875 mL / NET: -1145 mL      Physical Exam:    	Gen: NAD  	HEENT: Anicteric  	Pulm: CTA B/L  	CV: S1S2  	Abd: Soft, +BS   	MSK: 1+LE edema B/L, bruising and swelling more pronounced on LLE  	Neuro: Awake  	Skin: Warm and dry  	Vascular access:      LABS/STUDIES  --------------------------------------------------------------------------------              9.2    7.69  >-----------<  159      [12-23-20 @ 06:03]              27.3     135  |  103  |  28  ----------------------------<  100      [12-23-20 @ 06:03]  4.5   |  21  |  2.94        Ca     7.0     [12-23-20 @ 06:03]      iCa    0.99     [12-22 @ 07:27]      Mg     1.8     [12-23-20 @ 06:03]      Phos  4.5     [12-23-20 @ 06:03]    TPro  6.2  /  Alb  3.0  /  TBili  0.3  /  DBili  <0.1  /  AST  27  /  ALT  16  /  AlkPhos  81  [12-23-20 @ 06:03]          Creatinine Trend:  SCr 2.94 [12-23 @ 06:03]  SCr 2.59 [12-22 @ 06:44]  SCr 2.82 [12-21 @ 20:04]  SCr 2.43 [12-21 @ 05:01]  SCr 2.53 [12-20 @ 21:29]    Urinalysis - [12-20-20 @ 23:47]      Color Colorless / Appearance Clear / SG 1.007 / pH 6.0      Gluc Trace / Ketone Negative  / Bili Negative / Urobili Negative       Blood Trace / Protein Trace / Leuk Est Negative / Nitrite Negative      RBC 2 / WBC 0 / Hyaline 1 / Gran  / Sq Epi  / Non Sq Epi 0 / Bacteria Negative    Urine Sodium 124      [12-20-20 @ 23:47]  Urine Potassium 20      [12-20-20 @ 23:47]  Urine Chloride 115      [12-20-20 @ 23:47]  Urine Phosphorus 24.2      [12-21-20 @ 04:30]    PTH -- (Ca 7.1)      [12-22-20 @ 20:42]   312  Vitamin D (25OH) 27.3      [12-22-20 @ 20:42]  HbA1c 7.8      [05-02-19 @ 00:50]    HCV 0.06, Nonreact      [12-21-20 @ 07:24]

## 2020-12-23 NOTE — DISCHARGE NOTE NURSING/CASE MANAGEMENT/SOCIAL WORK - NSDCFUADDAPPT_GEN_ALL_CORE_FT
Do not take any further doses of ramipril until you follow you with your nephrologist.  Do not take any doses of Lasix (Furosemide) until Friday, 12/25/2020.   Please take the increased dosage of Metoprolol Succinate (Toprol). You will now take Toprol 50mg daily. You received your first dose in the hospital today. Please take your first dose at home tomorrow, 12/24/2020. Your new prescription was sent to [facility].

## 2020-12-23 NOTE — PROGRESS NOTE ADULT - PROBLEM SELECTOR PLAN 7
S/p CEA  - Continue Plavix, ASA, lovastatin Patient on insulin pump  - Endocrine consult for management of insulin pump (off since 12/20)  - Episodes of hypoglycemia improved.  - Endocrine recs appreciated    #Chronic pancreatitis  Management of diabetes as below  - Patient not on any pancreatic enzymes

## 2020-12-23 NOTE — PROGRESS NOTE ADULT - SUBJECTIVE AND OBJECTIVE BOX
Diabetes Follow up note:    Chief complaint: T2DM/pancreatic insufficiency     Interval Hx: BG values variable 80s-mid 200s over past 24 hours. Pt had FS tested and given insulin after breakfast today therefore high. Serum glucose 100mg/dl. Pt unsure if he will be discharged today or not. Would prefer to go home. Reports good appetite.     Review of Systems:  General: no complaints.   GI: Tolerating POs. Denies N/V/D/Abd pain  CV: Denies CP/SOB  ENDO: No S&Sx of hypoglycemia  MEDS:  atorvastatin 10 milliGRAM(s) Oral at bedtime  insulin glargine Injectable (LANTUS) 7 Unit(s) SubCutaneous at bedtime  insulin lispro (ADMELOG) corrective regimen sliding scale   SubCutaneous at bedtime  insulin lispro (ADMELOG) corrective regimen sliding scale   SubCutaneous three times a day before meals  insulin lispro Injectable (ADMELOG) 3 Unit(s) SubCutaneous three times a day with meals  methylPREDNISolone 4 milliGRAM(s) Oral daily      Allergies    No Known Allergies    PE:  General: Male sitting in chair. NAD.   Vital Signs Last 24 Hrs  T(C): 36.7 (23 Dec 2020 07:43), Max: 36.9 (22 Dec 2020 16:19)  T(F): 98 (23 Dec 2020 07:43), Max: 98.5 (22 Dec 2020 16:19)  HR: 69 (23 Dec 2020 07:43) (69 - 95)  BP: 114/69 (23 Dec 2020 07:43) (114/59 - 134/70)  BP(mean): --  RR: 17 (23 Dec 2020 07:43) (17 - 18)  SpO2: 97% (23 Dec 2020 07:43) (94% - 98%)  Abd: Soft, NT,ND,   Extremities: Warm. + trace LE edema  Neuro: A&O X3    LABS:  POCT Blood Glucose.: 280 mg/dL (12-23-20 @ 08:58)  POCT Blood Glucose.: 248 mg/dL (12-22-20 @ 21:21)  POCT Blood Glucose.: 82 mg/dL (12-22-20 @ 16:19)  POCT Blood Glucose.: 90 mg/dL (12-22-20 @ 11:31)  POCT Blood Glucose.: 160 mg/dL (12-22-20 @ 07:08)  POCT Blood Glucose.: 173 mg/dL (12-22-20 @ 05:49)  POCT Blood Glucose.: 256 mg/dL (12-21-20 @ 21:23)  POCT Blood Glucose.: 287 mg/dL (12-21-20 @ 17:47)  POCT Blood Glucose.: 75 mg/dL (12-21-20 @ 11:24)  POCT Blood Glucose.: 116 mg/dL (12-21-20 @ 07:57)  POCT Blood Glucose.: 97 mg/dL (12-21-20 @ 07:36)  POCT Blood Glucose.: 60 mg/dL (12-21-20 @ 07:12)  POCT Blood Glucose.: 322 mg/dL (12-20-20 @ 23:06)  POCT Blood Glucose.: 255 mg/dL (12-20-20 @ 21:26)  POCT Blood Glucose.: 164 mg/dL (12-20-20 @ 18:18)                            9.2    7.69  )-----------( 159      ( 23 Dec 2020 06:03 )             27.3       12-23    135  |  103  |  28<H>  ----------------------------<  100<H>  4.5   |  21<L>  |  2.94<H>    Ca    7.0<L>      23 Dec 2020 06:03  Phos  4.5     12-23  Mg     1.8     12-23    TPro  6.2  /  Alb  3.0<L>  /  TBili  0.3  /  DBili  <0.1  /  AST  27  /  ALT  16  /  AlkPhos  81  12-23    A1C with Estimated Average Glucose Result: 7.6 % (12-21-20 @ 07:25)          Contact number: john [phone] or [phone]

## 2020-12-23 NOTE — PROGRESS NOTE ADULT - SUBJECTIVE AND OBJECTIVE BOX
PROGRESS NOTE:   Authored by Genia Guardado MD  Pager [phone] [facility]     Patient is a 65y old  Male who presents with a chief complaint of Dyspnea (22 Dec 2020 17:13)      SUBJECTIVE / OVERNIGHT EVENTS:    MEDICATIONS  (STANDING):  aspirin enteric coated 81 milliGRAM(s) Oral daily  atorvastatin 10 milliGRAM(s) Oral at bedtime  calcium carbonate   1250 mG (OsCal) 1 Tablet(s) Oral two times a day  cholecalciferol 2000 Unit(s) Oral daily  clopidogrel Tablet 75 milliGRAM(s) Oral daily  furosemide    Tablet 80 milliGRAM(s) Oral daily  heparin   Injectable 5000 Unit(s) SubCutaneous every 8 hours  hydrALAZINE 10 milliGRAM(s) Oral three times a day  insulin glargine Injectable (LANTUS) 7 Unit(s) SubCutaneous at bedtime  insulin lispro (ADMELOG) corrective regimen sliding scale   SubCutaneous three times a day before meals  insulin lispro (ADMELOG) corrective regimen sliding scale   SubCutaneous at bedtime  insulin lispro Injectable (ADMELOG) 3 Unit(s) SubCutaneous three times a day with meals  methylPREDNISolone 4 milliGRAM(s) Oral daily  metoprolol succinate ER 50 milliGRAM(s) Oral daily  multivitamin 1 Tablet(s) Oral daily  pantoprazole    Tablet 40 milliGRAM(s) Oral before breakfast  sodium bicarbonate 1300 milliGRAM(s) Oral three times a day    MEDICATIONS  (PRN):      I&O's Summary    21 Dec 2020 07:01  -  22 Dec 2020 07:00  --------------------------------------------------------  IN: 630 mL / OUT: 0 mL / NET: 630 mL    22 Dec 2020 07:01  -  23 Dec 2020 06:59  --------------------------------------------------------  IN: 730 mL / OUT: 1875 mL / NET: -1145 mL        PHYSICAL EXAM:  Vital Signs Last 24 Hrs  T(C): 36.6 (23 Dec 2020 04:10), Max: 36.9 (22 Dec 2020 08:01)  T(F): 97.8 (23 Dec 2020 04:10), Max: 98.5 (22 Dec 2020 16:19)  HR: 85 (23 Dec 2020 05:50) (71 - 96)  BP: 134/70 (23 Dec 2020 05:50) (114/59 - 134/70)  BP(mean): --  RR: 17 (23 Dec 2020 04:10) (17 - 18)  SpO2: 98% (23 Dec 2020 04:10) (94% - 98%)        LABS:                        9.2    7.69  )-----------( 159      ( 23 Dec 2020 06:03 )             27.3     12-23    135  |  103  |  28<H>  ----------------------------<  100<H>  4.5   |  21<L>  |  2.94<H>    Ca    7.0<L>      23 Dec 2020 06:03  Phos  4.5     12-23  Mg     1.8     12-23                 PROGRESS NOTE:   Authored by Genia Guardado MD  Pager [phone] [facility]     Patient is a 65y old  Male who presents with a chief complaint of Dyspnea (22 Dec 2020 17:13)      SUBJECTIVE / OVERNIGHT EVENTS:  Afebrile. VSS. On room air. Ambulating without dyspnea or desats. Denies chest pain. States feels well, except for left leg pain.    MEDICATIONS  (STANDING):  aspirin enteric coated 81 milliGRAM(s) Oral daily  atorvastatin 10 milliGRAM(s) Oral at bedtime  calcium carbonate   1250 mG (OsCal) 1 Tablet(s) Oral two times a day  cholecalciferol 2000 Unit(s) Oral daily  clopidogrel Tablet 75 milliGRAM(s) Oral daily  furosemide    Tablet 80 milliGRAM(s) Oral daily  heparin   Injectable 5000 Unit(s) SubCutaneous every 8 hours  hydrALAZINE 10 milliGRAM(s) Oral three times a day  insulin glargine Injectable (LANTUS) 7 Unit(s) SubCutaneous at bedtime  insulin lispro (ADMELOG) corrective regimen sliding scale   SubCutaneous three times a day before meals  insulin lispro (ADMELOG) corrective regimen sliding scale   SubCutaneous at bedtime  insulin lispro Injectable (ADMELOG) 3 Unit(s) SubCutaneous three times a day with meals  methylPREDNISolone 4 milliGRAM(s) Oral daily  metoprolol succinate ER 50 milliGRAM(s) Oral daily  multivitamin 1 Tablet(s) Oral daily  pantoprazole    Tablet 40 milliGRAM(s) Oral before breakfast  sodium bicarbonate 1300 milliGRAM(s) Oral three times a day    MEDICATIONS  (PRN):      I&O's Summary    21 Dec 2020 07:01  -  22 Dec 2020 07:00  --------------------------------------------------------  IN: 630 mL / OUT: 0 mL / NET: 630 mL    22 Dec 2020 07:01  -  23 Dec 2020 06:59  --------------------------------------------------------  IN: 730 mL / OUT: 1875 mL / NET: -1145 mL        PHYSICAL EXAM:  Vital Signs Last 24 Hrs  T(C): 36.6 (23 Dec 2020 04:10), Max: 36.9 (22 Dec 2020 08:01)  T(F): 97.8 (23 Dec 2020 04:10), Max: 98.5 (22 Dec 2020 16:19)  HR: 85 (23 Dec 2020 05:50) (71 - 96)  BP: 134/70 (23 Dec 2020 05:50) (114/59 - 134/70)  BP(mean): --  RR: 17 (23 Dec 2020 04:10) (17 - 18)  SpO2: 98% (23 Dec 2020 04:10) (94% - 98%)    GENERAL: NAD, lying in chair comfortably on room air  HEENT: NC/AT, EOMI, PERRL  NECK: No JVD  CHEST/LUNG: CTAB, no increased WOB  HEART: RRR, no m/r/g  ABDOMEN: soft, NT, Distended  EXTREMITIES: some lower extremity edema b/l improved from yesterday, tender to palpation on L sided swelling with fluctuant mass  NERVOUS SYSTEM: A&Ox3, no focal deficits  MSK: FROM all 4 extremities, full and equal strength  SKIN: No rashes or lesions    LABS:                        9.2    7.69  )-----------( 159      ( 23 Dec 2020 06:03 )             27.3     12-23    135  |  103  |  28<H>  ----------------------------<  100<H>  4.5   |  21<L>  |  2.94<H>    Ca    7.0<L>      23 Dec 2020 06:03  Phos  4.5     12-23  Mg     1.8     12-23

## 2020-12-23 NOTE — PROGRESS NOTE ADULT - PROBLEM SELECTOR PLAN 3
Possibly secondary to CKD, with prolonged QT  S/p calcium gluconate x 4  - 1250 calcium carbonate BID

## 2020-12-23 NOTE — PROGRESS NOTE ADULT - PROVIDER SPECIALTY LIST ADULT
Cardiology
Nephrology
Nephrology
Internal Medicine
Internal Medicine
Endocrinology
Endocrinology
Internal Medicine

## 2020-12-23 NOTE — PROGRESS NOTE ADULT - PROBLEM SELECTOR PLAN 5
Continue Sodium bicarbonate  - 1300 mg sodium bicarb TID  - Strict I&Os  - Avoid nephrotoxic medications as able  - Nephrology following, appreciate recs Continue Sodium bicarbonate  - 1300 mg sodium bicarb TID  - Strict I&Os  - Avoid nephrotoxic medications as able  - Nephrology following, appreciate recs    #Hyperkalemia  History of recurrent hyperkalemia 2/2 renal disease  - Lokelma for K > 6

## 2020-12-23 NOTE — PROGRESS NOTE ADULT - ASSESSMENT
65 year old man with PMH MI (2002), CKD IV, HTN, chronic pancreatitis, Hx of past EtOH abuse, T2DM on insulin pump complicated by CKD/peripheral neuropathy/CAD, RA, OA, and current smoker who presents with dyspnea. Off insulin pump while inpatient w/variable glycemic control. No further hypoglycemia, elevated glucose this AM after eating w/out insulin coverage and FS monitoring post-prandial. Possible cath prior to discharge. Discussed transition back to insulin pump if discharged home today. BG goal (100-180mg/dl). 
65 year old man with PMH MI (2002), CKD IV, HTN, chronic pancreatitis, Hx of past EtOH abuse, T2DM on insulin pump complicated by CKD/peripheral neuropathy/CAD, RA, OA, and current smoker who presents with dyspnea. Tolerating POs w/glucose values below goal. Experiencing hypoglycemia overnight so will reduce basal insulin. Tolerating POs.  BG goal (100-180mg/dl). 
65y M with   Male with a pmhx of MI (2002), CKD4, HTN, chronic pancreatitis, Hx of past EtOH abuse, DM on insulin pump, RA, OA, and current smoker who presents with dyspnea on 12/20, found to be hyperkalemic      #Hyperkalemia  - resolved  - C/w bicarb tabs 1300mg TID  - Recommend tight glycemic control  - As an outpatient we instructed the patient to try taking 1 teaspoon of baking soda and mixing it with water to drink twice a day to help with the acidemia instead of sodium bicarb tabs      # IVA on CKD4  - Patient with baseline CKD4, Scr at baseline ~2-2.4mg/dl. Today Cr gilberto to 2.9mg/dl. Likely 2/2 overdiuresis. Would hold further diuretics for today and monitor. In regards to cardiac catherization, if emergent/urgent would proceed with catherization today as necessary, otherwise would re-evaluate kidney fxn tmrw and if Cr improved would proceed with cath then and monitor for contrast induced nephropathy  - Would c/w bicarb to 1300mg   - Hypocalcemia 2/2 denusomab use, c/w calcium carbonate 1250mg BID, c/w vit D at 2000u QD  - Dose medications to eGFR<30  - Patient needs to f/u with Dr. Castro upon discharge from the hospital, ideally within the next 2-4 weeks.
65y M with   Male with a pmhx of MI (2002), CKD4, HTN, chronic pancreatitis, Hx of past EtOH abuse, DM on insulin pump, RA, OA, and current smoker who presents with dyspnea on 12/20, found to be hyperkalemic      #Hyperkalemia  - resolved  - C/w bicarb tabs 1300mg TID, change IV diuretics to PO and d/c if possible  - Recommend tight glycemic control  - As an outpatient we instructed the patient to try taking 1 teaspoon of baking soda and mixing it with water to drink twice a day to help with the acidemia instead of sodium bicarb tabs      #CKD4  - Patient with baseline CKD4, Scr at baseline ~2-2.4mg/dl which is near his baseline  - Would c/w bicarb to 1300mg TID, recommend to change patient to oral diuretics and discharge today/ this evening if clinically stable and on RA.  - Hypocalcemia 2/2 denusomab use, c/w calcium carbonate 1250mg BID, c/w vit D at 2000u QD  - Dose medications to eGFR<30  - Patient needs to f/u with Dr. Castro upon discharge from the hospital, ideally within the next 2-4 weeks.
Patient is a 65 year old man with PMH MI (2002), CKD, HTN, chronic pancreatitis, Hx of past EtOH abuse, DM on insulin pump, RA, OA, and current smoker who presents with increased dyspnea for 1 day and found to have elevated BNP and lower extremity swelling concerning for CHF exacerbation.

## 2020-12-23 NOTE — PROGRESS NOTE ADULT - PROBLEM SELECTOR PLAN 9
States on long term prednisone for several months which he adjusts, currently on 4 mg medrol  - Continue 4 mg medrol DVT prophylaxis - Heparin subq 5000 units q8hr  GI prophylaxis - Omeprazole 20 mg (or therapeutic interchange)  Diet - carb controlled/DASH diet      Dispo - Pending TTE. Now on room air

## 2020-12-23 NOTE — PROGRESS NOTE ADULT - PROBLEM SELECTOR PLAN 6
- Continue metoprolol  - Resume amlodipine today  - Hold Ramipril given elevated K S/p CEA  - Continue Plavix, ASA, lovastatin

## 2020-12-23 NOTE — PROGRESS NOTE ADULT - SUBJECTIVE AND OBJECTIVE BOX
Patient is a 65y old  Male who presents with a chief complaint of Dyspnea (23 Dec 2020 06:59)      INTERVAL HISTORY: feels better    TELEMETRY Personally reviewed: no events     REVIEW OF SYSTEMS:   CONSTITUTIONAL: No weakness  EYES/ENT: No visual changes; No throat pain  Neck: No pain or stiffness  Respiratory: No cough, wheezing, No shortness of breath  CARDIOVASCULAR: no chest pain or palpitations  GASTROINTESTINAL: No abdominal pain, no nausea, vomiting or hematemesis  GENITOURINARY: No dysuria, frequency or hematuria  NEUROLOGICAL: No stroke like symptoms  SKIN: No rashes  	  MEDICATIONS:  furosemide    Tablet 80 milliGRAM(s) Oral daily  hydrALAZINE 10 milliGRAM(s) Oral every 8 hours  isosorbide   mononitrate ER Tablet (IMDUR) 30 milliGRAM(s) Oral daily  metoprolol succinate ER 50 milliGRAM(s) Oral daily      PHYSICAL EXAM:  T(C): 36.7 (12-23-20 @ 07:43), Max: 36.9 (12-22-20 @ 16:19)  HR: 69 (12-23-20 @ 07:43) (69 - 95)  BP: 114/69 (12-23-20 @ 07:43) (114/59 - 134/70)  RR: 17 (12-23-20 @ 07:43) (17 - 18)  SpO2: 97% (12-23-20 @ 07:43) (94% - 98%)  Wt(kg): --  I&O's Summary    22 Dec 2020 07:01  -  23 Dec 2020 07:00  --------------------------------------------------------  IN: 730 mL / OUT: 1875 mL / NET: -1145 mL      Appearance: In no distress	  HEENT:    PERRL, EOMI	  Cardiovascular:  S1 S2, No JVD  Respiratory: Lungs clear to auscultation	  Gastrointestinal:  Soft, Non-tender, + BS	  Vascularature:  No edema of LE  Psychiatric: Appropriate affect   Neuro: no acute focal deficits                             9.2    7.69  )-----------( 159      ( 23 Dec 2020 06:03 )             27.3     12-23    135  |  103  |  28<H>  ----------------------------<  100<H>  4.5   |  21<L>  |  2.94<H>    Ca    7.0<L>      23 Dec 2020 06:03  Phos  4.5     12-23  Mg     1.8     12-23    TPro  6.2  /  Alb  3.0<L>  /  TBili  0.3  /  DBili  <0.1  /  AST  27  /  ALT  16  /  AlkPhos  81  12-23    Labs personally reviewed      ASSESSMENT/PLAN: 	  Assessment and Plan:   · Assessment	  Patient is a 65 year old man with PMH MI (2002), CKD, HTN, chronic pancreatitis, Hx of past EtOH abuse, DM on insulin pump, RA, OA, and current smoker who presents with increased dyspnea for 1 day and found to have elevated BNP and lower extremity swelling concerning for CHF exacerbation.    Problem/Plan - 1:  ·  Problem: Acute systolic HF  Plan: Now s/p diuresis and breathing much improved  Reported TTE on 8/20 showed preserved EF and mild diastolic dysfunction and hypokinetic distal and apical walls (?as outpt)  - Now Echo with significant decline in global LV function with EF 30-35%  - given 50 pack year smoker, known PAD, elevated trop  with other risk factors ischemia is a definite possibility   - Decrease Lasix 80BID to daily  - given sHF will need to start GDMT  - c/w Toprol 50 daily   -  Hydral 10mg TID and uptitrate to 25mg TID   - will resume ACE in place of hydral if okay with nephrology   - Dr. Lisker recommends cath today/ Renal okay with proceeding despite bump in Cr today to 2.94.  - pt wishes to come back after Christmas for workup- Discussed in detail the importance of having cath workup to r/o ischemia especially in the presence of his multiple risk factors. Dr. Yan will reach out to Dr. Lisker who will try to speak with patient.   - also explained to patient he can have cath today and be dc'd tomorrow morning on Christmas ysabel.     Problem/Plan - 2:  ·  Problem: Hyperkalemia.  Plan: History of recurrent hyperkalemia 2/2 renal disease  now resolved s/p Lokelma   - Lokelma for K > 6.    Problem/Plan - 3:  ·  Problem:  Elevated troponin. Plan: Likely 2/2 sHF and CKD, but ischemia can not be ruled out in this 66 YO M with 50 pack year smoker, known PAD. IDDM and new sHF  Plan as noted above    Problem/Plan - 4:  ·  Problem: CKD. Plan: - Nephrology following, appreciate recs.- Cr appears to be around baseline  - Cr today 2.94     Problem/Plan - 5:  Problem: Hypertension. Plan: - Continue metoprolol, d/c amlodipine   - resume Ramipril if okay with nephrology and give maintenance daily Lokelma if need be to allow ACE dosing  - hydral if can't start ACE   - Tx of HF as noted above    Problem/Plan - 6:  ·  Problem: Carotid stenosis, left.  Plan: S/p CEA  - Continue Plavix, ASA, lovastatin.     Problem/Plan - 7:  ·  Problem: Diabetes.  Plan:insulin sliding scare TID   - Farxiga for sHF can not be added as GFR <30  - Consistent carb diet       Problem/Plan - 8:  Problem: Prophylactic measure. Plan; DVT prophylaxis - Heparin subq 5000 units q8hr  GI prophylaxis - Omeprazole 20 mg (or therapeutic interchange)        Soco Yan DO [facility]  Cardiovascular Medicine  [address] [place] [address], [address]  Office: [phone]  Cell: [phone]

## 2020-12-23 NOTE — PROGRESS NOTE ADULT - PROBLEM SELECTOR PROBLEM 3
Osteoporosis, unspecified osteoporosis type, unspecified pathological fracture presence
Other hyperlipidemia
Hypocalcemia

## 2021-01-01 ENCOUNTER — APPOINTMENT (OUTPATIENT)
Dept: RHEUMATOLOGY | Facility: CLINIC | Age: 66
End: 2021-01-01
Payer: COMMERCIAL

## 2021-01-01 ENCOUNTER — APPOINTMENT (OUTPATIENT)
Dept: HEMATOLOGY ONCOLOGY | Facility: CLINIC | Age: 66
End: 2021-01-01
Payer: COMMERCIAL

## 2021-01-01 ENCOUNTER — APPOINTMENT (OUTPATIENT)
Dept: THORACIC SURGERY | Facility: CLINIC | Age: 66
End: 2021-01-01
Payer: COMMERCIAL

## 2021-01-01 ENCOUNTER — INPATIENT (INPATIENT)
Facility: HOSPITAL | Age: 66
LOS: 5 days | Discharge: HOME | End: 2021-02-15
Attending: THORACIC SURGERY (CARDIOTHORACIC VASCULAR SURGERY) | Admitting: THORACIC SURGERY (CARDIOTHORACIC VASCULAR SURGERY)
Payer: COMMERCIAL

## 2021-01-01 ENCOUNTER — APPOINTMENT (OUTPATIENT)
Dept: CT IMAGING | Facility: IMAGING CENTER | Age: 66
End: 2021-01-01
Payer: COMMERCIAL

## 2021-01-01 ENCOUNTER — INPATIENT (INPATIENT)
Facility: HOSPITAL | Age: 66
LOS: 0 days | DRG: 291 | End: 2021-12-22
Attending: SPECIALIST | Admitting: SPECIALIST
Payer: COMMERCIAL

## 2021-01-01 ENCOUNTER — APPOINTMENT (OUTPATIENT)
Dept: DERMATOLOGY | Facility: CLINIC | Age: 66
End: 2021-01-01
Payer: COMMERCIAL

## 2021-01-01 ENCOUNTER — TRANSCRIPTION ENCOUNTER (OUTPATIENT)
Age: 66
End: 2021-01-01

## 2021-01-01 ENCOUNTER — APPOINTMENT (OUTPATIENT)
Dept: INTERNAL MEDICINE | Facility: CLINIC | Age: 66
End: 2021-01-01
Payer: COMMERCIAL

## 2021-01-01 ENCOUNTER — NON-APPOINTMENT (OUTPATIENT)
Age: 66
End: 2021-01-01

## 2021-01-01 ENCOUNTER — APPOINTMENT (OUTPATIENT)
Dept: INFUSION THERAPY | Facility: HOSPITAL | Age: 66
End: 2021-01-01

## 2021-01-01 ENCOUNTER — RX RENEWAL (OUTPATIENT)
Age: 66
End: 2021-01-01

## 2021-01-01 ENCOUNTER — APPOINTMENT (OUTPATIENT)
Dept: NEPHROLOGY | Facility: CLINIC | Age: 66
End: 2021-01-01
Payer: COMMERCIAL

## 2021-01-01 ENCOUNTER — APPOINTMENT (OUTPATIENT)
Dept: CARDIOLOGY | Facility: CLINIC | Age: 66
End: 2021-01-01
Payer: COMMERCIAL

## 2021-01-01 ENCOUNTER — APPOINTMENT (OUTPATIENT)
Dept: ORTHOPEDIC SURGERY | Facility: CLINIC | Age: 66
End: 2021-01-01
Payer: COMMERCIAL

## 2021-01-01 ENCOUNTER — APPOINTMENT (OUTPATIENT)
Dept: NUCLEAR MEDICINE | Facility: IMAGING CENTER | Age: 66
End: 2021-01-01
Payer: COMMERCIAL

## 2021-01-01 ENCOUNTER — APPOINTMENT (OUTPATIENT)
Dept: DISASTER EMERGENCY | Facility: CLINIC | Age: 66
End: 2021-01-01

## 2021-01-01 ENCOUNTER — RESULT REVIEW (OUTPATIENT)
Age: 66
End: 2021-01-01

## 2021-01-01 ENCOUNTER — OUTPATIENT (OUTPATIENT)
Dept: OUTPATIENT SERVICES | Facility: HOSPITAL | Age: 66
LOS: 1 days | End: 2021-01-01
Payer: COMMERCIAL

## 2021-01-01 ENCOUNTER — APPOINTMENT (OUTPATIENT)
Dept: RADIATION ONCOLOGY | Facility: CLINIC | Age: 66
End: 2021-01-01
Payer: COMMERCIAL

## 2021-01-01 ENCOUNTER — APPOINTMENT (OUTPATIENT)
Dept: RADIOLOGY | Facility: CLINIC | Age: 66
End: 2021-01-01
Payer: COMMERCIAL

## 2021-01-01 ENCOUNTER — LABORATORY RESULT (OUTPATIENT)
Age: 66
End: 2021-01-01

## 2021-01-01 ENCOUNTER — OUTPATIENT (OUTPATIENT)
Dept: OUTPATIENT SERVICES | Facility: HOSPITAL | Age: 66
LOS: 1 days | Discharge: HOME | End: 2021-01-01
Payer: COMMERCIAL

## 2021-01-01 ENCOUNTER — APPOINTMENT (OUTPATIENT)
Dept: ENDOCRINOLOGY | Facility: CLINIC | Age: 66
End: 2021-01-01
Payer: COMMERCIAL

## 2021-01-01 ENCOUNTER — INPATIENT (INPATIENT)
Facility: HOSPITAL | Age: 66
LOS: 1 days | Discharge: HOME | DRG: 180 | End: 2021-09-04
Attending: INTERNAL MEDICINE | Admitting: STUDENT IN AN ORGANIZED HEALTH CARE EDUCATION/TRAINING PROGRAM
Payer: COMMERCIAL

## 2021-01-01 ENCOUNTER — APPOINTMENT (OUTPATIENT)
Dept: ORTHOPEDIC SURGERY | Facility: CLINIC | Age: 66
End: 2021-01-01

## 2021-01-01 ENCOUNTER — OUTPATIENT (OUTPATIENT)
Dept: OUTPATIENT SERVICES | Facility: HOSPITAL | Age: 66
LOS: 1 days | Discharge: HOME | End: 2021-01-01

## 2021-01-01 ENCOUNTER — APPOINTMENT (OUTPATIENT)
Dept: RADIATION ONCOLOGY | Facility: CLINIC | Age: 66
End: 2021-01-01

## 2021-01-01 ENCOUNTER — APPOINTMENT (OUTPATIENT)
Dept: MRI IMAGING | Facility: CLINIC | Age: 66
End: 2021-01-01
Payer: COMMERCIAL

## 2021-01-01 ENCOUNTER — APPOINTMENT (OUTPATIENT)
Dept: INTERNAL MEDICINE | Facility: CLINIC | Age: 66
End: 2021-01-01

## 2021-01-01 ENCOUNTER — APPOINTMENT (OUTPATIENT)
Dept: ULTRASOUND IMAGING | Facility: CLINIC | Age: 66
End: 2021-01-01
Payer: COMMERCIAL

## 2021-01-01 ENCOUNTER — INPATIENT (INPATIENT)
Facility: HOSPITAL | Age: 66
LOS: 4 days | Discharge: HOME | DRG: 640 | End: 2021-10-18
Attending: STUDENT IN AN ORGANIZED HEALTH CARE EDUCATION/TRAINING PROGRAM | Admitting: HOSPITALIST
Payer: COMMERCIAL

## 2021-01-01 ENCOUNTER — OUTPATIENT (OUTPATIENT)
Dept: OUTPATIENT SERVICES | Facility: HOSPITAL | Age: 66
LOS: 1 days | End: 2021-01-01

## 2021-01-01 ENCOUNTER — FORM ENCOUNTER (OUTPATIENT)
Age: 66
End: 2021-01-01

## 2021-01-01 ENCOUNTER — APPOINTMENT (OUTPATIENT)
Dept: CT IMAGING | Facility: CLINIC | Age: 66
End: 2021-01-01
Payer: COMMERCIAL

## 2021-01-01 ENCOUNTER — APPOINTMENT (OUTPATIENT)
Dept: RADIOLOGY | Facility: HOSPITAL | Age: 66
End: 2021-01-01

## 2021-01-01 ENCOUNTER — APPOINTMENT (OUTPATIENT)
Dept: ULTRASOUND IMAGING | Facility: IMAGING CENTER | Age: 66
End: 2021-01-01
Payer: COMMERCIAL

## 2021-01-01 ENCOUNTER — APPOINTMENT (OUTPATIENT)
Dept: THORACIC SURGERY | Facility: HOSPITAL | Age: 66
End: 2021-01-01

## 2021-01-01 ENCOUNTER — APPOINTMENT (OUTPATIENT)
Dept: PULMONOLOGY | Facility: CLINIC | Age: 66
End: 2021-01-01
Payer: COMMERCIAL

## 2021-01-01 VITALS
HEART RATE: 80 BPM | SYSTOLIC BLOOD PRESSURE: 151 MMHG | WEIGHT: 154.87 LBS | RESPIRATION RATE: 18 BRPM | OXYGEN SATURATION: 97 % | BODY MASS INDEX: 22.22 KG/M2 | TEMPERATURE: 36.8 F | DIASTOLIC BLOOD PRESSURE: 74 MMHG

## 2021-01-01 VITALS
TEMPERATURE: 98.5 F | BODY MASS INDEX: 21.47 KG/M2 | HEART RATE: 77 BPM | WEIGHT: 150 LBS | DIASTOLIC BLOOD PRESSURE: 80 MMHG | OXYGEN SATURATION: 98 % | HEIGHT: 70 IN | SYSTOLIC BLOOD PRESSURE: 144 MMHG | RESPIRATION RATE: 17 BRPM

## 2021-01-01 VITALS
BODY MASS INDEX: 20.47 KG/M2 | DIASTOLIC BLOOD PRESSURE: 68 MMHG | HEIGHT: 70 IN | OXYGEN SATURATION: 97 % | HEART RATE: 76 BPM | WEIGHT: 143 LBS | SYSTOLIC BLOOD PRESSURE: 163 MMHG | TEMPERATURE: 97.6 F

## 2021-01-01 VITALS
SYSTOLIC BLOOD PRESSURE: 140 MMHG | WEIGHT: 151 LBS | HEIGHT: 70 IN | HEART RATE: 80 BPM | OXYGEN SATURATION: 98 % | BODY MASS INDEX: 21.62 KG/M2 | DIASTOLIC BLOOD PRESSURE: 60 MMHG

## 2021-01-01 VITALS
WEIGHT: 148 LBS | HEART RATE: 69 BPM | TEMPERATURE: 97.2 F | BODY MASS INDEX: 21.19 KG/M2 | OXYGEN SATURATION: 98 % | HEIGHT: 70 IN | DIASTOLIC BLOOD PRESSURE: 65 MMHG | SYSTOLIC BLOOD PRESSURE: 136 MMHG | RESPIRATION RATE: 16 BRPM

## 2021-01-01 VITALS — SYSTOLIC BLOOD PRESSURE: 140 MMHG | DIASTOLIC BLOOD PRESSURE: 66 MMHG

## 2021-01-01 VITALS
TEMPERATURE: 98.1 F | WEIGHT: 151.13 LBS | OXYGEN SATURATION: 98 % | HEART RATE: 89 BPM | HEIGHT: 70 IN | DIASTOLIC BLOOD PRESSURE: 66 MMHG | RESPIRATION RATE: 17 BRPM | SYSTOLIC BLOOD PRESSURE: 121 MMHG | BODY MASS INDEX: 21.64 KG/M2

## 2021-01-01 VITALS
OXYGEN SATURATION: 99 % | HEART RATE: 82 BPM | SYSTOLIC BLOOD PRESSURE: 136 MMHG | RESPIRATION RATE: 18 BRPM | DIASTOLIC BLOOD PRESSURE: 88 MMHG | TEMPERATURE: 98 F

## 2021-01-01 VITALS
HEIGHT: 61 IN | SYSTOLIC BLOOD PRESSURE: 110 MMHG | DIASTOLIC BLOOD PRESSURE: 60 MMHG | TEMPERATURE: 98.1 F | HEART RATE: 78 BPM | WEIGHT: 158 LBS | OXYGEN SATURATION: 98 % | BODY MASS INDEX: 29.83 KG/M2

## 2021-01-01 VITALS
OXYGEN SATURATION: 99 % | HEART RATE: 77 BPM | WEIGHT: 158 LBS | HEIGHT: 70 IN | DIASTOLIC BLOOD PRESSURE: 80 MMHG | BODY MASS INDEX: 22.62 KG/M2 | SYSTOLIC BLOOD PRESSURE: 122 MMHG | TEMPERATURE: 97.9 F

## 2021-01-01 VITALS
TEMPERATURE: 97.2 F | HEART RATE: 87 BPM | OXYGEN SATURATION: 96 % | DIASTOLIC BLOOD PRESSURE: 71 MMHG | HEIGHT: 70 IN | BODY MASS INDEX: 20.19 KG/M2 | SYSTOLIC BLOOD PRESSURE: 173 MMHG | WEIGHT: 141 LBS

## 2021-01-01 VITALS
RESPIRATION RATE: 18 BRPM | OXYGEN SATURATION: 96 % | SYSTOLIC BLOOD PRESSURE: 160 MMHG | HEART RATE: 90 BPM | TEMPERATURE: 99 F | HEIGHT: 70 IN | DIASTOLIC BLOOD PRESSURE: 69 MMHG | WEIGHT: 149.91 LBS

## 2021-01-01 VITALS
RESPIRATION RATE: 16 BRPM | OXYGEN SATURATION: 100 % | TEMPERATURE: 98 F | HEART RATE: 66 BPM | HEIGHT: 70 IN | WEIGHT: 153 LBS | SYSTOLIC BLOOD PRESSURE: 148 MMHG | DIASTOLIC BLOOD PRESSURE: 60 MMHG

## 2021-01-01 VITALS
HEIGHT: 70 IN | DIASTOLIC BLOOD PRESSURE: 76 MMHG | RESPIRATION RATE: 15 BRPM | WEIGHT: 149.67 LBS | OXYGEN SATURATION: 98 % | BODY MASS INDEX: 21.43 KG/M2 | TEMPERATURE: 97.3 F | HEART RATE: 82 BPM | SYSTOLIC BLOOD PRESSURE: 174 MMHG

## 2021-01-01 VITALS
TEMPERATURE: 97.4 F | SYSTOLIC BLOOD PRESSURE: 118 MMHG | WEIGHT: 154 LBS | HEART RATE: 78 BPM | OXYGEN SATURATION: 99 % | BODY MASS INDEX: 29.1 KG/M2 | DIASTOLIC BLOOD PRESSURE: 70 MMHG

## 2021-01-01 VITALS
OXYGEN SATURATION: 98 % | HEIGHT: 70 IN | RESPIRATION RATE: 16 BRPM | WEIGHT: 152 LBS | BODY MASS INDEX: 21.76 KG/M2 | SYSTOLIC BLOOD PRESSURE: 136 MMHG | HEART RATE: 69 BPM | DIASTOLIC BLOOD PRESSURE: 76 MMHG | TEMPERATURE: 98.6 F

## 2021-01-01 VITALS
HEART RATE: 69 BPM | WEIGHT: 144 LBS | DIASTOLIC BLOOD PRESSURE: 62 MMHG | BODY MASS INDEX: 20.66 KG/M2 | TEMPERATURE: 97.6 F | OXYGEN SATURATION: 98 % | SYSTOLIC BLOOD PRESSURE: 144 MMHG

## 2021-01-01 VITALS
OXYGEN SATURATION: 98 % | RESPIRATION RATE: 16 BRPM | HEIGHT: 70 IN | DIASTOLIC BLOOD PRESSURE: 77 MMHG | BODY MASS INDEX: 22.33 KG/M2 | WEIGHT: 156 LBS | TEMPERATURE: 97.9 F | HEART RATE: 72 BPM | SYSTOLIC BLOOD PRESSURE: 152 MMHG

## 2021-01-01 VITALS
WEIGHT: 146 LBS | BODY MASS INDEX: 20.9 KG/M2 | DIASTOLIC BLOOD PRESSURE: 76 MMHG | RESPIRATION RATE: 15 BRPM | TEMPERATURE: 99.4 F | HEIGHT: 70 IN | HEART RATE: 75 BPM | OXYGEN SATURATION: 99 % | SYSTOLIC BLOOD PRESSURE: 156 MMHG

## 2021-01-01 VITALS
TEMPERATURE: 97.8 F | HEART RATE: 69 BPM | OXYGEN SATURATION: 100 % | SYSTOLIC BLOOD PRESSURE: 140 MMHG | BODY MASS INDEX: 21.28 KG/M2 | DIASTOLIC BLOOD PRESSURE: 66 MMHG | HEIGHT: 71 IN | WEIGHT: 152 LBS

## 2021-01-01 VITALS
WEIGHT: 143 LBS | HEIGHT: 70 IN | SYSTOLIC BLOOD PRESSURE: 130 MMHG | OXYGEN SATURATION: 99 % | DIASTOLIC BLOOD PRESSURE: 70 MMHG | HEART RATE: 85 BPM | BODY MASS INDEX: 20.47 KG/M2 | TEMPERATURE: 98.1 F

## 2021-01-01 VITALS
RESPIRATION RATE: 18 BRPM | SYSTOLIC BLOOD PRESSURE: 113 MMHG | WEIGHT: 139.99 LBS | TEMPERATURE: 98 F | HEIGHT: 70 IN | DIASTOLIC BLOOD PRESSURE: 72 MMHG | OXYGEN SATURATION: 100 % | HEART RATE: 82 BPM

## 2021-01-01 VITALS
RESPIRATION RATE: 18 BRPM | OXYGEN SATURATION: 98 % | DIASTOLIC BLOOD PRESSURE: 67 MMHG | SYSTOLIC BLOOD PRESSURE: 150 MMHG | HEART RATE: 72 BPM | TEMPERATURE: 98 F

## 2021-01-01 VITALS
TEMPERATURE: 98.1 F | HEART RATE: 76 BPM | DIASTOLIC BLOOD PRESSURE: 80 MMHG | BODY MASS INDEX: 18.98 KG/M2 | WEIGHT: 132.28 LBS | RESPIRATION RATE: 16 BRPM | SYSTOLIC BLOOD PRESSURE: 150 MMHG | OXYGEN SATURATION: 97 %

## 2021-01-01 VITALS
TEMPERATURE: 96.7 F | RESPIRATION RATE: 16 BRPM | DIASTOLIC BLOOD PRESSURE: 70 MMHG | OXYGEN SATURATION: 98 % | BODY MASS INDEX: 20.52 KG/M2 | WEIGHT: 143 LBS | HEART RATE: 72 BPM | SYSTOLIC BLOOD PRESSURE: 151 MMHG

## 2021-01-01 VITALS
OXYGEN SATURATION: 99 % | HEART RATE: 72 BPM | DIASTOLIC BLOOD PRESSURE: 59 MMHG | TEMPERATURE: 98 F | RESPIRATION RATE: 18 BRPM | SYSTOLIC BLOOD PRESSURE: 114 MMHG

## 2021-01-01 VITALS
DIASTOLIC BLOOD PRESSURE: 71 MMHG | HEART RATE: 71 BPM | SYSTOLIC BLOOD PRESSURE: 152 MMHG | RESPIRATION RATE: 14 BRPM | OXYGEN SATURATION: 100 %

## 2021-01-01 VITALS
HEART RATE: 74 BPM | WEIGHT: 142 LBS | OXYGEN SATURATION: 98 % | SYSTOLIC BLOOD PRESSURE: 150 MMHG | HEIGHT: 70 IN | BODY MASS INDEX: 20.33 KG/M2 | DIASTOLIC BLOOD PRESSURE: 70 MMHG

## 2021-01-01 VITALS
RESPIRATION RATE: 14 BRPM | WEIGHT: 158.07 LBS | HEART RATE: 59 BPM | OXYGEN SATURATION: 99 % | TEMPERATURE: 99 F | HEIGHT: 78 IN | SYSTOLIC BLOOD PRESSURE: 130 MMHG | DIASTOLIC BLOOD PRESSURE: 78 MMHG

## 2021-01-01 VITALS — HEART RATE: 70 BPM | SYSTOLIC BLOOD PRESSURE: 43 MMHG | DIASTOLIC BLOOD PRESSURE: 31 MMHG | RESPIRATION RATE: 26 BRPM

## 2021-01-01 VITALS
TEMPERATURE: 97.3 F | WEIGHT: 133 LBS | HEART RATE: 78 BPM | DIASTOLIC BLOOD PRESSURE: 74 MMHG | HEIGHT: 70 IN | BODY MASS INDEX: 19.04 KG/M2 | SYSTOLIC BLOOD PRESSURE: 130 MMHG | OXYGEN SATURATION: 98 %

## 2021-01-01 VITALS
HEIGHT: 70 IN | OXYGEN SATURATION: 98 % | RESPIRATION RATE: 18 BRPM | TEMPERATURE: 98 F | DIASTOLIC BLOOD PRESSURE: 60 MMHG | SYSTOLIC BLOOD PRESSURE: 97 MMHG | HEART RATE: 103 BPM

## 2021-01-01 VITALS
SYSTOLIC BLOOD PRESSURE: 133 MMHG | BODY MASS INDEX: 21.28 KG/M2 | HEIGHT: 71 IN | WEIGHT: 152 LBS | HEART RATE: 69 BPM | OXYGEN SATURATION: 98 % | DIASTOLIC BLOOD PRESSURE: 73 MMHG

## 2021-01-01 VITALS — DIASTOLIC BLOOD PRESSURE: 73 MMHG | SYSTOLIC BLOOD PRESSURE: 154 MMHG

## 2021-01-01 VITALS
BODY MASS INDEX: 20.19 KG/M2 | HEART RATE: 78 BPM | TEMPERATURE: 97.8 F | SYSTOLIC BLOOD PRESSURE: 149 MMHG | WEIGHT: 141 LBS | HEIGHT: 70 IN | OXYGEN SATURATION: 99 % | DIASTOLIC BLOOD PRESSURE: 72 MMHG

## 2021-01-01 VITALS
HEART RATE: 69 BPM | BODY MASS INDEX: 21.9 KG/M2 | SYSTOLIC BLOOD PRESSURE: 145 MMHG | WEIGHT: 153 LBS | OXYGEN SATURATION: 97 % | TEMPERATURE: 97.3 F | HEIGHT: 70 IN | DIASTOLIC BLOOD PRESSURE: 72 MMHG

## 2021-01-01 VITALS — WEIGHT: 143 LBS | HEIGHT: 70 IN | BODY MASS INDEX: 20.47 KG/M2

## 2021-01-01 VITALS — HEIGHT: 71 IN | WEIGHT: 151.9 LBS

## 2021-01-01 DIAGNOSIS — Z98.89 OTHER SPECIFIED POSTPROCEDURAL STATES: Chronic | ICD-10-CM

## 2021-01-01 DIAGNOSIS — Z98.890 OTHER SPECIFIED POSTPROCEDURAL STATES: Chronic | ICD-10-CM

## 2021-01-01 DIAGNOSIS — M79.89 OTHER SPECIFIED SOFT TISSUE DISORDERS: ICD-10-CM

## 2021-01-01 DIAGNOSIS — Z96.641 PRESENCE OF RIGHT ARTIFICIAL HIP JOINT: Chronic | ICD-10-CM

## 2021-01-01 DIAGNOSIS — I10 ESSENTIAL (PRIMARY) HYPERTENSION: ICD-10-CM

## 2021-01-01 DIAGNOSIS — Z46.81 ENCOUNTER FOR FITTING AND ADJUSTMENT OF INSULIN PUMP: ICD-10-CM

## 2021-01-01 DIAGNOSIS — J90 PLEURAL EFFUSION, NOT ELSEWHERE CLASSIFIED: ICD-10-CM

## 2021-01-01 DIAGNOSIS — Z98.52 VASECTOMY STATUS: Chronic | ICD-10-CM

## 2021-01-01 DIAGNOSIS — C34.90 MALIGNANT NEOPLASM OF UNSPECIFIED PART OF UNSPECIFIED BRONCHUS OR LUNG: ICD-10-CM

## 2021-01-01 DIAGNOSIS — Z12.83 ENCOUNTER FOR SCREENING FOR MALIGNANT NEOPLASM OF SKIN: ICD-10-CM

## 2021-01-01 DIAGNOSIS — I25.10 ATHEROSCLEROTIC HEART DISEASE OF NATIVE CORONARY ARTERY WITHOUT ANGINA PECTORIS: ICD-10-CM

## 2021-01-01 DIAGNOSIS — Z90.49 ACQUIRED ABSENCE OF OTHER SPECIFIED PARTS OF DIGESTIVE TRACT: Chronic | ICD-10-CM

## 2021-01-01 DIAGNOSIS — R06.02 SHORTNESS OF BREATH: ICD-10-CM

## 2021-01-01 DIAGNOSIS — M17.12 UNILATERAL PRIMARY OSTEOARTHRITIS, LEFT KNEE: ICD-10-CM

## 2021-01-01 DIAGNOSIS — Z00.8 ENCOUNTER FOR OTHER GENERAL EXAMINATION: ICD-10-CM

## 2021-01-01 DIAGNOSIS — L82.1 OTHER SEBORRHEIC KERATOSIS: ICD-10-CM

## 2021-01-01 DIAGNOSIS — R91.8 OTHER NONSPECIFIC ABNORMAL FINDING OF LUNG FIELD: ICD-10-CM

## 2021-01-01 DIAGNOSIS — E78.5 HYPERLIPIDEMIA, UNSPECIFIED: ICD-10-CM

## 2021-01-01 DIAGNOSIS — R06.00 DYSPNEA, UNSPECIFIED: ICD-10-CM

## 2021-01-01 DIAGNOSIS — E11.9 TYPE 2 DIABETES MELLITUS WITHOUT COMPLICATIONS: ICD-10-CM

## 2021-01-01 DIAGNOSIS — F17.200 NICOTINE DEPENDENCE, UNSPECIFIED, UNCOMPLICATED: ICD-10-CM

## 2021-01-01 DIAGNOSIS — D50.9 IRON DEFICIENCY ANEMIA, UNSPECIFIED: ICD-10-CM

## 2021-01-01 DIAGNOSIS — R60.0 LOCALIZED EDEMA: ICD-10-CM

## 2021-01-01 DIAGNOSIS — K21.9 GASTRO-ESOPHAGEAL REFLUX DISEASE W/OUT ESOPHAGITIS: ICD-10-CM

## 2021-01-01 DIAGNOSIS — N17.9 ACUTE KIDNEY FAILURE, UNSPECIFIED: ICD-10-CM

## 2021-01-01 DIAGNOSIS — M81.0 AGE-RELATED OSTEOPOROSIS W/OUT CURRENT PATHOLOGICAL FRACTURE: ICD-10-CM

## 2021-01-01 DIAGNOSIS — Z90.89 ACQUIRED ABSENCE OF OTHER ORGANS: Chronic | ICD-10-CM

## 2021-01-01 DIAGNOSIS — E08.65 DIABETES MELLITUS DUE TO UNDERLYING CONDITION WITH HYPERGLYCEMIA: ICD-10-CM

## 2021-01-01 DIAGNOSIS — N18.30 CHRONIC KIDNEY DISEASE, STAGE 3 UNSPECIFIED: ICD-10-CM

## 2021-01-01 DIAGNOSIS — Z96.41 PRESENCE OF INSULIN PUMP (EXTERNAL) (INTERNAL): ICD-10-CM

## 2021-01-01 DIAGNOSIS — C44.42 SQUAMOUS CELL CARCINOMA OF SKIN OF SCALP AND NECK: ICD-10-CM

## 2021-01-01 DIAGNOSIS — D22.9 MELANOCYTIC NEVI, UNSPECIFIED: ICD-10-CM

## 2021-01-01 DIAGNOSIS — D64.89 OTHER SPECIFIED ANEMIAS: ICD-10-CM

## 2021-01-01 DIAGNOSIS — R42 DIZZINESS AND GIDDINESS: ICD-10-CM

## 2021-01-01 DIAGNOSIS — D48.5 NEOPLASM OF UNCERTAIN BEHAVIOR OF SKIN: ICD-10-CM

## 2021-01-01 DIAGNOSIS — I25.10 ATHEROSCLEROTIC HEART DISEASE OF NATIVE CORONARY ARTERY W/OUT ANGINA PECTORIS: ICD-10-CM

## 2021-01-01 DIAGNOSIS — E87.1 HYPO-OSMOLALITY AND HYPONATREMIA: ICD-10-CM

## 2021-01-01 DIAGNOSIS — E87.2 ACIDOSIS: ICD-10-CM

## 2021-01-01 DIAGNOSIS — L40.9 PSORIASIS, UNSPECIFIED: ICD-10-CM

## 2021-01-01 DIAGNOSIS — Z29.9 ENCOUNTER FOR PROPHYLACTIC MEASURES, UNSPECIFIED: ICD-10-CM

## 2021-01-01 DIAGNOSIS — C34.91 MALIGNANT NEOPLASM OF UNSPECIFIED PART OF RIGHT BRONCHUS OR LUNG: ICD-10-CM

## 2021-01-01 DIAGNOSIS — M06.9 RHEUMATOID ARTHRITIS, UNSPECIFIED: ICD-10-CM

## 2021-01-01 DIAGNOSIS — Z01.810 ENCOUNTER FOR PREPROCEDURAL CARDIOVASCULAR EXAMINATION: ICD-10-CM

## 2021-01-01 DIAGNOSIS — N18.4 CHRONIC KIDNEY DISEASE, STAGE 4 (SEVERE): ICD-10-CM

## 2021-01-01 DIAGNOSIS — R76.8 OTHER SPECIFIED ABNORMAL IMMUNOLOGICAL FINDINGS IN SERUM: ICD-10-CM

## 2021-01-01 DIAGNOSIS — E55.9 VITAMIN D DEFICIENCY, UNSPECIFIED: ICD-10-CM

## 2021-01-01 DIAGNOSIS — L40.50 ARTHROPATHIC PSORIASIS, UNSPECIFIED: ICD-10-CM

## 2021-01-01 DIAGNOSIS — Z01.818 ENCOUNTER FOR OTHER PREPROCEDURAL EXAMINATION: ICD-10-CM

## 2021-01-01 DIAGNOSIS — R91.1 SOLITARY PULMONARY NODULE: ICD-10-CM

## 2021-01-01 DIAGNOSIS — R11.2 NAUSEA WITH VOMITING, UNSPECIFIED: ICD-10-CM

## 2021-01-01 DIAGNOSIS — E11.65 TYPE 2 DIABETES MELLITUS WITH HYPERGLYCEMIA: ICD-10-CM

## 2021-01-01 DIAGNOSIS — K74.60 UNSPECIFIED CIRRHOSIS OF LIVER: ICD-10-CM

## 2021-01-01 DIAGNOSIS — J93.9 PNEUMOTHORAX, UNSPECIFIED: ICD-10-CM

## 2021-01-01 DIAGNOSIS — R53.83 OTHER FATIGUE: ICD-10-CM

## 2021-01-01 DIAGNOSIS — L57.0 ACTINIC KERATOSIS: ICD-10-CM

## 2021-01-01 DIAGNOSIS — Z02.9 ENCOUNTER FOR ADMINISTRATIVE EXAMINATIONS, UNSPECIFIED: ICD-10-CM

## 2021-01-01 DIAGNOSIS — R10.84 GENERALIZED ABDOMINAL PAIN: ICD-10-CM

## 2021-01-01 DIAGNOSIS — R65.10 SYSTEMIC INFLAMMATORY RESPONSE SYNDROME (SIRS) OF NON-INFECTIOUS ORIGIN WITHOUT ACUTE ORGAN DYSFUNCTION: ICD-10-CM

## 2021-01-01 DIAGNOSIS — R94.31 ABNORMAL ELECTROCARDIOGRAM [ECG] [EKG]: ICD-10-CM

## 2021-01-01 DIAGNOSIS — I50.22 CHRONIC SYSTOLIC (CONGESTIVE) HEART FAILURE: ICD-10-CM

## 2021-01-01 DIAGNOSIS — K86.89 OTHER SPECIFIED DISEASES OF PANCREAS: ICD-10-CM

## 2021-01-01 DIAGNOSIS — R26.89 OTHER ABNORMALITIES OF GAIT AND MOBILITY: ICD-10-CM

## 2021-01-01 DIAGNOSIS — N28.9 DISORDER OF KIDNEY AND URETER, UNSPECIFIED: ICD-10-CM

## 2021-01-01 DIAGNOSIS — F32.9 MAJOR DEPRESSIVE DISORDER, SINGLE EPISODE, UNSPECIFIED: ICD-10-CM

## 2021-01-01 DIAGNOSIS — E11.9 TYPE 2 DIABETES MELLITUS W/OUT COMPLICATIONS: ICD-10-CM

## 2021-01-01 DIAGNOSIS — I50.20 UNSPECIFIED SYSTOLIC (CONGESTIVE) HEART FAILURE: ICD-10-CM

## 2021-01-01 LAB
% ALBUMIN: 45.6 % — SIGNIFICANT CHANGE UP
% ALPHA 1: 6 % — SIGNIFICANT CHANGE UP
% ALPHA 2: 14.6 % — SIGNIFICANT CHANGE UP
% BETA: 11.7 % — SIGNIFICANT CHANGE UP
% GAMMA: 22.1 % — SIGNIFICANT CHANGE UP
25(OH)D3 SERPL-MCNC: 29.7 NG/ML
25(OH)D3 SERPL-MCNC: 36.2 NG/ML
A1C WITH ESTIMATED AVERAGE GLUCOSE RESULT: 7 % — HIGH (ref 4–5.6)
A1C WITH ESTIMATED AVERAGE GLUCOSE RESULT: 9.7 % — HIGH (ref 4–5.6)
ALBUMIN SERPL ELPH-MCNC: 2.8 G/DL
ALBUMIN SERPL ELPH-MCNC: 2.8 G/DL — LOW (ref 3.3–5)
ALBUMIN SERPL ELPH-MCNC: 2.8 G/DL — LOW (ref 3.3–5)
ALBUMIN SERPL ELPH-MCNC: 2.9 G/DL — LOW (ref 3.3–5)
ALBUMIN SERPL ELPH-MCNC: 3 G/DL
ALBUMIN SERPL ELPH-MCNC: 3 G/DL — LOW (ref 3.3–5)
ALBUMIN SERPL ELPH-MCNC: 3 G/DL — LOW (ref 3.3–5)
ALBUMIN SERPL ELPH-MCNC: 3 G/DL — LOW (ref 3.6–5.5)
ALBUMIN SERPL ELPH-MCNC: 3.1 G/DL
ALBUMIN SERPL ELPH-MCNC: 3.2 G/DL
ALBUMIN SERPL ELPH-MCNC: 3.2 G/DL — LOW (ref 3.3–5)
ALBUMIN SERPL ELPH-MCNC: 3.5 G/DL
ALBUMIN SERPL ELPH-MCNC: 3.5 G/DL — SIGNIFICANT CHANGE UP (ref 3.3–5)
ALBUMIN SERPL ELPH-MCNC: 3.7 G/DL — SIGNIFICANT CHANGE UP (ref 3.3–5)
ALBUMIN/GLOB SERPL ELPH: 0.9 RATIO — SIGNIFICANT CHANGE UP
ALP BLD-CCNC: 117 U/L
ALP BLD-CCNC: 117 U/L
ALP BLD-CCNC: 162 U/L
ALP SERPL-CCNC: 101 U/L — SIGNIFICANT CHANGE UP (ref 40–120)
ALP SERPL-CCNC: 103 U/L — SIGNIFICANT CHANGE UP (ref 40–120)
ALP SERPL-CCNC: 122 U/L — HIGH (ref 40–120)
ALP SERPL-CCNC: 131 U/L — HIGH (ref 40–120)
ALP SERPL-CCNC: 133 U/L — HIGH (ref 40–120)
ALP SERPL-CCNC: 140 U/L — HIGH (ref 40–120)
ALP SERPL-CCNC: 76 U/L — SIGNIFICANT CHANGE UP (ref 40–120)
ALP SERPL-CCNC: 98 U/L — SIGNIFICANT CHANGE UP (ref 40–120)
ALPHA1 GLOB SERPL ELPH-MCNC: 0.4 G/DL — SIGNIFICANT CHANGE UP (ref 0.1–0.4)
ALPHA2 GLOB SERPL ELPH-MCNC: 0.9 G/DL — SIGNIFICANT CHANGE UP (ref 0.5–1)
ALT FLD-CCNC: 11 U/L — SIGNIFICANT CHANGE UP (ref 10–45)
ALT FLD-CCNC: 13 U/L — SIGNIFICANT CHANGE UP (ref 10–45)
ALT FLD-CCNC: 14 U/L — SIGNIFICANT CHANGE UP (ref 10–45)
ALT FLD-CCNC: 19 U/L — SIGNIFICANT CHANGE UP (ref 10–45)
ALT FLD-CCNC: 19 U/L — SIGNIFICANT CHANGE UP (ref 4–41)
ALT FLD-CCNC: 22 U/L — SIGNIFICANT CHANGE UP (ref 10–45)
ALT FLD-CCNC: 25 U/L — SIGNIFICANT CHANGE UP (ref 10–45)
ALT FLD-CCNC: 58 U/L — HIGH (ref 4–41)
ALT SERPL-CCNC: 33 U/L
ALT SERPL-CCNC: 59 U/L
ALT SERPL-CCNC: 65 U/L
ANA PAT FLD IF-IMP: ABNORMAL
ANA SER IF-ACNC: ABNORMAL
ANION GAP SERPL CALC-SCNC: 10 MMOL/L
ANION GAP SERPL CALC-SCNC: 10 MMOL/L
ANION GAP SERPL CALC-SCNC: 10 MMOL/L — SIGNIFICANT CHANGE UP (ref 5–17)
ANION GAP SERPL CALC-SCNC: 10 MMOL/L — SIGNIFICANT CHANGE UP (ref 5–17)
ANION GAP SERPL CALC-SCNC: 10 MMOL/L — SIGNIFICANT CHANGE UP (ref 7–14)
ANION GAP SERPL CALC-SCNC: 10 MMOL/L — SIGNIFICANT CHANGE UP (ref 7–14)
ANION GAP SERPL CALC-SCNC: 11 MMOL/L
ANION GAP SERPL CALC-SCNC: 11 MMOL/L
ANION GAP SERPL CALC-SCNC: 11 MMOL/L — SIGNIFICANT CHANGE UP (ref 5–17)
ANION GAP SERPL CALC-SCNC: 11 MMOL/L — SIGNIFICANT CHANGE UP (ref 5–17)
ANION GAP SERPL CALC-SCNC: 11 MMOL/L — SIGNIFICANT CHANGE UP (ref 7–14)
ANION GAP SERPL CALC-SCNC: 12 MMOL/L
ANION GAP SERPL CALC-SCNC: 12 MMOL/L — SIGNIFICANT CHANGE UP (ref 5–17)
ANION GAP SERPL CALC-SCNC: 12 MMOL/L — SIGNIFICANT CHANGE UP (ref 5–17)
ANION GAP SERPL CALC-SCNC: 12 MMOL/L — SIGNIFICANT CHANGE UP (ref 7–14)
ANION GAP SERPL CALC-SCNC: 13 MMOL/L
ANION GAP SERPL CALC-SCNC: 13 MMOL/L — SIGNIFICANT CHANGE UP (ref 5–17)
ANION GAP SERPL CALC-SCNC: 13 MMOL/L — SIGNIFICANT CHANGE UP (ref 7–14)
ANION GAP SERPL CALC-SCNC: 14 MMOL/L — SIGNIFICANT CHANGE UP (ref 5–17)
ANION GAP SERPL CALC-SCNC: 15 MMOL/L — SIGNIFICANT CHANGE UP (ref 5–17)
ANION GAP SERPL CALC-SCNC: 16 MMOL/L — SIGNIFICANT CHANGE UP (ref 5–17)
ANION GAP SERPL CALC-SCNC: 17 MMOL/L — SIGNIFICANT CHANGE UP (ref 5–17)
ANION GAP SERPL CALC-SCNC: 18 MMOL/L — HIGH (ref 5–17)
ANION GAP SERPL CALC-SCNC: 18 MMOL/L — HIGH (ref 5–17)
ANION GAP SERPL CALC-SCNC: 21 MMOL/L — HIGH (ref 5–17)
ANION GAP SERPL CALC-SCNC: 7 MMOL/L — SIGNIFICANT CHANGE UP (ref 7–14)
ANION GAP SERPL CALC-SCNC: 7 MMOL/L — SIGNIFICANT CHANGE UP (ref 7–14)
ANION GAP SERPL CALC-SCNC: 9 MMOL/L — SIGNIFICANT CHANGE UP (ref 5–17)
APPEARANCE UR: CLEAR — SIGNIFICANT CHANGE UP
APPEARANCE: CLEAR
APTT 2H P 1:4 NP PPP: NORMAL
APTT 2H P INC PPP: NORMAL
APTT BLD: 30.4 SEC — SIGNIFICANT CHANGE UP (ref 27–36.3)
APTT BLD: 30.9 SEC — SIGNIFICANT CHANGE UP (ref 27.5–35.5)
APTT BLD: 31.4 SEC — SIGNIFICANT CHANGE UP (ref 27–36.3)
APTT IMM NP/PRE NP PPP: NORMAL
APTT INV RATIO PPP: 35 SEC
AST SERPL-CCNC: 106 U/L — HIGH (ref 4–40)
AST SERPL-CCNC: 20 U/L — SIGNIFICANT CHANGE UP (ref 10–40)
AST SERPL-CCNC: 24 U/L — SIGNIFICANT CHANGE UP (ref 10–40)
AST SERPL-CCNC: 29 U/L — SIGNIFICANT CHANGE UP (ref 10–40)
AST SERPL-CCNC: 30 U/L — SIGNIFICANT CHANGE UP (ref 4–40)
AST SERPL-CCNC: 32 U/L — SIGNIFICANT CHANGE UP (ref 10–40)
AST SERPL-CCNC: 32 U/L — SIGNIFICANT CHANGE UP (ref 10–40)
AST SERPL-CCNC: 34 U/L — SIGNIFICANT CHANGE UP (ref 10–40)
AST SERPL-CCNC: 63 U/L
AST SERPL-CCNC: 92 U/L
AST SERPL-CCNC: 92 U/L
B-GLOBULIN SERPL ELPH-MCNC: 0.8 G/DL — SIGNIFICANT CHANGE UP (ref 0.5–1)
B-OH-BUTYR SERPL-SCNC: 0.1 MMOL/L — SIGNIFICANT CHANGE UP
BACTERIA # UR AUTO: NEGATIVE — SIGNIFICANT CHANGE UP
BASE EXCESS BLDV CALC-SCNC: -17.4 MMOL/L — LOW (ref -2–2)
BASE EXCESS BLDV CALC-SCNC: -7.8 MMOL/L — LOW (ref -2–2)
BASE EXCESS BLDV CALC-SCNC: -9.7 MMOL/L — LOW (ref -2–2)
BASOPHILS # BLD AUTO: 0 K/UL — SIGNIFICANT CHANGE UP (ref 0–0.2)
BASOPHILS # BLD AUTO: 0.02 K/UL — SIGNIFICANT CHANGE UP (ref 0–0.2)
BASOPHILS # BLD AUTO: 0.02 K/UL — SIGNIFICANT CHANGE UP (ref 0–0.2)
BASOPHILS # BLD AUTO: 0.03 K/UL — SIGNIFICANT CHANGE UP (ref 0–0.2)
BASOPHILS # BLD AUTO: 0.03 K/UL — SIGNIFICANT CHANGE UP (ref 0–0.2)
BASOPHILS # BLD AUTO: 0.04 K/UL — SIGNIFICANT CHANGE UP (ref 0–0.2)
BASOPHILS # BLD AUTO: 0.05 K/UL
BASOPHILS # BLD AUTO: 0.05 K/UL — SIGNIFICANT CHANGE UP (ref 0–0.2)
BASOPHILS # BLD AUTO: 0.06 K/UL
BASOPHILS # BLD AUTO: 0.06 K/UL — SIGNIFICANT CHANGE UP (ref 0–0.2)
BASOPHILS # BLD AUTO: 0.06 K/UL — SIGNIFICANT CHANGE UP (ref 0–0.2)
BASOPHILS # BLD AUTO: 0.08 K/UL
BASOPHILS NFR BLD AUTO: 0 % — SIGNIFICANT CHANGE UP (ref 0–2)
BASOPHILS NFR BLD AUTO: 0.1 % — SIGNIFICANT CHANGE UP (ref 0–2)
BASOPHILS NFR BLD AUTO: 0.2 % — SIGNIFICANT CHANGE UP (ref 0–2)
BASOPHILS NFR BLD AUTO: 0.2 % — SIGNIFICANT CHANGE UP (ref 0–2)
BASOPHILS NFR BLD AUTO: 0.3 % — SIGNIFICANT CHANGE UP (ref 0–2)
BASOPHILS NFR BLD AUTO: 0.4 % — SIGNIFICANT CHANGE UP (ref 0–2)
BASOPHILS NFR BLD AUTO: 0.4 % — SIGNIFICANT CHANGE UP (ref 0–2)
BASOPHILS NFR BLD AUTO: 0.5 %
BASOPHILS NFR BLD AUTO: 0.5 %
BASOPHILS NFR BLD AUTO: 0.5 % — SIGNIFICANT CHANGE UP (ref 0–2)
BASOPHILS NFR BLD AUTO: 0.6 %
BASOPHILS NFR BLD AUTO: 0.6 %
BASOPHILS NFR BLD AUTO: 0.7 %
BASOPHILS NFR BLD AUTO: 0.7 % — SIGNIFICANT CHANGE UP (ref 0–2)
BILIRUB SERPL-MCNC: 0.2 MG/DL
BILIRUB SERPL-MCNC: 0.2 MG/DL — SIGNIFICANT CHANGE UP (ref 0.2–1.2)
BILIRUB SERPL-MCNC: 0.3 MG/DL — SIGNIFICANT CHANGE UP (ref 0.2–1.2)
BILIRUB SERPL-MCNC: 0.3 MG/DL — SIGNIFICANT CHANGE UP (ref 0.2–1.2)
BILIRUB SERPL-MCNC: 0.5 MG/DL — SIGNIFICANT CHANGE UP (ref 0.2–1.2)
BILIRUB UR-MCNC: NEGATIVE — SIGNIFICANT CHANGE UP
BILIRUBIN URINE: NEGATIVE
BLD GP AB SCN SERPL QL: NEGATIVE — SIGNIFICANT CHANGE UP
BLOOD GAS ARTERIAL - LYTES,HGB,ICA,LACT RESULT: SIGNIFICANT CHANGE UP
BLOOD GAS VENOUS - CREATININE: SIGNIFICANT CHANGE UP MG/DL (ref 0.5–1.3)
BLOOD URINE: NEGATIVE
BLOOD URINE: NORMAL
BLOOD URINE: NORMAL
BUN SERPL-MCNC: 27 MG/DL — HIGH (ref 7–23)
BUN SERPL-MCNC: 27 MG/DL — HIGH (ref 7–23)
BUN SERPL-MCNC: 29 MG/DL
BUN SERPL-MCNC: 29 MG/DL — HIGH (ref 7–23)
BUN SERPL-MCNC: 30 MG/DL — HIGH (ref 7–23)
BUN SERPL-MCNC: 30 MG/DL — HIGH (ref 7–23)
BUN SERPL-MCNC: 31 MG/DL
BUN SERPL-MCNC: 31 MG/DL
BUN SERPL-MCNC: 32 MG/DL
BUN SERPL-MCNC: 32 MG/DL — HIGH (ref 7–23)
BUN SERPL-MCNC: 33 MG/DL
BUN SERPL-MCNC: 33 MG/DL — HIGH (ref 7–23)
BUN SERPL-MCNC: 34 MG/DL — HIGH (ref 7–23)
BUN SERPL-MCNC: 34 MG/DL — HIGH (ref 7–23)
BUN SERPL-MCNC: 35 MG/DL
BUN SERPL-MCNC: 36 MG/DL — HIGH (ref 7–23)
BUN SERPL-MCNC: 37 MG/DL — HIGH (ref 7–23)
BUN SERPL-MCNC: 38 MG/DL — HIGH (ref 7–23)
BUN SERPL-MCNC: 38 MG/DL — HIGH (ref 7–23)
BUN SERPL-MCNC: 39 MG/DL — HIGH (ref 7–23)
BUN SERPL-MCNC: 39 MG/DL — HIGH (ref 7–23)
BUN SERPL-MCNC: 52 MG/DL — HIGH (ref 7–23)
BUN SERPL-MCNC: 66 MG/DL — HIGH (ref 7–23)
BUN SERPL-MCNC: 67 MG/DL — HIGH (ref 7–23)
BUN SERPL-MCNC: 69 MG/DL — HIGH (ref 7–23)
BUN SERPL-MCNC: 74 MG/DL — HIGH (ref 7–23)
BUN SERPL-MCNC: 76 MG/DL — HIGH (ref 7–23)
BUN SERPL-MCNC: 77 MG/DL — HIGH (ref 7–23)
BUN SERPL-MCNC: 77 MG/DL — HIGH (ref 7–23)
BUN SERPL-MCNC: 78 MG/DL — HIGH (ref 7–23)
BUN SERPL-MCNC: 79 MG/DL — HIGH (ref 7–23)
BUN SERPL-MCNC: 80 MG/DL — HIGH (ref 7–23)
BUN SERPL-MCNC: 81 MG/DL — HIGH (ref 7–23)
C PEPTIDE SERPL-MCNC: 2.4 NG/ML
C3 SERPL-MCNC: 102 MG/DL
C3 SERPL-MCNC: 117 MG/DL
C3 SERPL-MCNC: 99 MG/DL
C4 SERPL-MCNC: 26 MG/DL
C4 SERPL-MCNC: 29 MG/DL
C4 SERPL-MCNC: 34 MG/DL
CA-I SERPL-SCNC: 0.55 MMOL/L — CRITICAL LOW (ref 1.15–1.33)
CA-I SERPL-SCNC: 0.62 MMOL/L — CRITICAL LOW (ref 1.15–1.33)
CA-I SERPL-SCNC: 2.09 MMOL/L — CRITICAL HIGH (ref 1.15–1.33)
CALCIUM SERPL-MCNC: 4.1 MG/DL — CRITICAL LOW (ref 8.4–10.5)
CALCIUM SERPL-MCNC: 4.2 MG/DL — CRITICAL LOW (ref 8.4–10.5)
CALCIUM SERPL-MCNC: 6.8 MG/DL
CALCIUM SERPL-MCNC: 7.4 MG/DL — LOW (ref 8.4–10.5)
CALCIUM SERPL-MCNC: 7.4 MG/DL — LOW (ref 8.4–10.5)
CALCIUM SERPL-MCNC: 7.6 MG/DL — LOW (ref 8.4–10.5)
CALCIUM SERPL-MCNC: 7.6 MG/DL — LOW (ref 8.4–10.5)
CALCIUM SERPL-MCNC: 7.7 MG/DL — LOW (ref 8.4–10.5)
CALCIUM SERPL-MCNC: 7.7 MG/DL — LOW (ref 8.4–10.5)
CALCIUM SERPL-MCNC: 7.8 MG/DL
CALCIUM SERPL-MCNC: 7.8 MG/DL — LOW (ref 8.4–10.5)
CALCIUM SERPL-MCNC: 7.9 MG/DL — LOW (ref 8.4–10.5)
CALCIUM SERPL-MCNC: 8 MG/DL — LOW (ref 8.4–10.5)
CALCIUM SERPL-MCNC: 8.1 MG/DL
CALCIUM SERPL-MCNC: 8.1 MG/DL — LOW (ref 8.4–10.5)
CALCIUM SERPL-MCNC: 8.1 MG/DL — LOW (ref 8.4–10.5)
CALCIUM SERPL-MCNC: 8.3 MG/DL — LOW (ref 8.4–10.5)
CALCIUM SERPL-MCNC: 8.3 MG/DL — LOW (ref 8.4–10.5)
CALCIUM SERPL-MCNC: 8.5 MG/DL
CALCIUM SERPL-MCNC: 8.5 MG/DL
CALCIUM SERPL-MCNC: 8.5 MG/DL — SIGNIFICANT CHANGE UP (ref 8.4–10.5)
CALCIUM SERPL-MCNC: 8.6 MG/DL — SIGNIFICANT CHANGE UP (ref 8.4–10.5)
CALCIUM SERPL-MCNC: 8.7 MG/DL — SIGNIFICANT CHANGE UP (ref 8.4–10.5)
CALCIUM SERPL-MCNC: 8.8 MG/DL
CALCIUM SERPL-MCNC: 8.8 MG/DL — SIGNIFICANT CHANGE UP (ref 8.4–10.5)
CALCIUM SERPL-MCNC: 8.9 MG/DL — SIGNIFICANT CHANGE UP (ref 8.4–10.5)
CALCIUM SERPL-MCNC: 8.9 MG/DL — SIGNIFICANT CHANGE UP (ref 8.4–10.5)
CALCIUM SERPL-MCNC: 9 MG/DL — SIGNIFICANT CHANGE UP (ref 8.4–10.5)
CALCIUM SERPL-MCNC: 9 MG/DL — SIGNIFICANT CHANGE UP (ref 8.4–10.5)
CALCIUM SERPL-MCNC: 9.1 MG/DL — SIGNIFICANT CHANGE UP (ref 8.4–10.5)
CALCIUM SERPL-MCNC: 9.2 MG/DL — SIGNIFICANT CHANGE UP (ref 8.4–10.5)
CALCIUM SERPL-MCNC: 9.4 MG/DL — SIGNIFICANT CHANGE UP (ref 8.4–10.5)
CHLORIDE BLDV-SCNC: 102 MMOL/L — SIGNIFICANT CHANGE UP (ref 96–108)
CHLORIDE BLDV-SCNC: 102 MMOL/L — SIGNIFICANT CHANGE UP (ref 96–108)
CHLORIDE BLDV-SCNC: 105 MMOL/L — SIGNIFICANT CHANGE UP (ref 96–108)
CHLORIDE SERPL-SCNC: 100 MMOL/L — SIGNIFICANT CHANGE UP (ref 96–108)
CHLORIDE SERPL-SCNC: 101 MMOL/L
CHLORIDE SERPL-SCNC: 101 MMOL/L — SIGNIFICANT CHANGE UP (ref 98–107)
CHLORIDE SERPL-SCNC: 102 MMOL/L — SIGNIFICANT CHANGE UP (ref 98–107)
CHLORIDE SERPL-SCNC: 103 MMOL/L — SIGNIFICANT CHANGE UP (ref 98–107)
CHLORIDE SERPL-SCNC: 104 MMOL/L — SIGNIFICANT CHANGE UP (ref 96–108)
CHLORIDE SERPL-SCNC: 104 MMOL/L — SIGNIFICANT CHANGE UP (ref 98–107)
CHLORIDE SERPL-SCNC: 104 MMOL/L — SIGNIFICANT CHANGE UP (ref 98–107)
CHLORIDE SERPL-SCNC: 105 MMOL/L — SIGNIFICANT CHANGE UP (ref 98–107)
CHLORIDE SERPL-SCNC: 105 MMOL/L — SIGNIFICANT CHANGE UP (ref 98–107)
CHLORIDE SERPL-SCNC: 75 MMOL/L — LOW (ref 96–108)
CHLORIDE SERPL-SCNC: 80 MMOL/L — LOW (ref 96–108)
CHLORIDE SERPL-SCNC: 82 MMOL/L — LOW (ref 96–108)
CHLORIDE SERPL-SCNC: 83 MMOL/L — LOW (ref 96–108)
CHLORIDE SERPL-SCNC: 85 MMOL/L — LOW (ref 96–108)
CHLORIDE SERPL-SCNC: 86 MMOL/L — LOW (ref 96–108)
CHLORIDE SERPL-SCNC: 87 MMOL/L — LOW (ref 96–108)
CHLORIDE SERPL-SCNC: 87 MMOL/L — LOW (ref 96–108)
CHLORIDE SERPL-SCNC: 88 MMOL/L — LOW (ref 96–108)
CHLORIDE SERPL-SCNC: 94 MMOL/L — LOW (ref 96–108)
CHLORIDE SERPL-SCNC: 95 MMOL/L
CHLORIDE SERPL-SCNC: 96 MMOL/L — SIGNIFICANT CHANGE UP (ref 96–108)
CHLORIDE SERPL-SCNC: 97 MMOL/L
CHLORIDE SERPL-SCNC: 97 MMOL/L
CHLORIDE SERPL-SCNC: 97 MMOL/L — SIGNIFICANT CHANGE UP (ref 96–108)
CHLORIDE SERPL-SCNC: 97 MMOL/L — SIGNIFICANT CHANGE UP (ref 96–108)
CHLORIDE SERPL-SCNC: 99 MMOL/L
CHLORIDE SERPL-SCNC: 99 MMOL/L
CHLORIDE SERPL-SCNC: 99 MMOL/L — SIGNIFICANT CHANGE UP (ref 96–108)
CHOLEST SERPL-MCNC: 100 MG/DL
CO2 BLDV-SCNC: 16 MMOL/L — LOW (ref 22–26)
CO2 BLDV-SCNC: 18 MMOL/L — LOW (ref 22–26)
CO2 BLDV-SCNC: 18 MMOL/L — LOW (ref 22–26)
CO2 SERPL-SCNC: 15 MMOL/L — LOW (ref 22–31)
CO2 SERPL-SCNC: 16 MMOL/L — LOW (ref 22–31)
CO2 SERPL-SCNC: 21 MMOL/L
CO2 SERPL-SCNC: 21 MMOL/L — LOW (ref 22–31)
CO2 SERPL-SCNC: 22 MMOL/L — SIGNIFICANT CHANGE UP (ref 22–31)
CO2 SERPL-SCNC: 23 MMOL/L — SIGNIFICANT CHANGE UP (ref 22–31)
CO2 SERPL-SCNC: 24 MMOL/L
CO2 SERPL-SCNC: 24 MMOL/L — SIGNIFICANT CHANGE UP (ref 22–31)
CO2 SERPL-SCNC: 25 MMOL/L — SIGNIFICANT CHANGE UP (ref 22–31)
CO2 SERPL-SCNC: 26 MMOL/L
CO2 SERPL-SCNC: 26 MMOL/L
CO2 SERPL-SCNC: 26 MMOL/L — SIGNIFICANT CHANGE UP (ref 22–31)
CO2 SERPL-SCNC: 27 MMOL/L
CO2 SERPL-SCNC: 27 MMOL/L — SIGNIFICANT CHANGE UP (ref 22–31)
CO2 SERPL-SCNC: 27 MMOL/L — SIGNIFICANT CHANGE UP (ref 22–31)
CO2 SERPL-SCNC: 28 MMOL/L
COLOR SPEC: SIGNIFICANT CHANGE UP
COLOR: NORMAL
COLOR: YELLOW
COLOR: YELLOW
CORTIS AM PEAK SERPL-MCNC: 15.7 UG/DL — SIGNIFICANT CHANGE UP (ref 6–18.4)
COVID-19 SPIKE DOMAIN AB INTERP: POSITIVE
COVID-19 SPIKE DOMAIN AB INTERP: POSITIVE
COVID-19 SPIKE DOMAIN ANTIBODY RESULT: 177 U/ML — HIGH
COVID-19 SPIKE DOMAIN ANTIBODY RESULT: 189 U/ML — HIGH
CREAT SERPL-MCNC: 1.98 MG/DL — HIGH (ref 0.5–1.3)
CREAT SERPL-MCNC: 2.06 MG/DL — HIGH (ref 0.5–1.3)
CREAT SERPL-MCNC: 2.07 MG/DL — HIGH (ref 0.5–1.3)
CREAT SERPL-MCNC: 2.14 MG/DL
CREAT SERPL-MCNC: 2.18 MG/DL
CREAT SERPL-MCNC: 2.2 MG/DL — HIGH (ref 0.5–1.3)
CREAT SERPL-MCNC: 2.21 MG/DL — HIGH (ref 0.5–1.3)
CREAT SERPL-MCNC: 2.3 MG/DL — HIGH (ref 0.5–1.3)
CREAT SERPL-MCNC: 2.32 MG/DL — HIGH (ref 0.5–1.3)
CREAT SERPL-MCNC: 2.32 MG/DL — HIGH (ref 0.5–1.3)
CREAT SERPL-MCNC: 2.34 MG/DL
CREAT SERPL-MCNC: 2.36 MG/DL — HIGH (ref 0.5–1.3)
CREAT SERPL-MCNC: 2.38 MG/DL — HIGH (ref 0.5–1.3)
CREAT SERPL-MCNC: 2.39 MG/DL
CREAT SERPL-MCNC: 2.41 MG/DL — HIGH (ref 0.5–1.3)
CREAT SERPL-MCNC: 2.47 MG/DL — HIGH (ref 0.5–1.3)
CREAT SERPL-MCNC: 2.48 MG/DL — HIGH (ref 0.5–1.3)
CREAT SERPL-MCNC: 2.5 MG/DL — HIGH (ref 0.5–1.3)
CREAT SERPL-MCNC: 2.53 MG/DL — HIGH (ref 0.5–1.3)
CREAT SERPL-MCNC: 2.55 MG/DL — HIGH (ref 0.5–1.3)
CREAT SERPL-MCNC: 2.56 MG/DL
CREAT SERPL-MCNC: 2.56 MG/DL — HIGH (ref 0.5–1.3)
CREAT SERPL-MCNC: 2.56 MG/DL — HIGH (ref 0.5–1.3)
CREAT SERPL-MCNC: 2.57 MG/DL — HIGH (ref 0.5–1.3)
CREAT SERPL-MCNC: 2.58 MG/DL — HIGH (ref 0.5–1.3)
CREAT SERPL-MCNC: 2.64 MG/DL — HIGH (ref 0.5–1.3)
CREAT SERPL-MCNC: 2.67 MG/DL — HIGH (ref 0.5–1.3)
CREAT SERPL-MCNC: 2.68 MG/DL — HIGH (ref 0.5–1.3)
CREAT SERPL-MCNC: 2.7 MG/DL
CREAT SERPL-MCNC: 2.77 MG/DL — HIGH (ref 0.5–1.3)
CREAT SERPL-MCNC: 2.79 MG/DL — HIGH (ref 0.5–1.3)
CREAT SERPL-MCNC: 2.82 MG/DL — HIGH (ref 0.5–1.3)
CREAT SERPL-MCNC: 2.86 MG/DL — HIGH (ref 0.5–1.3)
CREAT SERPL-MCNC: 2.88 MG/DL — HIGH (ref 0.5–1.3)
CREAT SERPL-MCNC: 3.05 MG/DL — HIGH (ref 0.5–1.3)
CREAT SPEC-SCNC: 30 MG/DL
CREAT SPEC-SCNC: 67 MG/DL
CREAT SPEC-SCNC: 79 MG/DL
CREAT SPEC-SCNC: 89 MG/DL
CREAT/PROT UR: 0.8 RATIO
CREAT/PROT UR: 0.9 RATIO
CREAT/PROT UR: 1.4 RATIO
CREAT/PROT UR: 1.8 RATIO
CRP SERPL-MCNC: <3 MG/L
CULTURE RESULTS: SIGNIFICANT CHANGE UP
CULTURE RESULTS: SIGNIFICANT CHANGE UP
CYSTATIN C SERPL-MCNC: 2.55 MG/L
DACRYOCYTES BLD QL SMEAR: SLIGHT — SIGNIFICANT CHANGE UP
DIFF PNL FLD: NEGATIVE — SIGNIFICANT CHANGE UP
DSDNA AB SER-ACNC: 18 IU/ML
DSDNA AB SER-ACNC: 23 IU/ML
DSDNA AB SER-ACNC: <12 IU/ML
ENA RNP AB SER IA-ACNC: 0.2 AL
ENA SM AB SER IA-ACNC: <0.2 AL
EOSINOPHIL # BLD AUTO: 0 K/UL — SIGNIFICANT CHANGE UP (ref 0–0.5)
EOSINOPHIL # BLD AUTO: 0.02 K/UL — SIGNIFICANT CHANGE UP (ref 0–0.5)
EOSINOPHIL # BLD AUTO: 0.03 K/UL — SIGNIFICANT CHANGE UP (ref 0–0.5)
EOSINOPHIL # BLD AUTO: 0.03 K/UL — SIGNIFICANT CHANGE UP (ref 0–0.5)
EOSINOPHIL # BLD AUTO: 0.07 K/UL
EOSINOPHIL # BLD AUTO: 0.08 K/UL — SIGNIFICANT CHANGE UP (ref 0–0.5)
EOSINOPHIL # BLD AUTO: 0.09 K/UL
EOSINOPHIL # BLD AUTO: 0.09 K/UL
EOSINOPHIL # BLD AUTO: 0.27 K/UL
EOSINOPHIL # BLD AUTO: 0.35 K/UL
EOSINOPHIL NFR BLD AUTO: 0 % — SIGNIFICANT CHANGE UP (ref 0–6)
EOSINOPHIL NFR BLD AUTO: 0.1 % — SIGNIFICANT CHANGE UP (ref 0–6)
EOSINOPHIL NFR BLD AUTO: 0.2 % — SIGNIFICANT CHANGE UP (ref 0–6)
EOSINOPHIL NFR BLD AUTO: 0.4 % — SIGNIFICANT CHANGE UP (ref 0–6)
EOSINOPHIL NFR BLD AUTO: 0.5 % — SIGNIFICANT CHANGE UP (ref 0–6)
EOSINOPHIL NFR BLD AUTO: 0.6 %
EOSINOPHIL NFR BLD AUTO: 0.8 % — SIGNIFICANT CHANGE UP (ref 0–6)
EOSINOPHIL NFR BLD AUTO: 0.9 %
EOSINOPHIL NFR BLD AUTO: 0.9 % — SIGNIFICANT CHANGE UP (ref 0–6)
EOSINOPHIL NFR BLD AUTO: 1 %
EOSINOPHIL NFR BLD AUTO: 2.7 %
EOSINOPHIL NFR BLD AUTO: 3.1 %
EPI CELLS # UR: 1 /HPF — SIGNIFICANT CHANGE UP
ERYTHROCYTE [SEDIMENTATION RATE] IN BLOOD BY WESTERGREN METHOD: 72 MM/HR
ERYTHROCYTE [SEDIMENTATION RATE] IN BLOOD BY WESTERGREN METHOD: 75 MM/HR
ESTIMATED AVERAGE GLUCOSE: 154 MG/DL — HIGH (ref 68–114)
ESTIMATED AVERAGE GLUCOSE: 232 MG/DL — HIGH (ref 68–114)
FERRITIN SERPL-MCNC: 263 NG/ML
FERRITIN SERPL-MCNC: 369 NG/ML
GAMMA GLOBULIN: 1.4 G/DL — SIGNIFICANT CHANGE UP (ref 0.6–1.6)
GAS PNL BLDA: SIGNIFICANT CHANGE UP
GAS PNL BLDA: SIGNIFICANT CHANGE UP
GAS PNL BLDV: 132 MMOL/L — LOW (ref 136–145)
GAS PNL BLDV: 132 MMOL/L — LOW (ref 136–145)
GAS PNL BLDV: 135 MMOL/L — LOW (ref 136–145)
GAS PNL BLDV: SIGNIFICANT CHANGE UP
GFR/BSA.PRED SERPLBLD CYS-BASED-ARV: 22 ML/MIN
GLUCOSE BLDC GLUCOMTR-MCNC: 105 MG/DL — HIGH (ref 70–99)
GLUCOSE BLDC GLUCOMTR-MCNC: 116 MG/DL — HIGH (ref 70–99)
GLUCOSE BLDC GLUCOMTR-MCNC: 117 MG/DL — HIGH (ref 70–99)
GLUCOSE BLDC GLUCOMTR-MCNC: 117 MG/DL — HIGH (ref 70–99)
GLUCOSE BLDC GLUCOMTR-MCNC: 118 MG/DL — HIGH (ref 70–99)
GLUCOSE BLDC GLUCOMTR-MCNC: 120 MG/DL — HIGH (ref 70–99)
GLUCOSE BLDC GLUCOMTR-MCNC: 123 MG/DL — HIGH (ref 70–99)
GLUCOSE BLDC GLUCOMTR-MCNC: 124 MG/DL — HIGH (ref 70–99)
GLUCOSE BLDC GLUCOMTR-MCNC: 125 MG/DL — HIGH (ref 70–99)
GLUCOSE BLDC GLUCOMTR-MCNC: 129 MG/DL — HIGH (ref 70–99)
GLUCOSE BLDC GLUCOMTR-MCNC: 130 MG/DL — HIGH (ref 70–99)
GLUCOSE BLDC GLUCOMTR-MCNC: 135 MG/DL — HIGH (ref 70–99)
GLUCOSE BLDC GLUCOMTR-MCNC: 136 MG/DL — HIGH (ref 70–99)
GLUCOSE BLDC GLUCOMTR-MCNC: 137 MG/DL — HIGH (ref 70–99)
GLUCOSE BLDC GLUCOMTR-MCNC: 138 MG/DL — HIGH (ref 70–99)
GLUCOSE BLDC GLUCOMTR-MCNC: 140 MG/DL — HIGH (ref 70–99)
GLUCOSE BLDC GLUCOMTR-MCNC: 147 MG/DL — HIGH (ref 70–99)
GLUCOSE BLDC GLUCOMTR-MCNC: 147 MG/DL — HIGH (ref 70–99)
GLUCOSE BLDC GLUCOMTR-MCNC: 149 MG/DL — HIGH (ref 70–99)
GLUCOSE BLDC GLUCOMTR-MCNC: 150 MG/DL — HIGH (ref 70–99)
GLUCOSE BLDC GLUCOMTR-MCNC: 155 MG/DL — HIGH (ref 70–99)
GLUCOSE BLDC GLUCOMTR-MCNC: 158 MG/DL — HIGH (ref 70–99)
GLUCOSE BLDC GLUCOMTR-MCNC: 161 MG/DL — HIGH (ref 70–99)
GLUCOSE BLDC GLUCOMTR-MCNC: 163 MG/DL — HIGH (ref 70–99)
GLUCOSE BLDC GLUCOMTR-MCNC: 168 MG/DL — HIGH (ref 70–99)
GLUCOSE BLDC GLUCOMTR-MCNC: 172 MG/DL — HIGH (ref 70–99)
GLUCOSE BLDC GLUCOMTR-MCNC: 179 MG/DL — HIGH (ref 70–99)
GLUCOSE BLDC GLUCOMTR-MCNC: 181
GLUCOSE BLDC GLUCOMTR-MCNC: 187 MG/DL — HIGH (ref 70–99)
GLUCOSE BLDC GLUCOMTR-MCNC: 195 MG/DL — HIGH (ref 70–99)
GLUCOSE BLDC GLUCOMTR-MCNC: 196 MG/DL — HIGH (ref 70–99)
GLUCOSE BLDC GLUCOMTR-MCNC: 196 MG/DL — HIGH (ref 70–99)
GLUCOSE BLDC GLUCOMTR-MCNC: 199 MG/DL — HIGH (ref 70–99)
GLUCOSE BLDC GLUCOMTR-MCNC: 204 MG/DL — HIGH (ref 70–99)
GLUCOSE BLDC GLUCOMTR-MCNC: 206 MG/DL — HIGH (ref 70–99)
GLUCOSE BLDC GLUCOMTR-MCNC: 209 MG/DL — HIGH (ref 70–99)
GLUCOSE BLDC GLUCOMTR-MCNC: 217 MG/DL — HIGH (ref 70–99)
GLUCOSE BLDC GLUCOMTR-MCNC: 229 MG/DL — HIGH (ref 70–99)
GLUCOSE BLDC GLUCOMTR-MCNC: 231 MG/DL — HIGH (ref 70–99)
GLUCOSE BLDC GLUCOMTR-MCNC: 233 MG/DL — HIGH (ref 70–99)
GLUCOSE BLDC GLUCOMTR-MCNC: 244 MG/DL — HIGH (ref 70–99)
GLUCOSE BLDC GLUCOMTR-MCNC: 253 MG/DL — HIGH (ref 70–99)
GLUCOSE BLDC GLUCOMTR-MCNC: 256 MG/DL — HIGH (ref 70–99)
GLUCOSE BLDC GLUCOMTR-MCNC: 275 MG/DL — HIGH (ref 70–99)
GLUCOSE BLDC GLUCOMTR-MCNC: 289 MG/DL — HIGH (ref 70–99)
GLUCOSE BLDC GLUCOMTR-MCNC: 369 MG/DL — HIGH (ref 70–99)
GLUCOSE BLDC GLUCOMTR-MCNC: 374 MG/DL — HIGH (ref 70–99)
GLUCOSE BLDC GLUCOMTR-MCNC: 377 MG/DL — HIGH (ref 70–99)
GLUCOSE BLDC GLUCOMTR-MCNC: 390 MG/DL — HIGH (ref 70–99)
GLUCOSE BLDC GLUCOMTR-MCNC: 396 MG/DL — HIGH (ref 70–99)
GLUCOSE BLDC GLUCOMTR-MCNC: 88 MG/DL — SIGNIFICANT CHANGE UP (ref 70–99)
GLUCOSE BLDC GLUCOMTR-MCNC: 88 MG/DL — SIGNIFICANT CHANGE UP (ref 70–99)
GLUCOSE BLDC GLUCOMTR-MCNC: 93 MG/DL — SIGNIFICANT CHANGE UP (ref 70–99)
GLUCOSE BLDC GLUCOMTR-MCNC: 94 MG/DL — SIGNIFICANT CHANGE UP (ref 70–99)
GLUCOSE BLDC GLUCOMTR-MCNC: 97 MG/DL — SIGNIFICANT CHANGE UP (ref 70–99)
GLUCOSE BLDV-MCNC: 196 MG/DL — HIGH (ref 70–99)
GLUCOSE BLDV-MCNC: 243 MG/DL — HIGH (ref 70–99)
GLUCOSE BLDV-MCNC: 373 MG/DL — HIGH (ref 70–99)
GLUCOSE P FAST SERPL-MCNC: 274 MG/DL — HIGH (ref 70–99)
GLUCOSE QUALITATIVE U: ABNORMAL
GLUCOSE QUALITATIVE U: ABNORMAL
GLUCOSE QUALITATIVE U: NEGATIVE
GLUCOSE SERPL-MCNC: 102 MG/DL — HIGH (ref 70–99)
GLUCOSE SERPL-MCNC: 106 MG/DL — HIGH (ref 70–99)
GLUCOSE SERPL-MCNC: 114 MG/DL — HIGH (ref 70–99)
GLUCOSE SERPL-MCNC: 117 MG/DL — HIGH (ref 70–99)
GLUCOSE SERPL-MCNC: 132 MG/DL — HIGH (ref 70–99)
GLUCOSE SERPL-MCNC: 136 MG/DL — HIGH (ref 70–99)
GLUCOSE SERPL-MCNC: 158 MG/DL — HIGH (ref 70–99)
GLUCOSE SERPL-MCNC: 158 MG/DL — HIGH (ref 70–99)
GLUCOSE SERPL-MCNC: 160 MG/DL — HIGH (ref 70–99)
GLUCOSE SERPL-MCNC: 163 MG/DL
GLUCOSE SERPL-MCNC: 163 MG/DL — HIGH (ref 70–99)
GLUCOSE SERPL-MCNC: 171 MG/DL — HIGH (ref 70–99)
GLUCOSE SERPL-MCNC: 180 MG/DL — HIGH (ref 70–99)
GLUCOSE SERPL-MCNC: 186 MG/DL — HIGH (ref 70–99)
GLUCOSE SERPL-MCNC: 187 MG/DL — HIGH (ref 70–99)
GLUCOSE SERPL-MCNC: 189 MG/DL — HIGH (ref 70–99)
GLUCOSE SERPL-MCNC: 192 MG/DL
GLUCOSE SERPL-MCNC: 221 MG/DL — HIGH (ref 70–99)
GLUCOSE SERPL-MCNC: 226 MG/DL — HIGH (ref 70–99)
GLUCOSE SERPL-MCNC: 231 MG/DL — HIGH (ref 70–99)
GLUCOSE SERPL-MCNC: 233 MG/DL — HIGH (ref 70–99)
GLUCOSE SERPL-MCNC: 238 MG/DL
GLUCOSE SERPL-MCNC: 260 MG/DL — HIGH (ref 70–99)
GLUCOSE SERPL-MCNC: 262 MG/DL — HIGH (ref 70–99)
GLUCOSE SERPL-MCNC: 300 MG/DL
GLUCOSE SERPL-MCNC: 309 MG/DL — HIGH (ref 70–99)
GLUCOSE SERPL-MCNC: 320 MG/DL — HIGH (ref 70–99)
GLUCOSE SERPL-MCNC: 332 MG/DL
GLUCOSE SERPL-MCNC: 366 MG/DL
GLUCOSE SERPL-MCNC: 376 MG/DL
GLUCOSE SERPL-MCNC: 395 MG/DL — HIGH (ref 70–99)
GLUCOSE SERPL-MCNC: 404 MG/DL — HIGH (ref 70–99)
GLUCOSE SERPL-MCNC: 492 MG/DL — CRITICAL HIGH (ref 70–99)
GLUCOSE SERPL-MCNC: 81 MG/DL — SIGNIFICANT CHANGE UP (ref 70–99)
GLUCOSE SERPL-MCNC: 85 MG/DL — SIGNIFICANT CHANGE UP (ref 70–99)
GLUCOSE SERPL-MCNC: 93 MG/DL — SIGNIFICANT CHANGE UP (ref 70–99)
GLUCOSE SERPL-MCNC: 97 MG/DL — SIGNIFICANT CHANGE UP (ref 70–99)
GLUCOSE SERPL-MCNC: 97 MG/DL — SIGNIFICANT CHANGE UP (ref 70–99)
GLUCOSE UR QL: NEGATIVE — SIGNIFICANT CHANGE UP
HBA1C MFR BLD HPLC: 11.7
HBA1C MFR BLD HPLC: 8.4
HBA1C MFR BLD HPLC: 8.7
HBV CORE IGG+IGM SER QL: NONREACTIVE
HBV SURFACE AB SER QL: NONREACTIVE
HBV SURFACE AG SER QL: NONREACTIVE
HCO3 BLDV-SCNC: 14 MMOL/L — LOW (ref 22–29)
HCO3 BLDV-SCNC: 17 MMOL/L — LOW (ref 22–29)
HCO3 BLDV-SCNC: 17 MMOL/L — LOW (ref 22–29)
HCT VFR BLD CALC: 23.6 % — LOW (ref 39–50)
HCT VFR BLD CALC: 24.6 % — LOW (ref 39–50)
HCT VFR BLD CALC: 24.7 % — LOW (ref 39–50)
HCT VFR BLD CALC: 25.6 % — LOW (ref 39–50)
HCT VFR BLD CALC: 25.7 % — LOW (ref 39–50)
HCT VFR BLD CALC: 26.3 % — LOW (ref 39–50)
HCT VFR BLD CALC: 26.5 % — LOW (ref 39–50)
HCT VFR BLD CALC: 26.7 % — LOW (ref 39–50)
HCT VFR BLD CALC: 26.8 % — LOW (ref 39–50)
HCT VFR BLD CALC: 27.4 %
HCT VFR BLD CALC: 27.6 % — LOW (ref 39–50)
HCT VFR BLD CALC: 27.8 % — LOW (ref 39–50)
HCT VFR BLD CALC: 28.2 % — LOW (ref 39–50)
HCT VFR BLD CALC: 28.5 % — LOW (ref 39–50)
HCT VFR BLD CALC: 28.6 % — LOW (ref 39–50)
HCT VFR BLD CALC: 28.7 %
HCT VFR BLD CALC: 28.9 % — LOW (ref 39–50)
HCT VFR BLD CALC: 30.3 %
HCT VFR BLD CALC: 30.9 % — LOW (ref 39–50)
HCT VFR BLD CALC: 31.2 %
HCT VFR BLD CALC: 32 %
HCT VFR BLD CALC: 32.2 % — LOW (ref 39–50)
HCT VFR BLD CALC: 33.3 % — LOW (ref 39–50)
HCT VFR BLDA CALC: 22 % — LOW (ref 39–51)
HCT VFR BLDA CALC: 25 % — LOW (ref 39–51)
HCT VFR BLDA CALC: 25 % — LOW (ref 39–51)
HCV AB SER QL: NONREACTIVE
HCV S/CO RATIO: 0.06 S/CO
HDLC SERPL-MCNC: 47 MG/DL
HEPARIN-PF4 AB RESULT: 0.13 — SIGNIFICANT CHANGE UP (ref 0–0.39)
HGB BLD CALC-MCNC: 7.3 G/DL — LOW (ref 12.6–17.4)
HGB BLD CALC-MCNC: 8.2 G/DL — LOW (ref 12.6–17.4)
HGB BLD CALC-MCNC: 8.4 G/DL — LOW (ref 12.6–17.4)
HGB BLD-MCNC: 10.2 G/DL — LOW (ref 13–17)
HGB BLD-MCNC: 10.6 G/DL — LOW (ref 13–17)
HGB BLD-MCNC: 10.9 G/DL — LOW (ref 13–17)
HGB BLD-MCNC: 7.7 G/DL — LOW (ref 13–17)
HGB BLD-MCNC: 7.9 G/DL — LOW (ref 13–17)
HGB BLD-MCNC: 8 G/DL — LOW (ref 13–17)
HGB BLD-MCNC: 8 G/DL — LOW (ref 13–17)
HGB BLD-MCNC: 8.1 G/DL — LOW (ref 13–17)
HGB BLD-MCNC: 8.2 G/DL — LOW (ref 13–17)
HGB BLD-MCNC: 8.4 G/DL — LOW (ref 13–17)
HGB BLD-MCNC: 8.4 G/DL — LOW (ref 13–17)
HGB BLD-MCNC: 8.5 G/DL
HGB BLD-MCNC: 8.5 G/DL — LOW (ref 13–17)
HGB BLD-MCNC: 8.5 G/DL — LOW (ref 13–17)
HGB BLD-MCNC: 8.6 G/DL
HGB BLD-MCNC: 8.7 G/DL — LOW (ref 13–17)
HGB BLD-MCNC: 8.8 G/DL — LOW (ref 13–17)
HGB BLD-MCNC: 8.8 G/DL — LOW (ref 13–17)
HGB BLD-MCNC: 9 G/DL — LOW (ref 13–17)
HGB BLD-MCNC: 9.2 G/DL — LOW (ref 13–17)
HGB BLD-MCNC: 9.3 G/DL — LOW (ref 13–17)
HGB BLD-MCNC: 9.4 G/DL
HGB BLD-MCNC: 9.5 G/DL — LOW (ref 13–17)
HGB BLD-MCNC: 9.7 G/DL
HGB BLD-MCNC: 9.9 G/DL
HISTONE AB SER QL: 0.2 UNITS
HISTONE AB SER QL: 0.4 UNITS
IANC: 13.15 K/UL — HIGH (ref 1.5–8.5)
IMM GRANULOCYTES NFR BLD AUTO: 0.9 % — SIGNIFICANT CHANGE UP (ref 0–1.5)
IMM GRANULOCYTES NFR BLD AUTO: 1 % — SIGNIFICANT CHANGE UP (ref 0–1.5)
IMM GRANULOCYTES NFR BLD AUTO: 1 % — SIGNIFICANT CHANGE UP (ref 0–1.5)
IMM GRANULOCYTES NFR BLD AUTO: 1.2 %
IMM GRANULOCYTES NFR BLD AUTO: 1.3 %
IMM GRANULOCYTES NFR BLD AUTO: 1.3 %
IMM GRANULOCYTES NFR BLD AUTO: 1.4 % — SIGNIFICANT CHANGE UP (ref 0–1.5)
IMM GRANULOCYTES NFR BLD AUTO: 1.5 %
IMM GRANULOCYTES NFR BLD AUTO: 2 % — HIGH (ref 0–1.5)
IMM GRANULOCYTES NFR BLD AUTO: 2 % — HIGH (ref 0–1.5)
IMM GRANULOCYTES NFR BLD AUTO: 2.3 %
IMM GRANULOCYTES NFR BLD AUTO: 3.2 % — HIGH (ref 0–1.5)
IMM GRANULOCYTES NFR BLD AUTO: 4.6 % — HIGH (ref 0–1.5)
INR BLD: 0.98 RATIO — SIGNIFICANT CHANGE UP (ref 0.88–1.16)
INR BLD: 1.03 RATIO — SIGNIFICANT CHANGE UP (ref 0.88–1.16)
INR BLD: 1.04 RATIO — SIGNIFICANT CHANGE UP (ref 0.88–1.16)
INTERPRETATION SERPL IFE-IMP: SIGNIFICANT CHANGE UP
IRON SATN MFR SERPL: 15 %
IRON SATN MFR SERPL: 15 %
IRON SERPL-MCNC: 28 UG/DL
IRON SERPL-MCNC: 32 UG/DL
KETONES UR-MCNC: NEGATIVE — SIGNIFICANT CHANGE UP
KETONES URINE: NEGATIVE
LACTATE BLDV-MCNC: 1.9 MMOL/L — SIGNIFICANT CHANGE UP (ref 0.7–2)
LACTATE BLDV-MCNC: 13.9 MMOL/L — CRITICAL HIGH (ref 0.7–2)
LACTATE BLDV-MCNC: 2.5 MMOL/L — HIGH (ref 0.7–2)
LDLC SERPL CALC-MCNC: 33 MG/DL
LEUKOCYTE ESTERASE UR-ACNC: NEGATIVE — SIGNIFICANT CHANGE UP
LEUKOCYTE ESTERASE URINE: NEGATIVE
LYMPHOCYTES # BLD AUTO: 0.85 K/UL — LOW (ref 1–3.3)
LYMPHOCYTES # BLD AUTO: 0.86 K/UL — LOW (ref 1–3.3)
LYMPHOCYTES # BLD AUTO: 0.86 K/UL — LOW (ref 1–3.3)
LYMPHOCYTES # BLD AUTO: 0.88 K/UL — LOW (ref 1–3.3)
LYMPHOCYTES # BLD AUTO: 1.09 K/UL
LYMPHOCYTES # BLD AUTO: 1.09 K/UL — SIGNIFICANT CHANGE UP (ref 1–3.3)
LYMPHOCYTES # BLD AUTO: 1.14 K/UL — SIGNIFICANT CHANGE UP (ref 1–3.3)
LYMPHOCYTES # BLD AUTO: 1.15 K/UL
LYMPHOCYTES # BLD AUTO: 1.21 K/UL — SIGNIFICANT CHANGE UP (ref 1–3.3)
LYMPHOCYTES # BLD AUTO: 1.28 K/UL — SIGNIFICANT CHANGE UP (ref 1–3.3)
LYMPHOCYTES # BLD AUTO: 1.44 K/UL — SIGNIFICANT CHANGE UP (ref 1–3.3)
LYMPHOCYTES # BLD AUTO: 1.48 K/UL
LYMPHOCYTES # BLD AUTO: 1.56 K/UL
LYMPHOCYTES # BLD AUTO: 1.92 K/UL
LYMPHOCYTES # BLD AUTO: 10.3 % — LOW (ref 13–44)
LYMPHOCYTES # BLD AUTO: 10.3 % — LOW (ref 13–44)
LYMPHOCYTES # BLD AUTO: 11.7 % — LOW (ref 13–44)
LYMPHOCYTES # BLD AUTO: 15 % — SIGNIFICANT CHANGE UP (ref 13–44)
LYMPHOCYTES # BLD AUTO: 5.2 % — LOW (ref 13–44)
LYMPHOCYTES # BLD AUTO: 5.7 % — LOW (ref 13–44)
LYMPHOCYTES # BLD AUTO: 7.7 % — LOW (ref 13–44)
LYMPHOCYTES # BLD AUTO: 8 % — LOW (ref 13–44)
LYMPHOCYTES # BLD AUTO: 8.6 % — LOW (ref 13–44)
LYMPHOCYTES NFR BLD AUTO: 10.9 %
LYMPHOCYTES NFR BLD AUTO: 15.7 %
LYMPHOCYTES NFR BLD AUTO: 15.9 %
LYMPHOCYTES NFR BLD AUTO: 17 %
LYMPHOCYTES NFR BLD AUTO: 9.2 %
M TB IFN-G BLD-IMP: ABNORMAL
MAGNESIUM SERPL-MCNC: 1.5 MG/DL — LOW (ref 1.6–2.6)
MAGNESIUM SERPL-MCNC: 1.6 MG/DL — SIGNIFICANT CHANGE UP (ref 1.6–2.6)
MAGNESIUM SERPL-MCNC: 1.7 MG/DL — SIGNIFICANT CHANGE UP (ref 1.6–2.6)
MAGNESIUM SERPL-MCNC: 2 MG/DL — SIGNIFICANT CHANGE UP (ref 1.6–2.6)
MAGNESIUM SERPL-MCNC: 2 MG/DL — SIGNIFICANT CHANGE UP (ref 1.6–2.6)
MAGNESIUM SERPL-MCNC: 2.3 MG/DL — SIGNIFICANT CHANGE UP (ref 1.6–2.6)
MAN DIFF?: NORMAL
MANUAL SMEAR VERIFICATION: SIGNIFICANT CHANGE UP
MCHC RBC-ENTMCNC: 28.6 PG — SIGNIFICANT CHANGE UP (ref 27–34)
MCHC RBC-ENTMCNC: 28.8 PG — SIGNIFICANT CHANGE UP (ref 27–34)
MCHC RBC-ENTMCNC: 28.9 PG — SIGNIFICANT CHANGE UP (ref 27–34)
MCHC RBC-ENTMCNC: 29 PG — SIGNIFICANT CHANGE UP (ref 27–34)
MCHC RBC-ENTMCNC: 29 PG — SIGNIFICANT CHANGE UP (ref 27–34)
MCHC RBC-ENTMCNC: 29.1 PG
MCHC RBC-ENTMCNC: 29.1 PG
MCHC RBC-ENTMCNC: 29.1 PG — SIGNIFICANT CHANGE UP (ref 27–34)
MCHC RBC-ENTMCNC: 29.2 PG — SIGNIFICANT CHANGE UP (ref 27–34)
MCHC RBC-ENTMCNC: 29.2 PG — SIGNIFICANT CHANGE UP (ref 27–34)
MCHC RBC-ENTMCNC: 29.4 GM/DL
MCHC RBC-ENTMCNC: 29.6 PG — SIGNIFICANT CHANGE UP (ref 27–34)
MCHC RBC-ENTMCNC: 30 GM/DL
MCHC RBC-ENTMCNC: 30 PG — SIGNIFICANT CHANGE UP (ref 27–34)
MCHC RBC-ENTMCNC: 30.1 PG — SIGNIFICANT CHANGE UP (ref 27–34)
MCHC RBC-ENTMCNC: 30.2 PG
MCHC RBC-ENTMCNC: 30.4 PG
MCHC RBC-ENTMCNC: 30.5 PG
MCHC RBC-ENTMCNC: 30.6 PG — SIGNIFICANT CHANGE UP (ref 27–34)
MCHC RBC-ENTMCNC: 30.7 PG — SIGNIFICANT CHANGE UP (ref 27–34)
MCHC RBC-ENTMCNC: 30.7 PG — SIGNIFICANT CHANGE UP (ref 27–34)
MCHC RBC-ENTMCNC: 30.9 GM/DL — LOW (ref 32–36)
MCHC RBC-ENTMCNC: 31 GM/DL
MCHC RBC-ENTMCNC: 31 PG — SIGNIFICANT CHANGE UP (ref 27–34)
MCHC RBC-ENTMCNC: 31 PG — SIGNIFICANT CHANGE UP (ref 27–34)
MCHC RBC-ENTMCNC: 31.1 GM/DL — LOW (ref 32–36)
MCHC RBC-ENTMCNC: 31.1 PG — SIGNIFICANT CHANGE UP (ref 27–34)
MCHC RBC-ENTMCNC: 31.2 GM/DL — LOW (ref 32–36)
MCHC RBC-ENTMCNC: 31.2 PG — SIGNIFICANT CHANGE UP (ref 27–34)
MCHC RBC-ENTMCNC: 31.3 GM/DL — LOW (ref 32–36)
MCHC RBC-ENTMCNC: 31.3 PG — SIGNIFICANT CHANGE UP (ref 27–34)
MCHC RBC-ENTMCNC: 31.5 GM/DL — LOW (ref 32–36)
MCHC RBC-ENTMCNC: 31.6 GM/DL — LOW (ref 32–36)
MCHC RBC-ENTMCNC: 31.7 GM/DL
MCHC RBC-ENTMCNC: 31.7 GM/DL — LOW (ref 32–36)
MCHC RBC-ENTMCNC: 31.7 GM/DL — LOW (ref 32–36)
MCHC RBC-ENTMCNC: 31.8 GM/DL — LOW (ref 32–36)
MCHC RBC-ENTMCNC: 31.9 GM/DL — LOW (ref 32–36)
MCHC RBC-ENTMCNC: 32 GM/DL
MCHC RBC-ENTMCNC: 32 GM/DL — SIGNIFICANT CHANGE UP (ref 32–36)
MCHC RBC-ENTMCNC: 32 PG — SIGNIFICANT CHANGE UP (ref 27–34)
MCHC RBC-ENTMCNC: 32.2 GM/DL — SIGNIFICANT CHANGE UP (ref 32–36)
MCHC RBC-ENTMCNC: 32.5 GM/DL — SIGNIFICANT CHANGE UP (ref 32–36)
MCHC RBC-ENTMCNC: 32.6 GM/DL — SIGNIFICANT CHANGE UP (ref 32–36)
MCHC RBC-ENTMCNC: 32.7 GM/DL — SIGNIFICANT CHANGE UP (ref 32–36)
MCHC RBC-ENTMCNC: 32.7 GM/DL — SIGNIFICANT CHANGE UP (ref 32–36)
MCHC RBC-ENTMCNC: 33.2 GM/DL — SIGNIFICANT CHANGE UP (ref 32–36)
MCHC RBC-ENTMCNC: 33.3 GM/DL — SIGNIFICANT CHANGE UP (ref 32–36)
MCHC RBC-ENTMCNC: 34.3 GM/DL — SIGNIFICANT CHANGE UP (ref 32–36)
MCHC RBC-ENTMCNC: 34.6 GM/DL — SIGNIFICANT CHANGE UP (ref 32–36)
MCV RBC AUTO: 84.5 FL — SIGNIFICANT CHANGE UP (ref 80–100)
MCV RBC AUTO: 84.9 FL — SIGNIFICANT CHANGE UP (ref 80–100)
MCV RBC AUTO: 87.1 FL — SIGNIFICANT CHANGE UP (ref 80–100)
MCV RBC AUTO: 90.1 FL — SIGNIFICANT CHANGE UP (ref 80–100)
MCV RBC AUTO: 90.8 FL — SIGNIFICANT CHANGE UP (ref 80–100)
MCV RBC AUTO: 91.4 FL — SIGNIFICANT CHANGE UP (ref 80–100)
MCV RBC AUTO: 91.8 FL — SIGNIFICANT CHANGE UP (ref 80–100)
MCV RBC AUTO: 91.9 FL — SIGNIFICANT CHANGE UP (ref 80–100)
MCV RBC AUTO: 92.5 FL — SIGNIFICANT CHANGE UP (ref 80–100)
MCV RBC AUTO: 93.8 FL — SIGNIFICANT CHANGE UP (ref 80–100)
MCV RBC AUTO: 93.8 FL — SIGNIFICANT CHANGE UP (ref 80–100)
MCV RBC AUTO: 94.4 FL
MCV RBC AUTO: 95.4 FL — SIGNIFICANT CHANGE UP (ref 80–100)
MCV RBC AUTO: 95.4 FL — SIGNIFICANT CHANGE UP (ref 80–100)
MCV RBC AUTO: 96 FL
MCV RBC AUTO: 96.5 FL — SIGNIFICANT CHANGE UP (ref 80–100)
MCV RBC AUTO: 96.6 FL — SIGNIFICANT CHANGE UP (ref 80–100)
MCV RBC AUTO: 97 FL
MCV RBC AUTO: 97.1 FL — SIGNIFICANT CHANGE UP (ref 80–100)
MCV RBC AUTO: 97.7 FL — SIGNIFICANT CHANGE UP (ref 80–100)
MCV RBC AUTO: 97.9 FL
MCV RBC AUTO: 97.9 FL — SIGNIFICANT CHANGE UP (ref 80–100)
MCV RBC AUTO: 98 FL — SIGNIFICANT CHANGE UP (ref 80–100)
MCV RBC AUTO: 98.8 FL — SIGNIFICANT CHANGE UP (ref 80–100)
MCV RBC AUTO: 99.1 FL
MONOCYTES # BLD AUTO: 0.46 K/UL — SIGNIFICANT CHANGE UP (ref 0–0.9)
MONOCYTES # BLD AUTO: 0.55 K/UL — SIGNIFICANT CHANGE UP (ref 0–0.9)
MONOCYTES # BLD AUTO: 0.7 K/UL
MONOCYTES # BLD AUTO: 0.71 K/UL — SIGNIFICANT CHANGE UP (ref 0–0.9)
MONOCYTES # BLD AUTO: 0.72 K/UL
MONOCYTES # BLD AUTO: 0.73 K/UL
MONOCYTES # BLD AUTO: 0.73 K/UL
MONOCYTES # BLD AUTO: 0.79 K/UL
MONOCYTES # BLD AUTO: 0.85 K/UL — SIGNIFICANT CHANGE UP (ref 0–0.9)
MONOCYTES # BLD AUTO: 0.92 K/UL — HIGH (ref 0–0.9)
MONOCYTES # BLD AUTO: 0.93 K/UL — HIGH (ref 0–0.9)
MONOCYTES # BLD AUTO: 0.96 K/UL — HIGH (ref 0–0.9)
MONOCYTES # BLD AUTO: 1.18 K/UL — HIGH (ref 0–0.9)
MONOCYTES # BLD AUTO: 1.27 K/UL — HIGH (ref 0–0.9)
MONOCYTES NFR BLD AUTO: 4.6 % — SIGNIFICANT CHANGE UP (ref 2–14)
MONOCYTES NFR BLD AUTO: 6.2 % — SIGNIFICANT CHANGE UP (ref 2–14)
MONOCYTES NFR BLD AUTO: 6.4 %
MONOCYTES NFR BLD AUTO: 6.4 %
MONOCYTES NFR BLD AUTO: 6.4 % — SIGNIFICANT CHANGE UP (ref 2–14)
MONOCYTES NFR BLD AUTO: 6.7 % — SIGNIFICANT CHANGE UP (ref 2–14)
MONOCYTES NFR BLD AUTO: 7.3 %
MONOCYTES NFR BLD AUTO: 7.3 %
MONOCYTES NFR BLD AUTO: 7.5 %
MONOCYTES NFR BLD AUTO: 7.5 % — SIGNIFICANT CHANGE UP (ref 2–14)
MONOCYTES NFR BLD AUTO: 7.5 % — SIGNIFICANT CHANGE UP (ref 2–14)
MONOCYTES NFR BLD AUTO: 7.8 % — SIGNIFICANT CHANGE UP (ref 2–14)
MONOCYTES NFR BLD AUTO: 8 % — SIGNIFICANT CHANGE UP (ref 2–14)
MONOCYTES NFR BLD AUTO: 8.3 % — SIGNIFICANT CHANGE UP (ref 2–14)
NEUTROPHILS # BLD AUTO: 10.18 K/UL
NEUTROPHILS # BLD AUTO: 11.83 K/UL — HIGH (ref 1.8–7.4)
NEUTROPHILS # BLD AUTO: 12.77 K/UL — HIGH (ref 1.8–7.4)
NEUTROPHILS # BLD AUTO: 13.15 K/UL — HIGH (ref 1.8–7.4)
NEUTROPHILS # BLD AUTO: 14 K/UL — HIGH (ref 1.8–7.4)
NEUTROPHILS # BLD AUTO: 6.35 K/UL — SIGNIFICANT CHANGE UP (ref 1.8–7.4)
NEUTROPHILS # BLD AUTO: 6.79 K/UL
NEUTROPHILS # BLD AUTO: 6.94 K/UL — SIGNIFICANT CHANGE UP (ref 1.8–7.4)
NEUTROPHILS # BLD AUTO: 7.2 K/UL
NEUTROPHILS # BLD AUTO: 7.91 K/UL
NEUTROPHILS # BLD AUTO: 8 K/UL — HIGH (ref 1.8–7.4)
NEUTROPHILS # BLD AUTO: 8.1 K/UL
NEUTROPHILS # BLD AUTO: 8.59 K/UL — HIGH (ref 1.8–7.4)
NEUTROPHILS # BLD AUTO: 9.62 K/UL — HIGH (ref 1.8–7.4)
NEUTROPHILS NFR BLD AUTO: 71.6 %
NEUTROPHILS NFR BLD AUTO: 72.5 %
NEUTROPHILS NFR BLD AUTO: 72.7 %
NEUTROPHILS NFR BLD AUTO: 74.2 % — SIGNIFICANT CHANGE UP (ref 43–77)
NEUTROPHILS NFR BLD AUTO: 75.8 % — SIGNIFICANT CHANGE UP (ref 43–77)
NEUTROPHILS NFR BLD AUTO: 78.3 % — HIGH (ref 43–77)
NEUTROPHILS NFR BLD AUTO: 79 %
NEUTROPHILS NFR BLD AUTO: 80.7 % — HIGH (ref 43–77)
NEUTROPHILS NFR BLD AUTO: 81.1 % — HIGH (ref 43–77)
NEUTROPHILS NFR BLD AUTO: 81.8 %
NEUTROPHILS NFR BLD AUTO: 83.1 % — HIGH (ref 43–77)
NEUTROPHILS NFR BLD AUTO: 85.6 % — HIGH (ref 43–77)
NEUTROPHILS NFR BLD AUTO: 86.1 % — HIGH (ref 43–77)
NEUTROPHILS NFR BLD AUTO: 86.9 % — HIGH (ref 43–77)
NITRITE UR-MCNC: NEGATIVE — SIGNIFICANT CHANGE UP
NITRITE URINE: NEGATIVE
NON-GYNECOLOGICAL CYTOLOGY STUDY: SIGNIFICANT CHANGE UP
NONHDLC SERPL-MCNC: 52 MG/DL
NPP NORMAL POOLED PLASMA: NORMAL SECS
NRBC # BLD: 0 /100 WBCS — SIGNIFICANT CHANGE UP
NRBC # BLD: 0 /100 WBCS — SIGNIFICANT CHANGE UP (ref 0–0)
NRBC # FLD: 0 K/UL — SIGNIFICANT CHANGE UP
NT-PROBNP SERPL-SCNC: HIGH PG/ML (ref 0–300)
OSMOLALITY SERPL: 290 MOSMOL/KG — SIGNIFICANT CHANGE UP (ref 280–301)
OSMOLALITY SERPL: 294 MOSMOL/KG — SIGNIFICANT CHANGE UP (ref 280–301)
OSMOLALITY SERPL: 315 MOSMOL/KG
OSMOLALITY UR: 255 MOS/KG — LOW (ref 300–900)
OSMOLALITY UR: 303 MOS/KG — SIGNIFICANT CHANGE UP (ref 300–900)
OSMOLALITY UR: 423 MOS/KG — SIGNIFICANT CHANGE UP (ref 300–900)
OSMOLALITY UR: 460 MOSM/KG
OVALOCYTES BLD QL SMEAR: SLIGHT — SIGNIFICANT CHANGE UP
PCO2 BLDV: 33 MMHG — LOW (ref 42–55)
PCO2 BLDV: 37 MMHG — LOW (ref 42–55)
PCO2 BLDV: 66 MMHG — HIGH (ref 42–55)
PF4 HEPARIN CMPLX AB SER-ACNC: NEGATIVE — SIGNIFICANT CHANGE UP
PH BLDV: 6.93 — CRITICAL LOW (ref 7.32–7.43)
PH BLDV: 7.26 — LOW (ref 7.32–7.43)
PH BLDV: 7.33 — SIGNIFICANT CHANGE UP (ref 7.32–7.43)
PH UR: 5.5 — SIGNIFICANT CHANGE UP (ref 5–8)
PH URINE: 6
PH URINE: 6.5
PH URINE: 6.5
PHOSPHATE SERPL-MCNC: 2.6 MG/DL — SIGNIFICANT CHANGE UP (ref 2.5–4.5)
PHOSPHATE SERPL-MCNC: 3 MG/DL — SIGNIFICANT CHANGE UP (ref 2.5–4.5)
PHOSPHATE SERPL-MCNC: 3.2 MG/DL — SIGNIFICANT CHANGE UP (ref 2.5–4.5)
PHOSPHATE SERPL-MCNC: 3.8 MG/DL — SIGNIFICANT CHANGE UP (ref 2.5–4.5)
PHOSPHATE SERPL-MCNC: 3.9 MG/DL — SIGNIFICANT CHANGE UP (ref 2.5–4.5)
PHOSPHATE SERPL-MCNC: 4 MG/DL
PHOSPHATE SERPL-MCNC: 4.1 MG/DL — SIGNIFICANT CHANGE UP (ref 2.5–4.5)
PHOSPHATE SERPL-MCNC: 4.3 MG/DL — SIGNIFICANT CHANGE UP (ref 2.5–4.5)
PHOSPHATE SERPL-MCNC: 4.5 MG/DL
PHOSPHATE SERPL-MCNC: 4.5 MG/DL — SIGNIFICANT CHANGE UP (ref 2.5–4.5)
PHOSPHATE SERPL-MCNC: 4.6 MG/DL — HIGH (ref 2.5–4.5)
PHOSPHATE SERPL-MCNC: 4.7 MG/DL — HIGH (ref 2.5–4.5)
PLAT MORPH BLD: NORMAL — SIGNIFICANT CHANGE UP
PLATELET # BLD AUTO: 105 K/UL — LOW (ref 150–400)
PLATELET # BLD AUTO: 106 K/UL — LOW (ref 150–400)
PLATELET # BLD AUTO: 125 K/UL — LOW (ref 150–400)
PLATELET # BLD AUTO: 125 K/UL — LOW (ref 150–400)
PLATELET # BLD AUTO: 147 K/UL — LOW (ref 150–400)
PLATELET # BLD AUTO: 168 K/UL — SIGNIFICANT CHANGE UP (ref 150–400)
PLATELET # BLD AUTO: 175 K/UL — SIGNIFICANT CHANGE UP (ref 150–400)
PLATELET # BLD AUTO: 179 K/UL — SIGNIFICANT CHANGE UP (ref 150–400)
PLATELET # BLD AUTO: 182 K/UL — SIGNIFICANT CHANGE UP (ref 150–400)
PLATELET # BLD AUTO: 184 K/UL — SIGNIFICANT CHANGE UP (ref 150–400)
PLATELET # BLD AUTO: 201 K/UL — SIGNIFICANT CHANGE UP (ref 150–400)
PLATELET # BLD AUTO: 203 K/UL
PLATELET # BLD AUTO: 206 K/UL — SIGNIFICANT CHANGE UP (ref 150–400)
PLATELET # BLD AUTO: 207 K/UL — SIGNIFICANT CHANGE UP (ref 150–400)
PLATELET # BLD AUTO: 208 K/UL
PLATELET # BLD AUTO: 211 K/UL — SIGNIFICANT CHANGE UP (ref 150–400)
PLATELET # BLD AUTO: 214 K/UL
PLATELET # BLD AUTO: 221 K/UL — SIGNIFICANT CHANGE UP (ref 150–400)
PLATELET # BLD AUTO: 234 K/UL
PLATELET # BLD AUTO: 250 K/UL — SIGNIFICANT CHANGE UP (ref 150–400)
PLATELET # BLD AUTO: 260 K/UL — SIGNIFICANT CHANGE UP (ref 150–400)
PLATELET # BLD AUTO: 262 K/UL
PLATELET # BLD AUTO: 285 K/UL — SIGNIFICANT CHANGE UP (ref 150–400)
PLATELET # BLD AUTO: 295 K/UL — SIGNIFICANT CHANGE UP (ref 150–400)
PLATELET # BLD AUTO: 297 K/UL — SIGNIFICANT CHANGE UP (ref 150–400)
PO2 BLDV: 27 MMHG — SIGNIFICANT CHANGE UP (ref 25–45)
PO2 BLDV: 31 MMHG — SIGNIFICANT CHANGE UP (ref 25–45)
PO2 BLDV: 74 MMHG — HIGH (ref 25–45)
POIKILOCYTOSIS BLD QL AUTO: SLIGHT — SIGNIFICANT CHANGE UP
POTASSIUM BLDV-SCNC: 4.4 MMOL/L — SIGNIFICANT CHANGE UP (ref 3.5–5.1)
POTASSIUM BLDV-SCNC: 4.6 MMOL/L — SIGNIFICANT CHANGE UP (ref 3.5–5.1)
POTASSIUM BLDV-SCNC: 6.5 MMOL/L — CRITICAL HIGH (ref 3.5–5.1)
POTASSIUM SERPL-MCNC: 3.2 MMOL/L — LOW (ref 3.5–5.3)
POTASSIUM SERPL-MCNC: 3.2 MMOL/L — LOW (ref 3.5–5.3)
POTASSIUM SERPL-MCNC: 3.5 MMOL/L — SIGNIFICANT CHANGE UP (ref 3.5–5.3)
POTASSIUM SERPL-MCNC: 3.6 MMOL/L — SIGNIFICANT CHANGE UP (ref 3.5–5.3)
POTASSIUM SERPL-MCNC: 4 MMOL/L — SIGNIFICANT CHANGE UP (ref 3.5–5.3)
POTASSIUM SERPL-MCNC: 4 MMOL/L — SIGNIFICANT CHANGE UP (ref 3.5–5.3)
POTASSIUM SERPL-MCNC: 4.1 MMOL/L — SIGNIFICANT CHANGE UP (ref 3.5–5.3)
POTASSIUM SERPL-MCNC: 4.2 MMOL/L — SIGNIFICANT CHANGE UP (ref 3.5–5.3)
POTASSIUM SERPL-MCNC: 4.2 MMOL/L — SIGNIFICANT CHANGE UP (ref 3.5–5.3)
POTASSIUM SERPL-MCNC: 4.3 MMOL/L — SIGNIFICANT CHANGE UP (ref 3.5–5.3)
POTASSIUM SERPL-MCNC: 4.3 MMOL/L — SIGNIFICANT CHANGE UP (ref 3.5–5.3)
POTASSIUM SERPL-MCNC: 4.4 MMOL/L — SIGNIFICANT CHANGE UP (ref 3.5–5.3)
POTASSIUM SERPL-MCNC: 4.5 MMOL/L — SIGNIFICANT CHANGE UP (ref 3.5–5.3)
POTASSIUM SERPL-MCNC: 4.6 MMOL/L — SIGNIFICANT CHANGE UP (ref 3.5–5.3)
POTASSIUM SERPL-MCNC: 4.7 MMOL/L — SIGNIFICANT CHANGE UP (ref 3.5–5.3)
POTASSIUM SERPL-MCNC: 4.8 MMOL/L — SIGNIFICANT CHANGE UP (ref 3.5–5.3)
POTASSIUM SERPL-MCNC: 4.9 MMOL/L — SIGNIFICANT CHANGE UP (ref 3.5–5.3)
POTASSIUM SERPL-MCNC: 5 MMOL/L — SIGNIFICANT CHANGE UP (ref 3.5–5.3)
POTASSIUM SERPL-MCNC: 5.1 MMOL/L — SIGNIFICANT CHANGE UP (ref 3.5–5.3)
POTASSIUM SERPL-MCNC: 5.2 MMOL/L — SIGNIFICANT CHANGE UP (ref 3.5–5.3)
POTASSIUM SERPL-SCNC: 3.2 MMOL/L — LOW (ref 3.5–5.3)
POTASSIUM SERPL-SCNC: 3.2 MMOL/L — LOW (ref 3.5–5.3)
POTASSIUM SERPL-SCNC: 3.5 MMOL/L — SIGNIFICANT CHANGE UP (ref 3.5–5.3)
POTASSIUM SERPL-SCNC: 3.6 MMOL/L — SIGNIFICANT CHANGE UP (ref 3.5–5.3)
POTASSIUM SERPL-SCNC: 4 MMOL/L — SIGNIFICANT CHANGE UP (ref 3.5–5.3)
POTASSIUM SERPL-SCNC: 4 MMOL/L — SIGNIFICANT CHANGE UP (ref 3.5–5.3)
POTASSIUM SERPL-SCNC: 4.1 MMOL/L — SIGNIFICANT CHANGE UP (ref 3.5–5.3)
POTASSIUM SERPL-SCNC: 4.2 MMOL/L — SIGNIFICANT CHANGE UP (ref 3.5–5.3)
POTASSIUM SERPL-SCNC: 4.2 MMOL/L — SIGNIFICANT CHANGE UP (ref 3.5–5.3)
POTASSIUM SERPL-SCNC: 4.3 MMOL/L — SIGNIFICANT CHANGE UP (ref 3.5–5.3)
POTASSIUM SERPL-SCNC: 4.3 MMOL/L — SIGNIFICANT CHANGE UP (ref 3.5–5.3)
POTASSIUM SERPL-SCNC: 4.4 MMOL/L
POTASSIUM SERPL-SCNC: 4.4 MMOL/L — SIGNIFICANT CHANGE UP (ref 3.5–5.3)
POTASSIUM SERPL-SCNC: 4.5 MMOL/L
POTASSIUM SERPL-SCNC: 4.5 MMOL/L
POTASSIUM SERPL-SCNC: 4.5 MMOL/L — SIGNIFICANT CHANGE UP (ref 3.5–5.3)
POTASSIUM SERPL-SCNC: 4.6 MMOL/L — SIGNIFICANT CHANGE UP (ref 3.5–5.3)
POTASSIUM SERPL-SCNC: 4.7 MMOL/L — SIGNIFICANT CHANGE UP (ref 3.5–5.3)
POTASSIUM SERPL-SCNC: 4.8 MMOL/L
POTASSIUM SERPL-SCNC: 4.8 MMOL/L — SIGNIFICANT CHANGE UP (ref 3.5–5.3)
POTASSIUM SERPL-SCNC: 4.9 MMOL/L — SIGNIFICANT CHANGE UP (ref 3.5–5.3)
POTASSIUM SERPL-SCNC: 5 MMOL/L
POTASSIUM SERPL-SCNC: 5 MMOL/L — SIGNIFICANT CHANGE UP (ref 3.5–5.3)
POTASSIUM SERPL-SCNC: 5.1 MMOL/L
POTASSIUM SERPL-SCNC: 5.1 MMOL/L — SIGNIFICANT CHANGE UP (ref 3.5–5.3)
POTASSIUM SERPL-SCNC: 5.2 MMOL/L — SIGNIFICANT CHANGE UP (ref 3.5–5.3)
PROT PATTERN SERPL ELPH-IMP: SIGNIFICANT CHANGE UP
PROT SERPL-MCNC: 6 G/DL — SIGNIFICANT CHANGE UP (ref 6–8.3)
PROT SERPL-MCNC: 6.1 G/DL
PROT SERPL-MCNC: 6.2 G/DL
PROT SERPL-MCNC: 6.2 G/DL — SIGNIFICANT CHANGE UP (ref 6–8.3)
PROT SERPL-MCNC: 6.3 G/DL — SIGNIFICANT CHANGE UP (ref 6–8.3)
PROT SERPL-MCNC: 6.4 G/DL — SIGNIFICANT CHANGE UP (ref 6–8.3)
PROT SERPL-MCNC: 6.5 G/DL — SIGNIFICANT CHANGE UP (ref 6–8.3)
PROT SERPL-MCNC: 6.5 G/DL — SIGNIFICANT CHANGE UP (ref 6–8.3)
PROT SERPL-MCNC: 6.7 G/DL — SIGNIFICANT CHANGE UP (ref 6–8.3)
PROT SERPL-MCNC: 6.8 G/DL
PROT SERPL-MCNC: 6.9 G/DL — SIGNIFICANT CHANGE UP (ref 6–8.3)
PROT SERPL-MCNC: 7.2 G/DL — SIGNIFICANT CHANGE UP (ref 6–8.3)
PROT SERPL-MCNC: 7.3 G/DL — SIGNIFICANT CHANGE UP (ref 6–8.3)
PROT UR-MCNC: 126 MG/DL
PROT UR-MCNC: 145 MG/DL
PROT UR-MCNC: 23 MG/DL
PROT UR-MCNC: 58 MG/DL
PROT UR-MCNC: SIGNIFICANT CHANGE UP
PROTEIN URINE: ABNORMAL
PROTEIN URINE: ABNORMAL
PROTEIN URINE: NORMAL
PROTHROM AB SERPL-ACNC: 11.3 SEC — SIGNIFICANT CHANGE UP (ref 10.6–13.6)
PROTHROM AB SERPL-ACNC: 11.7 SEC — SIGNIFICANT CHANGE UP (ref 10.6–13.6)
PROTHROM AB SERPL-ACNC: 12.5 SEC — SIGNIFICANT CHANGE UP (ref 10.6–13.6)
QUANTIFERON TB PLUS MITOGEN MINUS NIL: 0.47 IU/ML
QUANTIFERON TB PLUS NIL: 0.02 IU/ML
QUANTIFERON TB PLUS TB1 MINUS NIL: 0 IU/ML
QUANTIFERON TB PLUS TB2 MINUS NIL: 0 IU/ML
RAPID RVP RESULT: SIGNIFICANT CHANGE UP
RBC # BLD: 2.52 M/UL — LOW (ref 4.2–5.8)
RBC # BLD: 2.6 M/UL — LOW (ref 4.2–5.8)
RBC # BLD: 2.6 M/UL — LOW (ref 4.2–5.8)
RBC # BLD: 2.66 M/UL — LOW (ref 4.2–5.8)
RBC # BLD: 2.73 M/UL — LOW (ref 4.2–5.8)
RBC # BLD: 2.74 M/UL — LOW (ref 4.2–5.8)
RBC # BLD: 2.8 M/UL
RBC # BLD: 2.8 M/UL — LOW (ref 4.2–5.8)
RBC # BLD: 2.81 M/UL — LOW (ref 4.2–5.8)
RBC # BLD: 2.88 M/UL — LOW (ref 4.2–5.8)
RBC # BLD: 2.91 M/UL — LOW (ref 4.2–5.8)
RBC # BLD: 2.91 M/UL — LOW (ref 4.2–5.8)
RBC # BLD: 2.92 M/UL — LOW (ref 4.2–5.8)
RBC # BLD: 2.96 M/UL
RBC # BLD: 3.03 M/UL — LOW (ref 4.2–5.8)
RBC # BLD: 3.05 M/UL — LOW (ref 4.2–5.8)
RBC # BLD: 3.05 M/UL — LOW (ref 4.2–5.8)
RBC # BLD: 3.1 M/UL — LOW (ref 4.2–5.8)
RBC # BLD: 3.17 M/UL — LOW (ref 4.2–5.8)
RBC # BLD: 3.21 M/UL
RBC # BLD: 3.23 M/UL
RBC # BLD: 3.25 M/UL
RBC # BLD: 3.29 M/UL — LOW (ref 4.2–5.8)
RBC # BLD: 3.41 M/UL — LOW (ref 4.2–5.8)
RBC # BLD: 3.64 M/UL — LOW (ref 4.2–5.8)
RBC # FLD: 12 % — SIGNIFICANT CHANGE UP (ref 10.3–14.5)
RBC # FLD: 12.1 % — SIGNIFICANT CHANGE UP (ref 10.3–14.5)
RBC # FLD: 12.3 %
RBC # FLD: 12.3 % — SIGNIFICANT CHANGE UP (ref 10.3–14.5)
RBC # FLD: 12.4 % — SIGNIFICANT CHANGE UP (ref 10.3–14.5)
RBC # FLD: 12.4 % — SIGNIFICANT CHANGE UP (ref 10.3–14.5)
RBC # FLD: 12.5 %
RBC # FLD: 14 %
RBC # FLD: 14.4 % — SIGNIFICANT CHANGE UP (ref 10.3–14.5)
RBC # FLD: 14.4 % — SIGNIFICANT CHANGE UP (ref 10.3–14.5)
RBC # FLD: 14.5 %
RBC # FLD: 14.5 % — SIGNIFICANT CHANGE UP (ref 10.3–14.5)
RBC # FLD: 14.6 % — HIGH (ref 10.3–14.5)
RBC # FLD: 14.7 % — HIGH (ref 10.3–14.5)
RBC # FLD: 14.8 % — HIGH (ref 10.3–14.5)
RBC # FLD: 14.8 % — HIGH (ref 10.3–14.5)
RBC # FLD: 15 %
RBC # FLD: 15.1 % — HIGH (ref 10.3–14.5)
RBC # FLD: 15.2 % — HIGH (ref 10.3–14.5)
RBC # FLD: 17.7 % — HIGH (ref 10.3–14.5)
RBC # FLD: 17.7 % — HIGH (ref 10.3–14.5)
RBC BLD AUTO: ABNORMAL
RBC CASTS # UR COMP ASSIST: 1 /HPF — SIGNIFICANT CHANGE UP (ref 0–4)
RH IG SCN BLD-IMP: POSITIVE — SIGNIFICANT CHANGE UP
ROULEAUX BLD QL SMEAR: PRESENT
SAO2 % BLDV: 32.6 % — LOW (ref 67–88)
SAO2 % BLDV: 48 % — LOW (ref 67–88)
SAO2 % BLDV: 86 % — SIGNIFICANT CHANGE UP (ref 67–88)
SARS-COV-2 IGG+IGM SERPL QL IA: 177 U/ML — HIGH
SARS-COV-2 IGG+IGM SERPL QL IA: 189 U/ML — HIGH
SARS-COV-2 IGG+IGM SERPL QL IA: POSITIVE
SARS-COV-2 IGG+IGM SERPL QL IA: POSITIVE
SARS-COV-2 N GENE NPH QL NAA+PROBE: NOT DETECTED
SARS-COV-2 RNA SPEC QL NAA+PROBE: SIGNIFICANT CHANGE UP
SODIUM ?TM SUB UR QN: 51 MMOL/L
SODIUM SERPL-SCNC: 120 MMOL/L — CRITICAL LOW (ref 135–145)
SODIUM SERPL-SCNC: 120 MMOL/L — CRITICAL LOW (ref 135–145)
SODIUM SERPL-SCNC: 122 MMOL/L — LOW (ref 135–145)
SODIUM SERPL-SCNC: 125 MMOL/L — LOW (ref 135–145)
SODIUM SERPL-SCNC: 125 MMOL/L — LOW (ref 135–145)
SODIUM SERPL-SCNC: 126 MMOL/L — LOW (ref 135–145)
SODIUM SERPL-SCNC: 126 MMOL/L — LOW (ref 135–145)
SODIUM SERPL-SCNC: 127 MMOL/L — LOW (ref 135–145)
SODIUM SERPL-SCNC: 128 MMOL/L — LOW (ref 135–145)
SODIUM SERPL-SCNC: 128 MMOL/L — LOW (ref 135–145)
SODIUM SERPL-SCNC: 129 MMOL/L — LOW (ref 135–145)
SODIUM SERPL-SCNC: 130 MMOL/L — LOW (ref 135–145)
SODIUM SERPL-SCNC: 132 MMOL/L
SODIUM SERPL-SCNC: 132 MMOL/L — LOW (ref 135–145)
SODIUM SERPL-SCNC: 133 MMOL/L — LOW (ref 135–145)
SODIUM SERPL-SCNC: 133 MMOL/L — LOW (ref 135–145)
SODIUM SERPL-SCNC: 134 MMOL/L
SODIUM SERPL-SCNC: 134 MMOL/L
SODIUM SERPL-SCNC: 134 MMOL/L — LOW (ref 135–145)
SODIUM SERPL-SCNC: 135 MMOL/L
SODIUM SERPL-SCNC: 135 MMOL/L
SODIUM SERPL-SCNC: 135 MMOL/L — SIGNIFICANT CHANGE UP (ref 135–145)
SODIUM SERPL-SCNC: 136 MMOL/L — SIGNIFICANT CHANGE UP (ref 135–145)
SODIUM SERPL-SCNC: 137 MMOL/L
SODIUM SERPL-SCNC: 137 MMOL/L — SIGNIFICANT CHANGE UP (ref 135–145)
SODIUM SERPL-SCNC: 138 MMOL/L — SIGNIFICANT CHANGE UP (ref 135–145)
SODIUM SERPL-SCNC: 139 MMOL/L — SIGNIFICANT CHANGE UP (ref 135–145)
SODIUM UR-SCNC: 29 MMOL/L — SIGNIFICANT CHANGE UP
SODIUM UR-SCNC: 34 MMOL/L — SIGNIFICANT CHANGE UP
SODIUM UR-SCNC: 40 MMOL/L — SIGNIFICANT CHANGE UP
SP GR SPEC: 1.01 — LOW (ref 1.01–1.02)
SPECIFIC GRAVITY URINE: 1.01
SPECIFIC GRAVITY URINE: 1.01
SPECIFIC GRAVITY URINE: 1.02
SPECIMEN SOURCE: SIGNIFICANT CHANGE UP
SPECIMEN SOURCE: SIGNIFICANT CHANGE UP
SRA INTERP SER-IMP: SIGNIFICANT CHANGE UP
SURGICAL PATHOLOGY STUDY: SIGNIFICANT CHANGE UP
TIBC SERPL-MCNC: 186 UG/DL
TIBC SERPL-MCNC: 221 UG/DL
TRIGL SERPL-MCNC: 97 MG/DL
TROPONIN T, HIGH SENSITIVITY RESULT: 188 NG/L — HIGH (ref 0–51)
TSH SERPL-MCNC: 2.03 UIU/ML — SIGNIFICANT CHANGE UP (ref 0.27–4.2)
UIBC SERPL-MCNC: 158 UG/DL
UIBC SERPL-MCNC: 188 UG/DL
URATE SERPL-MCNC: 13.6 MG/DL — HIGH (ref 3.4–8.8)
URATE UR-MCNC: 14.7 MG/DL — SIGNIFICANT CHANGE UP
UROBILINOGEN FLD QL: NEGATIVE — SIGNIFICANT CHANGE UP
UROBILINOGEN URINE: NORMAL
VARIANT LYMPHS # BLD: 0.9 % — SIGNIFICANT CHANGE UP (ref 0–6)
WBC # BLD: 10.03 K/UL — SIGNIFICANT CHANGE UP (ref 3.8–10.5)
WBC # BLD: 10.37 K/UL — SIGNIFICANT CHANGE UP (ref 3.8–10.5)
WBC # BLD: 10.56 K/UL — HIGH (ref 3.8–10.5)
WBC # BLD: 10.89 K/UL — HIGH (ref 3.8–10.5)
WBC # BLD: 11.07 K/UL — HIGH (ref 3.8–10.5)
WBC # BLD: 12.3 K/UL — HIGH (ref 3.8–10.5)
WBC # BLD: 13.72 K/UL — HIGH (ref 3.8–10.5)
WBC # BLD: 14.26 K/UL — HIGH (ref 3.8–10.5)
WBC # BLD: 15.12 K/UL — HIGH (ref 3.8–10.5)
WBC # BLD: 15.81 K/UL — HIGH (ref 3.8–10.5)
WBC # BLD: 16.26 K/UL — HIGH (ref 3.8–10.5)
WBC # BLD: 18.63 K/UL — HIGH (ref 3.8–10.5)
WBC # BLD: 6.25 K/UL — SIGNIFICANT CHANGE UP (ref 3.8–10.5)
WBC # BLD: 6.83 K/UL — SIGNIFICANT CHANGE UP (ref 3.8–10.5)
WBC # BLD: 7.25 K/UL — SIGNIFICANT CHANGE UP (ref 3.8–10.5)
WBC # BLD: 8.55 K/UL — SIGNIFICANT CHANGE UP (ref 3.8–10.5)
WBC # BLD: 8.56 K/UL — SIGNIFICANT CHANGE UP (ref 3.8–10.5)
WBC # BLD: 8.8 K/UL — SIGNIFICANT CHANGE UP (ref 3.8–10.5)
WBC # BLD: 8.89 K/UL — SIGNIFICANT CHANGE UP (ref 3.8–10.5)
WBC # BLD: 9.13 K/UL — SIGNIFICANT CHANGE UP (ref 3.8–10.5)
WBC # FLD AUTO: 10.01 K/UL
WBC # FLD AUTO: 10.03 K/UL — SIGNIFICANT CHANGE UP (ref 3.8–10.5)
WBC # FLD AUTO: 10.37 K/UL — SIGNIFICANT CHANGE UP (ref 3.8–10.5)
WBC # FLD AUTO: 10.56 K/UL — HIGH (ref 3.8–10.5)
WBC # FLD AUTO: 10.89 K/UL — HIGH (ref 3.8–10.5)
WBC # FLD AUTO: 11.07 K/UL — HIGH (ref 3.8–10.5)
WBC # FLD AUTO: 11.31 K/UL
WBC # FLD AUTO: 12.3 K/UL — HIGH (ref 3.8–10.5)
WBC # FLD AUTO: 12.44 K/UL
WBC # FLD AUTO: 13.72 K/UL — HIGH (ref 3.8–10.5)
WBC # FLD AUTO: 14.26 K/UL — HIGH (ref 3.8–10.5)
WBC # FLD AUTO: 15.12 K/UL — HIGH (ref 3.8–10.5)
WBC # FLD AUTO: 15.81 K/UL — HIGH (ref 3.8–10.5)
WBC # FLD AUTO: 16.26 K/UL — HIGH (ref 3.8–10.5)
WBC # FLD AUTO: 18.63 K/UL — HIGH (ref 3.8–10.5)
WBC # FLD AUTO: 6.25 K/UL — SIGNIFICANT CHANGE UP (ref 3.8–10.5)
WBC # FLD AUTO: 6.83 K/UL — SIGNIFICANT CHANGE UP (ref 3.8–10.5)
WBC # FLD AUTO: 7.25 K/UL — SIGNIFICANT CHANGE UP (ref 3.8–10.5)
WBC # FLD AUTO: 8.55 K/UL — SIGNIFICANT CHANGE UP (ref 3.8–10.5)
WBC # FLD AUTO: 8.56 K/UL — SIGNIFICANT CHANGE UP (ref 3.8–10.5)
WBC # FLD AUTO: 8.8 K/UL — SIGNIFICANT CHANGE UP (ref 3.8–10.5)
WBC # FLD AUTO: 8.89 K/UL — SIGNIFICANT CHANGE UP (ref 3.8–10.5)
WBC # FLD AUTO: 9.13 K/UL — SIGNIFICANT CHANGE UP (ref 3.8–10.5)
WBC # FLD AUTO: 9.33 K/UL
WBC # FLD AUTO: 9.94 K/UL
WBC UR QL: 0 /HPF — SIGNIFICANT CHANGE UP (ref 0–5)

## 2021-01-01 PROCEDURE — 99214 OFFICE O/P EST MOD 30 MIN: CPT

## 2021-01-01 PROCEDURE — 94640 AIRWAY INHALATION TREATMENT: CPT

## 2021-01-01 PROCEDURE — 99223 1ST HOSP IP/OBS HIGH 75: CPT

## 2021-01-01 PROCEDURE — 99072 ADDL SUPL MATRL&STAF TM PHE: CPT

## 2021-01-01 PROCEDURE — 12032 INTMD RPR S/A/T/EXT 2.6-7.5: CPT

## 2021-01-01 PROCEDURE — 99212 OFFICE O/P EST SF 10 MIN: CPT

## 2021-01-01 PROCEDURE — 99223 1ST HOSP IP/OBS HIGH 75: CPT | Mod: GC

## 2021-01-01 PROCEDURE — 71250 CT THORAX DX C-: CPT | Mod: 26

## 2021-01-01 PROCEDURE — 84295 ASSAY OF SERUM SODIUM: CPT

## 2021-01-01 PROCEDURE — 32674 THORACOSCOPY LYMPH NODE EXC: CPT

## 2021-01-01 PROCEDURE — 71045 X-RAY EXAM CHEST 1 VIEW: CPT | Mod: 26

## 2021-01-01 PROCEDURE — 99239 HOSP IP/OBS DSCHRG MGMT >30: CPT

## 2021-01-01 PROCEDURE — 88305 TISSUE EXAM BY PATHOLOGIST: CPT

## 2021-01-01 PROCEDURE — 99233 SBSQ HOSP IP/OBS HIGH 50: CPT | Mod: GC

## 2021-01-01 PROCEDURE — 84145 PROCALCITONIN (PCT): CPT

## 2021-01-01 PROCEDURE — 71250 CT THORAX DX C-: CPT | Mod: 26,MA

## 2021-01-01 PROCEDURE — 70450 CT HEAD/BRAIN W/O DYE: CPT | Mod: MA

## 2021-01-01 PROCEDURE — 95251 CONT GLUC MNTR ANALYSIS I&R: CPT

## 2021-01-01 PROCEDURE — 84155 ASSAY OF PROTEIN SERUM: CPT

## 2021-01-01 PROCEDURE — 96401 CHEMO ANTI-NEOPL SQ/IM: CPT

## 2021-01-01 PROCEDURE — 76770 US EXAM ABDO BACK WALL COMP: CPT | Mod: 26

## 2021-01-01 PROCEDURE — 99215 OFFICE O/P EST HI 40 MIN: CPT | Mod: 95

## 2021-01-01 PROCEDURE — 17311 MOHS 1 STAGE H/N/HF/G: CPT

## 2021-01-01 PROCEDURE — 96366 THER/PROPH/DIAG IV INF ADDON: CPT

## 2021-01-01 PROCEDURE — 99233 SBSQ HOSP IP/OBS HIGH 50: CPT

## 2021-01-01 PROCEDURE — 99213 OFFICE O/P EST LOW 20 MIN: CPT | Mod: 25,GC

## 2021-01-01 PROCEDURE — 99024 POSTOP FOLLOW-UP VISIT: CPT

## 2021-01-01 PROCEDURE — 73590 X-RAY EXAM OF LOWER LEG: CPT | Mod: 26,RT

## 2021-01-01 PROCEDURE — 99214 OFFICE O/P EST MOD 30 MIN: CPT | Mod: 25

## 2021-01-01 PROCEDURE — 32663 THORACOSCOPY W/LOBECTOMY: CPT | Mod: RT

## 2021-01-01 PROCEDURE — 70553 MRI BRAIN STEM W/O & W/DYE: CPT | Mod: 26

## 2021-01-01 PROCEDURE — 84132 ASSAY OF SERUM POTASSIUM: CPT

## 2021-01-01 PROCEDURE — 85018 HEMOGLOBIN: CPT

## 2021-01-01 PROCEDURE — 99442: CPT

## 2021-01-01 PROCEDURE — 83605 ASSAY OF LACTIC ACID: CPT

## 2021-01-01 PROCEDURE — 84484 ASSAY OF TROPONIN QUANT: CPT

## 2021-01-01 PROCEDURE — 80053 COMPREHEN METABOLIC PANEL: CPT

## 2021-01-01 PROCEDURE — C1894: CPT

## 2021-01-01 PROCEDURE — 99221 1ST HOSP IP/OBS SF/LOW 40: CPT

## 2021-01-01 PROCEDURE — 99213 OFFICE O/P EST LOW 20 MIN: CPT | Mod: 25

## 2021-01-01 PROCEDURE — 99285 EMERGENCY DEPT VISIT HI MDM: CPT | Mod: 25

## 2021-01-01 PROCEDURE — 78815 PET IMAGE W/CT SKULL-THIGH: CPT | Mod: 26,PI

## 2021-01-01 PROCEDURE — 77293 RESPIRATOR MOTION MGMT SIMUL: CPT | Mod: 26

## 2021-01-01 PROCEDURE — 17003 DESTRUCT PREMALG LES 2-14: CPT

## 2021-01-01 PROCEDURE — 84100 ASSAY OF PHOSPHORUS: CPT

## 2021-01-01 PROCEDURE — 20610 DRAIN/INJ JOINT/BURSA W/O US: CPT | Mod: LT

## 2021-01-01 PROCEDURE — 99232 SBSQ HOSP IP/OBS MODERATE 35: CPT

## 2021-01-01 PROCEDURE — 86769 SARS-COV-2 COVID-19 ANTIBODY: CPT

## 2021-01-01 PROCEDURE — 93460 R&L HRT ART/VENTRICLE ANGIO: CPT | Mod: 26

## 2021-01-01 PROCEDURE — 99285 EMERGENCY DEPT VISIT HI MDM: CPT

## 2021-01-01 PROCEDURE — 77295 3-D RADIOTHERAPY PLAN: CPT | Mod: 26

## 2021-01-01 PROCEDURE — 71250 CT THORAX DX C-: CPT

## 2021-01-01 PROCEDURE — 99443: CPT | Mod: GC,25

## 2021-01-01 PROCEDURE — 32555 ASPIRATE PLEURA W/ IMAGING: CPT | Mod: RT

## 2021-01-01 PROCEDURE — 83880 ASSAY OF NATRIURETIC PEPTIDE: CPT

## 2021-01-01 PROCEDURE — 74176 CT ABD & PELVIS W/O CONTRAST: CPT | Mod: MA

## 2021-01-01 PROCEDURE — 93005 ELECTROCARDIOGRAM TRACING: CPT

## 2021-01-01 PROCEDURE — U0005: CPT

## 2021-01-01 PROCEDURE — 80048 BASIC METABOLIC PNL TOTAL CA: CPT

## 2021-01-01 PROCEDURE — 96374 THER/PROPH/DIAG INJ IV PUSH: CPT

## 2021-01-01 PROCEDURE — 71250 CT THORAX DX C-: CPT | Mod: MA

## 2021-01-01 PROCEDURE — 94010 BREATHING CAPACITY TEST: CPT

## 2021-01-01 PROCEDURE — 83036 HEMOGLOBIN GLYCOSYLATED A1C: CPT | Mod: QW

## 2021-01-01 PROCEDURE — 70553 MRI BRAIN STEM W/O & W/DYE: CPT

## 2021-01-01 PROCEDURE — 99232 SBSQ HOSP IP/OBS MODERATE 35: CPT | Mod: GC

## 2021-01-01 PROCEDURE — 83930 ASSAY OF BLOOD OSMOLALITY: CPT

## 2021-01-01 PROCEDURE — 87040 BLOOD CULTURE FOR BACTERIA: CPT

## 2021-01-01 PROCEDURE — 99285 EMERGENCY DEPT VISIT HI MDM: CPT | Mod: CS

## 2021-01-01 PROCEDURE — 99215 OFFICE O/P EST HI 40 MIN: CPT

## 2021-01-01 PROCEDURE — 83735 ASSAY OF MAGNESIUM: CPT

## 2021-01-01 PROCEDURE — 99024 POSTOP FOLLOW-UP VISIT: CPT | Mod: GC

## 2021-01-01 PROCEDURE — 85027 COMPLETE CBC AUTOMATED: CPT

## 2021-01-01 PROCEDURE — 99213 OFFICE O/P EST LOW 20 MIN: CPT

## 2021-01-01 PROCEDURE — 86334 IMMUNOFIX E-PHORESIS SERUM: CPT

## 2021-01-01 PROCEDURE — 74176 CT ABD & PELVIS W/O CONTRAST: CPT | Mod: 26,MA

## 2021-01-01 PROCEDURE — 77290 THER RAD SIMULAJ FIELD CPLX: CPT | Mod: 26

## 2021-01-01 PROCEDURE — A9552: CPT

## 2021-01-01 PROCEDURE — 93000 ELECTROCARDIOGRAM COMPLETE: CPT

## 2021-01-01 PROCEDURE — 93010 ELECTROCARDIOGRAM REPORT: CPT

## 2021-01-01 PROCEDURE — A9585: CPT

## 2021-01-01 PROCEDURE — 82962 GLUCOSE BLOOD TEST: CPT

## 2021-01-01 PROCEDURE — 85025 COMPLETE CBC W/AUTO DIFF WBC: CPT

## 2021-01-01 PROCEDURE — 99496 TRANSJ CARE MGMT HIGH F2F 7D: CPT

## 2021-01-01 PROCEDURE — 78815 PET IMAGE W/CT SKULL-THIGH: CPT | Mod: 26,PS

## 2021-01-01 PROCEDURE — 88305 TISSUE EXAM BY PATHOLOGIST: CPT | Mod: 26

## 2021-01-01 PROCEDURE — 81001 URINALYSIS AUTO W/SCOPE: CPT

## 2021-01-01 PROCEDURE — 77334 RADIATION TREATMENT AID(S): CPT | Mod: 26

## 2021-01-01 PROCEDURE — 71046 X-RAY EXAM CHEST 2 VIEWS: CPT | Mod: 26

## 2021-01-01 PROCEDURE — 70336 MAGNETIC IMAGE JAW JOINT: CPT

## 2021-01-01 PROCEDURE — 96365 THER/PROPH/DIAG IV INF INIT: CPT

## 2021-01-01 PROCEDURE — 84443 ASSAY THYROID STIM HORMONE: CPT

## 2021-01-01 PROCEDURE — 99239 HOSP IP/OBS DSCHRG MGMT >30: CPT | Mod: GC

## 2021-01-01 PROCEDURE — 84560 ASSAY OF URINE/URIC ACID: CPT

## 2021-01-01 PROCEDURE — 99205 OFFICE O/P NEW HI 60 MIN: CPT

## 2021-01-01 PROCEDURE — 73630 X-RAY EXAM OF FOOT: CPT

## 2021-01-01 PROCEDURE — 71046 X-RAY EXAM CHEST 2 VIEWS: CPT

## 2021-01-01 PROCEDURE — 82533 TOTAL CORTISOL: CPT

## 2021-01-01 PROCEDURE — 94726 PLETHYSMOGRAPHY LUNG VOLUMES: CPT

## 2021-01-01 PROCEDURE — 76770 US EXAM ABDO BACK WALL COMP: CPT

## 2021-01-01 PROCEDURE — 77263 THER RADIOLOGY TX PLNG CPLX: CPT

## 2021-01-01 PROCEDURE — 97161 PT EVAL LOW COMPLEX 20 MIN: CPT

## 2021-01-01 PROCEDURE — 99205 OFFICE O/P NEW HI 60 MIN: CPT | Mod: GC,25

## 2021-01-01 PROCEDURE — 99214 OFFICE O/P EST MOD 30 MIN: CPT | Mod: 95

## 2021-01-01 PROCEDURE — 99222 1ST HOSP IP/OBS MODERATE 55: CPT

## 2021-01-01 PROCEDURE — 70336 MAGNETIC IMAGE JAW JOINT: CPT | Mod: 26

## 2021-01-01 PROCEDURE — 84550 ASSAY OF BLOOD/URIC ACID: CPT

## 2021-01-01 PROCEDURE — 82947 ASSAY GLUCOSE BLOOD QUANT: CPT

## 2021-01-01 PROCEDURE — 73610 X-RAY EXAM OF ANKLE: CPT

## 2021-01-01 PROCEDURE — 82565 ASSAY OF CREATININE: CPT

## 2021-01-01 PROCEDURE — 71045 X-RAY EXAM CHEST 1 VIEW: CPT | Mod: 26,77

## 2021-01-01 PROCEDURE — 73030 X-RAY EXAM OF SHOULDER: CPT | Mod: 26,LT

## 2021-01-01 PROCEDURE — C1769: CPT

## 2021-01-01 PROCEDURE — 73030 X-RAY EXAM OF SHOULDER: CPT

## 2021-01-01 PROCEDURE — 93308 TTE F-UP OR LMTD: CPT | Mod: 26

## 2021-01-01 PROCEDURE — 85014 HEMATOCRIT: CPT

## 2021-01-01 PROCEDURE — 93000 ELECTROCARDIOGRAM COMPLETE: CPT | Mod: NC

## 2021-01-01 PROCEDURE — 88313 SPECIAL STAINS GROUP 2: CPT | Mod: 26

## 2021-01-01 PROCEDURE — 78815 PET IMAGE W/CT SKULL-THIGH: CPT

## 2021-01-01 PROCEDURE — 84165 PROTEIN E-PHORESIS SERUM: CPT

## 2021-01-01 PROCEDURE — 82010 KETONE BODYS QUAN: CPT

## 2021-01-01 PROCEDURE — 73590 X-RAY EXAM OF LOWER LEG: CPT

## 2021-01-01 PROCEDURE — 99213 OFFICE O/P EST LOW 20 MIN: CPT | Mod: 95

## 2021-01-01 PROCEDURE — 71045 X-RAY EXAM CHEST 1 VIEW: CPT

## 2021-01-01 PROCEDURE — U0003: CPT

## 2021-01-01 PROCEDURE — 93970 EXTREMITY STUDY: CPT

## 2021-01-01 PROCEDURE — 99153 MOD SED SAME PHYS/QHP EA: CPT

## 2021-01-01 PROCEDURE — 84300 ASSAY OF URINE SODIUM: CPT

## 2021-01-01 PROCEDURE — 93970 EXTREMITY STUDY: CPT | Mod: 26

## 2021-01-01 PROCEDURE — 85610 PROTHROMBIN TIME: CPT

## 2021-01-01 PROCEDURE — 36415 COLL VENOUS BLD VENIPUNCTURE: CPT

## 2021-01-01 PROCEDURE — 88112 CYTOPATH CELL ENHANCE TECH: CPT | Mod: 26

## 2021-01-01 PROCEDURE — 96367 TX/PROPH/DG ADDL SEQ IV INF: CPT

## 2021-01-01 PROCEDURE — 88112 CYTOPATH CELL ENHANCE TECH: CPT

## 2021-01-01 PROCEDURE — 94729 DIFFUSING CAPACITY: CPT

## 2021-01-01 PROCEDURE — 82803 BLOOD GASES ANY COMBINATION: CPT

## 2021-01-01 PROCEDURE — 93308 TTE F-UP OR LMTD: CPT

## 2021-01-01 PROCEDURE — 96375 TX/PRO/DX INJ NEW DRUG ADDON: CPT

## 2021-01-01 PROCEDURE — 77300 RADIATION THERAPY DOSE PLAN: CPT | Mod: 26

## 2021-01-01 PROCEDURE — 93971 EXTREMITY STUDY: CPT | Mod: 26,RT

## 2021-01-01 PROCEDURE — 83935 ASSAY OF URINE OSMOLALITY: CPT

## 2021-01-01 PROCEDURE — 17000 DESTRUCT PREMALG LESION: CPT | Mod: 59

## 2021-01-01 PROCEDURE — 83036 HEMOGLOBIN GLYCOSYLATED A1C: CPT

## 2021-01-01 PROCEDURE — 82435 ASSAY OF BLOOD CHLORIDE: CPT

## 2021-01-01 PROCEDURE — 99152 MOD SED SAME PHYS/QHP 5/>YRS: CPT

## 2021-01-01 PROCEDURE — 32555 ASPIRATE PLEURA W/ IMAGING: CPT

## 2021-01-01 PROCEDURE — 12345: CPT | Mod: NC

## 2021-01-01 PROCEDURE — 11102 TANGNTL BX SKIN SINGLE LES: CPT

## 2021-01-01 PROCEDURE — 88360 TUMOR IMMUNOHISTOCHEM/MANUAL: CPT | Mod: 26

## 2021-01-01 PROCEDURE — 88309 TISSUE EXAM BY PATHOLOGIST: CPT | Mod: 26

## 2021-01-01 PROCEDURE — 70450 CT HEAD/BRAIN W/O DYE: CPT | Mod: 26,MA

## 2021-01-01 PROCEDURE — 93971 EXTREMITY STUDY: CPT

## 2021-01-01 PROCEDURE — 0225U NFCT DS DNA&RNA 21 SARSCOV2: CPT

## 2021-01-01 PROCEDURE — 85730 THROMBOPLASTIN TIME PARTIAL: CPT

## 2021-01-01 PROCEDURE — 73630 X-RAY EXAM OF FOOT: CPT | Mod: 26,RT

## 2021-01-01 PROCEDURE — 73610 X-RAY EXAM OF ANKLE: CPT | Mod: 26,RT

## 2021-01-01 PROCEDURE — C1887: CPT

## 2021-01-01 PROCEDURE — 82330 ASSAY OF CALCIUM: CPT

## 2021-01-01 PROCEDURE — 93460 R&L HRT ART/VENTRICLE ANGIO: CPT

## 2021-01-01 RX ORDER — DICLOFENAC SODIUM 30 MG/G
1 GEL TOPICAL
Qty: 0 | Refills: 0 | DISCHARGE

## 2021-01-01 RX ORDER — DEXTROSE 50 % IN WATER 50 %
25 SYRINGE (ML) INTRAVENOUS ONCE
Refills: 0 | Status: DISCONTINUED | OUTPATIENT
Start: 2021-01-01 | End: 2021-01-01

## 2021-01-01 RX ORDER — OXYCODONE HYDROCHLORIDE 5 MG/1
5 TABLET ORAL
Refills: 0 | Status: DISCONTINUED | OUTPATIENT
Start: 2021-01-01 | End: 2021-01-01

## 2021-01-01 RX ORDER — SODIUM CHLORIDE 9 MG/ML
1000 INJECTION, SOLUTION INTRAVENOUS
Refills: 0 | Status: DISCONTINUED | OUTPATIENT
Start: 2021-01-01 | End: 2021-01-01

## 2021-01-01 RX ORDER — B-COMPLEX WITH VITAMIN C
1250 (500 CA) TABLET ORAL
Refills: 0 | Status: DISCONTINUED | COMMUNITY
End: 2021-01-01

## 2021-01-01 RX ORDER — INSULIN LISPRO 100/ML
VIAL (ML) SUBCUTANEOUS AT BEDTIME
Refills: 0 | Status: DISCONTINUED | OUTPATIENT
Start: 2021-01-01 | End: 2021-01-01

## 2021-01-01 RX ORDER — HYDROMORPHONE HYDROCHLORIDE 2 MG/ML
0.3 INJECTION INTRAMUSCULAR; INTRAVENOUS; SUBCUTANEOUS
Refills: 0 | Status: DISCONTINUED | OUTPATIENT
Start: 2021-01-01 | End: 2021-01-01

## 2021-01-01 RX ORDER — INSULIN LISPRO 100/ML
VIAL (ML) SUBCUTANEOUS
Refills: 0 | Status: DISCONTINUED | OUTPATIENT
Start: 2021-01-01 | End: 2021-01-01

## 2021-01-01 RX ORDER — INSULIN LISPRO 100/ML
VIAL (ML) SUBCUTANEOUS EVERY 6 HOURS
Refills: 0 | Status: DISCONTINUED | OUTPATIENT
Start: 2021-01-01 | End: 2021-01-01

## 2021-01-01 RX ORDER — ONDANSETRON 4 MG/1
4 TABLET, ORALLY DISINTEGRATING ORAL 3 TIMES DAILY
Qty: 30 | Refills: 1 | Status: ACTIVE | COMMUNITY
Start: 2021-01-01 | End: 1900-01-01

## 2021-01-01 RX ORDER — SENNA PLUS 8.6 MG/1
2 TABLET ORAL AT BEDTIME
Refills: 0 | Status: DISCONTINUED | OUTPATIENT
Start: 2021-01-01 | End: 2021-01-01

## 2021-01-01 RX ORDER — CALCIUM GLUCONATE 100 MG/ML
1 VIAL (ML) INTRAVENOUS ONCE
Refills: 0 | Status: COMPLETED | OUTPATIENT
Start: 2021-01-01 | End: 2021-01-01

## 2021-01-01 RX ORDER — GLUCAGON INJECTION, SOLUTION 0.5 MG/.1ML
1 INJECTION, SOLUTION SUBCUTANEOUS ONCE
Refills: 0 | Status: DISCONTINUED | OUTPATIENT
Start: 2021-01-01 | End: 2021-01-01

## 2021-01-01 RX ORDER — ONDANSETRON 8 MG/1
4 TABLET, FILM COATED ORAL EVERY 6 HOURS
Refills: 0 | Status: DISCONTINUED | OUTPATIENT
Start: 2021-01-01 | End: 2021-01-01

## 2021-01-01 RX ORDER — HYDROMORPHONE HYDROCHLORIDE 2 MG/ML
0.5 INJECTION INTRAMUSCULAR; INTRAVENOUS; SUBCUTANEOUS
Refills: 0 | Status: DISCONTINUED | OUTPATIENT
Start: 2021-01-01 | End: 2021-01-01

## 2021-01-01 RX ORDER — INSULIN GLARGINE 100 [IU]/ML
12 INJECTION, SOLUTION SUBCUTANEOUS
Qty: 5 | Refills: 0
Start: 2021-01-01 | End: 2021-01-01

## 2021-01-01 RX ORDER — POLYETHYLENE GLYCOL 3350 17 G/17G
17 POWDER, FOR SOLUTION ORAL AT BEDTIME
Refills: 0 | Status: DISCONTINUED | OUTPATIENT
Start: 2021-01-01 | End: 2021-01-01

## 2021-01-01 RX ORDER — INSULIN GLARGINE 100 [IU]/ML
10 INJECTION, SOLUTION SUBCUTANEOUS ONCE
Refills: 0 | Status: COMPLETED | OUTPATIENT
Start: 2021-01-01 | End: 2021-01-01

## 2021-01-01 RX ORDER — ROSUVASTATIN CALCIUM 5 MG/1
1 TABLET ORAL
Qty: 0 | Refills: 0 | DISCHARGE

## 2021-01-01 RX ORDER — PANTOPRAZOLE SODIUM 20 MG/1
40 TABLET, DELAYED RELEASE ORAL
Refills: 0 | Status: DISCONTINUED | OUTPATIENT
Start: 2021-01-01 | End: 2021-01-01

## 2021-01-01 RX ORDER — SODIUM BICARBONATE 1 MEQ/ML
1300 SYRINGE (ML) INTRAVENOUS THREE TIMES A DAY
Refills: 0 | Status: DISCONTINUED | OUTPATIENT
Start: 2021-01-01 | End: 2021-01-01

## 2021-01-01 RX ORDER — OMEPRAZOLE 10 MG/1
1 CAPSULE, DELAYED RELEASE ORAL
Qty: 0 | Refills: 0 | DISCHARGE

## 2021-01-01 RX ORDER — INSULIN LISPRO 100/ML
1 VIAL (ML) SUBCUTANEOUS
Refills: 0 | Status: DISCONTINUED | OUTPATIENT
Start: 2021-01-01 | End: 2021-01-01

## 2021-01-01 RX ORDER — FUROSEMIDE 40 MG
1 TABLET ORAL
Qty: 30 | Refills: 0
Start: 2021-01-01 | End: 2021-01-01

## 2021-01-01 RX ORDER — ASPIRIN/CALCIUM CARB/MAGNESIUM 324 MG
81 TABLET ORAL
Refills: 0 | Status: DISCONTINUED | OUTPATIENT
Start: 2021-01-01 | End: 2021-01-01

## 2021-01-01 RX ORDER — SODIUM CHLORIDE 9 MG/ML
500 INJECTION INTRAMUSCULAR; INTRAVENOUS; SUBCUTANEOUS ONCE
Refills: 0 | Status: COMPLETED | OUTPATIENT
Start: 2021-01-01 | End: 2021-01-01

## 2021-01-01 RX ORDER — HYDROMORPHONE HYDROCHLORIDE 2 MG/ML
1 INJECTION INTRAMUSCULAR; INTRAVENOUS; SUBCUTANEOUS
Qty: 0 | Refills: 0 | DISCHARGE

## 2021-01-01 RX ORDER — IBUPROFEN 200 MG
2 TABLET ORAL
Qty: 0 | Refills: 0 | DISCHARGE

## 2021-01-01 RX ORDER — HEPARIN SODIUM 5000 [USP'U]/ML
5000 INJECTION INTRAVENOUS; SUBCUTANEOUS EVERY 8 HOURS
Refills: 0 | Status: DISCONTINUED | OUTPATIENT
Start: 2021-01-01 | End: 2021-01-01

## 2021-01-01 RX ORDER — FUROSEMIDE 40 MG
80 TABLET ORAL
Refills: 0 | Status: DISCONTINUED | OUTPATIENT
Start: 2021-01-01 | End: 2021-01-01

## 2021-01-01 RX ORDER — INSULIN LISPRO 100/ML
3 VIAL (ML) SUBCUTANEOUS ONCE
Refills: 0 | Status: COMPLETED | OUTPATIENT
Start: 2021-01-01 | End: 2021-01-01

## 2021-01-01 RX ORDER — ATORVASTATIN CALCIUM 80 MG/1
80 TABLET, FILM COATED ORAL AT BEDTIME
Refills: 0 | Status: DISCONTINUED | OUTPATIENT
Start: 2021-01-01 | End: 2021-01-01

## 2021-01-01 RX ORDER — METOPROLOL TARTRATE 50 MG
50 TABLET ORAL DAILY
Refills: 0 | Status: DISCONTINUED | OUTPATIENT
Start: 2021-01-01 | End: 2021-01-01

## 2021-01-01 RX ORDER — INSULIN LISPRO 100/ML
6 VIAL (ML) SUBCUTANEOUS
Qty: 5 | Refills: 0
Start: 2021-01-01 | End: 2021-01-01

## 2021-01-01 RX ORDER — METOPROLOL TARTRATE 50 MG
1 TABLET ORAL
Qty: 0 | Refills: 0 | DISCHARGE

## 2021-01-01 RX ORDER — SODIUM CHLORIDE 9 MG/ML
3 INJECTION INTRAMUSCULAR; INTRAVENOUS; SUBCUTANEOUS EVERY 8 HOURS
Refills: 0 | Status: DISCONTINUED | OUTPATIENT
Start: 2021-01-01 | End: 2021-01-01

## 2021-01-01 RX ORDER — MORPHINE SULFATE 50 MG/1
4 CAPSULE, EXTENDED RELEASE ORAL ONCE
Refills: 0 | Status: DISCONTINUED | OUTPATIENT
Start: 2021-01-01 | End: 2021-01-01

## 2021-01-01 RX ORDER — INSULIN GLARGINE 100 [IU]/ML
14 INJECTION, SOLUTION SUBCUTANEOUS AT BEDTIME
Refills: 0 | Status: DISCONTINUED | OUTPATIENT
Start: 2021-01-01 | End: 2021-01-01

## 2021-01-01 RX ORDER — MAGNESIUM SULFATE 500 MG/ML
2 VIAL (ML) INJECTION ONCE
Refills: 0 | Status: COMPLETED | OUTPATIENT
Start: 2021-01-01 | End: 2021-01-01

## 2021-01-01 RX ORDER — METOPROLOL SUCCINATE 50 MG/1
50 TABLET, EXTENDED RELEASE ORAL DAILY
Qty: 90 | Refills: 2 | Status: ACTIVE | COMMUNITY
Start: 1900-01-01 | End: 1900-01-01

## 2021-01-01 RX ORDER — HYOSCYAMINE SULFATE 0.12 MG/1
0.12 TABLET SUBLINGUAL
Qty: 10 | Refills: 0 | Status: ACTIVE | COMMUNITY
Start: 2021-01-01 | End: 1900-01-01

## 2021-01-01 RX ORDER — CLOPIDOGREL BISULFATE 75 MG/1
1 TABLET, FILM COATED ORAL
Qty: 0 | Refills: 0 | DISCHARGE

## 2021-01-01 RX ORDER — ACETAMINOPHEN 500 MG
650 TABLET ORAL EVERY 6 HOURS
Refills: 0 | Status: DISCONTINUED | OUTPATIENT
Start: 2021-01-01 | End: 2021-01-01

## 2021-01-01 RX ORDER — MULTIVITAMIN
TABLET ORAL
Refills: 0 | Status: ACTIVE | COMMUNITY
Start: 2021-01-01

## 2021-01-01 RX ORDER — OMEPRAZOLE 20 MG/1
20 CAPSULE, DELAYED RELEASE ORAL DAILY
Qty: 90 | Refills: 3 | Status: ACTIVE | COMMUNITY
Start: 1900-01-01 | End: 1900-01-01

## 2021-01-01 RX ORDER — INSULIN GLARGINE 100 [IU]/ML
10 INJECTION, SOLUTION SUBCUTANEOUS
Refills: 0 | Status: DISCONTINUED | OUTPATIENT
Start: 2021-01-01 | End: 2021-01-01

## 2021-01-01 RX ORDER — AZITHROMYCIN 500 MG/1
500 TABLET, FILM COATED ORAL ONCE
Refills: 0 | Status: COMPLETED | OUTPATIENT
Start: 2021-01-01 | End: 2021-01-01

## 2021-01-01 RX ORDER — SODIUM CHLORIDE 9 MG/ML
250 INJECTION INTRAMUSCULAR; INTRAVENOUS; SUBCUTANEOUS ONCE
Refills: 0 | Status: DISCONTINUED | OUTPATIENT
Start: 2021-01-01 | End: 2021-01-01

## 2021-01-01 RX ORDER — ROSUVASTATIN CALCIUM 5 MG/1
1 TABLET ORAL
Qty: 30 | Refills: 0
Start: 2021-01-01 | End: 2021-01-01

## 2021-01-01 RX ORDER — ENOXAPARIN SODIUM 100 MG/ML
12 INJECTION SUBCUTANEOUS
Qty: 45 | Refills: 3
Start: 2021-01-01 | End: 2022-01-01

## 2021-01-01 RX ORDER — LIDOCAINE 4 G/100G
1 CREAM TOPICAL DAILY
Refills: 0 | Status: DISCONTINUED | OUTPATIENT
Start: 2021-01-01 | End: 2021-01-01

## 2021-01-01 RX ORDER — INFLUENZA VIRUS VACCINE 15; 15; 15; 15 UG/.5ML; UG/.5ML; UG/.5ML; UG/.5ML
0.5 SUSPENSION INTRAMUSCULAR ONCE
Refills: 0 | Status: DISCONTINUED | OUTPATIENT
Start: 2021-01-01 | End: 2021-01-01

## 2021-01-01 RX ORDER — METOLAZONE 2.5 MG/1
2.5 TABLET ORAL
Qty: 10 | Refills: 1 | Status: DISCONTINUED | COMMUNITY
Start: 2021-01-01 | End: 2021-01-01

## 2021-01-01 RX ORDER — DEXTROSE 50 % IN WATER 50 %
12.5 SYRINGE (ML) INTRAVENOUS ONCE
Refills: 0 | Status: DISCONTINUED | OUTPATIENT
Start: 2021-01-01 | End: 2021-01-01

## 2021-01-01 RX ORDER — TAMSULOSIN HYDROCHLORIDE 0.4 MG/1
0.4 CAPSULE ORAL ONCE
Refills: 0 | Status: COMPLETED | OUTPATIENT
Start: 2021-01-01 | End: 2021-01-01

## 2021-01-01 RX ORDER — HYDROMORPHONE HYDROCHLORIDE 2 MG/ML
30 INJECTION INTRAMUSCULAR; INTRAVENOUS; SUBCUTANEOUS
Refills: 0 | Status: DISCONTINUED | OUTPATIENT
Start: 2021-01-01 | End: 2021-01-01

## 2021-01-01 RX ORDER — OXYCODONE HYDROCHLORIDE 5 MG/1
1 TABLET ORAL
Qty: 12 | Refills: 0
Start: 2021-01-01

## 2021-01-01 RX ORDER — HEPARIN SODIUM 5000 [USP'U]/ML
5000 INJECTION INTRAVENOUS; SUBCUTANEOUS ONCE
Refills: 0 | Status: COMPLETED | OUTPATIENT
Start: 2021-01-01 | End: 2021-01-01

## 2021-01-01 RX ORDER — FUROSEMIDE 40 MG
1 TABLET ORAL
Qty: 0 | Refills: 0 | DISCHARGE

## 2021-01-01 RX ORDER — ASPIRIN/CALCIUM CARB/MAGNESIUM 324 MG
1 TABLET ORAL
Qty: 0 | Refills: 0 | DISCHARGE

## 2021-01-01 RX ORDER — CALCIUM GLUCONATE 100 MG/ML
2 VIAL (ML) INTRAVENOUS ONCE
Refills: 0 | Status: COMPLETED | OUTPATIENT
Start: 2021-01-01 | End: 2021-01-01

## 2021-01-01 RX ORDER — INSULIN GLARGINE 100 [IU]/ML
7 INJECTION, SOLUTION SUBCUTANEOUS ONCE
Refills: 0 | Status: COMPLETED | OUTPATIENT
Start: 2021-01-01 | End: 2021-01-01

## 2021-01-01 RX ORDER — CLOPIDOGREL BISULFATE 75 MG/1
75 TABLET, FILM COATED ORAL DAILY
Refills: 0 | Status: DISCONTINUED | OUTPATIENT
Start: 2021-01-01 | End: 2021-01-01

## 2021-01-01 RX ORDER — DENOSUMAB 60 MG/ML
60 INJECTION SUBCUTANEOUS
Qty: 1 | Refills: 0 | Status: COMPLETED | OUTPATIENT
Start: 2021-01-01

## 2021-01-01 RX ORDER — CALCIUM CITRATE 200(950)MG
200 TABLET ORAL
Qty: 120 | Refills: 0 | Status: DISCONTINUED | COMMUNITY
Start: 2021-01-01 | End: 2021-01-01

## 2021-01-01 RX ORDER — ISOPROPYL ALCOHOL, BENZOCAINE .7; .06 ML/ML; ML/ML
1 SWAB TOPICAL
Qty: 100 | Refills: 3
Start: 2021-01-01 | End: 2022-01-25

## 2021-01-01 RX ORDER — IPRATROPIUM/ALBUTEROL SULFATE 18-103MCG
3 AEROSOL WITH ADAPTER (GRAM) INHALATION EVERY 6 HOURS
Refills: 0 | Status: DISCONTINUED | OUTPATIENT
Start: 2021-01-01 | End: 2021-01-01

## 2021-01-01 RX ORDER — FUROSEMIDE 40 MG
40 TABLET ORAL ONCE
Refills: 0 | Status: COMPLETED | OUTPATIENT
Start: 2021-01-01 | End: 2021-01-01

## 2021-01-01 RX ORDER — ACETAMINOPHEN 500 MG/1
500 TABLET ORAL
Qty: 180 | Refills: 0 | Status: ACTIVE | COMMUNITY
Start: 2021-01-01

## 2021-01-01 RX ORDER — DEXTROSE 50 % IN WATER 50 %
15 SYRINGE (ML) INTRAVENOUS ONCE
Refills: 0 | Status: DISCONTINUED | OUTPATIENT
Start: 2021-01-01 | End: 2021-01-01

## 2021-01-01 RX ORDER — GABAPENTIN 300 MG/1
300 CAPSULE ORAL DAILY
Refills: 0 | Status: DISCONTINUED | COMMUNITY
Start: 2021-01-01 | End: 2021-01-01

## 2021-01-01 RX ORDER — VALSARTAN 40 MG/1
40 TABLET, COATED ORAL
Qty: 90 | Refills: 3 | Status: ACTIVE | COMMUNITY
Start: 2021-01-01 | End: 1900-01-01

## 2021-01-01 RX ORDER — ASPIRIN/CALCIUM CARB/MAGNESIUM 324 MG
0 TABLET ORAL
Qty: 0 | Refills: 0 | DISCHARGE

## 2021-01-01 RX ORDER — USTEKINUMAB 45 MG/.5ML
0 INJECTION, SOLUTION SUBCUTANEOUS
Qty: 0 | Refills: 0 | DISCHARGE

## 2021-01-01 RX ORDER — OXYCODONE HYDROCHLORIDE 5 MG/1
5 TABLET ORAL EVERY 6 HOURS
Refills: 0 | Status: DISCONTINUED | OUTPATIENT
Start: 2021-01-01 | End: 2021-01-01

## 2021-01-01 RX ORDER — ROSUVASTATIN CALCIUM 20 MG/1
20 TABLET, FILM COATED ORAL DAILY
Qty: 90 | Refills: 0 | Status: DISCONTINUED | COMMUNITY
Start: 2021-01-01 | End: 2021-01-01

## 2021-01-01 RX ORDER — CLOPIDOGREL BISULFATE 75 MG/1
75 TABLET, FILM COATED ORAL DAILY
Qty: 90 | Refills: 2 | Status: ACTIVE | COMMUNITY
Start: 2021-01-01 | End: 1900-01-01

## 2021-01-01 RX ORDER — ABATACEPT 125 MG/ML
100 INJECTION, SOLUTION SUBCUTANEOUS
Qty: 0 | Refills: 0 | DISCHARGE

## 2021-01-01 RX ORDER — ROSUVASTATIN CALCIUM 20 MG/1
20 TABLET, FILM COATED ORAL
Refills: 0 | Status: ACTIVE | COMMUNITY

## 2021-01-01 RX ORDER — VALSARTAN 80 MG/1
40 TABLET ORAL DAILY
Refills: 0 | Status: DISCONTINUED | OUTPATIENT
Start: 2021-01-01 | End: 2021-01-01

## 2021-01-01 RX ORDER — SENNA PLUS 8.6 MG/1
2 TABLET ORAL
Qty: 0 | Refills: 0 | DISCHARGE
Start: 2021-01-01

## 2021-01-01 RX ORDER — DRONABINOL 2.5 MG
1 CAPSULE ORAL
Qty: 60 | Refills: 0
Start: 2021-01-01 | End: 2021-01-01

## 2021-01-01 RX ORDER — CLOPIDOGREL BISULFATE 75 MG/1
75 TABLET, FILM COATED ORAL
Qty: 90 | Refills: 1 | Status: DISCONTINUED | COMMUNITY
Start: 2018-06-20 | End: 2021-01-01

## 2021-01-01 RX ORDER — INSULIN LISPRO 100/ML
6 VIAL (ML) SUBCUTANEOUS
Refills: 0 | Status: DISCONTINUED | OUTPATIENT
Start: 2021-01-01 | End: 2021-01-01

## 2021-01-01 RX ORDER — SODIUM CHLORIDE 9 MG/ML
1 INJECTION INTRAMUSCULAR; INTRAVENOUS; SUBCUTANEOUS
Refills: 0 | Status: DISCONTINUED | OUTPATIENT
Start: 2021-01-01 | End: 2021-01-01

## 2021-01-01 RX ORDER — DAPAGLIFLOZIN 10 MG/1
10 TABLET, FILM COATED ORAL
Qty: 90 | Refills: 3 | Status: ACTIVE | COMMUNITY
Start: 2021-01-01 | End: 1900-01-01

## 2021-01-01 RX ORDER — ONDANSETRON 8 MG/1
4 TABLET, FILM COATED ORAL ONCE
Refills: 0 | Status: DISCONTINUED | OUTPATIENT
Start: 2021-01-01 | End: 2021-01-01

## 2021-01-01 RX ORDER — POLYETHYLENE GLYCOL 3350 17 G/17G
17 POWDER, FOR SOLUTION ORAL
Qty: 0 | Refills: 0 | DISCHARGE
Start: 2021-01-01

## 2021-01-01 RX ORDER — FUROSEMIDE 40 MG
0 TABLET ORAL
Qty: 0 | Refills: 0 | DISCHARGE

## 2021-01-01 RX ORDER — INSULIN LISPRO 100/ML
5 VIAL (ML) SUBCUTANEOUS
Refills: 0 | Status: DISCONTINUED | OUTPATIENT
Start: 2021-01-01 | End: 2021-01-01

## 2021-01-01 RX ORDER — SODIUM CHLORIDE 9 MG/ML
1000 INJECTION INTRAMUSCULAR; INTRAVENOUS; SUBCUTANEOUS
Refills: 0 | Status: DISCONTINUED | OUTPATIENT
Start: 2021-01-01 | End: 2021-01-01

## 2021-01-01 RX ORDER — SECUKINUMAB 150 MG/ML
150 INJECTION SUBCUTANEOUS
Qty: 12 | Refills: 1 | Status: DISCONTINUED | COMMUNITY
Start: 2020-08-07 | End: 2021-01-01

## 2021-01-01 RX ORDER — INSULIN LISPRO 100/ML
0 VIAL (ML) SUBCUTANEOUS
Qty: 0 | Refills: 0 | DISCHARGE

## 2021-01-01 RX ORDER — HYDROXYCHLOROQUINE SULFATE 200 MG
1 TABLET ORAL
Qty: 0 | Refills: 0 | DISCHARGE

## 2021-01-01 RX ORDER — NORMAL SALT TABLETS 1 G/G
1 TABLET ORAL
Qty: 30 | Refills: 0 | Status: DISCONTINUED | COMMUNITY
Start: 2021-01-01 | End: 2021-01-01

## 2021-01-01 RX ORDER — ACETAMINOPHEN 500 MG
500 TABLET ORAL EVERY 8 HOURS
Refills: 0 | Status: COMPLETED | OUTPATIENT
Start: 2021-01-01 | End: 2021-01-01

## 2021-01-01 RX ORDER — NALOXONE HYDROCHLORIDE 4 MG/.1ML
0.1 SPRAY NASAL
Refills: 0 | Status: DISCONTINUED | OUTPATIENT
Start: 2021-01-01 | End: 2021-01-01

## 2021-01-01 RX ORDER — INSULIN LISPRO 100/ML
6 VIAL (ML) SUBCUTANEOUS
Qty: 5 | Refills: 3
Start: 2021-01-01 | End: 2022-01-01

## 2021-01-01 RX ORDER — HYALURONATE SODIUM 20 MG/2 ML
20 SYRINGE (ML) INTRAARTICULAR
Qty: 1 | Refills: 0 | Status: DISCONTINUED | COMMUNITY
Start: 2021-01-01 | End: 2021-01-01

## 2021-01-01 RX ORDER — SODIUM BICARBONATE 650 MG/1
650 TABLET ORAL
Refills: 0 | Status: ACTIVE | COMMUNITY

## 2021-01-01 RX ORDER — VALSARTAN 80 MG/1
40 TABLET ORAL AT BEDTIME
Refills: 0 | Status: DISCONTINUED | OUTPATIENT
Start: 2021-01-01 | End: 2021-01-01

## 2021-01-01 RX ORDER — SECUKINUMAB 150 MG/ML
0 INJECTION SUBCUTANEOUS
Qty: 0 | Refills: 0 | DISCHARGE

## 2021-01-01 RX ORDER — METOCLOPRAMIDE HCL 10 MG
10 TABLET ORAL EVERY 6 HOURS
Refills: 0 | Status: DISCONTINUED | OUTPATIENT
Start: 2021-01-01 | End: 2021-01-01

## 2021-01-01 RX ORDER — DULOXETINE HYDROCHLORIDE 20 MG/1
20 CAPSULE, DELAYED RELEASE PELLETS ORAL
Qty: 90 | Refills: 0 | Status: DISCONTINUED | COMMUNITY
Start: 2021-01-01 | End: 2021-01-01

## 2021-01-01 RX ORDER — SODIUM CHLORIDE 9 MG/ML
250 INJECTION INTRAMUSCULAR; INTRAVENOUS; SUBCUTANEOUS ONCE
Refills: 0 | Status: COMPLETED | OUTPATIENT
Start: 2021-01-01 | End: 2021-01-01

## 2021-01-01 RX ORDER — POTASSIUM CHLORIDE 20 MEQ
40 PACKET (EA) ORAL ONCE
Refills: 0 | Status: COMPLETED | OUTPATIENT
Start: 2021-01-01 | End: 2021-01-01

## 2021-01-01 RX ORDER — ATORVASTATIN CALCIUM 80 MG/1
80 TABLET, FILM COATED ORAL
Refills: 0 | Status: DISCONTINUED | COMMUNITY
Start: 2021-01-01 | End: 2021-01-01

## 2021-01-01 RX ORDER — ACETAMINOPHEN 500 MG
975 TABLET ORAL ONCE
Refills: 0 | Status: DISCONTINUED | OUTPATIENT
Start: 2021-01-01 | End: 2021-01-01

## 2021-01-01 RX ORDER — HYDROCHLOROTHIAZIDE 25 MG
50 TABLET ORAL ONCE
Refills: 0 | Status: COMPLETED | OUTPATIENT
Start: 2021-01-01 | End: 2021-01-01

## 2021-01-01 RX ORDER — SECUKINUMAB 150 MG/ML
200 INJECTION SUBCUTANEOUS
Qty: 0 | Refills: 0 | DISCHARGE

## 2021-01-01 RX ORDER — CHOLECALCIFEROL (VITAMIN D3) 125 MCG
2000 CAPSULE ORAL AT BEDTIME
Refills: 0 | Status: DISCONTINUED | OUTPATIENT
Start: 2021-01-01 | End: 2021-01-01

## 2021-01-01 RX ORDER — CALCIPOTRIENE 50 UG/G
1 CREAM TOPICAL
Qty: 0 | Refills: 0 | DISCHARGE

## 2021-01-01 RX ORDER — ACETAMINOPHEN 500 MG
2 TABLET ORAL
Qty: 0 | Refills: 0 | DISCHARGE

## 2021-01-01 RX ORDER — ATORVASTATIN CALCIUM 80 MG/1
1 TABLET, FILM COATED ORAL
Qty: 30 | Refills: 0
Start: 2021-01-01 | End: 2021-01-01

## 2021-01-01 RX ORDER — METHYLPREDNISOLONE 4 MG/1
4 TABLET ORAL
Qty: 60 | Refills: 0 | Status: ACTIVE | COMMUNITY
Start: 2021-01-01 | End: 1900-01-01

## 2021-01-01 RX ORDER — DENOSUMAB 60 MG/ML
60 INJECTION SUBCUTANEOUS
Qty: 2 | Refills: 0 | Status: ACTIVE | COMMUNITY
Start: 2021-01-01

## 2021-01-01 RX ORDER — ACETAMINOPHEN 500 MG
500 TABLET ORAL ONCE
Refills: 0 | Status: COMPLETED | OUTPATIENT
Start: 2021-01-01 | End: 2021-01-01

## 2021-01-01 RX ORDER — ACETAMINOPHEN 500 MG
650 TABLET ORAL ONCE
Refills: 0 | Status: COMPLETED | OUTPATIENT
Start: 2021-01-01 | End: 2021-01-01

## 2021-01-01 RX ORDER — CHOLECALCIFEROL (VITAMIN D3) 125 MCG
0 CAPSULE ORAL
Qty: 0 | Refills: 0 | DISCHARGE

## 2021-01-01 RX ORDER — METOCLOPRAMIDE HCL 10 MG
10 TABLET ORAL ONCE
Refills: 0 | Status: COMPLETED | OUTPATIENT
Start: 2021-01-01 | End: 2021-01-01

## 2021-01-01 RX ORDER — CHOLECALCIFEROL (VITAMIN D3) 125 MCG
2000 CAPSULE ORAL DAILY
Refills: 0 | Status: DISCONTINUED | OUTPATIENT
Start: 2021-01-01 | End: 2021-01-01

## 2021-01-01 RX ORDER — VALSARTAN 80 MG/1
1 TABLET ORAL
Qty: 0 | Refills: 0 | DISCHARGE

## 2021-01-01 RX ORDER — DICLOFENAC SODIUM 30 MG/G
0 GEL TOPICAL
Qty: 0 | Refills: 0 | DISCHARGE

## 2021-01-01 RX ORDER — SODIUM CHLORIDE 9 MG/ML
1000 INJECTION INTRAMUSCULAR; INTRAVENOUS; SUBCUTANEOUS ONCE
Refills: 0 | Status: DISCONTINUED | OUTPATIENT
Start: 2021-01-01 | End: 2021-01-01

## 2021-01-01 RX ORDER — INSULIN PUMP CART,CONT INF,RF
CARTRIDGE (EA) SUBCUTANEOUS
Qty: 6 | Refills: 2 | Status: DISCONTINUED | COMMUNITY
Start: 2020-05-28 | End: 2021-01-01

## 2021-01-01 RX ORDER — INSULIN GLARGINE 100 [IU]/ML
10 INJECTION, SOLUTION SUBCUTANEOUS EVERY MORNING
Refills: 0 | Status: DISCONTINUED | OUTPATIENT
Start: 2021-01-01 | End: 2021-01-01

## 2021-01-01 RX ORDER — ISOPROPYL ALCOHOL, BENZOCAINE .7; .06 ML/ML; ML/ML
1 SWAB TOPICAL
Qty: 100 | Refills: 1
Start: 2021-01-01 | End: 2021-01-01

## 2021-01-01 RX ORDER — SODIUM BICARBONATE 1 MEQ/ML
0 SYRINGE (ML) INTRAVENOUS
Qty: 0 | Refills: 0 | DISCHARGE

## 2021-01-01 RX ORDER — CHOLECALCIFEROL (VITAMIN D3) 125 MCG
1 CAPSULE ORAL
Qty: 0 | Refills: 0 | DISCHARGE

## 2021-01-01 RX ORDER — INSULIN LISPRO 100/ML
7 VIAL (ML) SUBCUTANEOUS
Refills: 0 | Status: DISCONTINUED | OUTPATIENT
Start: 2021-01-01 | End: 2021-01-01

## 2021-01-01 RX ORDER — CALCIUM CARBONATE 500(1250)
1 TABLET ORAL ONCE
Refills: 0 | Status: COMPLETED | OUTPATIENT
Start: 2021-01-01 | End: 2021-01-01

## 2021-01-01 RX ORDER — OXYCODONE HYDROCHLORIDE 5 MG/1
1 TABLET ORAL
Qty: 12 | Refills: 0
Start: 2021-01-01 | End: 2021-01-01

## 2021-01-01 RX ORDER — FUROSEMIDE 80 MG/1
80 TABLET ORAL TWICE DAILY
Qty: 90 | Refills: 3 | Status: DISCONTINUED | COMMUNITY
End: 2021-01-01

## 2021-01-01 RX ORDER — INSULIN GLARGINE 100 [IU]/ML
10 INJECTION, SOLUTION SUBCUTANEOUS AT BEDTIME
Refills: 0 | Status: DISCONTINUED | OUTPATIENT
Start: 2021-01-01 | End: 2021-01-01

## 2021-01-01 RX ORDER — INSULIN PUMP CONTROLLER, RF
EACH MISCELLANEOUS
Qty: 1 | Refills: 0 | Status: DISCONTINUED | COMMUNITY
Start: 2020-05-28 | End: 2021-01-01

## 2021-01-01 RX ORDER — SODIUM CHLORIDE 9 MG/ML
250 INJECTION INTRAMUSCULAR; INTRAVENOUS; SUBCUTANEOUS
Refills: 0 | Status: DISCONTINUED | OUTPATIENT
Start: 2021-01-01 | End: 2021-01-01

## 2021-01-01 RX ORDER — TOFACITINIB 11 MG/1
1 TABLET, FILM COATED, EXTENDED RELEASE ORAL
Qty: 0 | Refills: 0 | DISCHARGE

## 2021-01-01 RX ORDER — CEPHALEXIN 500 MG
1 CAPSULE ORAL
Qty: 0 | Refills: 0 | DISCHARGE

## 2021-01-01 RX ORDER — CEFTRIAXONE 500 MG/1
1000 INJECTION, POWDER, FOR SOLUTION INTRAMUSCULAR; INTRAVENOUS ONCE
Refills: 0 | Status: COMPLETED | OUTPATIENT
Start: 2021-01-01 | End: 2021-01-01

## 2021-01-01 RX ORDER — DRONABINOL 2.5 MG
2.5 CAPSULE ORAL
Refills: 0 | Status: DISCONTINUED | OUTPATIENT
Start: 2021-01-01 | End: 2021-01-01

## 2021-01-01 RX ORDER — ACETAMINOPHEN 500 MG
0 TABLET ORAL
Qty: 0 | Refills: 0 | DISCHARGE

## 2021-01-01 RX ADMIN — Medication 5 UNIT(S): at 13:29

## 2021-01-01 RX ADMIN — Medication 2.5 MILLIGRAM(S): at 18:38

## 2021-01-01 RX ADMIN — HEPARIN SODIUM 5000 UNIT(S): 5000 INJECTION INTRAVENOUS; SUBCUTANEOUS at 14:47

## 2021-01-01 RX ADMIN — Medication 4 MILLIGRAM(S): at 06:26

## 2021-01-01 RX ADMIN — Medication 1300 MILLIGRAM(S): at 21:55

## 2021-01-01 RX ADMIN — Medication 80 MILLIGRAM(S): at 18:03

## 2021-01-01 RX ADMIN — Medication 7 UNIT(S): at 13:20

## 2021-01-01 RX ADMIN — SENNA PLUS 2 TABLET(S): 8.6 TABLET ORAL at 21:12

## 2021-01-01 RX ADMIN — CLOPIDOGREL BISULFATE 75 MILLIGRAM(S): 75 TABLET, FILM COATED ORAL at 12:28

## 2021-01-01 RX ADMIN — ATORVASTATIN CALCIUM 80 MILLIGRAM(S): 80 TABLET, FILM COATED ORAL at 21:09

## 2021-01-01 RX ADMIN — Medication 1300 MILLIGRAM(S): at 21:11

## 2021-01-01 RX ADMIN — OXYCODONE HYDROCHLORIDE 5 MILLIGRAM(S): 5 TABLET ORAL at 14:04

## 2021-01-01 RX ADMIN — Medication 50 MILLIGRAM(S): at 07:08

## 2021-01-01 RX ADMIN — Medication 4 MILLIGRAM(S): at 06:14

## 2021-01-01 RX ADMIN — Medication 40 MILLIEQUIVALENT(S): at 13:36

## 2021-01-01 RX ADMIN — OXYCODONE HYDROCHLORIDE 5 MILLIGRAM(S): 5 TABLET ORAL at 14:33

## 2021-01-01 RX ADMIN — Medication 50 MILLIGRAM(S): at 14:55

## 2021-01-01 RX ADMIN — HYDROMORPHONE HYDROCHLORIDE 30 MILLILITER(S): 2 INJECTION INTRAMUSCULAR; INTRAVENOUS; SUBCUTANEOUS at 07:34

## 2021-01-01 RX ADMIN — MORPHINE SULFATE 4 MILLIGRAM(S): 50 CAPSULE, EXTENDED RELEASE ORAL at 15:21

## 2021-01-01 RX ADMIN — ATORVASTATIN CALCIUM 80 MILLIGRAM(S): 80 TABLET, FILM COATED ORAL at 21:12

## 2021-01-01 RX ADMIN — OXYCODONE HYDROCHLORIDE 5 MILLIGRAM(S): 5 TABLET ORAL at 05:49

## 2021-01-01 RX ADMIN — Medication 1300 MILLIGRAM(S): at 05:44

## 2021-01-01 RX ADMIN — Medication 2.5 MILLIGRAM(S): at 06:31

## 2021-01-01 RX ADMIN — Medication 2: at 13:25

## 2021-01-01 RX ADMIN — HEPARIN SODIUM 5000 UNIT(S): 5000 INJECTION INTRAVENOUS; SUBCUTANEOUS at 21:36

## 2021-01-01 RX ADMIN — Medication 1300 MILLIGRAM(S): at 14:48

## 2021-01-01 RX ADMIN — Medication 650 MILLIGRAM(S): at 13:12

## 2021-01-01 RX ADMIN — SODIUM CHLORIDE 500 MILLILITER(S): 9 INJECTION INTRAMUSCULAR; INTRAVENOUS; SUBCUTANEOUS at 16:26

## 2021-01-01 RX ADMIN — SODIUM CHLORIDE 1000 MILLILITER(S): 9 INJECTION INTRAMUSCULAR; INTRAVENOUS; SUBCUTANEOUS at 13:30

## 2021-01-01 RX ADMIN — Medication 3: at 18:15

## 2021-01-01 RX ADMIN — Medication 200 MILLIGRAM(S): at 17:49

## 2021-01-01 RX ADMIN — ATORVASTATIN CALCIUM 80 MILLIGRAM(S): 80 TABLET, FILM COATED ORAL at 20:17

## 2021-01-01 RX ADMIN — Medication 50 MILLIGRAM(S): at 06:14

## 2021-01-01 RX ADMIN — Medication 1 EACH: at 20:26

## 2021-01-01 RX ADMIN — Medication 500 MILLIGRAM(S): at 06:14

## 2021-01-01 RX ADMIN — DENOSUMAB 0 MG/ML: 60 INJECTION SUBCUTANEOUS at 00:00

## 2021-01-01 RX ADMIN — Medication 1 TABLET(S): at 16:11

## 2021-01-01 RX ADMIN — Medication 3 MILLILITER(S): at 05:39

## 2021-01-01 RX ADMIN — Medication 81 MILLIGRAM(S): at 08:09

## 2021-01-01 RX ADMIN — Medication 50 GRAM(S): at 11:11

## 2021-01-01 RX ADMIN — Medication 1300 MILLIGRAM(S): at 05:49

## 2021-01-01 RX ADMIN — CLOPIDOGREL BISULFATE 75 MILLIGRAM(S): 75 TABLET, FILM COATED ORAL at 12:31

## 2021-01-01 RX ADMIN — HEPARIN SODIUM 5000 UNIT(S): 5000 INJECTION INTRAVENOUS; SUBCUTANEOUS at 06:27

## 2021-01-01 RX ADMIN — HEPARIN SODIUM 5000 UNIT(S): 5000 INJECTION INTRAVENOUS; SUBCUTANEOUS at 22:47

## 2021-01-01 RX ADMIN — Medication 2.5 MILLIGRAM(S): at 05:11

## 2021-01-01 RX ADMIN — Medication 650 MILLIGRAM(S): at 12:12

## 2021-01-01 RX ADMIN — HYDROMORPHONE HYDROCHLORIDE 30 MILLILITER(S): 2 INJECTION INTRAMUSCULAR; INTRAVENOUS; SUBCUTANEOUS at 19:09

## 2021-01-01 RX ADMIN — HEPARIN SODIUM 5000 UNIT(S): 5000 INJECTION INTRAVENOUS; SUBCUTANEOUS at 06:13

## 2021-01-01 RX ADMIN — OXYCODONE HYDROCHLORIDE 5 MILLIGRAM(S): 5 TABLET ORAL at 23:51

## 2021-01-01 RX ADMIN — Medication 1300 MILLIGRAM(S): at 05:10

## 2021-01-01 RX ADMIN — CLOPIDOGREL BISULFATE 75 MILLIGRAM(S): 75 TABLET, FILM COATED ORAL at 11:06

## 2021-01-01 RX ADMIN — Medication 3 MILLILITER(S): at 18:21

## 2021-01-01 RX ADMIN — Medication 1300 MILLIGRAM(S): at 21:48

## 2021-01-01 RX ADMIN — Medication 80 MILLIGRAM(S): at 05:10

## 2021-01-01 RX ADMIN — Medication 1300 MILLIGRAM(S): at 13:21

## 2021-01-01 RX ADMIN — Medication 5 UNIT(S): at 17:49

## 2021-01-01 RX ADMIN — OXYCODONE HYDROCHLORIDE 5 MILLIGRAM(S): 5 TABLET ORAL at 11:06

## 2021-01-01 RX ADMIN — CEFTRIAXONE 100 MILLIGRAM(S): 500 INJECTION, POWDER, FOR SOLUTION INTRAMUSCULAR; INTRAVENOUS at 17:22

## 2021-01-01 RX ADMIN — VALSARTAN 40 MILLIGRAM(S): 80 TABLET ORAL at 23:57

## 2021-01-01 RX ADMIN — Medication 6 UNIT(S): at 18:01

## 2021-01-01 RX ADMIN — Medication 1: at 17:49

## 2021-01-01 RX ADMIN — PANTOPRAZOLE SODIUM 40 MILLIGRAM(S): 20 TABLET, DELAYED RELEASE ORAL at 06:31

## 2021-01-01 RX ADMIN — Medication 500 MILLIGRAM(S): at 21:11

## 2021-01-01 RX ADMIN — Medication 80 MILLIGRAM(S): at 05:58

## 2021-01-01 RX ADMIN — Medication 50 MILLIGRAM(S): at 06:48

## 2021-01-01 RX ADMIN — Medication 3: at 13:24

## 2021-01-01 RX ADMIN — HEPARIN SODIUM 5000 UNIT(S): 5000 INJECTION INTRAVENOUS; SUBCUTANEOUS at 05:59

## 2021-01-01 RX ADMIN — OXYCODONE HYDROCHLORIDE 5 MILLIGRAM(S): 5 TABLET ORAL at 23:48

## 2021-01-01 RX ADMIN — HYDROMORPHONE HYDROCHLORIDE 30 MILLILITER(S): 2 INJECTION INTRAMUSCULAR; INTRAVENOUS; SUBCUTANEOUS at 07:20

## 2021-01-01 RX ADMIN — HYDROMORPHONE HYDROCHLORIDE 0.5 MILLIGRAM(S): 2 INJECTION INTRAMUSCULAR; INTRAVENOUS; SUBCUTANEOUS at 02:28

## 2021-01-01 RX ADMIN — CEFTRIAXONE 1000 MILLIGRAM(S): 500 INJECTION, POWDER, FOR SOLUTION INTRAMUSCULAR; INTRAVENOUS at 18:21

## 2021-01-01 RX ADMIN — SODIUM CHLORIDE 500 MILLILITER(S): 9 INJECTION INTRAMUSCULAR; INTRAVENOUS; SUBCUTANEOUS at 17:00

## 2021-01-01 RX ADMIN — PANTOPRAZOLE SODIUM 40 MILLIGRAM(S): 20 TABLET, DELAYED RELEASE ORAL at 06:26

## 2021-01-01 RX ADMIN — PANTOPRAZOLE SODIUM 40 MILLIGRAM(S): 20 TABLET, DELAYED RELEASE ORAL at 05:44

## 2021-01-01 RX ADMIN — Medication 1300 MILLIGRAM(S): at 05:37

## 2021-01-01 RX ADMIN — Medication 80 MILLIGRAM(S): at 21:48

## 2021-01-01 RX ADMIN — Medication 1: at 17:48

## 2021-01-01 RX ADMIN — Medication 1300 MILLIGRAM(S): at 22:52

## 2021-01-01 RX ADMIN — SODIUM CHLORIDE 1 GRAM(S): 9 INJECTION INTRAMUSCULAR; INTRAVENOUS; SUBCUTANEOUS at 17:33

## 2021-01-01 RX ADMIN — CLOPIDOGREL BISULFATE 75 MILLIGRAM(S): 75 TABLET, FILM COATED ORAL at 12:13

## 2021-01-01 RX ADMIN — Medication 1 TABLET(S): at 11:47

## 2021-01-01 RX ADMIN — SENNA PLUS 2 TABLET(S): 8.6 TABLET ORAL at 21:35

## 2021-01-01 RX ADMIN — Medication 5 UNIT(S): at 09:14

## 2021-01-01 RX ADMIN — Medication 3: at 23:02

## 2021-01-01 RX ADMIN — ATORVASTATIN CALCIUM 80 MILLIGRAM(S): 80 TABLET, FILM COATED ORAL at 21:21

## 2021-01-01 RX ADMIN — OXYCODONE HYDROCHLORIDE 5 MILLIGRAM(S): 5 TABLET ORAL at 21:21

## 2021-01-01 RX ADMIN — SENNA PLUS 2 TABLET(S): 8.6 TABLET ORAL at 21:55

## 2021-01-01 RX ADMIN — Medication 1300 MILLIGRAM(S): at 14:47

## 2021-01-01 RX ADMIN — HEPARIN SODIUM 5000 UNIT(S): 5000 INJECTION INTRAVENOUS; SUBCUTANEOUS at 21:56

## 2021-01-01 RX ADMIN — VALSARTAN 40 MILLIGRAM(S): 80 TABLET ORAL at 05:37

## 2021-01-01 RX ADMIN — Medication 81 MILLIGRAM(S): at 06:47

## 2021-01-01 RX ADMIN — Medication 10 MILLIGRAM(S): at 11:10

## 2021-01-01 RX ADMIN — HEPARIN SODIUM 5000 UNIT(S): 5000 INJECTION INTRAVENOUS; SUBCUTANEOUS at 20:17

## 2021-01-01 RX ADMIN — Medication 1 TABLET(S): at 12:27

## 2021-01-01 RX ADMIN — Medication 1 TABLET(S): at 12:13

## 2021-01-01 RX ADMIN — Medication 4 MILLIGRAM(S): at 09:31

## 2021-01-01 RX ADMIN — SODIUM CHLORIDE 3 MILLILITER(S): 9 INJECTION INTRAMUSCULAR; INTRAVENOUS; SUBCUTANEOUS at 21:08

## 2021-01-01 RX ADMIN — LIDOCAINE 1 PATCH: 4 CREAM TOPICAL at 20:00

## 2021-01-01 RX ADMIN — PANTOPRAZOLE SODIUM 40 MILLIGRAM(S): 20 TABLET, DELAYED RELEASE ORAL at 05:58

## 2021-01-01 RX ADMIN — Medication 1: at 13:28

## 2021-01-01 RX ADMIN — Medication 1300 MILLIGRAM(S): at 06:28

## 2021-01-01 RX ADMIN — Medication 50 MILLIGRAM(S): at 06:28

## 2021-01-01 RX ADMIN — SENNA PLUS 2 TABLET(S): 8.6 TABLET ORAL at 20:17

## 2021-01-01 RX ADMIN — Medication 100 GRAM(S): at 11:11

## 2021-01-01 RX ADMIN — AZITHROMYCIN 250 MILLIGRAM(S): 500 TABLET, FILM COATED ORAL at 17:24

## 2021-01-01 RX ADMIN — CLOPIDOGREL BISULFATE 75 MILLIGRAM(S): 75 TABLET, FILM COATED ORAL at 11:47

## 2021-01-01 RX ADMIN — Medication 2000 UNIT(S): at 21:04

## 2021-01-01 RX ADMIN — ATORVASTATIN CALCIUM 80 MILLIGRAM(S): 80 TABLET, FILM COATED ORAL at 21:48

## 2021-01-01 RX ADMIN — ATORVASTATIN CALCIUM 80 MILLIGRAM(S): 80 TABLET, FILM COATED ORAL at 21:15

## 2021-01-01 RX ADMIN — Medication 2000 UNIT(S): at 22:45

## 2021-01-01 RX ADMIN — LIDOCAINE 1 PATCH: 4 CREAM TOPICAL at 23:10

## 2021-01-01 RX ADMIN — Medication 2 GRAM(S): at 15:50

## 2021-01-01 RX ADMIN — SODIUM CHLORIDE 75 MILLILITER(S): 9 INJECTION INTRAMUSCULAR; INTRAVENOUS; SUBCUTANEOUS at 14:25

## 2021-01-01 RX ADMIN — ATORVASTATIN CALCIUM 80 MILLIGRAM(S): 80 TABLET, FILM COATED ORAL at 22:45

## 2021-01-01 RX ADMIN — PANTOPRAZOLE SODIUM 40 MILLIGRAM(S): 20 TABLET, DELAYED RELEASE ORAL at 06:14

## 2021-01-01 RX ADMIN — Medication 2: at 08:59

## 2021-01-01 RX ADMIN — Medication 1300 MILLIGRAM(S): at 23:05

## 2021-01-01 RX ADMIN — Medication 4 MILLIGRAM(S): at 06:27

## 2021-01-01 RX ADMIN — OXYCODONE HYDROCHLORIDE 5 MILLIGRAM(S): 5 TABLET ORAL at 12:24

## 2021-01-01 RX ADMIN — OXYCODONE HYDROCHLORIDE 5 MILLIGRAM(S): 5 TABLET ORAL at 07:58

## 2021-01-01 RX ADMIN — INSULIN GLARGINE 7 UNIT(S): 100 INJECTION, SOLUTION SUBCUTANEOUS at 00:56

## 2021-01-01 RX ADMIN — HEPARIN SODIUM 5000 UNIT(S): 5000 INJECTION INTRAVENOUS; SUBCUTANEOUS at 13:52

## 2021-01-01 RX ADMIN — Medication 80 MILLIGRAM(S): at 06:14

## 2021-01-01 RX ADMIN — Medication 40 MILLIGRAM(S): at 22:42

## 2021-01-01 RX ADMIN — Medication 1300 MILLIGRAM(S): at 21:21

## 2021-01-01 RX ADMIN — ATORVASTATIN CALCIUM 80 MILLIGRAM(S): 80 TABLET, FILM COATED ORAL at 23:04

## 2021-01-01 RX ADMIN — Medication 1300 MILLIGRAM(S): at 07:09

## 2021-01-01 RX ADMIN — Medication 1300 MILLIGRAM(S): at 05:58

## 2021-01-01 RX ADMIN — Medication 50 GRAM(S): at 10:53

## 2021-01-01 RX ADMIN — Medication 1300 MILLIGRAM(S): at 05:59

## 2021-01-01 RX ADMIN — Medication 1300 MILLIGRAM(S): at 22:45

## 2021-01-01 RX ADMIN — TAMSULOSIN HYDROCHLORIDE 0.4 MILLIGRAM(S): 0.4 CAPSULE ORAL at 17:16

## 2021-01-01 RX ADMIN — Medication 4 MILLIGRAM(S): at 08:09

## 2021-01-01 RX ADMIN — INSULIN GLARGINE 10 UNIT(S): 100 INJECTION, SOLUTION SUBCUTANEOUS at 08:59

## 2021-01-01 RX ADMIN — Medication 5 UNIT(S): at 13:02

## 2021-01-01 RX ADMIN — Medication 81 MILLIGRAM(S): at 06:28

## 2021-01-01 RX ADMIN — Medication 200 GRAM(S): at 14:55

## 2021-01-01 RX ADMIN — Medication 1300 MILLIGRAM(S): at 14:18

## 2021-01-01 RX ADMIN — Medication 2.5 MILLIGRAM(S): at 17:08

## 2021-01-01 RX ADMIN — Medication 81 MILLIGRAM(S): at 05:12

## 2021-01-01 RX ADMIN — Medication 2000 UNIT(S): at 23:04

## 2021-01-01 RX ADMIN — Medication 80 MILLIGRAM(S): at 05:44

## 2021-01-01 RX ADMIN — Medication 80 MILLIGRAM(S): at 18:06

## 2021-01-01 RX ADMIN — SENNA PLUS 2 TABLET(S): 8.6 TABLET ORAL at 21:15

## 2021-01-01 RX ADMIN — Medication 50 MILLIGRAM(S): at 05:10

## 2021-01-01 RX ADMIN — Medication 1300 MILLIGRAM(S): at 05:12

## 2021-01-01 RX ADMIN — Medication 7 UNIT(S): at 17:50

## 2021-01-01 RX ADMIN — Medication 4 MILLIGRAM(S): at 05:49

## 2021-01-01 RX ADMIN — SODIUM CHLORIDE 100 MILLILITER(S): 9 INJECTION INTRAMUSCULAR; INTRAVENOUS; SUBCUTANEOUS at 18:15

## 2021-01-01 RX ADMIN — INSULIN GLARGINE 10 UNIT(S): 100 INJECTION, SOLUTION SUBCUTANEOUS at 09:13

## 2021-01-01 RX ADMIN — Medication 50 MILLIGRAM(S): at 13:10

## 2021-01-01 RX ADMIN — Medication 3: at 09:05

## 2021-01-01 RX ADMIN — PANTOPRAZOLE SODIUM 40 MILLIGRAM(S): 20 TABLET, DELAYED RELEASE ORAL at 05:12

## 2021-01-01 RX ADMIN — Medication 1: at 08:38

## 2021-01-01 RX ADMIN — OXYCODONE HYDROCHLORIDE 5 MILLIGRAM(S): 5 TABLET ORAL at 16:43

## 2021-01-01 RX ADMIN — Medication 80 MILLIGRAM(S): at 07:41

## 2021-01-01 RX ADMIN — Medication 2.5 MILLIGRAM(S): at 05:48

## 2021-01-01 RX ADMIN — OXYCODONE HYDROCHLORIDE 5 MILLIGRAM(S): 5 TABLET ORAL at 09:19

## 2021-01-01 RX ADMIN — Medication 4 MILLIGRAM(S): at 05:44

## 2021-01-01 RX ADMIN — HEPARIN SODIUM 5000 UNIT(S): 5000 INJECTION INTRAVENOUS; SUBCUTANEOUS at 05:39

## 2021-01-01 RX ADMIN — HEPARIN SODIUM 5000 UNIT(S): 5000 INJECTION INTRAVENOUS; SUBCUTANEOUS at 21:10

## 2021-01-01 RX ADMIN — VALSARTAN 40 MILLIGRAM(S): 80 TABLET ORAL at 21:04

## 2021-01-01 RX ADMIN — Medication 80 MILLIGRAM(S): at 18:09

## 2021-01-01 RX ADMIN — Medication 1300 MILLIGRAM(S): at 07:43

## 2021-01-01 RX ADMIN — Medication 3 UNIT(S): at 03:33

## 2021-01-01 RX ADMIN — Medication 5 UNIT(S): at 13:25

## 2021-01-01 RX ADMIN — Medication 4 MILLIGRAM(S): at 05:11

## 2021-01-01 RX ADMIN — Medication 50 MILLIGRAM(S): at 07:43

## 2021-01-01 RX ADMIN — Medication 2: at 13:01

## 2021-01-01 RX ADMIN — SODIUM CHLORIDE 1 GRAM(S): 9 INJECTION INTRAMUSCULAR; INTRAVENOUS; SUBCUTANEOUS at 17:51

## 2021-01-01 RX ADMIN — Medication 2.5 MILLIGRAM(S): at 17:33

## 2021-01-01 RX ADMIN — OXYCODONE HYDROCHLORIDE 5 MILLIGRAM(S): 5 TABLET ORAL at 06:19

## 2021-01-01 RX ADMIN — HEPARIN SODIUM 5000 UNIT(S): 5000 INJECTION INTRAVENOUS; SUBCUTANEOUS at 06:26

## 2021-01-01 RX ADMIN — Medication 5 UNIT(S): at 09:06

## 2021-01-01 RX ADMIN — Medication 1300 MILLIGRAM(S): at 12:50

## 2021-01-01 RX ADMIN — Medication 4 MILLIGRAM(S): at 06:21

## 2021-01-01 RX ADMIN — PANTOPRAZOLE SODIUM 40 MILLIGRAM(S): 20 TABLET, DELAYED RELEASE ORAL at 08:54

## 2021-01-01 RX ADMIN — SODIUM CHLORIDE 1 GRAM(S): 9 INJECTION INTRAMUSCULAR; INTRAVENOUS; SUBCUTANEOUS at 17:11

## 2021-01-01 RX ADMIN — HEPARIN SODIUM 5000 UNIT(S): 5000 INJECTION INTRAVENOUS; SUBCUTANEOUS at 21:15

## 2021-01-01 RX ADMIN — Medication 50 MILLIGRAM(S): at 14:47

## 2021-01-01 RX ADMIN — SODIUM CHLORIDE 30 MILLILITER(S): 9 INJECTION, SOLUTION INTRAVENOUS at 15:55

## 2021-01-01 RX ADMIN — Medication 7 UNIT(S): at 08:38

## 2021-01-01 RX ADMIN — LIDOCAINE 1 PATCH: 4 CREAM TOPICAL at 20:26

## 2021-01-01 RX ADMIN — Medication 650 MILLIGRAM(S): at 05:30

## 2021-01-01 RX ADMIN — Medication 50 MILLIGRAM(S): at 05:44

## 2021-01-01 RX ADMIN — ONDANSETRON 4 MILLIGRAM(S): 8 TABLET, FILM COATED ORAL at 10:45

## 2021-01-01 RX ADMIN — CLOPIDOGREL BISULFATE 75 MILLIGRAM(S): 75 TABLET, FILM COATED ORAL at 11:37

## 2021-01-01 RX ADMIN — Medication 2.5 MILLIGRAM(S): at 17:50

## 2021-01-01 RX ADMIN — HEPARIN SODIUM 5000 UNIT(S): 5000 INJECTION INTRAVENOUS; SUBCUTANEOUS at 14:48

## 2021-01-01 RX ADMIN — Medication 1300 MILLIGRAM(S): at 14:17

## 2021-01-01 RX ADMIN — HEPARIN SODIUM 5000 UNIT(S): 5000 INJECTION INTRAVENOUS; SUBCUTANEOUS at 05:44

## 2021-01-01 RX ADMIN — ATORVASTATIN CALCIUM 80 MILLIGRAM(S): 80 TABLET, FILM COATED ORAL at 22:52

## 2021-01-01 RX ADMIN — Medication 1300 MILLIGRAM(S): at 13:24

## 2021-01-01 RX ADMIN — Medication 3 MILLILITER(S): at 12:31

## 2021-01-01 RX ADMIN — HEPARIN SODIUM 5000 UNIT(S): 5000 INJECTION INTRAVENOUS; SUBCUTANEOUS at 14:18

## 2021-01-01 RX ADMIN — HEPARIN SODIUM 5000 UNIT(S): 5000 INJECTION INTRAVENOUS; SUBCUTANEOUS at 14:16

## 2021-01-01 RX ADMIN — Medication 2000 UNIT(S): at 21:21

## 2021-01-01 RX ADMIN — VALSARTAN 40 MILLIGRAM(S): 80 TABLET ORAL at 05:44

## 2021-01-01 RX ADMIN — OXYCODONE HYDROCHLORIDE 5 MILLIGRAM(S): 5 TABLET ORAL at 15:30

## 2021-01-01 RX ADMIN — INSULIN GLARGINE 10 UNIT(S): 100 INJECTION, SOLUTION SUBCUTANEOUS at 13:20

## 2021-01-01 RX ADMIN — OXYCODONE HYDROCHLORIDE 5 MILLIGRAM(S): 5 TABLET ORAL at 22:56

## 2021-01-01 RX ADMIN — INSULIN GLARGINE 10 UNIT(S): 100 INJECTION, SOLUTION SUBCUTANEOUS at 01:58

## 2021-01-01 RX ADMIN — Medication 1 TABLET(S): at 12:30

## 2021-01-01 RX ADMIN — PANTOPRAZOLE SODIUM 40 MILLIGRAM(S): 20 TABLET, DELAYED RELEASE ORAL at 06:47

## 2021-01-01 RX ADMIN — HEPARIN SODIUM 5000 UNIT(S): 5000 INJECTION INTRAVENOUS; SUBCUTANEOUS at 21:49

## 2021-01-01 RX ADMIN — Medication 81 MILLIGRAM(S): at 06:15

## 2021-01-01 RX ADMIN — Medication 1300 MILLIGRAM(S): at 13:29

## 2021-01-01 RX ADMIN — CLOPIDOGREL BISULFATE 75 MILLIGRAM(S): 75 TABLET, FILM COATED ORAL at 12:30

## 2021-01-01 RX ADMIN — SODIUM CHLORIDE 3 MILLILITER(S): 9 INJECTION INTRAMUSCULAR; INTRAVENOUS; SUBCUTANEOUS at 15:55

## 2021-01-01 RX ADMIN — ONDANSETRON 4 MILLIGRAM(S): 8 TABLET, FILM COATED ORAL at 13:22

## 2021-01-01 RX ADMIN — PANTOPRAZOLE SODIUM 40 MILLIGRAM(S): 20 TABLET, DELAYED RELEASE ORAL at 07:43

## 2021-01-01 RX ADMIN — Medication 7 UNIT(S): at 08:45

## 2021-01-01 RX ADMIN — Medication 1300 MILLIGRAM(S): at 13:03

## 2021-01-01 RX ADMIN — ATORVASTATIN CALCIUM 80 MILLIGRAM(S): 80 TABLET, FILM COATED ORAL at 21:35

## 2021-01-01 RX ADMIN — ATORVASTATIN CALCIUM 80 MILLIGRAM(S): 80 TABLET, FILM COATED ORAL at 21:55

## 2021-01-01 RX ADMIN — Medication 1 TABLET(S): at 12:31

## 2021-01-01 RX ADMIN — HEPARIN SODIUM 5000 UNIT(S): 5000 INJECTION INTRAVENOUS; SUBCUTANEOUS at 21:12

## 2021-01-01 RX ADMIN — Medication 80 MILLIGRAM(S): at 05:38

## 2021-01-01 RX ADMIN — Medication 4 MILLIGRAM(S): at 05:59

## 2021-01-01 RX ADMIN — Medication 3: at 09:06

## 2021-01-01 RX ADMIN — PANTOPRAZOLE SODIUM 40 MILLIGRAM(S): 20 TABLET, DELAYED RELEASE ORAL at 05:10

## 2021-01-01 RX ADMIN — SODIUM CHLORIDE 1 GRAM(S): 9 INJECTION INTRAMUSCULAR; INTRAVENOUS; SUBCUTANEOUS at 05:48

## 2021-01-01 RX ADMIN — Medication 5 UNIT(S): at 17:34

## 2021-01-01 RX ADMIN — SODIUM CHLORIDE 1 GRAM(S): 9 INJECTION INTRAMUSCULAR; INTRAVENOUS; SUBCUTANEOUS at 05:59

## 2021-01-01 RX ADMIN — Medication 1300 MILLIGRAM(S): at 17:08

## 2021-01-01 RX ADMIN — Medication 1300 MILLIGRAM(S): at 21:04

## 2021-01-01 RX ADMIN — Medication 1300 MILLIGRAM(S): at 21:35

## 2021-01-01 RX ADMIN — SODIUM CHLORIDE 125 MILLILITER(S): 9 INJECTION INTRAMUSCULAR; INTRAVENOUS; SUBCUTANEOUS at 10:39

## 2021-01-01 RX ADMIN — Medication 500 MILLIGRAM(S): at 21:55

## 2021-01-01 RX ADMIN — Medication 1300 MILLIGRAM(S): at 14:36

## 2021-01-01 RX ADMIN — Medication 50 MILLIGRAM(S): at 05:49

## 2021-01-01 RX ADMIN — SODIUM CHLORIDE 50 MILLILITER(S): 9 INJECTION INTRAMUSCULAR; INTRAVENOUS; SUBCUTANEOUS at 05:12

## 2021-01-01 RX ADMIN — INSULIN GLARGINE 10 UNIT(S): 100 INJECTION, SOLUTION SUBCUTANEOUS at 23:58

## 2021-01-01 RX ADMIN — Medication 50 MILLIGRAM(S): at 05:59

## 2021-01-01 RX ADMIN — PANTOPRAZOLE SODIUM 40 MILLIGRAM(S): 20 TABLET, DELAYED RELEASE ORAL at 07:08

## 2021-01-01 RX ADMIN — Medication 50 MILLIGRAM(S): at 05:58

## 2021-01-01 RX ADMIN — SODIUM CHLORIDE 1 GRAM(S): 9 INJECTION INTRAMUSCULAR; INTRAVENOUS; SUBCUTANEOUS at 18:38

## 2021-01-01 RX ADMIN — HEPARIN SODIUM 5000 UNIT(S): 5000 INJECTION INTRAVENOUS; SUBCUTANEOUS at 14:36

## 2021-01-01 RX ADMIN — Medication 100 GRAM(S): at 14:37

## 2021-01-01 RX ADMIN — Medication 1300 MILLIGRAM(S): at 21:15

## 2021-01-01 RX ADMIN — ATORVASTATIN CALCIUM 80 MILLIGRAM(S): 80 TABLET, FILM COATED ORAL at 21:04

## 2021-01-01 RX ADMIN — Medication 2000 UNIT(S): at 12:30

## 2021-01-01 RX ADMIN — Medication 650 MILLIGRAM(S): at 04:38

## 2021-01-01 RX ADMIN — Medication 4 MILLIGRAM(S): at 05:37

## 2021-01-01 RX ADMIN — Medication 2.5 MILLIGRAM(S): at 05:59

## 2021-01-01 RX ADMIN — Medication 500 MILLIGRAM(S): at 14:47

## 2021-01-01 RX ADMIN — Medication 1300 MILLIGRAM(S): at 21:09

## 2021-01-01 RX ADMIN — HEPARIN SODIUM 5000 UNIT(S): 5000 INJECTION INTRAVENOUS; SUBCUTANEOUS at 12:50

## 2021-01-01 RX ADMIN — Medication 2.5 MILLIGRAM(S): at 17:48

## 2021-01-01 RX ADMIN — Medication 5 UNIT(S): at 18:16

## 2021-01-01 RX ADMIN — SODIUM CHLORIDE 30 MILLILITER(S): 9 INJECTION, SOLUTION INTRAVENOUS at 19:04

## 2021-01-01 RX ADMIN — Medication 650 MILLIGRAM(S): at 05:13

## 2021-01-01 RX ADMIN — Medication 80 MILLIGRAM(S): at 18:24

## 2021-01-01 RX ADMIN — Medication 1300 MILLIGRAM(S): at 20:17

## 2021-01-01 RX ADMIN — Medication 500 MILLIGRAM(S): at 05:58

## 2021-01-01 RX ADMIN — HEPARIN SODIUM 5000 UNIT(S): 5000 INJECTION INTRAVENOUS; SUBCUTANEOUS at 05:10

## 2021-01-01 RX ADMIN — Medication 500 MILLIGRAM(S): at 14:48

## 2021-01-01 RX ADMIN — Medication 5 UNIT(S): at 09:02

## 2021-01-01 RX ADMIN — ONDANSETRON 4 MILLIGRAM(S): 8 TABLET, FILM COATED ORAL at 10:12

## 2021-01-01 RX ADMIN — PANTOPRAZOLE SODIUM 40 MILLIGRAM(S): 20 TABLET, DELAYED RELEASE ORAL at 05:37

## 2021-01-01 RX ADMIN — OXYCODONE HYDROCHLORIDE 5 MILLIGRAM(S): 5 TABLET ORAL at 22:09

## 2021-01-01 RX ADMIN — Medication 1300 MILLIGRAM(S): at 06:27

## 2021-01-01 RX ADMIN — SODIUM CHLORIDE 1 GRAM(S): 9 INJECTION INTRAMUSCULAR; INTRAVENOUS; SUBCUTANEOUS at 06:28

## 2021-01-01 RX ADMIN — Medication 1300 MILLIGRAM(S): at 06:14

## 2021-01-01 RX ADMIN — SODIUM CHLORIDE 3 MILLILITER(S): 9 INJECTION INTRAMUSCULAR; INTRAVENOUS; SUBCUTANEOUS at 05:07

## 2021-01-01 RX ADMIN — Medication 2000 UNIT(S): at 22:52

## 2021-01-01 RX ADMIN — Medication 2000 UNIT(S): at 11:37

## 2021-01-01 RX ADMIN — POLYETHYLENE GLYCOL 3350 17 GRAM(S): 17 POWDER, FOR SOLUTION ORAL at 22:56

## 2021-01-01 RX ADMIN — Medication 1300 MILLIGRAM(S): at 06:26

## 2021-01-01 RX ADMIN — HEPARIN SODIUM 5000 UNIT(S): 5000 INJECTION INTRAVENOUS; SUBCUTANEOUS at 07:41

## 2021-01-01 RX ADMIN — LIDOCAINE 1 PATCH: 4 CREAM TOPICAL at 17:48

## 2021-01-01 RX ADMIN — HYDROMORPHONE HYDROCHLORIDE 30 MILLILITER(S): 2 INJECTION INTRAMUSCULAR; INTRAVENOUS; SUBCUTANEOUS at 19:02

## 2021-01-01 RX ADMIN — Medication 2: at 08:45

## 2021-01-01 RX ADMIN — Medication 1 TABLET(S): at 11:37

## 2021-01-01 RX ADMIN — LIDOCAINE 1 PATCH: 4 CREAM TOPICAL at 11:23

## 2021-01-01 RX ADMIN — Medication 650 MILLIGRAM(S): at 18:01

## 2021-01-01 RX ADMIN — LIDOCAINE 1 PATCH: 4 CREAM TOPICAL at 06:00

## 2021-01-01 RX ADMIN — SODIUM CHLORIDE 75 MILLILITER(S): 9 INJECTION, SOLUTION INTRAVENOUS at 23:06

## 2021-01-01 RX ADMIN — Medication 1 TABLET(S): at 11:06

## 2021-01-04 NOTE — REVIEW OF SYSTEMS
[Chest Pain] : no chest pain [Shortness Of Breath] : no shortness of breath [Nausea] : no nausea Diabetes    Hypertension

## 2021-01-04 NOTE — PHYSICAL EXAM
[Alert] : alert [Well Nourished] : well nourished [Healthy Appearance] : healthy appearance [No Acute Distress] : no acute distress [Normal Sclera/Conjunctiva] : normal sclera/conjunctiva [No Proptosis] : no proptosis [No LAD] : no lymphadenopathy [Thyroid Not Enlarged] : the thyroid was not enlarged [No Respiratory Distress] : no respiratory distress [Clear to Auscultation] : lungs were clear to auscultation bilaterally [Normal S1, S2] : normal S1 and S2 [Regular Rhythm] : with a regular rhythm [Pedal Pulses Normal] : the pedal pulses are present [Normal Bowel Sounds] : normal bowel sounds [Soft] : abdomen soft [No Spinal Tenderness] : no spinal tenderness [No Involuntary Movements] : no involuntary movements were seen [Normal Strength/Tone] : muscle strength and tone were normal [Right Foot Was Examined] : right foot ~C was examined [Left Foot Was Examined] : left foot ~C was examined [Normal Reflexes] : deep tendon reflexes were 2+ and symmetric [No Tremors] : no tremors [Normal Affect] : the affect was normal [Normal Mood] : the mood was normal [Foot Ulcers] : no foot ulcers [de-identified] : +LE edema

## 2021-01-04 NOTE — ASSESSMENT
[FreeTextEntry1] : 66 year old man with T2DM and chronic pancreatitis,last hba1c elevated, recently with improved control\par  CGm downloaded and reviewed.  Pt with 51% time in range, 0% hypoglycemia.  Pt with frequent postpranidal hyperglycemia.  frequently not bolusing before meals.  Recommend continue current pump settings.  He will make effort to consistently take bolus insulin prior to the meal.  Re-download data in 2 weeks.\par Discussed with pt adding jardiance for renal and cardiovascular benefit.  He has upcoming appts with his nephrologist and cardiologist and will discuss with them.  Has some concerns about side effects\par  - Retinopathy screening up to date\par  - had flu shot\par \par \par HTN -, temporarily off  ACE, followed by nephrology for ckd\par \par Hyperlipidemia- continue lovastatin.consider higher intensity statin - he will disucss with his cardiologist\par  \par \par vitamin D def'y - continue vitamin D supplementation\par \par \par \par f/u 3 months

## 2021-01-04 NOTE — HISTORY OF PRESENT ILLNESS
[Continuous Glucose Monitoring] : Continuous Glucose Monitoring: Yes [Dexcom] : Dexcom [FreeTextEntry1] : cc: diabetes\par \par 65 year old man with T2DM (diagnosed in 2002, on insulin pump since late 2015), chronic pancreatitis, with reasonable control. Using omnipod pump (with U100 Humalog) and dexcom sensor. Had recent hospitalization for SOB, will be undergoing further cardiac evaluation.  Was off pump in hospital.  Now back on pump with improved control. Prednisone has been decreased to 4 mg, Recently decreased basal rate due to hypoglycemia.    He has been limiting carbohydrate intake.  He walks a little. \par Had flu shot\par + neuropathy\par \par Saw opthalmologist  a few months ago, No retinopathy, + h/o MI\par \par \par HTN - blood pressure has been controlled, metoprolol increased recently.  Now off ramilpril due to elevated k+\par \par Hyperlipidemia - taking statin, no side effects.\par \par Smoking, does not currently want to stop

## 2021-01-05 NOTE — HISTORY OF PRESENT ILLNESS
[FreeTextEntry1] : He was hospitalized for worsening SOB and noted to have worsening LV systolic function. symptoms improved with diuresis.\par He lost weight.\par No chest pain.\par \par No further orthopnea.\par \par Still continue to smoke a bit less than pack a day.\par He continues on lasix 80 bid\par

## 2021-01-05 NOTE — DISCUSSION/SUMMARY
[FreeTextEntry1] : He is a 65-year-old with a history of myocardial infarction, latest in 2002, now with worsening LV dysfunction and acute CHF. Stable leg edema on his current regimen.\par RA/psoriatic arthritis on chronic steroids.\par His blood pressure control is okay. LDL was 55. uptitrate to high dose statin - crestor 20\par BP is good, off ramipril and lasix 80 bid. Cr. is 2.2 and stable.\par He should continue plavix daily and ASA biw.\par Will consider adding entresto.\par We discussed the need for smoking cessation, again.  \par Planning for coronary angiography. risks and benefits discussed. He agrees to proceed.\par

## 2021-01-22 NOTE — ASU DISCHARGE PLAN (ADULT/PEDIATRIC) - PROVIDER TOKENS
PROVIDER:[TOKEN:[[id number]]] PROVIDER:[TOKEN:[[id number]]],PROVIDER:[TOKEN:[[id number]],FOLLOWUP:[1-3 days]],PROVIDER:[TOKEN:[[id number]]],PROVIDER:[TOKEN:[[id number]],FOLLOWUP:[1-3 days]]

## 2021-01-22 NOTE — ASU DISCHARGE PLAN (ADULT/PEDIATRIC) - CARE PROVIDER_API CALL
Lisker, Jay J (MD)  Cardiovascular Disease; Internal Medicine  [address]  [place] [address]  Phone: [phone]  Fax: [phone]  Follow Up Time:    Lisker, Jay J (MD)  Cardiovascular Disease; Internal Medicine  [address]  [place] [address]  Phone: [phone]  Fax: [phone]  Follow Up Time:     José Luis Castro)  Internal Medicine; Nephrology  [address] [place] [address]  Phone: [phone]  Fax: [phone]  Follow Up Time: 1-3 days    Manuel Jackson (DO)  EndocrinologyMetabDiabetes; Internal Medicine  [address] [place] [address]  Phone: [phone]  Fax: [phone]  Follow Up Time:     Cr Kidd)  Internal Medicine  [address]  [place] [address]  Phone: [phone]  Fax: [phone]  Follow Up Time: 1-3 days

## 2021-01-22 NOTE — ASU DISCHARGE PLAN (ADULT/PEDIATRIC) - ASU DC SPECIAL INSTRUCTIONSFT
Wound Care:   the day AFTER your procedure remove bandage GENTTLY, and clean using  mild soap and gentle warm, water stream, pat dry. leave OPEN to air. YOU MAY SHOWER   DO NOT apply lotions, creams, ointments, powder, parfumes to your incision site  DO NOT SOAK your site for 1 week ( no baths, no pools, no tubs, etc...)  Check  your groin and /or wirst daily.A small amount of bruising, and soarness are normal    ACTIVITY: for 24 hours   - DO NOT DRIVE  - DO NOT make any important decisions or sign legal documents   - DO NOT operate heavy machinaries   - you may resume sexual activity in 48 hours, unless otherwise instructed by your cardiologist     If your procedure was done through the WRIST: for the NEXT 3DAYS:  - avoid pushing, pulling, with that affected wrist   - avoid repeated movement of that hand and wrist ( eg: typing, hammering)  - DO NOT LIFT anything more than 5 lbs     If your procedure was done through the GROIN: for the NEXT 5 DAYS  - Limit climbing stairs, DO NOT soak in bathtub or pool  - no strenous activities, pushing, pulling, straining  - Do not lift anything 10lbs or heavier     MEDICATION:   take your medications as explained ( see discharge paperwork)   If you received a STENT, you will be taking antiplatelet medications to KEEP YOUR STENT OPEN ( eg: Aspirin, Plavix, Brilinta, Effient, etc).  Take as prescribed DO NOT STOP taking them without consulting with your cardiologist first.     Follow heart healthy diet reccomended by your doctor, , if you smoke STOP SMOKING ( may call [phone] for center of tobacco control if you need assistance)     CALL your doctor to make appointment in 2 WEEKS     ***CALL YOUR DOCTOR***  if you experience: fever, chills, body aches, or severe pain, swelling, redness, heat or yellow discharge at incision site  If you experience Bleeding or excruciating pain at the procedural site, sweliing ( golf ball size) at your procedural site  If you experience CHEST PAIN  If you experience extremity numbness, tingling, temperature change ( of your procedural site)   If you are unable to reach your doctor, you may contact:   -Cardiology Office at [facility] at [phone] or   - [facility] [phone] - [facility] [phone]

## 2021-01-22 NOTE — ASU DISCHARGE PLAN (ADULT/PEDIATRIC) - COMMENTS
please follow up with either your PD Dr Kidd or nephrologist Dr Castro to have your kidney function re-checked in the next 2-3 days

## 2021-01-22 NOTE — ASU PATIENT PROFILE, ADULT - NSALCOHOLPROBLEMSRELYN_GEN_A_CORE_SD
A 64-year-old gentleman for preoperative clearance for ocular surgery.  No   complaints.  Labs reviewed.  Last A1c 6.4.  Currently, compliant with Zetia and   Zocor with no side effects.  He told me about his experiences with Nichole and   also his wife had a recent breast cancer and other issues.      CIARA/HN  dd: 08/10/2017 15:14:43 (CDT)  td: 08/11/2017 02:40:41 (CDT)  Doc ID   #[id number]  Job ID #[id number]    CC:    no

## 2021-01-22 NOTE — H&P CARDIOLOGY - HISTORY OF PRESENT ILLNESS
64 y/o male current smoker (50 PYH) with pmh of HTN, CAD s/p MI (2002), CKD stage IV, chronic pancreatitis, ETOH abuse (last drink 4-5 months ago occasional glass of wine or beer but stopped 11 years ago), DMT2 controlled (AIc 7.6) c/b CKD and peripheral neuropathy in b/l feet on Omnipod insulin pump with Humalog, RA/Psoriatic arthritis (on Consentyx and Medrol) with recent hospitalization 12/20/20 with Dyspnea noted to have new onset LV systolic dysfunction with LVEF 30-35% with mod-severe MR on TTE 12/21/20 compared to TTE 8/2020 with preserved EF and Diastolic dysfunction.  He was seen by Dr. Elvin Yan, Cardiologist on this admission was treated for new onset systolic heart failure optimized and followed up with Dr. Lisker, his primary cardiologist and presents for [facility] for further evaluation of his CAD and his new Systolic HF.  He continues to have exertional SOB >1 flight aor > 2 block which requires a rest period.  He denies cp, palpitations or orthopnea with no sick contacts, fevers, chills and his COVID-19 PCR negative on 1/19/21 and is negative.    Of note: left anterolateral pretibial area with healing abrasion from December remains with intact scab and soft tissue swelling with erythema contained to site of injury s/p fall on ice. Xray 12/22 shows soft tissue swelling with no fracture.   Dr. Kidd has been monitoring.      Endocrinologist - Dr. Estrada  PCP - Dr. Kidd  Nephrologist - Dr. José Luis Castro aware of cardiac cath.    Rheumatologist - Dr. Jacqueline Coello 64 y/o male current smoker (50 PYH) with pmh of HTN, CAD s/p MI (2002), CKD stage IV, chronic pancreatitis, ETOH abuse (last drink 4-5 months ago occasional glass of wine or beer but stopped 11 years ago), DMT2 controlled (AIc 7.6) c/b CKD and peripheral neuropathy in b/l feet on Omnipod insulin pump with Humalog, RA/Psoriatic arthritis (on Consentyx and Medrol) with recent hospitalization 12/20/20 with Dyspnea noted to have new onset LV systolic dysfunction with LVEF 30-35% with mod-severe MR on TTE 12/21/20 compared to TTE 8/2020 with preserved EF and Diastolic dysfunction.  He was seen by Dr. Elvin Yan, Cardiologist on this admission was treated for new onset systolic heart failure optimized and followed up with Dr. Lisker, his primary cardiologist and presents for [facility] for further evaluation of his CAD and his new Systolic HF.  He continues to have exertional SOB >1 flight aor > 2 block which requires a rest period.  He denies cp, palpitations or orthopnea with no sick contacts, fevers, chills and his COVID-19 PCR negative on 1/19/21 and is negative.    Blood sugar 94 has not eaten since 1030pm on 1/21 pump removed.  Pt has his own insulin and supplies.  Will call Endo to alert of his arrival.    Of note: left anterolateral pretibial area with healing abrasion from December remains with intact scab and soft tissue swelling with erythema contained to site of injury s/p fall on ice. Xray 12/22 shows soft tissue swelling with no fracture.   Dr. Kidd has been monitoring.      Endocrinologist - Dr. Estrada  PCP - Dr. Kidd  Nephrologist - Dr. José Luis Castro aware of cardiac cath.    Rheumatologist - Dr. Jacqueline Coello 64 y/o male current smoker (50 PYH) with pmh of HTN, CAD s/p MI (2002), CKD stage IV, chronic pancreatitis, ETOH abuse (last drink 4-5 months ago occasional glass of wine or beer but stopped 11 years ago), DMT2 controlled (AIc 7.6) c/b CKD and peripheral neuropathy in b/l feet on Omnipod insulin pump with Humalog, RA/Psoriatic arthritis (on Consentyx and Medrol), OA of neck, ankles, hips, and knees with recent hospitalization 12/20/20 with Dyspnea noted to have new onset LV systolic dysfunction with LVEF 30-35% with mod-severe MR on TTE 12/21/20 compared to TTE 8/2020 with preserved EF and Diastolic dysfunction.  He was seen by Dr. Elvin Yan, Cardiologist on this admission was treated for new onset systolic heart failure optimized and followed up with Dr. Lisker, his primary cardiologist and presents for [facility] for further evaluation of his CAD and his new Systolic HF.  He continues to have exertional SOB >1 flight aor > 2 block which requires a rest period.  He denies cp, palpitations or orthopnea with no sick contacts, fevers, chills and his COVID-19 PCR negative on 1/19/21 and is negative.    Blood sugar 94 has not eaten since 1030pm on 1/21 pump removed.  Pt has his own insulin and supplies.  Will call Endo to alert of his arrival.    Of note: left anterolateral pretibial area with healing abrasion from December remains with intact scab and soft tissue swelling with erythema contained to site of injury s/p fall on ice. Xray 12/22 shows soft tissue swelling with no fracture.   Dr. Kidd has been monitoring.      Endocrinologist - Dr. Estrada  PCP - Dr. Kidd  Nephrologist - Dr. José Luis Castro aware of cardiac cath.    Rheumatologist - Dr. Jacqueline Coello 66 y/o male current smoker (50 PYH) with pmh of HTN, CAD s/p MI (2002), CKD stage IV, chronic pancreatitis, ETOH abuse (last drink 4-5 months ago occasional glass of wine or beer but stopped 11 years ago), DMT2 controlled (AIc 7.6) c/b CKD and peripheral neuropathy in b/l feet on Omnipod insulin pump with Humalog, RA/Psoriatic arthritis (on Consentyx and Medrol), OA of neck, ankles, hips, and knees with recent hospitalization 12/20/20 with Dyspnea noted to have new onset LV systolic dysfunction with LVEF 30-35% with mod-severe MR on TTE 12/21/20 compared to TTE 8/2020 with preserved EF and Diastolic dysfunction.  He was seen by Dr. Elvin Yan, Cardiologist on this admission was treated for new onset systolic heart failure optimized and followed up with Dr. Lisker, his primary cardiologist and presents for [facility] for further evaluation of his CAD and his new Systolic HF.  He continues to have exertional SOB >1 flight aor > 2 block which requires a rest period.  He denies cp, palpitations or orthopnea with no sick contacts, fevers, chills and his COVID-19 PCR negative on 1/19/21 and is negative.    Blood sugar 94 has not eaten since 1030pm on 1/21 pump removed.  Pt has his own insulin and supplies.  NP Keyla Cr/Dr. Jackson aware of pt's procedure will update of pt's course if admitted so they can follow pt.     Of note: left anterolateral pretibial area with healing abrasion from December remains with intact scab and soft tissue swelling with erythema contained to site of injury s/p fall on ice. Xray 12/22 shows soft tissue swelling with no fracture.   Dr. Kidd has been monitoring.      Endocrinologist - Dr. Estrada  PCP - Dr. Kidd  Nephrologist - Dr. José Luis Castro aware of cardiac cath.    Rheumatologist - Dr. Jacqueline Coello

## 2021-01-25 NOTE — PHYSICAL EXAM
[General Appearance - Alert] : alert [General Appearance - In No Acute Distress] : in no acute distress [General Appearance - Well Nourished] : well nourished [General Appearance - Well Developed] : well developed [Respiration, Rhythm And Depth] : normal respiratory rhythm and effort [Apical Impulse] : the apical impulse was normal [Heart Rate And Rhythm] : heart rate was normal and rhythm regular [Heart Sounds] : normal S1 and S2 [Examination Of The Chest] : the chest was normal in appearance [Bowel Sounds] : normal bowel sounds [Abdomen Soft] : soft [Abdomen Tenderness] : non-tender [] : no hepato-splenomegaly [Abdomen Mass (___ Cm)] : no abdominal mass palpated [Cervical Lymph Nodes Enlarged Posterior Bilaterally] : posterior cervical [Cervical Lymph Nodes Enlarged Anterior Bilaterally] : anterior cervical [Supraclavicular Lymph Nodes Enlarged Bilaterally] : supraclavicular [Femoral Lymph Nodes Enlarged Bilaterally] : femoral [Axillary Lymph Nodes Enlarged Bilaterally] : axillary [Inguinal Lymph Nodes Enlarged Bilaterally] : inguinal [Skin Color & Pigmentation] : normal skin color and pigmentation [Oriented To Time, Place, And Person] : oriented to person, place, and time [Impaired Insight] : insight and judgment were intact [Affect] : the affect was normal

## 2021-01-26 NOTE — CONSULT LETTER
[Dear  ___] : Dear  [unfilled], [Courtesy Letter:] : I had the pleasure of seeing your patient, [unfilled], in my office today. [Please see my note below.] : Please see my note below. [Sincerely,] : Sincerely, [FreeTextEntry2] : Dr. Cr Kidd (MED)\par Dr Jay Lisker ( Cardiology) [FreeTextEntry3] : Maurisio Lucia MD\par Attending Surgeon\par Division of Thoracic Surgery\par , [facility] [employer] [facility]\par

## 2021-01-26 NOTE — HISTORY OF PRESENT ILLNESS
[FreeTextEntry1] : Mr. NORM DEAN, 65 year old male, current smoker, w/ hx of MI (2002), CKD (Stage III), HTN, chronic\par pancreatitis, Hx of past EtOH abuse, DM, Left Carotid Stenosis, RA, OA , who presented to [facility] ED with complaints of Dyspnea and b/l LE edema. During workup, CT scan of chest revealing several > 2 cm nodules (RLL, RUL, LLL), new and increased in size compared to 2014, in setting of extensive smoking history, and imaging characteristics very concerning for malignancy\par \par Of note: During hospitalization: He was found to have elevated BNP, T wave inversions, signs of volume overload, and elevated troponins concerning for CHF exacerbation but an ischemic event could not be ruled out.  Patient's echocardiogram showed worsening ejection fraction to 30-35% from prior 50% in 8/2020. He was recommended for cardiac catheterization. Patient preferring to follow up on an outpatient basis. Beta blocker was subsequently increased. He was also found to be hypocalcemic and hyperkaliemic, most likely secondary to CKD\par \par CT chest on 12/21/20:\par - Emphysema. \par - 2.4 x 2.5 cm solid nodule in the medial right lower lobe (3:100). \par - 3.4 x 2.2 cm groundglass opacity in the left lower lobe (3:82, 3:85) increase in size since 7/25/2014, previously measuring 0.7 cm. \par - Focal airway dilatation of the left upper lobe with minimal surrounding consolidation is indeterminate (3:63). \par - 3 mm nodule in the right upper lobe (3:71), unchanged dating back to 7/25/2014. \par - Partial passive atelectasis in the bilateral lower lobes. Intralobular septal thickening in the lung bases.\par - New small bilateral pleural effusions.\par - Cirrhotic liver morphology an upper abdominal varices.. Left adrenal adenoma.\par \par PET/CT on 1/11/21:\par - FDG-avid RLL nodule with central lucencies is unchanged as compared to CT dated 12/1/2020, measuring 2.5 x 2.4 cm, SUV 8.7 (image 105). \par - Minimally FDG-avid LLL ggo, not significantly changed as compared to CT dated 12/21/2020, and increased in size since 2014, measures approximately 3.4 x 2.2 cm (SUV 2.2; image 94). \par - 3 mm right upper lobe nodule, too small to characterize with PET, is unchanged on CT since 2014 (image 93). - Nonspecific pancolonic and rectal hypermetabolism.\par - Diffuse, mild FDG activity in right temporomandibular joint region, without corresponding abnormality on CT (SUV 3.9; image 15).\par - FDG-avid lucency, left maxillary alveolus, just to the left of midline, possibly related to dental disease (SUV 3.6; image 25). \par \par Patient presents to office for surgical consultation. Referred by Dr. Cr Kidd.

## 2021-01-26 NOTE — ASSESSMENT
[FreeTextEntry1] : \par Mr. NORM DEAN, 65 year old male, current smoker, w/ hx of MI (2002), CKD (Stage III), HTN, chronic\par pancreatitis, Hx of past EtOH abuse, DM, Left Carotid Stenosis, RA, OA , who presented to [facility] ED with complaints of Dyspnea and b/l LE edema. During workup, CT scan of chest revealing several > 2 cm nodules (RLL, RUL, LLL), new and increased in size compared to 2014, in setting of extensive smoking history, and imaging characteristics very concerning for malignancy\par \par Patient  in no acute distress,  an active smoker endorsing 10 lb unintentional weigh loss SOB on exertion.\par \par \par \par I have reviewed the medical records and imaging with the patient at the time of this office consultation, and I've made the following recommendations:\par 1. PFT\par 2. Cardiac Clearance with Dr Lisker\par 3 .Right VATS Thoracotomy,  RLL lobectomy pending results  of PFT\par 4. Hold Plavix 5 days prior to OR day\par 5. Present to Tumor Board\par \par \par Recommendations reviewed with patient during this office visit, and all questions answered; Patient instructed on the importance of follow up and verbalizes understanding.\par I personally performed the services described in the documentation, reviewed the documentation recorded by the scribe in my presence and it accurately and completely records my words and actions.\par \par I, Rosaline Thompson NP, am scribing for and the presence of TIFFANIE Johnson, the following sections HISTORY OF PRESENT ILLNESS, PAST MEDICAL/FAMILY/SOCIAL HISTORY; REVIEW OF SYSTEMS; VITAL SIGNS; PHYSICAL EXAM; DISPOSITION.\par \par

## 2021-01-26 NOTE — REVIEW OF SYSTEMS
[As noted in HPI] : as noted in HPI [SOB on Exertion] : shortness of breath during exertion [As Noted in HPI] : as noted in HPI [Negative] : Heme/Lymph [de-identified] : INsulin pump

## 2021-01-26 NOTE — DATA REVIEWED
[FreeTextEntry1] : PET/CT on 1/11/21: - FDG-avid RLL nodule with central lucencies is unchanged as compared to CT dated 12/1/2020, measuring 2.5 x 2.4 cm, SUV 8.7 (image 105).  - Minimally FDG-avid LLL ggo, not significantly changed as compared to CT dated 12/21/2020, and increased in size since 2014, measures approximately 3.4 x 2.2 cm (SUV 2.2; image 94).  - 3 mm right upper lobe nodule, too small to characterize with PET, is unchanged on CT since 2014 (image 93). - Nonspecific pancolonic and rectal hypermetabolism. - Diffuse, mild FDG activity in right temporomandibular joint region, without corresponding abnormality on CT (SUV 3.9; image 15). - FDG-avid lucency, left maxillary alveolus, just to the left of midline, possibly related to dental disease (SUV 3.6; image 25).

## 2021-01-29 NOTE — REVIEW OF SYSTEMS
[Dyspnea on exertion] : dyspnea during exertion [Lower Ext Edema] : lower extremity edema [see HPI] : see HPI [Negative] : Heme/Lymph [Recent Weight Loss (___ Lbs)] : no recent weight loss [Cough] : no cough

## 2021-01-29 NOTE — DISCUSSION/SUMMARY
[FreeTextEntry1] : He is a 65-year-old with a history of RA/psoriatic arthritis on chronic steroids, Non-obstructive CAD, now with severe LV dysfunction and now stable Systolic CHF on his current regimen.\par Planning to undergo VATS and possible right lower lobe lobectomy.\par His blood pressure is optimized as is his  LDL. \par He may stop plavix for 7 days prior to surgery.\par Continue daily aspirin.\par He is medically optimized from a CV standpoint and may proceed with the planned surgery.

## 2021-01-29 NOTE — HISTORY OF PRESENT ILLNESS
[FreeTextEntry1] : He was seen today preoperatively prior to planned right VATS and left lower lobectomy.\par He is a 65-year-old with known chronic nonischemic cardiomyopathy with recent congestive heart failure who has been doing well since.\par Coronary angiography done earlier this month showed mild to moderate nonobstructive coronary artery disease without any flow-limiting lesions.  The right coronary artery was not visualized\par  \par No orthopnea or PND.\par Still continue to smoke a bit less than pack a day.\par He continues on lasix 80 bid\par

## 2021-02-02 NOTE — H&P PST ADULT - GASTROINTESTINAL COMMENTS
5-6 bowel movements /day- last colonoscopy 4 years ago- "WNL" as per pt "diarrhea related to pancreatitis "

## 2021-02-02 NOTE — H&P PST ADULT - ASSESSMENT
65 yo male presents with right thumb pain Problem: solitary pulmonary nodules   Assessment and Plan: Patient scheduled for surgery on 2/9/2021  Patient provided with verbal and written presurgical instructions; verbalized understanding  with teach back.    Patient provided with famotidine for GI prophylaxis; verbalized understanding.    Patient provided with Chlorhexidine wash, verbal and written instructions reviewed. Patient demonstrated understanding with teach back.   Patient confirmed COVID appointment     OR booking notified of ANT, implants and DM    EKG, Echo, Stress test & angio in chart     Endocrine evaluation requested by PST for insulin pump recommendations  , patient verbalized understanding, will obtain  Case discussed with Dr. Lynn    Problem: Hypertension  Assessment and Plan: Patient instructed to take metoprolol on day of procedure, verbalized understanding.    Problem: CAD/PAD  Assessment and Plan: Patient instructed to take hold aspirin DOS  Patient instructed to hold Plavix from today, LD 2/1/2021    Problem: Diabetes  Assessment and Plan: Patient instructed on medications adjustments: Refer to  endocrinologist for insulin pump management   OR booking notified of DM  POCT glucose testing upon admission

## 2021-02-02 NOTE — H&P PST ADULT - VENOUS THROMBOEMBOLISM CURRENT STATUS
(1) serious lung disease, including pneumonia (within 1 month)/(1) swollen legs (current)/(1) other risk factor (includes escalating BMI, pack-years of smoking, diabetes requiring insulin, chemotherapy, female gender and length of surgery)

## 2021-02-02 NOTE — H&P PST ADULT - HISTORY OF PRESENT ILLNESS
65 year old male with PMH of DM- using insulin pump and gluco sensor, HTN, HLD, RA, OA, CAD- non-obstructive, PAD s/p carotid endarterectomy, cirrhosis- former ETOH, current smoker- 50 PPD, reports experiencing SOB , edema in December 2020, CT scan noted fluid accumulation in bilateral lower lungs, increased size of pulmonary nodules, patient has since been optimized, cardiac cath revealed non- obstructive CAD, patient reports resolution of SOB, dyspnea and improvement of edema, now scheduled for right video assisted thoracoscopy , thoracotomy, right lower lobe lobectomy  65 year old male with PMH of DM- using insulin pump and gluco sensor, HTN, HLD, RA, OA, CAD- non-obstructive, PAD s/p carotid endarterectomy, chronic pancreatitis & cirrhosis- former ETOH, current smoker- 50+ PPD, reports experiencing SOB , edema in December 2020, CT scan noted fluid accumulation in bilateral lower lungs, increased size of pulmonary nodules, patient has since been optimized, cardiac cath revealed non- obstructive CAD, patient reports resolution of SOB, dyspnea and improvement of edema, now scheduled for right video assisted thoracoscopy , thoracotomy, right lower lobe lobectomy

## 2021-02-02 NOTE — H&P PST ADULT - NEGATIVE ENMT SYMPTOMS
no ear pain/no tinnitus/no sinus symptoms/no nasal congestion/no dry mouth/no throat pain/no dysphagia

## 2021-02-02 NOTE — H&P PST ADULT - NEGATIVE GENERAL SYMPTOMS
no fever/no chills/no sweating/no anorexia/no polyphagia/no polyuria/no polydipsia/no malaise/no fatigue

## 2021-02-02 NOTE — H&P PST ADULT - NSICDXPASTSURGICALHX_GEN_ALL_CORE_FT
PAST SURGICAL HISTORY:  H/O arthroscopy of right knee 1988    H/O coronary angiogram     H/O umbilical hernia repair 2014    H/O vasectomy 1994    History of inguinal herniorrhaphy right and left, total of 6    S/P carotid endarterectomy left 11/9/2016    S/P cholecystectomy 2003, laparoscopic    S/P colonoscopy 2014, benign polyps    S/P hip replacement, right     S/P insertion of intrathecal pump placed in 2007  removed in 2008    S/P insertion of spinal cord stimulator inserted 2006 and removed 2007    S/P tonsillectomy and adenoidectomy age 16

## 2021-02-02 NOTE — H&P PST ADULT - NSICDXPASTMEDICALHX_GEN_ALL_CORE_FT
PAST MEDICAL HISTORY:  Alcohol abuse recovering 2003, now only has an occasional drink    Carotid stenosis, left     Cataract bilateral    Chronic pancreatitis "enzymes don't work"    Cigarette smoker     Coronary artery disease     Diabetes mellitus Type II, diagnosed in 2000, insulin pump    Fatty liver     Gastroesophageal reflux disease     H/O carotid stenosis     H/O hyperkalemia treated with sodium bicarbonate    H/O insertion of insulin pump 2015, omnipod, changed q 3 days    H/O rheumatoid arthritis on medication    H/O sleep apnea mild, unable to use CPAP    H/O: HTN (hypertension)     Hearing loss bilateral    History of chronic pancreatitis started in 1999, followed by pain management for pain control    History of MI (myocardial infarction) in 2002    HLD (hyperlipidemia)     Hyperlipidemia     Myocardial infarction     Nasal drainage post nasal drip    Osteoarthritis     PAD (peripheral artery disease)     Peripheral neuropathy feet    Portal vein thrombosis 2004 and treated with blood thinners, no longer follows with hepatology    Renal failure, chronic, stage 3 (moderate)     Solitary pulmonary nodule     Thumb pain, right     Vitamin D deficiency

## 2021-02-02 NOTE — H&P PST ADULT - ATTENDING COMMENTS
Patient seen and examined agree with above note as modified, where appropriate, by me. The risks benefits and alternatives of the procedure were explained to the patient including but not limited to bleeding, infection, prolonged air leak, shortness of breath, need for oxygen, chronic pain, lymphatic leak and nerve injury. All of the patients questions were answered, he demonstrated understanding and freely consented to the procedure.

## 2021-02-02 NOTE — H&P PST ADULT - NEGATIVE NEUROLOGICAL SYMPTOMS
no weakness/no generalized seizures/no focal seizures/no syncope/no loss of sensation/no loss of consciousness/no hemiparesis/no confusion

## 2021-02-08 NOTE — ASU PATIENT PROFILE, ADULT - PMH
Alcohol abuse  recovering 2003, now only has an occasional drink  Carotid stenosis, left    Cataract  bilateral  Chronic pancreatitis  "enzymes don't work"  Cigarette smoker    Coronary artery disease    Diabetes mellitus  Type II, diagnosed in 2000, insulin pump  Fatty liver    Gastroesophageal reflux disease    H/O carotid stenosis    H/O hyperkalemia  treated with sodium bicarbonate  H/O insertion of insulin pump  2015, omnipod, changed q 3 days  H/O rheumatoid arthritis  on medication  H/O sleep apnea  mild, unable to use CPAP  H/O: HTN (hypertension)    Hearing loss  bilateral  History of chronic pancreatitis  started in 1999, followed by pain management for pain control  History of MI (myocardial infarction)  in 2002  HLD (hyperlipidemia)    Hyperlipidemia    Myocardial infarction    Nasal drainage  post nasal drip  Osteoarthritis    PAD (peripheral artery disease)    Peripheral neuropathy  feet  Portal vein thrombosis  2004 and treated with blood thinners, no longer follows with hepatology  Renal failure, chronic, stage 3 (moderate)    Solitary pulmonary nodule    Thumb pain, right    Vitamin D deficiency

## 2021-02-08 NOTE — ASU PATIENT PROFILE, ADULT - PSH
H/O arthroscopy of right knee  1988  H/O coronary angiogram    H/O umbilical hernia repair  2014  H/O vasectomy  1994  History of inguinal herniorrhaphy  right and left, total of 6  S/P carotid endarterectomy  left 11/9/2016  S/P cholecystectomy  2003, laparoscopic  S/P colonoscopy  2014, benign polyps  S/P hip replacement, right    S/P insertion of intrathecal pump  placed in 2007  removed in 2008  S/P insertion of spinal cord stimulator  inserted 2006 and removed 2007  S/P tonsillectomy and adenoidectomy  age 16

## 2021-02-08 NOTE — ASU PATIENT PROFILE, ADULT - ACCEPTABLE
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  Not at all typical for myasthenia although I would like to complete her workup with Musk and striated muscle antibodies  Had an extensive inpatient evaluation with many study results pending at the time of discharge  Reviewed study results with patient:  Lyme serology negative, GARY and rheumatoid factor negative, Sjogren's antibodies negative, normal TSH, spinal fluid with no significant red or white cells and a mildly elevated protein consistent with her diabetic circumstance, CSF MS panel with no oligoclonal bands and normal IgG index and synthesis rate, acetyl choline receptor binding, blocking and modulating antibodies all negative  Additionally, with an MRI brain and orbits demonstrating a moderate to severe amount of chronic microangiopathic changes  Partially empty sella was present suggesting the possibility of intracranial hypertension  This prompted lumbar puncture with normal pressures  Also with a CTA head which was unremarkable  She is seeing Ophthalmology and her course is one of slow improvement in her diplopia  --will be obtaining reports from her formal outpatient ophthalmologic evaluation  --for completeness will be ordering MUSK and stride muscle antibodies  --she will continue her ongoing follow-up with ophthalmology 
3

## 2021-02-09 NOTE — ASU PREOP CHECKLIST - 2.
Insulin pump Endocrinology consult requested Insulin pump  applied left arm Endocrinology consult requested

## 2021-02-09 NOTE — BRIEF OPERATIVE NOTE - NSICDXBRIEFPROCEDURE_GEN_ALL_CORE_FT
PROCEDURES:  VATS, with lobectomy 09-Feb-2021 15:19:45 RVATS RLL lobectomy, MLND José Luis Knutson

## 2021-02-09 NOTE — PROGRESS NOTE ADULT - SUBJECTIVE AND OBJECTIVE BOX
65 year old male with history of DM secondary to pancreatitic insuffiency, HTN, HLD, RA, OA, CAD- non-obstructive, PAD s/p carotid endarterectomy, chronic pancreatitis & cirrhosis- former ETOH, current smoker- 50+ PPD, reports experiencing SOB , edema in December 2020, CT scan noted fluid accumulation in bilateral lower lungs, increased size of pulmonary nodules, patient has since been optimized, cardiac cath revealed non-obstructive CAD, patient reports resolution of SOB, dyspnea and improvement of edema. Is now s/p right video assisted thoracoscopy, thoracotomy, right lower lobe lobectomy.    S:  Doing well denies any difficulty breathing, just "some soreness."    O:    General:  AOx3, appears comfortable in no distress.  Neuro: no gross neurologic or CN deficits  Pulm: CTABL. Chest tube in place with adequate tidaling.  CV: s1s2 RRR no m/r/g  Abdominal: NT/ND, TTP  Extremities: no cyanosis, clubbing or edema  Derm: no significant skin rash noted      _________________________  VITAL SIGNS:  Vital Signs Last 24 Hrs  T(C): 36.7 (09 Feb 2021 15:10), Max: 36.7 (09 Feb 2021 09:45)  T(F): 98 (09 Feb 2021 15:10), Max: 98.1 (09 Feb 2021 09:45)  HR: 70 (09 Feb 2021 16:15) (66 - 80)  BP: 143/74 (09 Feb 2021 15:15) (143/74 - 148/60)  BP(mean): 95 (09 Feb 2021 15:15) (95 - 95)  RR: 20 (09 Feb 2021 16:15) (16 - 23)  SpO2: 96% (09 Feb 2021 16:15) (94% - 100%)  I/Os:   I&O's Detail    09 Feb 2021 07:01  -  09 Feb 2021 16:32  --------------------------------------------------------  IN:    Lactated Ringers: 60 mL  Total IN: 60 mL    OUT:    Indwelling Catheter - Urethral (mL): 150 mL    Voided (mL): 20 mL  Total OUT: 170 mL    Total NET: -110 mL              MEDICATIONS:  MEDICATIONS  (STANDING):  atorvastatin 80 milliGRAM(s) Oral at bedtime  dextrose 40% Gel 15 Gram(s) Oral once  dextrose 5%. 1000 milliLiter(s) (50 mL/Hr) IV Continuous <Continuous>  dextrose 5%. 1000 milliLiter(s) (100 mL/Hr) IV Continuous <Continuous>  dextrose 50% Injectable 25 Gram(s) IV Push once  dextrose 50% Injectable 12.5 Gram(s) IV Push once  dextrose 50% Injectable 25 Gram(s) IV Push once  glucagon  Injectable 1 milliGRAM(s) IntraMuscular once  heparin   Injectable 5000 Unit(s) SubCutaneous every 8 hours  HYDROmorphone PCA (1 mG/mL) 30 milliLiter(s) PCA Continuous PCA Continuous  insulin lispro (ADMELOG) corrective regimen sliding scale   SubCutaneous three times a day before meals  lactated ringers. 1000 milliLiter(s) (30 mL/Hr) IV Continuous <Continuous>  pantoprazole    Tablet 40 milliGRAM(s) Oral before breakfast  senna 2 Tablet(s) Oral at bedtime  sodium chloride 0.9% lock flush 3 milliLiter(s) IV Push every 8 hours    MEDICATIONS  (PRN):  HYDROmorphone  Injectable 0.5 milliGRAM(s) IV Push every 10 minutes PRN Moderate Pain (4 - 6)  HYDROmorphone PCA (1 mG/mL) Rescue Clinician Bolus 0.5 milliGRAM(s) IV Push every 15 minutes PRN for Pain Scale GREATER THAN 6  metoclopramide Injectable 10 milliGRAM(s) IV Push once PRN Nausea and/or Vomiting  naloxone Injectable 0.1 milliGRAM(s) IV Push every 3 minutes PRN For ANY of the following changes in patient status:  A. RR LESS THAN 10 breaths per minute, B. Oxygen saturation LESS THAN 90%, C. Sedation score of 6  ondansetron Injectable 4 milliGRAM(s) IV Push every 6 hours PRN Nausea  ondansetron Injectable 4 milliGRAM(s) IV Push once PRN Nausea and/or Vomiting    LABS:      ABG - ( 09 Feb 2021 13:38 )  pH, Arterial: 7.26  pH, Blood: x     /  pCO2: 52    /  pO2: 232   / HCO3: 21    / Base Excess: -3.6  /  SaO2: 95.9      _________________________    A/P:    RLL lobectomy    Neuro- No issues  Pulmonary- Cont bronchodilators. Incentive spirometry. OOB to chair and walking as tolerated. Pain control. Monitor chest tube output. Chest tube to water seal.  CV- HD stable, euvolemic. Hold antihypertensives kit-op.  Abdomen- no issues. Cont bowel regimen. Advance diet as tolerated.  Renal- stable Cr. Trend.  Endocrine- no active issues, trend FSG.    DVT ppx- SQH    Pertinent clinical, laboratory, radiographic, hemodynamic, echocardiographic, respiratory data, microbiologic data and chart were reviewed and analyzed frequently throughout the course of the day and night  Patient seen, examined and plan discussed with CT Surgeon / CTICU team during rounds.    Status discussed with patient /  updated plan of care.

## 2021-02-09 NOTE — CONSULT NOTE ADULT - PROBLEM SELECTOR PROBLEM 1
Diabetes mellitus due to underlying condition with hyperglycemia, with long-term current use of insulin

## 2021-02-09 NOTE — CONSULT NOTE ADULT - ASSESSMENT
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 Patient now s/p procedure in CTICU. Endocrine consult for management of patient with DM on insulin pump.          DM due to pancreatic insuffiencey   Treat as DM1  A1c 7.0%  FS goal 140-180 while in ICU   FS premeal and qhs   Please have nursing check FS manually and record in flowsheet how much patient boluses         Insulin pump attached  Attestation form   Supplies     HTN  goal bp 130/80    HLD   LDL goal<55  Continue rosuvatatin     To be Discussed with Dr Jono Meyers MD  Endocrine Fellow   Pager    65 year old male with history of DM secondary to pancreatitic insuffiencey ( on Omnipod insulin pump and DEXCOM), HTN, HLD, RA, OA, CAD- non-obstructive, PAD s/p carotid endarterectomy, chronic pancreatitis & cirrhosis- former ETOH, current smoker admitted for right video assisted thoracoscopy , thoracotomy, right lower lobe lobectomy. Patient now s/p procedure in [facility]ICU. Endocrine consult for management of patient with DM on insulin pump.      DM due to pancreatic insuffiencey   Treat as DM1  A1c 7.0%, concern for hypoglycemia   FS goal 140-180 while in ICU   FS premeal and qhs   Please have nursing check FS manually and record in flowsheet how much patient boluses   Recommend continue insulin pump, temp basal discontinued. Patient now on home basal rate of 0.5units/hr as he is going to eat   If pump discontinued for any reason, please give patient stat dose of Lantus 12 units. Patient requires basal insulin as we are treating as DM1 and can go into DKA.   On discharge patient can follow up with Dr Estrada 4/12/21  at 9:45am  OF NOTE: patient on medrol 4mg daily PO at home for psoriatic arthritis. Recommend restart home dose.       Insulin pump attached  Attestation form complete   Patient reports he bought supplies   Changed Omnipod insertion site this morning, 2/9/21, due for site change on 2/12/21  Changed Dexcom insertion site last night 2/8/21, due for site change on 2/18/21      HTN  goal bp 130/80  above could be due to pain   consider restarting home metoprolol     HLD   LDL goal<55  Continue rosuvastatin     Discussed with Dr De La Cruz  Discussed with primary team     Cory Meyers MD  Endocrine Fellow   Pager

## 2021-02-09 NOTE — ASU PREOP CHECKLIST - COMMENTS
pepcid sip of water this morning pepcid sip of water at 5:45am pep phong s metoprolol medrol sodium bicarbonate calcium citrate sip of water at 5:45am

## 2021-02-09 NOTE — ASU PREOP CHECKLIST - INTERNAL PROSTHESES
left arm omnipod insullin pump and right abdominal glucose monitor insulin pump and glucose monitor insulin pump and glucose monitor right hip replacement

## 2021-02-09 NOTE — CONSULT NOTE ADULT - ATTENDING COMMENTS
Patient seen postop in CTICU. DM2 with component of pancreatic insufficiency, managed as Type 1 DM. Follows with Dr. Estrada outpatient for endocrine. On Omnipod pump, was using 80% basal for NPO past midnight (0.4 units/hour) - planning to resume diet tonight, glucose 170s on Dexcom - raised basal rate back to 0.5 units/hour. Reviewed insulin pump use with RNs at bedside. Continue fingerstick monitoring before meals and bedtime. Will follow. Complex patient high level decision making.    Amna Burns MD  Division of Endocrinology  Pager: [phone]    If after 6PM or before 9AM, or on weekends/holidays, please call endocrine answering service for assistance ([phone]).  For nonurgent matters email [email or web address] for assistance.

## 2021-02-09 NOTE — CONSULT NOTE ADULT - SUBJECTIVE AND OBJECTIVE BOX
HPI: 65 year old male with history of DM secondary to pancreatitic insuffiencey ( on Omnipod insulin pump and DEXCOM), HTN, HLD, RA, OA, CAD- non-obstructive, PAD s/p carotid endarterectomy, chronic pancreatitis & cirrhosis- former ETOH, current smoker- 50+ PPD, reports experiencing SOB , edema in December 2020, CT scan noted fluid accumulation in bilateral lower lungs, increased size of pulmonary nodules, patient has since been optimized, cardiac cath revealed non- obstructive CAD, patient reports resolution of SOB, dyspnea and improvement of edema. Admitted for right video assisted thoracoscopy , thoracotomy, right lower lobe lobectomy. Patient now s/p procedure in CTICU.   Endocrine consult for management of patient with DM on insulin pump.    Endocrine History:  Patient follows with Dr Estrada, 1/2021 A1c 7.0%  Patient diagnosed with DM in 2002. On insulin pump since 2015.  Patient has Omnipod insulin pump. Changes site every three days and DEXCOM CGM.   BG   Does not have history of DM retinopathy, follow with opthalmology regularly.  Does report neuropathy   H/o CAD,     Insulin pump setting:  Basal 12A- 12A     0.5units/hr  CR     12A- 12A    1:4  ISF     12A- 12A     50  BGT    120-150        PAST MEDICAL & SURGICAL HISTORY:  Solitary pulmonary nodule  Chronic pancreatitis  HLD (hyperlipidemia)  Myocardial infarction  H/O carotid stenosis  PAD (peripheral artery disease)  Thumb pain, right  Fatty liver  Peripheral neuropathy feet  post nasal drip  Cataract bilateral  Hearing loss bilateral  Renal failure, chronic, stage 3 (moderate)  Vitamin D deficiency  H/O hyperkalemia  treated with sodium bicarbonate  H/O insertion of insulin pump  2015, omnipod, changed q 3 days  Portal vein thrombosis  2004 and treated with blood thinners, no longer follows with hepatology  Alcohol abuse  recovering 2003, now only has an occasional drink  History of chronic pancreatitis  started in 1999, followed by pain management for pain control  Osteoarthritis  Gastroesophageal reflux disease  Hyperlipidemia  Cigarette smoker  H/O rheumatoid arthritis  on medication  H/O sleep apnea  mild, unable to use CPAP  Carotid stenosis, left  History of MI (myocardial infarction)  in 2002  H/O: HTN (hypertension)  Coronary artery disease  Diabetes mellitus  Type II, diagnosed in 2000, insulin pump  S/P hip replacement, right  H/O coronary angiogram  S/P colonoscopy  2014, benign polyps  H/O arthroscopy of right knee  1988  S/P tonsillectomy and adenoidectomy  age 16  S/P insertion of spinal cord stimulator  inserted 2006 and removed 2007  H/O umbilical hernia repair  2014  S/P carotid endarterectomy  left 11/9/2016  History of inguinal herniorrhaphy  right and left, total of 6  S/P insertion of intrathecal pump  placed in 2007  removed in 2008  H/O vasectomy  1994  S/P cholecystectomy  2003, laparoscopic    FAMILY HISTORY:  No pertinent family history in first degree relatives  adopted    Social History:  Former alcohol abuse  Current smoker     Outpatient Medications:  Home Medications:  Aspir 81 oral delayed release tablet: one tab orally on monday and friday (09 Feb 2021 10:18)  calcium citrate: 800mg one tab orally in AM (09 Feb 2021 10:18)  Cosentyx: 200mg  subcutaneous once a month (09 Feb 2021 10:18)  furosemide: 80mg tab one tab orally twice a day (09 Feb 2021 10:18)  HumaLOG 100 units/mL subcutaneous solution: via pump (09 Feb 2021 10:18)  marijuana medical:  as needed (09 Feb 2021 10:18)  Medrol 4 mg oral tablet: one tab orally in AM (09 Feb 2021 10:18)  metoprolol succinate 50 mg oral tablet, extended release: 1 tab(s) orally once a day, in AM (09 Feb 2021 10:18)  Multiple Vitamins oral tablet: 1 tab(s) orally once a day, l  last dose on 02/02/21 (09 Feb 2021 10:18)  omeprazole 20 mg oral delayed release capsule: 1 cap(s) orally once a day, in AM (09 Feb 2021 10:18)  Plavix 75 mg oral tablet: 1 tab(s) orally once a day,  last dose on 02/01/21 (09 Feb 2021 10:18)  rosuvastatin 20 mg oral tablet: 1 tab(s) orally once a day at bedtime (09 Feb 2021 10:18)  sodium bicarbonate: 1300 mg orally three times a day  (09 Feb 2021 10:18)  Tylenol: 500 mg 2 tabs orally as needed (09 Feb 2021 10:18)  Vitamin D3 2000 intl units (50 mcg) oral tablet: one tab orally at bedtime (09 Feb 2021 10:18)    MEDICATIONS  (STANDING):  HYDROmorphone PCA (1 mG/mL) 30 milliLiter(s) PCA Continuous PCA Continuous  lactated ringers. 1000 milliLiter(s) (30 mL/Hr) IV Continuous <Continuous>  sodium chloride 0.9% lock flush 3 milliLiter(s) IV Push every 8 hours    MEDICATIONS  (PRN):  HYDROmorphone  Injectable 0.5 milliGRAM(s) IV Push every 10 minutes PRN Moderate Pain (4 - 6)  HYDROmorphone PCA (1 mG/mL) Rescue Clinician Bolus 0.5 milliGRAM(s) IV Push every 15 minutes PRN for Pain Scale GREATER THAN 6  metoclopramide Injectable 10 milliGRAM(s) IV Push once PRN Nausea and/or Vomiting  naloxone Injectable 0.1 milliGRAM(s) IV Push every 3 minutes PRN For ANY of the following changes in patient status:  A. RR LESS THAN 10 breaths per minute, B. Oxygen saturation LESS THAN 90%, C. Sedation score of 6  ondansetron Injectable 4 milliGRAM(s) IV Push every 6 hours PRN Nausea  ondansetron Injectable 4 milliGRAM(s) IV Push once PRN Nausea and/or Vomiting    Allergies  amitriptyline (Rash)  Plaquenil (Hives)    Review of Systems:  Constitutional: No fever  Eyes: No blurry vision  Neuro: No tremors  HEENT: No pain  Cardiovascular: No chest pain, palpitations  Respiratory: No SOB, no cough  GI: No nausea, vomiting, abdominal pain  : No dysuria  Skin: no rash  Psych: no depression  Endocrine: no polyuria, polydipsia  Hem/lymph: no swelling  Osteoporosis: no fractures    ALL OTHER SYSTEMS REVIEWED AND NEGATIVE    PHYSICAL EXAM:  VITALS: T(C): 36.7 (02-09-21 @ 15:10)  T(F): 98 (02-09-21 @ 15:10), Max: 98.1 (02-09-21 @ 09:45)  HR: 77 (02-09-21 @ 15:15) (66 - 80)  BP: 143/74 (02-09-21 @ 15:15) (143/74 - 148/60)  RR:  (16 - 22)  SpO2:  (96% - 100%)  Wt(kg): 69kg   GENERAL: NAD, well-groomed, well-developed  EYES: No proptosis, anicteric  HEENT:  Atraumatic, Normocephalic, moist mucous membranes  THYROID: Normal size, no palpable nodules  RESPIRATORY: Clear to auscultation bilaterally; No rales, rhonchi, wheezing  CARDIOVASCULAR: Regular rate and rhythm; No murmurs; no peripheral edema  GI: Soft, nontender, non distended, normal bowel sounds  SKIN: Dry, intact, No rashes or lesions  MUSCULOSKELETAL: Full range of motion, normal strength  NEURO: sensation intact, extraocular movements intact, no tremor  PSYCH: Alert and oriented x 3, reactive affect     POCT Blood Glucose.: 124 mg/dL (02-09-21 @ 09:15)                           HPI: 65 year old male with history of DM secondary to pancreatitic insuffiencey ( on Omnipod insulin pump and DEXCOM), HTN, HLD, RA, OA, CAD- non-obstructive, PAD s/p carotid endarterectomy, chronic pancreatitis & cirrhosis- former ETOH, current smoker- 50+ PPD, reports experiencing SOB , edema in December 2020, CT scan noted fluid accumulation in bilateral lower lungs, increased size of pulmonary nodules, patient has since been optimized, cardiac cath revealed non- obstructive CAD, patient reports resolution of SOB, dyspnea and improvement of edema. Admitted for right video assisted thoracoscopy , thoracotomy, right lower lobe lobectomy. Patient now s/p procedure in CTICU.   Endocrine consult for management of patient with DM on insulin pump.    Endocrine History:  Patient follows with Dr Estrada, 1/2021 A1c 7.0%  Patient diagnosed with DM in 2002. On insulin pump since 2015.  Patient has Omnipod insulin pump (Humalog). Changes site every three days, changed insertion site this morning. Patient has DEXCOM CGM which he placed yesterday, 2/8/2021.   Does not have history of DM retinopathy, follow with opthalmology regularly.  Does report neuropathy and history CAD     Insulin pump setting:  Basal 12A- 12A     0.5units/hr  CR     12A- 12A    1:4  ISF     12A- 12A     50  BGT    120      PAST MEDICAL & SURGICAL HISTORY:  Solitary pulmonary nodule  Chronic pancreatitis  HLD (hyperlipidemia)  Myocardial infarction  H/O carotid stenosis  PAD (peripheral artery disease)  Thumb pain, right  Fatty liver  Peripheral neuropathy feet  post nasal drip  Cataract bilateral  Hearing loss bilateral  Renal failure, chronic, stage 3 (moderate)  Vitamin D deficiency  H/O hyperkalemia  treated with sodium bicarbonate  H/O insertion of insulin pump  2015, omnipod, changed q 3 days  Portal vein thrombosis  2004 and treated with blood thinners, no longer follows with hepatology  Alcohol abuse  recovering 2003, now only has an occasional drink  History of chronic pancreatitis  started in 1999, followed by pain management for pain control  Osteoarthritis  Gastroesophageal reflux disease  Hyperlipidemia  Cigarette smoker  H/O rheumatoid arthritis  on medication  H/O sleep apnea  mild, unable to use CPAP  Carotid stenosis, left  History of MI (myocardial infarction)  in 2002  H/O: HTN (hypertension)  Coronary artery disease  Diabetes mellitus  Type II, diagnosed in 2000, insulin pump  S/P hip replacement, right  H/O coronary angiogram  S/P colonoscopy  2014, benign polyps  H/O arthroscopy of right knee  1988  S/P tonsillectomy and adenoidectomy  age 16  S/P insertion of spinal cord stimulator  inserted 2006 and removed 2007  H/O umbilical hernia repair  2014  S/P carotid endarterectomy  left 11/9/2016  History of inguinal herniorrhaphy  right and left, total of 6  S/P insertion of intrathecal pump  placed in 2007  removed in 2008  H/O vasectomy  1994  S/P cholecystectomy  2003, laparoscopic    FAMILY HISTORY:  No pertinent family history in first degree relatives  adopted    Social History:  Former alcohol abuse  Current smoker     Outpatient Medications:  Home Medications:  Aspir 81 oral delayed release tablet: one tab orally on monday and friday (09 Feb 2021 10:18)  calcium citrate: 800mg one tab orally in AM (09 Feb 2021 10:18)  Cosentyx: 200mg  subcutaneous once a month (09 Feb 2021 10:18)  furosemide: 80mg tab one tab orally twice a day (09 Feb 2021 10:18)  HumaLOG 100 units/mL subcutaneous solution: via pump (09 Feb 2021 10:18)  marijuana medical:  as needed (09 Feb 2021 10:18)  Medrol 4 mg oral tablet: one tab orally in AM (09 Feb 2021 10:18)  metoprolol succinate 50 mg oral tablet, extended release: 1 tab(s) orally once a day, in AM (09 Feb 2021 10:18)  Multiple Vitamins oral tablet: 1 tab(s) orally once a day, l  last dose on 02/02/21 (09 Feb 2021 10:18)  omeprazole 20 mg oral delayed release capsule: 1 cap(s) orally once a day, in AM (09 Feb 2021 10:18)  Plavix 75 mg oral tablet: 1 tab(s) orally once a day,  last dose on 02/01/21 (09 Feb 2021 10:18)  rosuvastatin 20 mg oral tablet: 1 tab(s) orally once a day at bedtime (09 Feb 2021 10:18)  sodium bicarbonate: 1300 mg orally three times a day  (09 Feb 2021 10:18)  Tylenol: 500 mg 2 tabs orally as needed (09 Feb 2021 10:18)  Vitamin D3 2000 intl units (50 mcg) oral tablet: one tab orally at bedtime (09 Feb 2021 10:18)    MEDICATIONS  (STANDING):  HYDROmorphone PCA (1 mG/mL) 30 milliLiter(s) PCA Continuous PCA Continuous  lactated ringers. 1000 milliLiter(s) (30 mL/Hr) IV Continuous <Continuous>  sodium chloride 0.9% lock flush 3 milliLiter(s) IV Push every 8 hours    MEDICATIONS  (PRN):  HYDROmorphone  Injectable 0.5 milliGRAM(s) IV Push every 10 minutes PRN Moderate Pain (4 - 6)  HYDROmorphone PCA (1 mG/mL) Rescue Clinician Bolus 0.5 milliGRAM(s) IV Push every 15 minutes PRN for Pain Scale GREATER THAN 6  metoclopramide Injectable 10 milliGRAM(s) IV Push once PRN Nausea and/or Vomiting  naloxone Injectable 0.1 milliGRAM(s) IV Push every 3 minutes PRN For ANY of the following changes in patient status:  A. RR LESS THAN 10 breaths per minute, B. Oxygen saturation LESS THAN 90%, C. Sedation score of 6  ondansetron Injectable 4 milliGRAM(s) IV Push every 6 hours PRN Nausea  ondansetron Injectable 4 milliGRAM(s) IV Push once PRN Nausea and/or Vomiting    Allergies  amitriptyline (Rash)  Plaquenil (Hives)    Review of Systems:  Constitutional: No fever  Eyes: No blurry vision  Neuro: No tremors  HEENT: No pain  Cardiovascular: No chest pain, palpitations  Respiratory: No SOB, no cough  GI: No nausea, vomiting, abdominal pain  : No dysuria  Skin: no rash  Psych: no depression  Endocrine: no polyuria, polydipsia  Hem/lymph: no swelling  Osteoporosis: no fractures    ALL OTHER SYSTEMS REVIEWED AND NEGATIVE    PHYSICAL EXAM:  VITALS: T(C): 36.7 (02-09-21 @ 15:10)  T(F): 98 (02-09-21 @ 15:10), Max: 98.1 (02-09-21 @ 09:45)  HR: 77 (02-09-21 @ 15:15) (66 - 80)  BP: 143/74 (02-09-21 @ 15:15) (143/74 - 148/60)  RR:  (16 - 22)  SpO2:  (96% - 100%)  Wt(kg): 69kg   GENERAL: NAD, well-groomed, well-developed  EYES: No proptosis, anicteric  HEENT:  Atraumatic, Normocephalic, moist mucous membranes  THYROID: Normal size, no palpable nodules  RESPIRATORY: Clear to auscultation bilaterally; No rales, rhonchi, wheezing  CARDIOVASCULAR: Regular rate and rhythm; No murmurs; no peripheral edema  GI: Soft, nontender, non distended, normal bowel sounds  SKIN: Dry, intact, No rashes or lesions  MUSCULOSKELETAL: Full range of motion, normal strength  NEURO: sensation intact, extraocular movements intact, no tremor  PSYCH: Alert and oriented x 3, reactive affect     POCT Blood Glucose.: 124 mg/dL (02-09-21 @ 09:15)                           HPI: 65 year old male with history of DM secondary to pancreatitic insufficiency ( on Omnipod insulin pump and DEXCOM), HTN, HLD, RA, OA, CAD- non-obstructive, PAD s/p carotid endarterectomy, chronic pancreatitis & cirrhosis- former ETOH, current smoker- 50+ PPD, reports experiencing SOB , edema in December 2020, CT scan noted fluid accumulation in bilateral lower lungs, increased size of pulmonary nodules, patient has since been optimized, cardiac cath revealed non- obstructive CAD, patient reports resolution of SOB, dyspnea and improvement of edema. Admitted for right video assisted thoracoscopy , thoracotomy, right lower lobe lobectomy. Patient now s/p procedure in CTICU.   Endocrine consult for management of patient with DM on insulin pump.    Endocrine History:  Patient follows with Dr Estrada, 1/2021 A1c 7.0%  Patient diagnosed with DM in 2002. On insulin pump since 2015.  Patient has Omnipod insulin pump (Humalog). Changes site every three days, changed insertion site this morning. Patient has DEXCOM CGM which he placed yesterday, 2/8/2021.   Does not have history of DM retinopathy, follow with opthalmology regularly.  Does report neuropathy and history CAD     Insulin pump setting:  Basal 12A- 12A     0.5units/hr  CR     12A- 12A    1:4  ISF     12A- 12A     50  BGT    120      PAST MEDICAL & SURGICAL HISTORY:  Solitary pulmonary nodule  Chronic pancreatitis  HLD (hyperlipidemia)  Myocardial infarction  H/O carotid stenosis  PAD (peripheral artery disease)  Thumb pain, right  Fatty liver  Peripheral neuropathy feet  post nasal drip  Cataract bilateral  Hearing loss bilateral  Renal failure, chronic, stage 3 (moderate)  Vitamin D deficiency  H/O hyperkalemia  treated with sodium bicarbonate  H/O insertion of insulin pump  2015, omnipod, changed q 3 days  Portal vein thrombosis  2004 and treated with blood thinners, no longer follows with hepatology  Alcohol abuse  recovering 2003, now only has an occasional drink  History of chronic pancreatitis  started in 1999, followed by pain management for pain control  Osteoarthritis  Gastroesophageal reflux disease  Hyperlipidemia  Cigarette smoker  H/O rheumatoid arthritis  on medication  H/O sleep apnea  mild, unable to use CPAP  Carotid stenosis, left  History of MI (myocardial infarction)  in 2002  H/O: HTN (hypertension)  Coronary artery disease  Diabetes mellitus  Type II, diagnosed in 2000, insulin pump  S/P hip replacement, right  H/O coronary angiogram  S/P colonoscopy  2014, benign polyps  H/O arthroscopy of right knee  1988  S/P tonsillectomy and adenoidectomy  age 16  S/P insertion of spinal cord stimulator  inserted 2006 and removed 2007  H/O umbilical hernia repair  2014  S/P carotid endarterectomy  left 11/9/2016  History of inguinal herniorrhaphy  right and left, total of 6  S/P insertion of intrathecal pump  placed in 2007  removed in 2008  H/O vasectomy  1994  S/P cholecystectomy  2003, laparoscopic    FAMILY HISTORY:  No pertinent family history in first degree relatives  adopted    Social History:  Former alcohol abuse  Current smoker     Outpatient Medications:  Home Medications:  Aspir 81 oral delayed release tablet: one tab orally on monday and friday (09 Feb 2021 10:18)  calcium citrate: 800mg one tab orally in AM (09 Feb 2021 10:18)  Cosentyx: 200mg  subcutaneous once a month (09 Feb 2021 10:18)  furosemide: 80mg tab one tab orally twice a day (09 Feb 2021 10:18)  HumaLOG 100 units/mL subcutaneous solution: via pump (09 Feb 2021 10:18)  marijuana medical:  as needed (09 Feb 2021 10:18)  Medrol 4 mg oral tablet: one tab orally in AM (09 Feb 2021 10:18)  metoprolol succinate 50 mg oral tablet, extended release: 1 tab(s) orally once a day, in AM (09 Feb 2021 10:18)  Multiple Vitamins oral tablet: 1 tab(s) orally once a day, l  last dose on 02/02/21 (09 Feb 2021 10:18)  omeprazole 20 mg oral delayed release capsule: 1 cap(s) orally once a day, in AM (09 Feb 2021 10:18)  Plavix 75 mg oral tablet: 1 tab(s) orally once a day,  last dose on 02/01/21 (09 Feb 2021 10:18)  rosuvastatin 20 mg oral tablet: 1 tab(s) orally once a day at bedtime (09 Feb 2021 10:18)  sodium bicarbonate: 1300 mg orally three times a day  (09 Feb 2021 10:18)  Tylenol: 500 mg 2 tabs orally as needed (09 Feb 2021 10:18)  Vitamin D3 2000 intl units (50 mcg) oral tablet: one tab orally at bedtime (09 Feb 2021 10:18)    MEDICATIONS  (STANDING):  HYDROmorphone PCA (1 mG/mL) 30 milliLiter(s) PCA Continuous PCA Continuous  lactated ringers. 1000 milliLiter(s) (30 mL/Hr) IV Continuous <Continuous>  sodium chloride 0.9% lock flush 3 milliLiter(s) IV Push every 8 hours    MEDICATIONS  (PRN):  HYDROmorphone  Injectable 0.5 milliGRAM(s) IV Push every 10 minutes PRN Moderate Pain (4 - 6)  HYDROmorphone PCA (1 mG/mL) Rescue Clinician Bolus 0.5 milliGRAM(s) IV Push every 15 minutes PRN for Pain Scale GREATER THAN 6  metoclopramide Injectable 10 milliGRAM(s) IV Push once PRN Nausea and/or Vomiting  naloxone Injectable 0.1 milliGRAM(s) IV Push every 3 minutes PRN For ANY of the following changes in patient status:  A. RR LESS THAN 10 breaths per minute, B. Oxygen saturation LESS THAN 90%, C. Sedation score of 6  ondansetron Injectable 4 milliGRAM(s) IV Push every 6 hours PRN Nausea  ondansetron Injectable 4 milliGRAM(s) IV Push once PRN Nausea and/or Vomiting    Allergies  amitriptyline (Rash)  Plaquenil (Hives)    Review of Systems:  Constitutional: No fever  Eyes: No blurry vision  Neuro: No tremors  HEENT: No pain  Cardiovascular: No chest pain, palpitations  Respiratory: No SOB, no cough  GI: No nausea, vomiting, abdominal pain  : No dysuria  Skin: no rash  Psych: no depression  Endocrine: no polyuria, polydipsia  Hem/lymph: no swelling  Osteoporosis: no fractures    ALL OTHER SYSTEMS REVIEWED AND NEGATIVE    PHYSICAL EXAM:  VITALS: T(C): 36.7 (02-09-21 @ 15:10)  T(F): 98 (02-09-21 @ 15:10), Max: 98.1 (02-09-21 @ 09:45)  HR: 77 (02-09-21 @ 15:15) (66 - 80)  BP: 143/74 (02-09-21 @ 15:15) (143/74 - 148/60)  RR:  (16 - 22)  SpO2:  (96% - 100%)  Wt(kg): 69kg   GENERAL: NAD, well-groomed, well-developed  EYES: No proptosis, anicteric  HEENT:  Atraumatic, Normocephalic, moist mucous membranes  THYROID: Normal size, no palpable nodules  RESPIRATORY: Clear to auscultation bilaterally; No rales, rhonchi, wheezing  CARDIOVASCULAR: Regular rate and rhythm; No murmurs; no peripheral edema  GI: Soft, nontender, non distended, normal bowel sounds  SKIN: Dry, intact, No rashes or lesions  MUSCULOSKELETAL: Full range of motion, normal strength  NEURO: sensation intact, extraocular movements intact, no tremor  PSYCH: Alert and oriented x 3, reactive affect     POCT Blood Glucose.: 124 mg/dL (02-09-21 @ 09:15)

## 2021-02-10 NOTE — PHYSICAL THERAPY INITIAL EVALUATION ADULT - GENERAL OBSERVATIONS, REHAB EVAL
Pt received seated in bedside chair, +IV/PCA pump, +cardiac monitor, +cheung, +chest tube, in no apparent distress. Pt agreeable to participate in physical therapy evaluation.

## 2021-02-10 NOTE — PHYSICAL THERAPY INITIAL EVALUATION ADULT - ADDITIONAL COMMENTS
Pt was left seated in bedside chair as found, all lines/tubes intact and call rojas within reach, RN Marva BARNARD present/aware.

## 2021-02-10 NOTE — PROGRESS NOTE ADULT - SUBJECTIVE AND OBJECTIVE BOX
Chief Complaint: management of DM due to pancreatitic insuffiencey     History: Patient seen and examined at bedside. Mild distress due to patient. Did not have much of an appetite this morning, ate a roll, however improved by lunchtime when he had a turkey sandwich.    MEDICATIONS  (STANDING):  aspirin enteric coated 81 milliGRAM(s) Oral <User Schedule>  atorvastatin 80 milliGRAM(s) Oral at bedtime  dextrose 50% Injectable 25 Gram(s) IV Push once  heparin   Injectable 5000 Unit(s) SubCutaneous every 8 hours  HYDROmorphone PCA (1 mG/mL) 30 milliLiter(s) PCA Continuous PCA Continuous  insulin lispro (HumaLOG) Pump 1 Each SubCutaneous Continuous Pump  lactated ringers. 1000 milliLiter(s) (30 mL/Hr) IV Continuous <Continuous>  lidocaine   Patch 1 Patch Transdermal daily  methylPREDNISolone 4 milliGRAM(s) Oral daily  pantoprazole    Tablet 40 milliGRAM(s) Oral before breakfast  senna 2 Tablet(s) Oral at bedtime  sodium bicarbonate 1300 milliGRAM(s) Oral three times a day    MEDICATIONS  (PRN):  HYDROmorphone  Injectable 0.5 milliGRAM(s) IV Push every 10 minutes PRN Moderate Pain (4 - 6)  HYDROmorphone PCA (1 mG/mL) Rescue Clinician Bolus 0.5 milliGRAM(s) IV Push every 15 minutes PRN for Pain Scale GREATER THAN 6  metoclopramide 10 milliGRAM(s) Oral every 6 hours PRN nausea  naloxone Injectable 0.1 milliGRAM(s) IV Push every 3 minutes PRN For ANY of the following changes in patient status:  A. RR LESS THAN 10 breaths per minute, B. Oxygen saturation LESS THAN 90%, C. Sedation score of 6  ondansetron Injectable 4 milliGRAM(s) IV Push every 6 hours PRN Nausea  ondansetron Injectable 4 milliGRAM(s) IV Push once PRN Nausea and/or Vomiting    PHYSICAL EXAM:  VITALS: T(C): 35.9 (02-10-21 @ 15:00)  T(F): 96.6 (02-10-21 @ 15:00), Max: 97.6 (02-10-21 @ 00:00)  HR: 67 (02-10-21 @ 17:00) (58 - 72)  BP: --  RR:  (11 - 21)  SpO2:  (93% - 97%)  Wt(kg): 69kg   GENERAL: NAD, well-groomed, well-developed  RESPIRATORY: Clear to auscultation bilaterally; No rales, rhonchi, wheezing, or rubs  CARDIOVASCULAR: Regular rate and rhythm; No murmurs; no peripheral edema  GI: Soft, nontender, non distended, normal bowel sounds  SKIN: insulin pump on leftt arm, DEXCOM on right lower abdomen   MUSCULOSKELETAL: Full range of motion, normal strength  PSYCH: Alert and oriented x 3, reactive affect     POCT Blood Glucose.: 161 mg/dL (02-10-21 @ 16:29)  POCT Blood Glucose.: 149 mg/dL (02-10-21 @ 11:39)  POCT Blood Glucose.: 158 mg/dL (02-10-21 @ 10:11)  POCT Blood Glucose.: 195 mg/dL (02-10-21 @ 01:36)  POCT Blood Glucose.: 206 mg/dL (02-10-21 @ 01:35)  POCT Blood Glucose.: 124 mg/dL (02-09-21 @ 09:15)      02-10    135  |  103  |  37<H>  ----------------------------<  158<H>  4.7   |  22  |  2.55<H>    EGFR if : 29<L>  EGFR if non : 25<L>    Ca    7.7<L>      02-10  Mg     2.3     02-10  Phos  4.5     02-10        Insulin settings:     Insulin pump setting:  Basal 12A- 12A     0.5units/hr  CR     12A- 12A    1:5 (Changed today from 1:4)  ISF     12A- 12A     50  BGT    120                           Chief Complaint: management of DM due to pancreatitic insufficiency     History: Patient seen and examined at bedside. Mild distress due to patient. Did not have much of an appetite this morning, ate a roll, however improved by lunchtime when he had a turkey sandwich.    MEDICATIONS  (STANDING):  aspirin enteric coated 81 milliGRAM(s) Oral <User Schedule>  atorvastatin 80 milliGRAM(s) Oral at bedtime  dextrose 50% Injectable 25 Gram(s) IV Push once  heparin   Injectable 5000 Unit(s) SubCutaneous every 8 hours  HYDROmorphone PCA (1 mG/mL) 30 milliLiter(s) PCA Continuous PCA Continuous  insulin lispro (HumaLOG) Pump 1 Each SubCutaneous Continuous Pump  lactated ringers. 1000 milliLiter(s) (30 mL/Hr) IV Continuous <Continuous>  lidocaine   Patch 1 Patch Transdermal daily  methylPREDNISolone 4 milliGRAM(s) Oral daily  pantoprazole    Tablet 40 milliGRAM(s) Oral before breakfast  senna 2 Tablet(s) Oral at bedtime  sodium bicarbonate 1300 milliGRAM(s) Oral three times a day    MEDICATIONS  (PRN):  HYDROmorphone  Injectable 0.5 milliGRAM(s) IV Push every 10 minutes PRN Moderate Pain (4 - 6)  HYDROmorphone PCA (1 mG/mL) Rescue Clinician Bolus 0.5 milliGRAM(s) IV Push every 15 minutes PRN for Pain Scale GREATER THAN 6  metoclopramide 10 milliGRAM(s) Oral every 6 hours PRN nausea  naloxone Injectable 0.1 milliGRAM(s) IV Push every 3 minutes PRN For ANY of the following changes in patient status:  A. RR LESS THAN 10 breaths per minute, B. Oxygen saturation LESS THAN 90%, C. Sedation score of 6  ondansetron Injectable 4 milliGRAM(s) IV Push every 6 hours PRN Nausea  ondansetron Injectable 4 milliGRAM(s) IV Push once PRN Nausea and/or Vomiting    PHYSICAL EXAM:  VITALS: T(C): 35.9 (02-10-21 @ 15:00)  T(F): 96.6 (02-10-21 @ 15:00), Max: 97.6 (02-10-21 @ 00:00)  HR: 67 (02-10-21 @ 17:00) (58 - 72)  BP: --  RR:  (11 - 21)  SpO2:  (93% - 97%)  Wt(kg): 69kg   GENERAL: NAD, well-groomed, well-developed  RESPIRATORY: Clear to auscultation bilaterally; No rales, rhonchi, wheezing, or rubs  CARDIOVASCULAR: Regular rate and rhythm; No murmurs; no peripheral edema  GI: Soft, nontender, non distended, normal bowel sounds  SKIN: insulin pump on leftt arm, DEXCOM on right lower abdomen   MUSCULOSKELETAL: Full range of motion, normal strength  PSYCH: Alert and oriented x 3, reactive affect     POCT Blood Glucose.: 161 mg/dL (02-10-21 @ 16:29)  POCT Blood Glucose.: 149 mg/dL (02-10-21 @ 11:39)  POCT Blood Glucose.: 158 mg/dL (02-10-21 @ 10:11)  POCT Blood Glucose.: 195 mg/dL (02-10-21 @ 01:36)  POCT Blood Glucose.: 206 mg/dL (02-10-21 @ 01:35)  POCT Blood Glucose.: 124 mg/dL (02-09-21 @ 09:15)      02-10    135  |  103  |  37<H>  ----------------------------<  158<H>  4.7   |  22  |  2.55<H>    EGFR if : 29<L>  EGFR if non : 25<L>    Ca    7.7<L>      02-10  Mg     2.3     02-10  Phos  4.5     02-10        Insulin settings:     Insulin pump setting:  Basal 12A- 12A     0.5units/hr  CR     12A- 12A    1:5 (Changed today from 1:4)  ISF     12A- 12A     50  BGT    120

## 2021-02-10 NOTE — PROGRESS NOTE ADULT - SUBJECTIVE AND OBJECTIVE BOX
Anesthesia Pain Management Service    SUBJECTIVE: Pt doing well with IV PCA without problems reported.    Therapy:	  [ X] IV PCA	   [ ] Epidural           [ ] s/p Spinal Opoid              [ ] Postpartum infusion	  [ ] Patient controlled regional anesthesia (PCRA)    [ ] prn Analgesics    Allergies    amitriptyline (Rash)  Plaquenil (Hives)    Intolerances      MEDICATIONS  (STANDING):  aspirin enteric coated 81 milliGRAM(s) Oral <User Schedule>  atorvastatin 80 milliGRAM(s) Oral at bedtime  dextrose 50% Injectable 25 Gram(s) IV Push once  heparin   Injectable 5000 Unit(s) SubCutaneous every 8 hours  HYDROmorphone PCA (1 mG/mL) 30 milliLiter(s) PCA Continuous PCA Continuous  insulin lispro (HumaLOG) Pump 1 Each SubCutaneous Continuous Pump  lactated ringers. 1000 milliLiter(s) (30 mL/Hr) IV Continuous <Continuous>  methylPREDNISolone 4 milliGRAM(s) Oral daily  pantoprazole    Tablet 40 milliGRAM(s) Oral before breakfast  senna 2 Tablet(s) Oral at bedtime  sodium bicarbonate 1300 milliGRAM(s) Oral three times a day    MEDICATIONS  (PRN):  HYDROmorphone  Injectable 0.5 milliGRAM(s) IV Push every 10 minutes PRN Moderate Pain (4 - 6)  HYDROmorphone PCA (1 mG/mL) Rescue Clinician Bolus 0.5 milliGRAM(s) IV Push every 15 minutes PRN for Pain Scale GREATER THAN 6  metoclopramide 10 milliGRAM(s) Oral every 6 hours PRN nausea  naloxone Injectable 0.1 milliGRAM(s) IV Push every 3 minutes PRN For ANY of the following changes in patient status:  A. RR LESS THAN 10 breaths per minute, B. Oxygen saturation LESS THAN 90%, C. Sedation score of 6  ondansetron Injectable 4 milliGRAM(s) IV Push every 6 hours PRN Nausea  ondansetron Injectable 4 milliGRAM(s) IV Push once PRN Nausea and/or Vomiting      OBJECTIVE:   [X] No new signs     [ ] Other:    Side Effects:  [X ] None			[ ] Other:    Assessment of Catheter Site:		[ ] Intact		[ ] Other:    ASSESSMENT/PLAN  [ X] Continue current therapy    [ ] Therapy changed to:    [ ] IV PCA       [ ] Epidural     [ ] prn Analgesics     Comments:

## 2021-02-10 NOTE — PHYSICAL THERAPY INITIAL EVALUATION ADULT - PERTINENT HX OF CURRENT PROBLEM, REHAB EVAL
Pt is a 65 year old male presenting with a pre-operative diagnosis of lung cancer. Pt POD#1 s/p VATS, with lobectomy. PMH listed below.

## 2021-02-10 NOTE — PHYSICAL THERAPY INITIAL EVALUATION ADULT - PATIENT PROFILE REVIEW, REHAB EVAL
PT orders received: Ambulate as tolerated. Consult with GENIE BARNARD, patient may participate in PT evaluation./yes

## 2021-02-10 NOTE — PROGRESS NOTE ADULT - ASSESSMENT
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 Patient now s/p procedure in [facility]. Endocrine consult for management of patient with DM on insulin pump.      DM due to pancreatic insuffiencey   Treat as DM1  A1c 7.0%, concern for hypoglycemia   FS goal 140-180 while in ICU   FS premeal and qhs   Please have nursing check FS manually and record in flowsheet how much patient boluses   Recommend continue insulin pump with CR increased to 1:5, as patient was calculating dose of bolus using this regimen instead of the 1:4 CR in pump   If pump discontinued for any reason, please give patient stat dose of Lantus 12 units. Patient requires basal insulin as we are treating as DM1 and can go into DKA.   On discharge patient can follow up with Dr Estrada 4/12/21  at 9:45am  OF NOTE: patient on medrol 4mg daily PO at home for psoriatic arthritis. Recommend restart home dose.       Insulin pump attached  Attestation form complete   Patient reports he bought supplies   Changed Omnipod insertion site this morning, 2/9/21, due for site change on 2/12/21  Changed Dexcom insertion site last night 2/8/21, due for site change on 2/18/21      HTN  goal bp 130/80  above could be due to pain   consider restarting home metoprolol     HLD   LDL goal<55  Continue rosuvastatin     Discussed with Dr De La Cruz  Discussed with primary team     Cory Meyers MD  Endocrine Fellow   Pager    65 year old male with history of DM secondary to pancreatitic insuffiencey ( on Omnipod insulin pump and DEXCOM), HTN, HLD, RA, OA, CAD- non-obstructive, PAD s/p carotid endarterectomy, chronic pancreatitis & cirrhosis- former ETOH, current smoker admitted for right video assisted thoracoscopy , thoracotomy, right lower lobe lobectomy. Patient now s/p procedure in [facility]ICU. Endocrine consult for management of patient with DM on insulin pump.      DM due to pancreatic insufficiency   Treat as DM1  A1c 7.0%, concern for hypoglycemia   FS goal 140-180 while in ICU   FS premeal and qhs   Please have nursing check FS manually and record in flowsheet how much patient boluses   Recommend continue insulin pump with CR increased to 1:5, as patient was calculating dose of bolus using this regimen instead of the 1:4 CR in pump   If pump discontinued for any reason, please give patient stat dose of Lantus 12 units. Patient requires basal insulin as we are treating as DM1 and can go into DKA.   On discharge patient can follow up with Dr Estrada 4/12/21  at 9:45am  OF NOTE: patient on medrol 4mg daily PO at home for psoriatic arthritis. Recommend restart home dose.       Insulin pump attached  Attestation form complete   Patient reports he bought supplies   Changed Omnipod insertion site this morning, 2/9/21, due for site change on 2/12/21  Changed Dexcom insertion site last night 2/8/21, due for site change on 2/18/21      HTN  goal bp 130/80  above could be due to pain   consider restarting home metoprolol     HLD   LDL goal<55  Continue rosuvastatin     Discussed with Dr De La Cruz  Discussed with primary team     Cory Meyers MD  Endocrine Fellow   Pager

## 2021-02-10 NOTE — PROGRESS NOTE ADULT - SUBJECTIVE AND OBJECTIVE BOX
Anesthesia Pain Management Service    SUBJECTIVE: Patient is doing well with IV PCA and no significant problems reported.    Pain Scale Score	At rest: __2_ 	With Activity: ___ 	[X ] Refer to charted pain scores    THERAPY:    [ ] IV PCA Morphine		[ ] 5 mg/mL	[ ] 1 mg/mL  [X ] IV PCA Hydromorphone	[ ] 5 mg/mL	[X ] 1 mg/mL  [ ] IV PCA Fentanyl		[ ] 50 micrograms/mL    Demand dose __0.2_ lockout __6_ (minutes) Continuous Rate _0__ Total: _2.8__  mg used (in past 24 hours)      MEDICATIONS  (STANDING):  aspirin enteric coated 81 milliGRAM(s) Oral <User Schedule>  atorvastatin 80 milliGRAM(s) Oral at bedtime  dextrose 50% Injectable 25 Gram(s) IV Push once  heparin   Injectable 5000 Unit(s) SubCutaneous every 8 hours  HYDROmorphone PCA (1 mG/mL) 30 milliLiter(s) PCA Continuous PCA Continuous  insulin lispro (HumaLOG) Pump 1 Each SubCutaneous Continuous Pump  lactated ringers. 1000 milliLiter(s) (30 mL/Hr) IV Continuous <Continuous>  methylPREDNISolone 4 milliGRAM(s) Oral daily  pantoprazole    Tablet 40 milliGRAM(s) Oral before breakfast  senna 2 Tablet(s) Oral at bedtime  sodium bicarbonate 1300 milliGRAM(s) Oral three times a day    MEDICATIONS  (PRN):  HYDROmorphone  Injectable 0.5 milliGRAM(s) IV Push every 10 minutes PRN Moderate Pain (4 - 6)  HYDROmorphone PCA (1 mG/mL) Rescue Clinician Bolus 0.5 milliGRAM(s) IV Push every 15 minutes PRN for Pain Scale GREATER THAN 6  metoclopramide 10 milliGRAM(s) Oral every 6 hours PRN nausea  naloxone Injectable 0.1 milliGRAM(s) IV Push every 3 minutes PRN For ANY of the following changes in patient status:  A. RR LESS THAN 10 breaths per minute, B. Oxygen saturation LESS THAN 90%, C. Sedation score of 6  ondansetron Injectable 4 milliGRAM(s) IV Push every 6 hours PRN Nausea  ondansetron Injectable 4 milliGRAM(s) IV Push once PRN Nausea and/or Vomiting      OBJECTIVE: laying in bed     Sedation Score:	[ X] Alert	[ ] Drowsy 	[ ] Arousable	[ ] Asleep	[ ] Unresponsive    Side Effects:	[X ] None	[ ] Nausea	[ ] Vomiting	[ ] Pruritus  		[ ] Other:    Vital Signs Last 24 Hrs  T(C): 35 (10 Feb 2021 11:00), Max: 36.7 (09 Feb 2021 15:10)  T(F): 95 (10 Feb 2021 11:00), Max: 98 (09 Feb 2021 15:10)  HR: 64 (10 Feb 2021 11:00) (61 - 80)  BP: 143/74 (09 Feb 2021 15:15) (143/74 - 143/74)  BP(mean): 95 (09 Feb 2021 15:15) (95 - 95)  RR: 15 (10 Feb 2021 11:00) (11 - 25)  SpO2: 96% (10 Feb 2021 11:00) (93% - 99%)    ASSESSMENT/ PLAN    Therapy to  be:	[ X] Continue   [ ] Discontinued   [ ] Change to prn Analgesics    Documentation and Verification of current medications:   [X] Done	[ ] Not done, not elligible    Comments: Recommend non-opioid adjuvant analgesics to be used when possible and when allowed by primary surgical team.    Progress Note written now but Patient was seen earlier.

## 2021-02-10 NOTE — PROGRESS NOTE ADULT - SUBJECTIVE AND OBJECTIVE BOX
ANESTHESIA POSTOP CHECK    65y Male POSTOP DAY 1 S/P R VATS    Vital Signs Last 24 Hrs  T(C): 36.2 (10 Feb 2021 08:00), Max: 36.7 (09 Feb 2021 15:10)  T(F): 97.2 (10 Feb 2021 08:00), Max: 98 (09 Feb 2021 15:10)  HR: 66 (10 Feb 2021 09:00) (63 - 80)  BP: 143/74 (09 Feb 2021 15:15) (143/74 - 143/74)  BP(mean): 95 (09 Feb 2021 15:15) (95 - 95)  RR: 16 (10 Feb 2021 09:00) (11 - 25)  SpO2: 95% (10 Feb 2021 09:00) (93% - 99%)  I&O's Summary    09 Feb 2021 07:01  -  10 Feb 2021 07:00  --------------------------------------------------------  IN: 750 mL / OUT: 915 mL / NET: -165 mL    10 Feb 2021 07:01  -  10 Feb 2021 09:58  --------------------------------------------------------  IN: 300 mL / OUT: 200 mL / NET: 100 mL        [X ] NO APPARENT ANESTHESIA COMPLICATIONS      Comments:

## 2021-02-10 NOTE — PROGRESS NOTE ADULT - SUBJECTIVE AND OBJECTIVE BOX
PROCEDURE: RVATS RLL lobectomy, MLND 09-Feb-2021      ISSUES:   Lung nodule  Post op pain  Chest tube in place  Systolic heart failure (EF 30%)  Moderate mitral regurgitation  CAD  Hx of MI  CKD  Chronic pancreatitis  HTN  HLD  DM2 on insulin pump  Cirrhosis  Carotid stenosis of left  Peripheral artery disease  Rheumatoid arthritis/Psoriatic arthritis (on Consentyx and Medrol)  Smoker  Osteoarthritis  ANT -- not on CPAP        INTERVAL EVENTS:    Chest tube remains on water seal.      HISTORY:   Patient reports moderate pain at chest wall incision sites which is worse with coughing and deep breathing without associated fever or dyspnea. Pain is improved with use of pain meds.     PHYSICAL EXAM:   Gen: Comfortable, No acute distress  Eyes: Sclera white, Conjunctiva normal, Eyelids normal, Pupils symmetrical   ENT: Mucous membranes moist,  ,  ,    Neck: Trachea midline,  ,  ,  ,  ,    CV: Rate regular, Rhythm regular,  ,  ,    Resp: Breath sounds clear, No accessory muscles use, R chest tube in place,    Abd: Soft, Non-distended, Non-tender, Bowel sounds normal,  ,  ,    Skin: Warm, No peripheral edema of lower extremities,  ,    : No cheung  Neuro: Moving all 4 extremities,    Psych: A&Ox3      ASSESSMENT AND PLAN:     NEURO:  Post-operative Pain - Pain control with PCA and Tylenol IV PRN.           RESPIRATORY:  Hypoxia - Wean nasal cannula for goal O2sat above 92. Obtain CXR. Incentive spirometry. Chest PT and frequent suctioning. Continue bronchodilators. OOB to chair & ambulate w/ assistance. Continuous pulse oximetry for support & to prevent decompensation.       Chest tube – Pleurevac water seal. Monitor chest tube output.            CARDIOVASCULAR:  Hemodynamically stable - Not on pressors. Continue hemodynamic monitoring.  HTN - stable. Continue home antihypertensives medications.  CAD - stable. Continue aspirin. Hold plavix.       CHF - stable.           RENAL:  CKD – Stable. Renally dose medications. Monitor for hyperkalemia and uremia. Avoid nephrotoxic medications. Monitor IOs and electrolytes.         GASTROINTESTINAL:  GI prophylaxis not indicated  Zofran and Reglan IV PRN for nausea  Regular consistency diet            HEMATOLOGIC:  No signs of active bleeding. Monitor Hgb in CBC in AM  DVT prophylaxis with heparin subQ and SCDs.         INFECTIOUS DISEASE:  No signs of active infection. Will monitor for fever and leukocytosis.         ENDOCRINE:  DM2 – Stable. Monitor glucose fingersticks for goal 120-180. Insulin sliding scale. Carb control diet.            Pertinent clinical, laboratory, radiographic, hemodynamic, echocardiographic, respiratory data, microbiologic data and chart were reviewed by myself and analyzed frequently throughout the course of the day and night by myself.    Plan discussed at length with the CTICU staff and Attending CT Surgeon.     Patient's status was discussed with patient at bedside.           ________________________________________________      _________________________  VITAL SIGNS:  Vital Signs Last 24 Hrs  T(C): 35.9 (10 Feb 2021 15:00), Max: 36.4 (10 Feb 2021 00:00)  T(F): 96.6 (10 Feb 2021 15:00), Max: 97.6 (10 Feb 2021 00:00)  HR: 70 (10 Feb 2021 16:00) (58 - 72)  BP: --  BP(mean): --  RR: 21 (10 Feb 2021 16:00) (11 - 21)  SpO2: 95% (10 Feb 2021 16:00) (93% - 97%)  I/Os:   I&O's Detail    09 Feb 2021 07:01  -  10 Feb 2021 07:00  --------------------------------------------------------  IN:    Lactated Ringers: 510 mL    Oral Fluid: 240 mL  Total IN: 750 mL    OUT:    Chest Tube (mL): 175 mL    Indwelling Catheter - Urethral (mL): 720 mL    Voided (mL): 20 mL  Total OUT: 915 mL    Total NET: -165 mL      10 Feb 2021 07:01  -  10 Feb 2021 17:46  --------------------------------------------------------  IN:    Lactated Ringers: 210 mL    Oral Fluid: 480 mL  Total IN: 690 mL    OUT:    Chest Tube (mL): 140 mL    Indwelling Catheter - Urethral (mL): 230 mL  Total OUT: 370 mL    Total NET: 320 mL              MEDICATIONS:  MEDICATIONS  (STANDING):  acetaminophen  IVPB .. 500 milliGRAM(s) IV Intermittent once  aspirin enteric coated 81 milliGRAM(s) Oral <User Schedule>  atorvastatin 80 milliGRAM(s) Oral at bedtime  dextrose 50% Injectable 25 Gram(s) IV Push once  heparin   Injectable 5000 Unit(s) SubCutaneous every 8 hours  HYDROmorphone PCA (1 mG/mL) 30 milliLiter(s) PCA Continuous PCA Continuous  insulin lispro (HumaLOG) Pump 1 Each SubCutaneous Continuous Pump  lactated ringers. 1000 milliLiter(s) (30 mL/Hr) IV Continuous <Continuous>  lidocaine   Patch 1 Patch Transdermal daily  methylPREDNISolone 4 milliGRAM(s) Oral daily  pantoprazole    Tablet 40 milliGRAM(s) Oral before breakfast  senna 2 Tablet(s) Oral at bedtime  sodium bicarbonate 1300 milliGRAM(s) Oral three times a day    MEDICATIONS  (PRN):  HYDROmorphone  Injectable 0.5 milliGRAM(s) IV Push every 10 minutes PRN Moderate Pain (4 - 6)  HYDROmorphone PCA (1 mG/mL) Rescue Clinician Bolus 0.5 milliGRAM(s) IV Push every 15 minutes PRN for Pain Scale GREATER THAN 6  metoclopramide 10 milliGRAM(s) Oral every 6 hours PRN nausea  naloxone Injectable 0.1 milliGRAM(s) IV Push every 3 minutes PRN For ANY of the following changes in patient status:  A. RR LESS THAN 10 breaths per minute, B. Oxygen saturation LESS THAN 90%, C. Sedation score of 6  ondansetron Injectable 4 milliGRAM(s) IV Push every 6 hours PRN Nausea  ondansetron Injectable 4 milliGRAM(s) IV Push once PRN Nausea and/or Vomiting      LABS:                        9.3    18.63 )-----------( 184      ( 10 Feb 2021 14:00 )             28.9     02-10    135  |  103  |  37<H>  ----------------------------<  158<H>  4.7   |  22  |  2.55<H>    Ca    7.7<L>      10 Feb 2021 14:00  Phos  4.5     02-10  Mg     2.3     02-10        PT/INR - ( 10 Feb 2021 14:00 )   PT: 11.7 sec;   INR: 1.03 ratio         PTT - ( 10 Feb 2021 14:00 )  PTT:31.4 sec  ABG - ( 10 Feb 2021 14:00 )  pH, Arterial: 7.31  pH, Blood: x     /  pCO2: 48    /  pO2: 126   / HCO3: 22    / Base Excess: -1.8  /  SaO2: 97.8                _________________________

## 2021-02-11 NOTE — PROGRESS NOTE ADULT - SUBJECTIVE AND OBJECTIVE BOX
Issues:  RLL lobectomy, MLND  CHF  HTN  DMII on insulin pump  RA on steroids  Pancreatic insufficiency    S:  Doing well. No complaints, walked without difficulty.    O:    General:  AOx3, appears comfortable in no distress.  Neuro: no gross neurologic or CN deficits  Pulm: CTABL. Chest tube in place with adequate tidaling.  CV: s1s2 RRR no m/r/g  Abdominal: NT/ND, TTP  Extremities: no cyanosis, clubbing or edema  Derm: no significant skin rash noted      _________________________  VITAL SIGNS:  Vital Signs Last 24 Hrs  T(C): 36.2 (11 Feb 2021 08:00), Max: 36.2 (11 Feb 2021 08:00)  T(F): 97.2 (11 Feb 2021 08:00), Max: 97.2 (11 Feb 2021 08:00)  HR: 79 (11 Feb 2021 10:00) (58 - 82)  BP: --  BP(mean): --  RR: 21 (11 Feb 2021 10:00) (12 - 24)  SpO2: 95% (11 Feb 2021 10:00) (91% - 97%)  I/Os:   I&O's Detail    10 Feb 2021 07:01  -  11 Feb 2021 07:00  --------------------------------------------------------  IN:    Lactated Ringers: 720 mL    Oral Fluid: 720 mL  Total IN: 1440 mL    OUT:    Chest Tube (mL): 330 mL    Indwelling Catheter - Urethral (mL): 230 mL    Voided (mL): 500 mL  Total OUT: 1060 mL    Total NET: 380 mL      11 Feb 2021 07:01  -  11 Feb 2021 11:40  --------------------------------------------------------  IN:    Lactated Ringers: 120 mL    Oral Fluid: 540 mL  Total IN: 660 mL    OUT:    Chest Tube (mL): 50 mL  Total OUT: 50 mL    Total NET: 610 mL              MEDICATIONS:  MEDICATIONS  (STANDING):  acetaminophen   Tablet .. 500 milliGRAM(s) Oral every 8 hours  aspirin enteric coated 81 milliGRAM(s) Oral <User Schedule>  atorvastatin 80 milliGRAM(s) Oral at bedtime  dextrose 50% Injectable 25 Gram(s) IV Push once  furosemide    Tablet 80 milliGRAM(s) Oral two times a day  heparin   Injectable 5000 Unit(s) SubCutaneous every 8 hours  insulin lispro (HumaLOG) Pump 1 Each SubCutaneous Continuous Pump  lidocaine   Patch 1 Patch Transdermal daily  methylPREDNISolone 4 milliGRAM(s) Oral daily  metoprolol succinate ER 50 milliGRAM(s) Oral daily  pantoprazole    Tablet 40 milliGRAM(s) Oral before breakfast  senna 2 Tablet(s) Oral at bedtime  sodium bicarbonate 1300 milliGRAM(s) Oral three times a day    MEDICATIONS  (PRN):  HYDROmorphone  Injectable 0.3 milliGRAM(s) IV Push every 3 hours PRN Severe Breakthrough Pain  metoclopramide 10 milliGRAM(s) Oral every 6 hours PRN nausea  ondansetron Injectable 4 milliGRAM(s) IV Push once PRN Nausea and/or Vomiting  oxyCODONE    IR 5 milliGRAM(s) Oral every 3 hours PRN Moderate to Severe Pain (4-10)      LABS:                        8.7    10.37 )-----------( 125      ( 11 Feb 2021 10:39 )             26.8     02-11    137  |  101  |  39<H>  ----------------------------<  171<H>  4.8   |  23  |  2.77<H>    Ca    7.6<L>      11 Feb 2021 04:03  Phos  4.3     02-11  Mg     2.0     02-11        PT/INR - ( 11 Feb 2021 10:39 )   PT: 11.3 sec;   INR: 0.98 ratio         PTT - ( 11 Feb 2021 10:39 )  PTT:30.4 sec  ABG - ( 10 Feb 2021 14:00 )  pH, Arterial: 7.31  pH, Blood: x     /  pCO2: 48    /  pO2: 126   / HCO3: 22    / Base Excess: -1.8  /  SaO2: 97.8        _________________________    A/P:    Neuro- No issues  Pulmonary- Cont bronchodilators. Incentive spirometry. OOB to chair and walking as tolerated. Pain control. Monitor chest tube output. Chest tube to water seal.  CV- HD stable, euvolemic. Hold antihypertensives kit-op.  Abdomen- no issues. Cont bowel regimen. Advance diet as tolerated.  Renal- stable Cr. Trend.  Endocrine- no active issues, trend FSG.    DVT ppx- SQH    Pertinent clinical, laboratory, radiographic, hemodynamic, echocardiographic, respiratory data, microbiologic data and chart were reviewed and analyzed frequently throughout the course of the day and night  Patient seen, examined and plan discussed with CT Surgeon / CTICU team during rounds.    Status discussed with patient /  updated plan of care

## 2021-02-11 NOTE — PROGRESS NOTE ADULT - ASSESSMENT
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 Patient now s/p procedure in CTICU. Endocrine consult for management of patient with DM on insulin pump.      DM due to pancreatic insufficiency   Treat as DM1  A1c 7.0%, concern for hypoglycemia   FS goal 140-180 while in ICU   FS premeal and qhs   Continue checking FS manually and record in flowsheet how much patient boluses   Recommend continue insulin pump at current settings   If pump discontinued for any reason, please give patient stat dose of Lantus 12 units. Patient requires basal insulin as we are treating as DM1 and can go into DKA.   On discharge patient can follow up with Dr Estrada 4/12/21  at 9:45am  OF NOTE: patient on medrol 4mg daily PO at home for psoriatic arthritis. Recommend restart home dose.       Insulin pump attached  Attestation form complete   Patient reports he bought supplies   Changed Omnipod insertion site this morning, 2/9/21, due for site change on 2/12/21  Changed Dexcom insertion site last night 2/8/21, due for site change on 2/18/21      HTN  goal bp 130/80  above could be due to pain   consider restarting home metoprolol     HLD   LDL goal<55  Continue rosuvastatin     Discussed with Dr De La Cruz  Discussed with primary team     Cory Meyers MD  Endocrine Fellow   Pager    65 year old male with history of DM secondary to pancreatitic insuffiencey ( on Omnipod insulin pump and DEXCOM), HTN, HLD, RA, OA, CAD- non-obstructive, PAD s/p carotid endarterectomy, chronic pancreatitis & cirrhosis- former ETOH, current smoker admitted for right video assisted thoracoscopy , thoracotomy, right lower lobe lobectomy. Patient now s/p procedure in CTICU. Endocrine consult for management of patient with DM on insulin pump.      DM due to pancreatic insufficiency   Treat as DM1  A1c 7.0%, concern for hypoglycemia   FS goal 140-180 while in ICU   FS premeal and qhs   Continue checking FS manually and record in flowsheet how much patient boluses   Recommend continue insulin pump at current settings   If pump discontinued for any reason, please give patient stat dose of Lantus 12 units. Patient requires basal insulin as we are treating as DM1 and can go into DKA.   On discharge patient can follow up with Dr Estrada 4/12/21  at 9:45am  OF NOTE: patient on medrol 4mg daily PO at home for psoriatic arthritis. Recommend restart home dose.       Insulin pump attached  Attestation form complete   Patient reports he bought supplies   Changed Omnipod insertion site this morning, 2/9/21, due for site change on 2/12/21  Changed Dexcom insertion site last night 2/8/21, due for site change on 2/18/21      HTN  goal bp 130/80  above could be due to pain   consider restarting home metoprolol     HLD   LDL goal<55  Continue rosuvastatin     Discussed with Dr Jono Meyers MD  Endocrine Fellow   Pager    65 year old male with history of DM secondary to pancreatitic insuffiencey ( on Omnipod insulin pump and DEXCOM), HTN, HLD, RA, OA, CAD- non-obstructive, PAD s/p carotid endarterectomy, chronic pancreatitis & cirrhosis- former ETOH, current smoker admitted for right video assisted thoracoscopy , thoracotomy, right lower lobe lobectomy. Patient now s/p procedure in CTICU. Endocrine consult for management of patient with DM on insulin pump.      DM due to pancreatic insufficiency - on insulin pump in hospital  Treat as DM1  A1c 7.0%, concern for hypoglycemia   FS goal 140-180 while in ICU   FS premeal and qhs   Continue checking FS manually and record in flowsheet how much patient boluses   Recommend continue insulin pump at current settings   If pump discontinued for any reason, please give patient stat dose of Lantus 12 units. Patient requires basal insulin as we are treating as DM1 and can go into DKA.   On discharge patient can follow up with Dr Estrada 4/12/21  at 9:45am  OF NOTE: patient on medrol 4mg daily PO at home for psoriatic arthritis. Recommend restart home dose.       Insulin pump attached  Attestation form complete   Patient reports he bought supplies   Changed Omnipod insertion site this morning, 2/9/21, due for site change on 2/12/21  Changed Dexcom insertion site last night 2/8/21, due for site change on 2/18/21      HTN  goal bp 130/80  above could be due to pain   consider restarting home metoprolol     HLD   LDL goal<55  Continue rosuvastatin     Discussed with Dr Jono Meyers MD  Endocrine Fellow   Pager

## 2021-02-11 NOTE — PROGRESS NOTE ADULT - SUBJECTIVE AND OBJECTIVE BOX
Anesthesia Pain Management Service- Attending Addendum    SUBJECTIVE: Patient's pain control adequate    Therapy:	  [ X] IV PCA	   [ ] Epidural           [ ] s/p Spinal Opoid              [ ] Postpartum infusion	  [ ] Patient controlled regional anesthesia (PCRA)    [ ] prn Analgesics    Allergies    amitriptyline (Rash)  Plaquenil (Hives)    Intolerances      MEDICATIONS  (STANDING):  acetaminophen   Tablet .. 500 milliGRAM(s) Oral every 8 hours  aspirin enteric coated 81 milliGRAM(s) Oral <User Schedule>  atorvastatin 80 milliGRAM(s) Oral at bedtime  dextrose 50% Injectable 25 Gram(s) IV Push once  furosemide    Tablet 80 milliGRAM(s) Oral two times a day  heparin   Injectable 5000 Unit(s) SubCutaneous every 8 hours  insulin lispro (HumaLOG) Pump 1 Each SubCutaneous Continuous Pump  lidocaine   Patch 1 Patch Transdermal daily  methylPREDNISolone 4 milliGRAM(s) Oral daily  metoprolol succinate ER 50 milliGRAM(s) Oral daily  pantoprazole    Tablet 40 milliGRAM(s) Oral before breakfast  senna 2 Tablet(s) Oral at bedtime  sodium bicarbonate 1300 milliGRAM(s) Oral three times a day    MEDICATIONS  (PRN):  HYDROmorphone  Injectable 0.3 milliGRAM(s) IV Push every 3 hours PRN Severe Breakthrough Pain  metoclopramide 10 milliGRAM(s) Oral every 6 hours PRN nausea  ondansetron Injectable 4 milliGRAM(s) IV Push once PRN Nausea and/or Vomiting  oxyCODONE    IR 5 milliGRAM(s) Oral every 3 hours PRN Moderate to Severe Pain (4-10)      OBJECTIVE:   [X] No new signs     [ ] Other:    Side Effects:  [X ] None			[ ] Other:      ASSESSMENT/PLAN  -Discontinue current therapy    [ ] Therapy changed to:    [ ] IV PCA       [ ] Epidural     [ X] prn Analgesics     Comments: Pain management per primary team, APS to sign off    Note entered after patient seen

## 2021-02-11 NOTE — PROGRESS NOTE ADULT - SUBJECTIVE AND OBJECTIVE BOX
Chief Complaint:  DM in patient with pancreatic insuffiencey on insulin pump     History: Patient seen and examined at bedside. Patient in NAD. Reports appetite is better today.    MEDICATIONS  (STANDING):  acetaminophen   Tablet .. 500 milliGRAM(s) Oral every 8 hours  aspirin enteric coated 81 milliGRAM(s) Oral <User Schedule>  atorvastatin 80 milliGRAM(s) Oral at bedtime  dextrose 50% Injectable 25 Gram(s) IV Push once  furosemide    Tablet 80 milliGRAM(s) Oral two times a day  heparin   Injectable 5000 Unit(s) SubCutaneous every 8 hours  insulin lispro (HumaLOG) Pump 1 Each SubCutaneous Continuous Pump  lidocaine   Patch 1 Patch Transdermal daily  methylPREDNISolone 4 milliGRAM(s) Oral daily  metoprolol succinate ER 50 milliGRAM(s) Oral daily  pantoprazole    Tablet 40 milliGRAM(s) Oral before breakfast  senna 2 Tablet(s) Oral at bedtime  sodium bicarbonate 1300 milliGRAM(s) Oral three times a day    MEDICATIONS  (PRN):  HYDROmorphone  Injectable 0.3 milliGRAM(s) IV Push every 3 hours PRN Severe Breakthrough Pain  metoclopramide 10 milliGRAM(s) Oral every 6 hours PRN nausea  ondansetron Injectable 4 milliGRAM(s) IV Push once PRN Nausea and/or Vomiting  oxyCODONE    IR 5 milliGRAM(s) Oral every 3 hours PRN Moderate to Severe Pain (4-10)      PHYSICAL EXAM:  VITALS: T(C): 36.2 (02-11-21 @ 08:00)  T(F): 97.2 (02-11-21 @ 08:00), Max: 97.2 (02-11-21 @ 08:00)  HR: 83 (02-11-21 @ 14:00) (58 - 83)  BP: 151/60 (02-11-21 @ 14:00) (126/56 - 151/60)  RR:  (12 - 24)  SpO2:  (91% - 98%)  Wt(kg): 69kg   GENERAL: NAD, well-groomed, well-developed  RESPIRATORY: Clear to auscultation bilaterally; No rales, rhonchi, wheezing, or rubs  CARDIOVASCULAR: Regular rate and rhythm; No murmurs; no peripheral edema  GI: Soft, nontender, non distended, normal bowel sounds  SKIN: Dry, intact, No rashes or lesions, insulin pump on left arm, DEXCOM on right lower abdomen   MUSCULOSKELETAL: Full range of motion, normal strength  PSYCH: Alert and oriented x 3, reactive affect    POCT Blood Glucose.: 179 mg/dL (02-11-21 @ 07:37)  POCT Blood Glucose.: 161 mg/dL (02-10-21 @ 16:29)  POCT Blood Glucose.: 149 mg/dL (02-10-21 @ 11:39)  POCT Blood Glucose.: 158 mg/dL (02-10-21 @ 10:11)  POCT Blood Glucose.: 195 mg/dL (02-10-21 @ 01:36)  POCT Blood Glucose.: 206 mg/dL (02-10-21 @ 01:35)  POCT Blood Glucose.: 229 mg/dL (02-09-21 @ 15:39)  POCT Blood Glucose.: 124 mg/dL (02-09-21 @ 09:15)      02-11    137  |  101  |  39<H>  ----------------------------<  171<H>  4.8   |  23  |  2.77<H>    EGFR if : 26<L>  EGFR if non : 23<L>    Ca    7.6<L>      02-11  Mg     2.0     02-11  Phos  4.3     02-11

## 2021-02-12 NOTE — PROGRESS NOTE ADULT - ATTENDING COMMENTS
Patient seen at bedside in CTICU. DM2 with component of pancreatic insufficiency, managed as Type 1 DM. Follows with Dr. Estrada outpatient for endocrine. On Omnipod pump, continue basal rate 0.5 units/hour. Next site change today patient has supplies.  Although on dexcom, continue fingerstick monitoring before meals and bedtime. Will follow. When otherwise ready for discharge he can be dc from endocrine standpoint on current pump settings. Complex patient high level decision making.    Amna Burns MD  Division of Endocrinology  Pager: [phone]    If after 6PM or before 9AM, or on weekends/holidays, please call endocrine answering service for assistance ([phone]).  For nonurgent matters email [email or web address] for assistance.
Patient seen at bedside in CTICU. DM2 with component of pancreatic insufficiency, managed as Type 1 DM. Follows with Dr. Estrada outpatient for endocrine. On Omnipod pump, continue basal rate 0.5 units/hour. Patient reported that he had adjusted carb ratio to 1:5 and using this in his head to calculate boluses due to change in his steroid dose as outpatient. Changed carb ratio in pump to reflect what he is doing. Although on dexcom, continue fingerstick monitoring before meals and bedtime. Will follow. Complex patient high level decision making.    Amna Burns MD  Division of Endocrinology  Pager: [phone]    If after 6PM or before 9AM, or on weekends/holidays, please call endocrine answering service for assistance ([phone]).  For nonurgent matters email [email or web address] for assistance.
Patient seen at bedside in CTICU. DM2 with component of pancreatic insufficiency, managed as Type 1 DM. Follows with Dr. Estrada outpatient for endocrine. On Omnipod pump, continue basal rate 0.5 units/hour. Next site change tomorrow, patient has supplies.  Although on dexcom, continue fingerstick monitoring before meals and bedtime. Will follow. Complex patient high level decision making.    Amna Burns MD  Division of Endocrinology  Pager: [phone]    If after 6PM or before 9AM, or on weekends/holidays, please call endocrine answering service for assistance ([phone]).  For nonurgent matters email [email or web address] for assistance.

## 2021-02-12 NOTE — PROGRESS NOTE ADULT - SUBJECTIVE AND OBJECTIVE BOX
Chief Complaint: Management of patient with DM secondary to pancreatitic insuffiencey on insulin pump    History: Patient seen and examined at bedside. NAD. Still has chest tube. BG at goal, tolerating diet     MEDICATIONS  (STANDING):  acetaminophen   Tablet .. 500 milliGRAM(s) Oral every 8 hours  aspirin enteric coated 81 milliGRAM(s) Oral <User Schedule>  atorvastatin 80 milliGRAM(s) Oral at bedtime  dextrose 50% Injectable 25 Gram(s) IV Push once  furosemide    Tablet 80 milliGRAM(s) Oral two times a day  heparin   Injectable 5000 Unit(s) SubCutaneous every 8 hours  insulin lispro (HumaLOG) Pump 1 Each SubCutaneous Continuous Pump  lidocaine   Patch 1 Patch Transdermal daily  methylPREDNISolone 4 milliGRAM(s) Oral daily  metoprolol succinate ER 50 milliGRAM(s) Oral daily  pantoprazole    Tablet 40 milliGRAM(s) Oral before breakfast  senna 2 Tablet(s) Oral at bedtime  sodium bicarbonate 1300 milliGRAM(s) Oral three times a day    MEDICATIONS  (PRN):  HYDROmorphone  Injectable 0.3 milliGRAM(s) IV Push every 3 hours PRN Severe Breakthrough Pain  metoclopramide 10 milliGRAM(s) Oral every 6 hours PRN nausea  ondansetron Injectable 4 milliGRAM(s) IV Push once PRN Nausea and/or Vomiting  oxyCODONE    IR 5 milliGRAM(s) Oral every 3 hours PRN Moderate to Severe Pain (4-10)    PHYSICAL EXAM:  VITALS: T(C): 36 (02-12-21 @ 12:00)  T(F): 96.8 (02-12-21 @ 12:00), Max: 97.7 (02-11-21 @ 20:00)  HR: 65 (02-12-21 @ 15:00) (65 - 88)  BP: 133/62 (02-12-21 @ 15:00) (120/57 - 151/64)  RR:  (13 - 24)  SpO2:  (94% - 100%)  Wt(kg): 69kg   GENERAL: NAD, well-groomed, well-developed  RESPIRATORY: Clear to auscultation bilaterally; No rales, rhonchi, wheezing, or rubs  CARDIOVASCULAR: Regular rate and rhythm; No murmurs; no peripheral edema  GI: Soft, nontender, non distended, normal bowel sounds  SKIN: DEXCOM right lower abdomen, left arm Omnipod   MUSCULOSKELETAL: Full range of motion, normal strength  NEURO: sensation intact, extraocular movements intact, no tremor, normal reflexes  PSYCH: Alert and oriented x 3, reactive affect     POCT Blood Glucose.: 117 mg/dL (02-12-21 @ 07:37)  POCT Blood Glucose.: 147 mg/dL (02-11-21 @ 23:05)  POCT Blood Glucose.: 179 mg/dL (02-11-21 @ 07:37)  POCT Blood Glucose.: 161 mg/dL (02-10-21 @ 16:29)  POCT Blood Glucose.: 149 mg/dL (02-10-21 @ 11:39)  POCT Blood Glucose.: 158 mg/dL (02-10-21 @ 10:11)  POCT Blood Glucose.: 195 mg/dL (02-10-21 @ 01:36)  POCT Blood Glucose.: 206 mg/dL (02-10-21 @ 01:35)  POCT Blood Glucose.: 229 mg/dL (02-09-21 @ 15:39)      02-12    136  |  105  |  38<H>  ----------------------------<  106<H>  5.0   |  24  |  2.57<H>    EGFR if : 29<L>  EGFR if non : 25<L>    Ca    7.7<L>      02-12  Mg     2.0     02-11  Phos  4.3     02-11    TPro  6.3  /  Alb  2.8<L>  /  TBili  0.2  /  DBili  x   /  AST  30  /  ALT  19  /  AlkPhos  76  02-12

## 2021-02-12 NOTE — PROGRESS NOTE ADULT - SUBJECTIVE AND OBJECTIVE BOX
NORM DEAN      65y   Male   [id number]         amitriptyline (Rash)  Plaquenil (Hives)             Daily     Daily Drug Dosing Weight  Height (cm): 175 (09 Feb 2021 13:02)  Weight (kg): 69.4 (09 Feb 2021 09:59)  BMI (kg/m2): 22.7 (09 Feb 2021 13:02)  BSA (m2): 1.84 (09 Feb 2021 13:02)    HPI:  65 year old male with PMH of DM- using insulin pump and gluco sensor, HTN, HLD, RA, OA, CAD- non-obstructive, PAD s/p carotid endarterectomy, chronic pancreatitis & cirrhosis- former ETOH, current smoker- 50+ PPD, reports experiencing SOB , edema in December 2020, CT scan noted fluid accumulation in bilateral lower lungs, increased size of pulmonary nodules, patient has since been optimized, cardiac cath revealed non- obstructive CAD, patient reports resolution of SOB, dyspnea and improvement of edema, now scheduled for right video assisted thoracoscopy , thoracotomy, right lower lobe lobectomy  (02 Feb 2021 11:13)      Procedure:  RVATS RLL lobectomy, MLND   2/9/2021                    Issues:              RLL lobectomy, MLND  CHF  HTN  CAD  DM-2 on insulin pump  RA on steroids  Pancreatic insufficiency  CKD Stage-3                 Home Medications:  Aspir 81 oral delayed release tablet: one tab orally on monday and friday (09 Feb 2021 10:18)  calcium citrate: 800mg one tab orally in AM (09 Feb 2021 10:18)  Cosentyx: 200mg  subcutaneous once a month (09 Feb 2021 10:18)  furosemide: 80mg tab one tab orally twice a day (09 Feb 2021 10:18)  HumaLOG 100 units/mL subcutaneous solution: via pump (09 Feb 2021 10:18)  marijuana medical:  as needed (09 Feb 2021 10:18)  Medrol 4 mg oral tablet: one tab orally in AM (09 Feb 2021 10:18)  metoprolol succinate 50 mg oral tablet, extended release: 1 tab(s) orally once a day, in AM (09 Feb 2021 10:18)  Multiple Vitamins oral tablet: 1 tab(s) orally once a day, l  last dose on 02/02/21 (09 Feb 2021 10:18)  omeprazole 20 mg oral delayed release capsule: 1 cap(s) orally once a day, in AM (09 Feb 2021 10:18)  Plavix 75 mg oral tablet: 1 tab(s) orally once a day,  last dose on 02/01/21 (09 Feb 2021 10:18)  rosuvastatin 20 mg oral tablet: 1 tab(s) orally once a day at bedtime (09 Feb 2021 10:18)  sodium bicarbonate: 1300 mg orally three times a day  (09 Feb 2021 10:18)  Tylenol: 500 mg 2 tabs orally as needed (09 Feb 2021 10:18)  Vitamin D3 2000 intl units (50 mcg) oral tablet: one tab orally at bedtime (09 Feb 2021 10:18)      PAST MEDICAL & SURGICAL HISTORY:  Solitary pulmonary nodule    Chronic pancreatitis  &quot;enzymes don&#x27;t work&quot;    HLD (hyperlipidemia)    Myocardial infarction    H/O carotid stenosis    PAD (peripheral artery disease)    Thumb pain, right    Fatty liver    Peripheral neuropathy  feet    Nasal drainage  post nasal drip    Cataract  bilateral    Hearing loss  bilateral    Renal failure, chronic, stage 3 (moderate)    Vitamin D deficiency    H/O hyperkalemia  treated with sodium bicarbonate    H/O insertion of insulin pump  2015, omnipod, changed q 3 days    Portal vein thrombosis  2004 and treated with blood thinners, no longer follows with hepatology    Alcohol abuse  recovering 2003, now only has an occasional drink    History of chronic pancreatitis  started in 1999, followed by pain management for pain control    Osteoarthritis    Gastroesophageal reflux disease    Hyperlipidemia    Cigarette smoker    H/O rheumatoid arthritis  on medication    H/O sleep apnea  mild, unable to use CPAP    Carotid stenosis, left    History of MI (myocardial infarction)  in 2002    H/O: HTN (hypertension)    Coronary artery disease    Diabetes mellitus  Type II, diagnosed in 2000, insulin pump    S/P hip replacement, right    H/O coronary angiogram    S/P colonoscopy  2014, benign polyps    H/O arthroscopy of right knee  1988    S/P tonsillectomy and adenoidectomy  age 16    S/P insertion of spinal cord stimulator  inserted 2006 and removed 2007    H/O umbilical hernia repair  2014    S/P carotid endarterectomy  left 11/9/2016    History of inguinal herniorrhaphy  right and left, total of 6    S/P insertion of intrathecal pump  placed in 2007  removed in 2008    H/O vasectomy  1994    S/P cholecystectomy  2003, laparoscopic      Vital Signs Last 24 Hrs  T(C): 35.8 (12 Feb 2021 08:00), Max: 36.5 (11 Feb 2021 20:00)  T(F): 96.5 (12 Feb 2021 08:00), Max: 97.7 (11 Feb 2021 20:00)  HR: 71 (12 Feb 2021 08:00) (69 - 88)  BP: 123/73 (12 Feb 2021 08:00) (120/58 - 153/86)  BP(mean): 88 (12 Feb 2021 08:00) (76 - 101)  RR: 18 (12 Feb 2021 08:00) (13 - 24)  SpO2: 96% (12 Feb 2021 08:00) (94% - 98%)  I&O's Detail    11 Feb 2021 07:01  -  12 Feb 2021 07:00  --------------------------------------------------------  IN:    Lactated Ringers: 120 mL    Oral Fluid: 1030 mL  Total IN: 1150 mL    OUT:    Chest Tube (mL): 220 mL  Total OUT: 220 mL    Total NET: 930 mL      12 Feb 2021 07:01  -  12 Feb 2021 09:27  --------------------------------------------------------  IN:  Total IN: 0 mL    OUT:    Chest Tube (mL): 30 mL    Voided (mL): 300 mL  Total OUT: 330 mL    Total NET: -330 mL        CAPILLARY BLOOD GLUCOSE      POCT Blood Glucose.: 117 mg/dL (12 Feb 2021 07:37)  POCT Blood Glucose.: 147 mg/dL (11 Feb 2021 23:05)    CAPILLARY BLOOD GLUCOSE      POCT Blood Glucose.: 117 mg/dL (12 Feb 2021 07:37)      Home Medications:  Aspir 81 oral delayed release tablet: one tab orally on monday and friday (09 Feb 2021 10:18)  calcium citrate: 800mg one tab orally in AM (09 Feb 2021 10:18)  Cosentyx: 200mg  subcutaneous once a month (09 Feb 2021 10:18)  furosemide: 80mg tab one tab orally twice a day (09 Feb 2021 10:18)  HumaLOG 100 units/mL subcutaneous solution: via pump (09 Feb 2021 10:18)  marijuana medical:  as needed (09 Feb 2021 10:18)  Medrol 4 mg oral tablet: one tab orally in AM (09 Feb 2021 10:18)  metoprolol succinate 50 mg oral tablet, extended release: 1 tab(s) orally once a day, in AM (09 Feb 2021 10:18)  Multiple Vitamins oral tablet: 1 tab(s) orally once a day, l  last dose on 02/02/21 (09 Feb 2021 10:18)  omeprazole 20 mg oral delayed release capsule: 1 cap(s) orally once a day, in AM (09 Feb 2021 10:18)  Plavix 75 mg oral tablet: 1 tab(s) orally once a day,  last dose on 02/01/21 (09 Feb 2021 10:18)  rosuvastatin 20 mg oral tablet: 1 tab(s) orally once a day at bedtime (09 Feb 2021 10:18)  sodium bicarbonate: 1300 mg orally three times a day  (09 Feb 2021 10:18)  Tylenol: 500 mg 2 tabs orally as needed (09 Feb 2021 10:18)  Vitamin D3 2000 intl units (50 mcg) oral tablet: one tab orally at bedtime (09 Feb 2021 10:18)      MEDICATIONS  (STANDING):  acetaminophen   Tablet .. 500 milliGRAM(s) Oral every 8 hours  aspirin enteric coated 81 milliGRAM(s) Oral <User Schedule>  atorvastatin 80 milliGRAM(s) Oral at bedtime  dextrose 50% Injectable 25 Gram(s) IV Push once  furosemide    Tablet 80 milliGRAM(s) Oral two times a day  heparin   Injectable 5000 Unit(s) SubCutaneous every 8 hours  insulin lispro (HumaLOG) Pump 1 Each SubCutaneous Continuous Pump  lidocaine   Patch 1 Patch Transdermal daily  methylPREDNISolone 4 milliGRAM(s) Oral daily  metoprolol succinate ER 50 milliGRAM(s) Oral daily  pantoprazole    Tablet 40 milliGRAM(s) Oral before breakfast  senna 2 Tablet(s) Oral at bedtime  sodium bicarbonate 1300 milliGRAM(s) Oral three times a day    MEDICATIONS  (PRN):  HYDROmorphone  Injectable 0.3 milliGRAM(s) IV Push every 3 hours PRN Severe Breakthrough Pain  metoclopramide 10 milliGRAM(s) Oral every 6 hours PRN nausea  ondansetron Injectable 4 milliGRAM(s) IV Push once PRN Nausea and/or Vomiting  oxyCODONE    IR 5 milliGRAM(s) Oral every 3 hours PRN Moderate to Severe Pain (4-10)          Physical exam:                             General:               Pt is awake, alert,  appears to be in pain but not in  distress                                                  Neuro:                  Nonfocal                             Cardiovascular:   S1 & S2, regular                           Respiratory:         Air entry is fair and equal on both sides, has bilateral conducted sounds                           GI:                          Soft, nondistended and nontender, Bowel sounds active                            Ext:                        No cyanosis or edema     Labs:                                                                           8.0    6.25  )-----------( 105      ( 12 Feb 2021 03:42 )             25.7             02-12    136  |  105  |  38<H>  ----------------------------<  106<H>  5.0   |  24  |  2.57<H>    Ca    7.7<L>      12 Feb 2021 03:42  Phos  4.3     02-11  Mg     2.0     02-11    TPro  6.3  /  Alb  2.8<L>  /  TBili  0.2  /  DBili  x   /  AST  30  /  ALT  19  /  AlkPhos  76  02-12                  PT/INR - ( 11 Feb 2021 10:39 )   PT: 11.3 sec;   INR: 0.98 ratio         PTT - ( 11 Feb 2021 10:39 )  PTT:30.4 sec  LIVER FUNCTIONS - ( 12 Feb 2021 03:42 )  Alb: 2.8 g/dL / Pro: 6.3 g/dL / ALK PHOS: 76 U/L / ALT: 19 U/L / AST: 30 U/L / GGT: x             CXR:    < from: Xray Chest 1 View- PORTABLE-Routine (02.11.21 @ 05:45) >  Right-sided chest tube in position unchanged. Lungs show no focal consolidations. Small postop changes at the right lung base. Subcutaneous emphysema  in the right chest. Left lung and right upper lung fields are clear.    Small apical pneumothorax is present.    COMPARISON:  February 10    IMPRESSION:  Follow-up post right thoracotomy with chest tube and tiny apical pneumothorax.        Plan:    General: 65yMale s/p RVATS RLL lobectomy, MLND   2/9/2021, experiencing  pain with deep breathing.                             Neuro:                                          Pain control with PCA /  Tylenol PRN                            Cardiovascular:                                          HTN / CAD / HLD: Continue hemodynamic monitoring, on Lopressor / ASA / Lipitor. Hold Plavix for now                            Respiratory:                                         Pt is on RA                                         Comfortable, not in any distress.                                         Encourage incentive spirometry                                          Monitor chest tube output                                         Chest tube to  water seal, no water seal                                                                 Continue bronchodilators, pulmonary toilet                            GI                                         On clear liquids, advance to DASH / diabetic / regular diet as tolerated                                         Continue Zofran / Reglan for nausea - PRN	                                                                 Renal:                                         CKD:  Monitor I/Os and electrolytes.                                                                                         Hem/ Onc:                                                                                  Monitor chest tube output &  signs of bleeding.                                          Follow CBC in AM                           Infectious disease:                                            Monitor for fever / leukocytosis.                                          All surgical incision / chest tube  sites look clean                            Endocrine                                             DM-2 / Hyperglycemia: Has insulin pump. Continue Accu-Checks       Endocrine f/u     Pt is on SQ Heparin and Venodyne boots for DVT prophylaxis.     Pertinent clinical, laboratory, radiographic, hemodynamic, echocardiographic, respiratory data, microbiologic data and chart were reviewed and analyzed frequently throughout the course of the day and night  Patient seen, examined and plan discussed with CT Surgeon Dr. Lucia / CTICU team during rounds.    Status discussed with patient /  updated plan of care          Chadd Altman MD                                                                     NORM DEAN      65y   Male   [id number]         amitriptyline (Rash)  Plaquenil (Hives)             Daily     Daily Drug Dosing Weight  Height (cm): 175 (09 Feb 2021 13:02)  Weight (kg): 69.4 (09 Feb 2021 09:59)  BMI (kg/m2): 22.7 (09 Feb 2021 13:02)  BSA (m2): 1.84 (09 Feb 2021 13:02)    HPI:  65 year old male with PMH of DM- using insulin pump and gluco sensor, HTN, HLD, RA, OA, CAD- non-obstructive, PAD s/p carotid endarterectomy, chronic pancreatitis & cirrhosis- former ETOH, current smoker- 50+ PPD, reports experiencing SOB , edema in December 2020, CT scan noted fluid accumulation in bilateral lower lungs, increased size of pulmonary nodules, patient has since been optimized, cardiac cath revealed non- obstructive CAD, patient reports resolution of SOB, dyspnea and improvement of edema, now scheduled for right video assisted thoracoscopy , thoracotomy, right lower lobe lobectomy  (02 Feb 2021 11:13)      Procedure:  RVATS RLL lobectomy, MLND   2/9/2021                    Issues:              RLL lobectomy, MLND  CHF  HTN  CAD  DM-2 on insulin pump  RA on steroids  Pancreatic insufficiency  CKD Stage-4                 Home Medications:  Aspir 81 oral delayed release tablet: one tab orally on monday and friday (09 Feb 2021 10:18)  calcium citrate: 800mg one tab orally in AM (09 Feb 2021 10:18)  Cosentyx: 200mg  subcutaneous once a month (09 Feb 2021 10:18)  furosemide: 80mg tab one tab orally twice a day (09 Feb 2021 10:18)  HumaLOG 100 units/mL subcutaneous solution: via pump (09 Feb 2021 10:18)  marijuana medical:  as needed (09 Feb 2021 10:18)  Medrol 4 mg oral tablet: one tab orally in AM (09 Feb 2021 10:18)  metoprolol succinate 50 mg oral tablet, extended release: 1 tab(s) orally once a day, in AM (09 Feb 2021 10:18)  Multiple Vitamins oral tablet: 1 tab(s) orally once a day, l  last dose on 02/02/21 (09 Feb 2021 10:18)  omeprazole 20 mg oral delayed release capsule: 1 cap(s) orally once a day, in AM (09 Feb 2021 10:18)  Plavix 75 mg oral tablet: 1 tab(s) orally once a day,  last dose on 02/01/21 (09 Feb 2021 10:18)  rosuvastatin 20 mg oral tablet: 1 tab(s) orally once a day at bedtime (09 Feb 2021 10:18)  sodium bicarbonate: 1300 mg orally three times a day  (09 Feb 2021 10:18)  Tylenol: 500 mg 2 tabs orally as needed (09 Feb 2021 10:18)  Vitamin D3 2000 intl units (50 mcg) oral tablet: one tab orally at bedtime (09 Feb 2021 10:18)      PAST MEDICAL & SURGICAL HISTORY:  Solitary pulmonary nodule    Chronic pancreatitis  &quot;enzymes don&#x27;t work&quot;    HLD (hyperlipidemia)    Myocardial infarction    H/O carotid stenosis    PAD (peripheral artery disease)    Thumb pain, right    Fatty liver    Peripheral neuropathy  feet    Nasal drainage  post nasal drip    Cataract  bilateral    Hearing loss  bilateral    Renal failure, chronic, stage 3 (moderate)    Vitamin D deficiency    H/O hyperkalemia  treated with sodium bicarbonate    H/O insertion of insulin pump  2015, omnipod, changed q 3 days    Portal vein thrombosis  2004 and treated with blood thinners, no longer follows with hepatology    Alcohol abuse  recovering 2003, now only has an occasional drink    History of chronic pancreatitis  started in 1999, followed by pain management for pain control    Osteoarthritis    Gastroesophageal reflux disease    Hyperlipidemia    Cigarette smoker    H/O rheumatoid arthritis  on medication    H/O sleep apnea  mild, unable to use CPAP    Carotid stenosis, left    History of MI (myocardial infarction)  in 2002    H/O: HTN (hypertension)    Coronary artery disease    Diabetes mellitus  Type II, diagnosed in 2000, insulin pump    S/P hip replacement, right    H/O coronary angiogram    S/P colonoscopy  2014, benign polyps    H/O arthroscopy of right knee  1988    S/P tonsillectomy and adenoidectomy  age 16    S/P insertion of spinal cord stimulator  inserted 2006 and removed 2007    H/O umbilical hernia repair  2014    S/P carotid endarterectomy  left 11/9/2016    History of inguinal herniorrhaphy  right and left, total of 6    S/P insertion of intrathecal pump  placed in 2007  removed in 2008    H/O vasectomy  1994    S/P cholecystectomy  2003, laparoscopic      Vital Signs Last 24 Hrs  T(C): 35.8 (12 Feb 2021 08:00), Max: 36.5 (11 Feb 2021 20:00)  T(F): 96.5 (12 Feb 2021 08:00), Max: 97.7 (11 Feb 2021 20:00)  HR: 71 (12 Feb 2021 08:00) (69 - 88)  BP: 123/73 (12 Feb 2021 08:00) (120/58 - 153/86)  BP(mean): 88 (12 Feb 2021 08:00) (76 - 101)  RR: 18 (12 Feb 2021 08:00) (13 - 24)  SpO2: 96% (12 Feb 2021 08:00) (94% - 98%)  I&O's Detail    11 Feb 2021 07:01  -  12 Feb 2021 07:00  --------------------------------------------------------  IN:    Lactated Ringers: 120 mL    Oral Fluid: 1030 mL  Total IN: 1150 mL    OUT:    Chest Tube (mL): 220 mL  Total OUT: 220 mL    Total NET: 930 mL      12 Feb 2021 07:01  -  12 Feb 2021 09:27  --------------------------------------------------------  IN:  Total IN: 0 mL    OUT:    Chest Tube (mL): 30 mL    Voided (mL): 300 mL  Total OUT: 330 mL    Total NET: -330 mL        CAPILLARY BLOOD GLUCOSE      POCT Blood Glucose.: 117 mg/dL (12 Feb 2021 07:37)  POCT Blood Glucose.: 147 mg/dL (11 Feb 2021 23:05)    CAPILLARY BLOOD GLUCOSE      POCT Blood Glucose.: 117 mg/dL (12 Feb 2021 07:37)      Home Medications:  Aspir 81 oral delayed release tablet: one tab orally on monday and friday (09 Feb 2021 10:18)  calcium citrate: 800mg one tab orally in AM (09 Feb 2021 10:18)  Cosentyx: 200mg  subcutaneous once a month (09 Feb 2021 10:18)  furosemide: 80mg tab one tab orally twice a day (09 Feb 2021 10:18)  HumaLOG 100 units/mL subcutaneous solution: via pump (09 Feb 2021 10:18)  marijuana medical:  as needed (09 Feb 2021 10:18)  Medrol 4 mg oral tablet: one tab orally in AM (09 Feb 2021 10:18)  metoprolol succinate 50 mg oral tablet, extended release: 1 tab(s) orally once a day, in AM (09 Feb 2021 10:18)  Multiple Vitamins oral tablet: 1 tab(s) orally once a day, l  last dose on 02/02/21 (09 Feb 2021 10:18)  omeprazole 20 mg oral delayed release capsule: 1 cap(s) orally once a day, in AM (09 Feb 2021 10:18)  Plavix 75 mg oral tablet: 1 tab(s) orally once a day,  last dose on 02/01/21 (09 Feb 2021 10:18)  rosuvastatin 20 mg oral tablet: 1 tab(s) orally once a day at bedtime (09 Feb 2021 10:18)  sodium bicarbonate: 1300 mg orally three times a day  (09 Feb 2021 10:18)  Tylenol: 500 mg 2 tabs orally as needed (09 Feb 2021 10:18)  Vitamin D3 2000 intl units (50 mcg) oral tablet: one tab orally at bedtime (09 Feb 2021 10:18)      MEDICATIONS  (STANDING):  acetaminophen   Tablet .. 500 milliGRAM(s) Oral every 8 hours  aspirin enteric coated 81 milliGRAM(s) Oral <User Schedule>  atorvastatin 80 milliGRAM(s) Oral at bedtime  dextrose 50% Injectable 25 Gram(s) IV Push once  furosemide    Tablet 80 milliGRAM(s) Oral two times a day  heparin   Injectable 5000 Unit(s) SubCutaneous every 8 hours  insulin lispro (HumaLOG) Pump 1 Each SubCutaneous Continuous Pump  lidocaine   Patch 1 Patch Transdermal daily  methylPREDNISolone 4 milliGRAM(s) Oral daily  metoprolol succinate ER 50 milliGRAM(s) Oral daily  pantoprazole    Tablet 40 milliGRAM(s) Oral before breakfast  senna 2 Tablet(s) Oral at bedtime  sodium bicarbonate 1300 milliGRAM(s) Oral three times a day    MEDICATIONS  (PRN):  HYDROmorphone  Injectable 0.3 milliGRAM(s) IV Push every 3 hours PRN Severe Breakthrough Pain  metoclopramide 10 milliGRAM(s) Oral every 6 hours PRN nausea  ondansetron Injectable 4 milliGRAM(s) IV Push once PRN Nausea and/or Vomiting  oxyCODONE    IR 5 milliGRAM(s) Oral every 3 hours PRN Moderate to Severe Pain (4-10)          Physical exam:                             General:               Pt is awake, alert,  appears to be in pain but not in  distress                                                  Neuro:                  Nonfocal                             Cardiovascular:   S1 & S2, regular                           Respiratory:         Air entry is fair and equal on both sides, has bilateral conducted sounds                           GI:                          Soft, nondistended and nontender, Bowel sounds active                            Ext:                        No cyanosis or edema     Labs:                                                                           8.0    6.25  )-----------( 105      ( 12 Feb 2021 03:42 )             25.7             02-12    136  |  105  |  38<H>  ----------------------------<  106<H>  5.0   |  24  |  2.57<H>    Ca    7.7<L>      12 Feb 2021 03:42  Phos  4.3     02-11  Mg     2.0     02-11    TPro  6.3  /  Alb  2.8<L>  /  TBili  0.2  /  DBili  x   /  AST  30  /  ALT  19  /  AlkPhos  76  02-12                  PT/INR - ( 11 Feb 2021 10:39 )   PT: 11.3 sec;   INR: 0.98 ratio         PTT - ( 11 Feb 2021 10:39 )  PTT:30.4 sec  LIVER FUNCTIONS - ( 12 Feb 2021 03:42 )  Alb: 2.8 g/dL / Pro: 6.3 g/dL / ALK PHOS: 76 U/L / ALT: 19 U/L / AST: 30 U/L / GGT: x             CXR:    < from: Xray Chest 1 View- PORTABLE-Routine (02.11.21 @ 05:45) >  Right-sided chest tube in position unchanged. Lungs show no focal consolidations. Small postop changes at the right lung base. Subcutaneous emphysema  in the right chest. Left lung and right upper lung fields are clear.    Small apical pneumothorax is present.    COMPARISON:  February 10    IMPRESSION:  Follow-up post right thoracotomy with chest tube and tiny apical pneumothorax.        Plan:    General: 65yMale s/p RVATS RLL lobectomy, MLND   2/9/2021, experiencing  pain with deep breathing.                             Neuro:                                          Pain control with PCA /  Tylenol PRN                            Cardiovascular:                                          HTN / CAD / HLD: Continue hemodynamic monitoring, on Lopressor / ASA / Lipitor. Hold Plavix for now                            Respiratory:                                         Pt is on RA                                         Comfortable, not in any distress.                                         Encourage incentive spirometry                                          Monitor chest tube output                                         Chest tube to  water seal, no water seal                                                                 Continue bronchodilators, pulmonary toilet                            GI                                         On clear liquids, advance to DASH / diabetic / regular diet as tolerated                                         Continue Zofran / Reglan for nausea - PRN	                                                                 Renal:                                         CKD:  Monitor I/Os and electrolytes.      Avoid hypotension  / nephrotoxic agents                                                                                        Hem/ Onc:                                                                                  Monitor chest tube output &  signs of bleeding.                                          Follow CBC in AM                           Infectious disease:                                            Monitor for fever / leukocytosis.                                          All surgical incision / chest tube  sites look clean                            Endocrine                                             DM-2 / Hyperglycemia: Has insulin pump. Continue Accu-Checks       Endocrine f/u     Pt is on SQ Heparin and Venodyne boots for DVT prophylaxis.     Pertinent clinical, laboratory, radiographic, hemodynamic, echocardiographic, respiratory data, microbiologic data and chart were reviewed and analyzed frequently throughout the course of the day and night  Patient seen, examined and plan discussed with CT Surgeon Dr. Lucia / CTICU team during rounds.    Status discussed with patient /  updated plan of care          Chadd Altman MD

## 2021-02-12 NOTE — DIETITIAN INITIAL EVALUATION ADULT. - REASON FOR ADMISSION
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

## 2021-02-12 NOTE — DIETITIAN INITIAL EVALUATION ADULT. - OTHER INFO
RDN met with patient at bedside. No GI distress (nausea/vomiting/diarrhea/constipation.) No reported difficulties chewing and swallowing.  NKFA; Pt. denies weight loss UBW 152lb (69.1kg), which is consistent with weight obtained on this admission.  Pt with Hx of DM due to pancreatic insufficiency, endocrine following.  Pt with insulin pump, which he is currently utilizing.  Review of recommended diet provided.

## 2021-02-12 NOTE — PROGRESS NOTE ADULT - ASSESSMENT
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 Patient now s/p procedure in CTICU. Endocrine consult for management of patient with DM on insulin pump.      DM due to pancreatic insufficiency - on insulin pump in hospital  Treat as DM1  A1c 7.0%, concern for hypoglycemia   FS goal 140-180 while in ICU   FS premeal and qhs   Continue checking FS manually and record in flowsheet how much patient boluses   Recommend continue insulin pump at current settings   If pump discontinued for any reason, please give patient stat dose of Lantus 12 units. Patient requires basal insulin as we are treating as DM1 and can go into DKA.   On discharge patient can follow up with Dr Estrada 4/12/21  at 9:45am  OF NOTE: patient on medrol 4mg daily PO at home for psoriatic arthritis. Recommend restart home dose.       Insulin pump attached  Attestation form complete   Patient reports he brought supplies   Changed Omnipod insertion site this morning, 2/9/21, Patient is due for site change today 2/12/21, reports he will do it later this afternoon   Changed Dexcom insertion site last night 2/8/21, due for site change on 2/18/21      HTN  goal bp 130/80  at goal  continue metoprolol     HLD   LDL goal<55  Continue rosuvastatin     Discussed with Dr Jono Meyers MD  Endocrine Fellow   Pager

## 2021-02-13 NOTE — PROGRESS NOTE ADULT - SUBJECTIVE AND OBJECTIVE BOX
Issues:  R VATS RLL Lobectomy, MLND    CHF  HTN, HLD  RA  Carotid stenosis (s/p endarterectomy)  Chronic pancreatitis  PAD  DM  ANT    S:  Looks and feels well.  Small air leak in chest tube.    O:    General:  AOx3, appears comfortable in no distress.  Neuro: no gross neurologic or CN deficits  Pulm: CTABL. Chest tube in place with adequate tidaling.  CV: s1s2 RRR no m/r/g  Abdominal: NT/ND, TTP  Extremities: no cyanosis, clubbing or edema  Derm: no significant skin rash noted      _________________________  VITAL SIGNS:  Vital Signs Last 24 Hrs  T(C): 36.1 (13 Feb 2021 04:00), Max: 36.3 (12 Feb 2021 20:00)  T(F): 96.9 (13 Feb 2021 04:00), Max: 97.3 (12 Feb 2021 20:00)  HR: 72 (13 Feb 2021 09:00) (64 - 82)  BP: 110/58 (13 Feb 2021 09:00) (108/62 - 156/67)  BP(mean): 75 (13 Feb 2021 09:00) (75 - 105)  RR: 16 (13 Feb 2021 09:00) (16 - 22)  SpO2: 96% (13 Feb 2021 09:00) (96% - 100%)  I/Os:   I&O's Detail    12 Feb 2021 07:01  -  13 Feb 2021 07:00  --------------------------------------------------------  IN:  Total IN: 0 mL    OUT:    Chest Tube (mL): 230 mL    Voided (mL): 900 mL  Total OUT: 1130 mL    Total NET: -1130 mL      13 Feb 2021 07:01  -  13 Feb 2021 10:45  --------------------------------------------------------  IN:    Oral Fluid: 150 mL  Total IN: 150 mL    OUT:    Chest Tube (mL): 30 mL    Voided (mL): 150 mL  Total OUT: 180 mL    Total NET: -30 mL              MEDICATIONS:  MEDICATIONS  (STANDING):  aspirin enteric coated 81 milliGRAM(s) Oral <User Schedule>  atorvastatin 80 milliGRAM(s) Oral at bedtime  dextrose 50% Injectable 25 Gram(s) IV Push once  furosemide    Tablet 80 milliGRAM(s) Oral two times a day  heparin   Injectable 5000 Unit(s) SubCutaneous every 8 hours  insulin lispro (HumaLOG) Pump 1 Each SubCutaneous Continuous Pump  lidocaine   Patch 1 Patch Transdermal daily  methylPREDNISolone 4 milliGRAM(s) Oral daily  metoprolol succinate ER 50 milliGRAM(s) Oral daily  pantoprazole    Tablet 40 milliGRAM(s) Oral before breakfast  senna 2 Tablet(s) Oral at bedtime  sodium bicarbonate 1300 milliGRAM(s) Oral three times a day    MEDICATIONS  (PRN):  HYDROmorphone  Injectable 0.3 milliGRAM(s) IV Push every 3 hours PRN Severe Breakthrough Pain  metoclopramide 10 milliGRAM(s) Oral every 6 hours PRN nausea  ondansetron Injectable 4 milliGRAM(s) IV Push once PRN Nausea and/or Vomiting  oxyCODONE    IR 5 milliGRAM(s) Oral every 3 hours PRN Moderate to Severe Pain (4-10)      LABS:                        8.2    6.83  )-----------( 125      ( 13 Feb 2021 05:32 )             26.5     02-13    138  |  105  |  36<H>  ----------------------------<  85  4.4   |  26  |  2.56<H>    Ca    7.8<L>      13 Feb 2021 05:32    TPro  6.3  /  Alb  2.8<L>  /  TBili  0.2  /  DBili  x   /  AST  30  /  ALT  19  /  AlkPhos  76  02-12    LIVER FUNCTIONS - ( 12 Feb 2021 03:42 )  Alb: 2.8 g/dL / Pro: 6.3 g/dL / ALK PHOS: 76 U/L / ALT: 19 U/L / AST: 30 U/L / GGT: x                 _________________________      A/P:    Neuro- No issues  Pulmonary- Cont bronchodilators. Incentive spirometry. OOB to chair and walking as tolerated. Pain control. Monitor chest tube output. Chest tube to water seal.  CV- HD stable, euvolemic. Hold antihypertensives kit-op.  Abdomen- no issues. Cont bowel regimen. Advance diet as tolerated.  Renal- stable Cr. Trend.  Endocrine- DM, cont to trend FSG. Cont insulin pump on current settings as per endocrine.    DVT ppx- SQH    Pertinent clinical, laboratory, radiographic, hemodynamic, echocardiographic, respiratory data, microbiologic data and chart were reviewed and analyzed frequently throughout the course of the day and night  Patient seen, examined and plan discussed with CT Surgeon / CTICU team during rounds.    Status discussed with patient /  updated plan of care

## 2021-02-14 NOTE — PROGRESS NOTE ADULT - ASSESSMENT
66 y/o male with Hx of CHF, HTN, HLD, DM, ANT, RA, PAD, carotid stenosis & chronic pancreatitis s/p Right VATS & a RLLobectomy on 2/9, now with a stable small right apical pneumothorax.    1. Keep CT to waterseal for now  2. Follow up AM CXR, if film stable then will be able to d/c CT  3. Endocrine follow up for insulin pump  4. Continue SQ heparin for DVT PPx  5. Continue ambulation & use of incentive spirometry  6. Plan to likely d/c patient home today      Discussed with Cardiothoracic Team at AM rounds.    Ravi James PA-C, MPAS  Thoracic Surgery   Spectra [phone] 64 y/o male with Hx of CHF, HTN, HLD, DM, ANT, RA, PAD, carotid stenosis & chronic pancreatitis s/p Right VATS & a RLLobectomy on 2/9, now with a stable small right apical pneumothorax.    1. Keep CT to waterseal for now  2. Follow up AM CXR, if film stable then will be able to d/c CT  3. Endocrine follow up for insulin pump  4. Continue SQ heparin for DVT PPx  5. Continue ambulation & use of incentive spirometry  6. Plan to likely d/c patient home today pending repeat Cr level. Currently 3.0 up from 2.5.  7. Given 250cc NS bolus over 3 hours & repeat Cr, if Cr downtrending & repeat CXR okay, can remove CT & d/c home      Discussed with Cardiothoracic Team at AM rounds.    Ravi James PA-C, MPAS  Thoracic Surgery   Spectra [phone] 64 y/o male with Hx of CHF, HTN, HLD, DM, ANT, RA, PAD, carotid stenosis & chronic pancreatitis s/p Right VATS & a RLLobectomy on 2/9, now with a stable small right apical pneumothorax.    1. Clamp CT   2. Repeat CXR with CT clamped, if film stable then will be able to d/c CT  3. Endocrine follow up for insulin pump  4. Continue SQ heparin for DVT PPx  5. Continue ambulation & use of incentive spirometry  6. Plan to likely d/c patient home today pending repeat Cr level. Currently 3.0 up from 2.5.  7. Given 250cc NS bolus over 3 hours & repeat Cr, if Cr downtrending & repeat CXR okay, can remove CT & d/c home      Discussed with Cardiothoracic Team at AM rounds.    Ravi James PA-C, MPAS  Thoracic Surgery   Spectra [phone]

## 2021-02-14 NOTE — DISCHARGE NOTE PROVIDER - CARE PROVIDERS DIRECT ADDRESSES
[email or web address], [email or web address]

## 2021-02-14 NOTE — PROGRESS NOTE ADULT - SUBJECTIVE AND OBJECTIVE BOX
s/p Right VATS, Right Lower Lobectomy & Lymph Node Dissection 2/9, POD #5    Subjective: Patient has no complaints. There were no overnight events. He denies dyspnea, CP, SOB or RICARDO.      PMHx: CHF, HTN, HLD, Carotid Stenosis, Chronic Pancreatitis, PAD, DM, ANT   Social Hx: current every day smoker      VITAL SIGNS:  T(C): 36.6 (02-14-21 @ 05:08), Max: 37.2 (02-13-21 @ 22:22)  HR: 78 (02-14-21 @ 05:08) (67 - 78)  BP: 149/78 (02-14-21 @ 05:08) (108/62 - 149/78)  RR: 18 (02-14-21 @ 05:08) (16 - 21)  SpO2: 98% (02-14-21 @ 05:08) (96% - 100%)      LABS:                        8.2    6.83  )-----------( 125      ( 13 Feb 2021 05:32 )             26.5     02-13    138  |  105  |  36<H>  ----------------------------<  85  4.4   |  26  |  2.56<H>    Ca    7.8<L>      13 Feb 2021 05:32      MEDICATIONS:  MEDICATIONS  (STANDING):  aspirin enteric coated 81 milliGRAM(s) Oral <User Schedule>  atorvastatin 80 milliGRAM(s) Oral at bedtime  dextrose 50% Injectable 25 Gram(s) IV Push once  furosemide    Tablet 80 milliGRAM(s) Oral two times a day  heparin   Injectable 5000 Unit(s) SubCutaneous every 8 hours  insulin lispro (HumaLOG) Pump 1 Each SubCutaneous Continuous Pump  lidocaine   Patch 1 Patch Transdermal daily  methylPREDNISolone 4 milliGRAM(s) Oral daily  metoprolol succinate ER 50 milliGRAM(s) Oral daily  pantoprazole    Tablet 40 milliGRAM(s) Oral before breakfast  senna 2 Tablet(s) Oral at bedtime  sodium bicarbonate 1300 milliGRAM(s) Oral three times a day    MEDICATIONS  (PRN):  HYDROmorphone  Injectable 0.3 milliGRAM(s) IV Push every 3 hours PRN Severe Breakthrough Pain  metoclopramide 10 milliGRAM(s) Oral every 6 hours PRN nausea  ondansetron Injectable 4 milliGRAM(s) IV Push once PRN Nausea and/or Vomiting  oxyCODONE    IR 5 milliGRAM(s) Oral every 3 hours PRN Moderate to Severe Pain (4-10)      PHYSICAL EXAM:   General: well developed, well nourished, NAD  Neuro: awake, alert, responds to commands, nonfocal, WORLEY x 4  Eyes: scleras clear b/l, PERRLA/ EOMI, Gross vision intact  ENT: Gross hearing intact, grossly patent pharynx, no stridor  Neck: Neck supple, trachea midline, No JVD  Respiratory: CTA B/L, No wheezing, rales, rhonchi  CV: RRR, +S1 +S2, no murmurs, rubs or gallops  Abdominal: Soft, NT, ND, no rebound, no guarding, + bowel sounds  Extremities: No edema, + peripheral pulses  Skin: No Rashes, Hematoma, Ecchymosis  Lymphatic: No Neck, axilla, groin LAD  Psych: Oriented x 3, normal affect    Incisions: dressings are clean, dry and intact, CT dressing new, small amount of subcutaneous air present. Incision sites are mildly tender to palpation, steri strips intact, no skin erythema, no wound drainage, no induration or fluctuance.     Tubes: CT to waterseal. No air leak present today. Output is serosanguineous, 270cc thus far

## 2021-02-14 NOTE — PROGRESS NOTE ADULT - PROVIDER SPECIALTY LIST ADULT
Anesthesia
Critical Care
Critical Care
Thoracic Surgery
Critical Care
Pain Medicine
Endocrinology

## 2021-02-14 NOTE — DISCHARGE NOTE PROVIDER - NSDCFUADDINST_GEN_ALL_CORE_FT
Keep the wound clean and dry and monitor for redness, pus, fevers and difficulty breathing and if noted, call Dr Lucia.   Please walk 4-5 x per day; increase as tolerated. You may climb stairs.   Continue to use the incentive spirometer.   You may keep wounds uncovered. Please shower daily with soap and water.   The suture will be removed in the office at the follow up appointment.   Please call the office at [phone] if you have fevers, chills, worsening shortness of breath, chest pain, warmth, redness or purulent discharge from the wound.   Please walk 4-5 x per day; increase as tolerated. You may climb stairs.   Continue to use the incentive spirometer.   You may keep wounds uncovered. Please wash daily with soap and water.      Please call the office at [phone] if you have fevers, chills, worsening shortness of breath, chest pain, warmth, redness or purulent discharge from the wound.

## 2021-02-14 NOTE — DISCHARGE NOTE PROVIDER - NSDCCPCAREPLAN_GEN_ALL_CORE_FT
PRINCIPAL DISCHARGE DIAGNOSIS  Diagnosis: Pulmonary nodules  Assessment and Plan of Treatment: Enlarging       PRINCIPAL DISCHARGE DIAGNOSIS  Diagnosis: Pulmonary nodules  Assessment and Plan of Treatment: Please follow up with Dr. Lucia in the office in 2 weeks. Please call and make an appointment. You may shower but remove all dressings first. Leave the white steri strips in place, they can get wet and they will fall off on their own. You may eat a regular diet. Please remain active & ambulatory but refrain from any heavy lifting.      SECONDARY DISCHARGE DIAGNOSES  Diagnosis: Diabetes mellitus due to underlying condition with hyperglycemia, with long-term current use of insulin  Assessment and Plan of Treatment: Continue your current treatment regimen as directed by your primary care provider. Please also follow up with your primary care provider in one week.    Diagnosis: Hypertension, unspecified type  Assessment and Plan of Treatment: Continue your current treatment regimen as directed by your primary care provider. Please also follow up with your primary care provider in one week.    Diagnosis: Hyperlipidemia, unspecified hyperlipidemia type  Assessment and Plan of Treatment: Continue your current treatment regimen as directed by your primary care provider. Please also follow up with your primary care provider in one week.

## 2021-02-14 NOTE — DISCHARGE NOTE PROVIDER - NSDCMRMEDTOKEN_GEN_ALL_CORE_FT
Aspir 81 oral delayed release tablet: one tab orally on monday and friday  calcium citrate: 800mg one tab orally in AM  Cosentyx: 200mg  subcutaneous once a month  furosemide: 80mg tab one tab orally twice a day  HumaLOG 100 units/mL subcutaneous solution: via pump  marijuana medical:  as needed  Medrol 4 mg oral tablet: one tab orally in AM  metoprolol succinate 50 mg oral tablet, extended release: 1 tab(s) orally once a day, in AM  Multiple Vitamins oral tablet: 1 tab(s) orally once a day, l  last dose on 02/02/21  omeprazole 20 mg oral delayed release capsule: 1 cap(s) orally once a day, in AM  Plavix 75 mg oral tablet: 1 tab(s) orally once a day,  last dose on 02/01/21  rosuvastatin 20 mg oral tablet: 1 tab(s) orally once a day at bedtime  sodium bicarbonate: 1300 mg orally three times a day   Tylenol: 500 mg 2 tabs orally as needed  Vitamin D3 2000 intl units (50 mcg) oral tablet: one tab orally at bedtime   Aspir 81 oral delayed release tablet: one tab orally on monday and friday  calcium citrate: 800mg one tab orally in AM  Cosentyx: 200mg  subcutaneous once a month  furosemide: 80mg tab one tab orally twice a day  HumaLOG 100 units/mL subcutaneous solution: via pump  marijuana medical:  as needed  Medrol 4 mg oral tablet: one tab orally in AM  metoprolol succinate 50 mg oral tablet, extended release: 1 tab(s) orally once a day, in AM  Multiple Vitamins oral tablet: 1 tab(s) orally once a day, l  last dose on 02/02/21  omeprazole 20 mg oral delayed release capsule: 1 cap(s) orally once a day, in AM  oxyCODONE 5 mg oral tablet: 1 tab(s) orally every 6 hours, As Needed -for moderate pain MDD:4 tabs   Plavix 75 mg oral tablet: 1 tab(s) orally once a day,  last dose on 02/01/21  rosuvastatin 20 mg oral tablet: 1 tab(s) orally once a day at bedtime  sodium bicarbonate: 1300 mg orally three times a day   Tylenol: 500 mg 2 tabs orally as needed  Vitamin D3 2000 intl units (50 mcg) oral tablet: one tab orally at bedtime   Aspir 81 oral delayed release tablet: one tab orally on monday and friday  calcium citrate: 800mg one tab orally in AM  Cosentyx: 200mg  subcutaneous once a month  furosemide: 80mg tab one tab orally twice a day  HumaLOG 100 units/mL subcutaneous solution: via pump  marijuana medical:  as needed  Medrol 4 mg oral tablet: one tab orally in AM  metoprolol succinate 50 mg oral tablet, extended release: 1 tab(s) orally once a day, in AM  Multiple Vitamins oral tablet: 1 tab(s) orally once a day, l  last dose on 02/02/21  omeprazole 20 mg oral delayed release capsule: 1 cap(s) orally once a day, in AM  oxyCODONE 5 mg oral tablet: 1 tab(s) orally every 6 hours, As Needed -for moderate pain MDD:4 tabs   PA &amp; Lateral Chest X-Ray: Please fax the results to Dr. Lucia at [phone]. Please also give the patient a copy of the results.  Plavix 75 mg oral tablet: 1 tab(s) orally once a day,  last dose on 02/01/21  rosuvastatin 20 mg oral tablet: 1 tab(s) orally once a day at bedtime  sodium bicarbonate: 1300 mg orally three times a day   Tylenol: 500 mg 2 tabs orally as needed  Vitamin D3 2000 intl units (50 mcg) oral tablet: one tab orally at bedtime

## 2021-02-14 NOTE — DISCHARGE NOTE PROVIDER - NSDCFUADDAPPT_GEN_ALL_CORE_FT
See Dr Lucia in about a weeks and a half.  Call for an appointment and bring a new chest X-ray when you come.   Follow up with Dr. Lucia in 1-2 weeks [phone].   Have your Chest x-ray done 1-2 days prior to your appointment.    Please bring copy of x-ray to your appointment.  Follow up with primary care provider in one week.  Follow up with Dr. Lucia later this week  call for appointment [phone].   Have your Chest x-ray done  prior to your appointment.    Please bring copy of x-ray to your appointment.      Follow up with primary care provider in one week.

## 2021-02-14 NOTE — DISCHARGE NOTE PROVIDER - CARE PROVIDER_API CALL
Maurisio Lucia)  Surgery; Thoracic Surgery  [address] [place] [address], [place] [address]  Phone: [phone]  Fax: [phone]  Established Patient  Follow Up Time:

## 2021-02-14 NOTE — DISCHARGE NOTE PROVIDER - NSDCFUSCHEDAPPT_GEN_ALL_CORE_FT
NORM DEAN ; 03/12/2021 ; [facility]ph[facility] [address] NORM Villavicencio ; 03/29/2021 ; [facility] [address] [facility] ; 04/12/[facility]21 ; [facility] [address] [facility] ; 05/05/2021 ; [facility] Gen[facility] [address] [facility]

## 2021-02-14 NOTE — DISCHARGE NOTE PROVIDER - NSDCCPTREATMENT_GEN_ALL_CORE_FT
PRINCIPAL PROCEDURE  Procedure: VATS, with lobectomy  Findings and Treatment: RVATS RLL lobectomy, MLND

## 2021-02-14 NOTE — DISCHARGE NOTE PROVIDER - HOSPITAL COURSE
65 year old male with PMH of DM- using insulin pump and gluco sensor, HTN, HLD, RA, OA, CAD- non-obstructive, PAD s/p carotid endarterectomy, chronic pancreatitis & cirrhosis- former ETOH, current smoker- 50+ PPD experienced SOB, edema in December 2020 and a CT scan noted fluid accumulation in bilateral lower lungs & increased size of pulmonary nodules.  Pre-op clearance involved a cardiac cath that revealed non- obstructive CAD, and the SOB, dyspnea and edema improved.  On 2/9/20 he underwent a R VATS, RLLobectomy and MLND.  His chest tu be removal was delayed by the presence of subcutaneous air and high output. 65 year old male with PMH of DM- using insulin pump and gluco sensor, HTN, HLD, RA, OA, CAD- non-obstructive, PAD s/p carotid endarterectomy, chronic pancreatitis & cirrhosis- former ETOH, current smoker- 50+ PPD experienced SOB, edema in December 2020 and a CT scan noted fluid accumulation in bilateral lower lungs & increased size of pulmonary nodules.  Pre-op clearance involved a cardiac cath that revealed non- obstructive CAD, and the SOB, dyspnea and edema improved.  On 2/9/20 he underwent an elective R VATS, RLLobectomy and MLND. The procedure was uneventful. Diet was advanced as tolerated. His chest tube removal was delayed by the presence of subcutaneous air, a small apical pneumothorax and high output. The pneumothorax remains unchanged on daily chest x-rays and after a clamping trial the chest tube was removed on 2/14/21 & he was then discharged home. 65 year old male with PMH of DM- using insulin pump and gluco sensor, HTN, HLD, RA, OA, CAD- non-obstructive, PAD s/p carotid endarterectomy, chronic pancreatitis & cirrhosis- former ETOH, current smoker- 50+ PPD experienced SOB, edema in December 2020 and a CT scan noted fluid accumulation in bilateral lower lungs & increased size of pulmonary nodules.  Pre-op clearance involved a cardiac cath that revealed non- obstructive CAD, and the SOB, dyspnea and edema improved.  On 2/9/20 he underwent an elective R VATS, RLLobectomy and MLND. The procedure was uneventful. Diet was advanced as tolerated. His chest tube removal was delayed by the presence of subcutaneous air, a small apical pneumothorax and high output. PTX increased with chest tube clamping trial 2/14-2/15. Pt was then discharge home with a miniatrium and visiting nurse to follow up with Dr Lucia later this week.

## 2021-02-15 NOTE — DISCHARGE NOTE NURSING/CASE MANAGEMENT/SOCIAL WORK - NSDCFUADDAPPT_GEN_ALL_CORE_FT
Follow up with Dr. Lucia later this week  call for appointment [phone].   Have your Chest x-ray done  prior to your appointment.    Please bring copy of x-ray to your appointment.      Follow up with primary care provider in one week.

## 2021-02-15 NOTE — DISCHARGE NOTE NURSING/CASE MANAGEMENT/SOCIAL WORK - PATIENT PORTAL LINK FT
You can access the FollowMyHealth Patient Portal offered by [facility] by registering at the following website: [email or web address]. By joining [employer]’s FollowMyHealth portal, you will also be able to view your health information using other applications (apps) compatible with our system.

## 2021-02-15 NOTE — DISCHARGE NOTE NURSING/CASE MANAGEMENT/SOCIAL WORK - NSDCPEEMAIL_GEN_ALL_CORE
[facility] for Tobacco Control email [email or web address]

## 2021-02-15 NOTE — DISCHARGE NOTE NURSING/CASE MANAGEMENT/SOCIAL WORK - NSDCPEWEB_GEN_ALL_CORE
[facility] for Tobacco Control website --- [email or web address] website --- [email or web address]

## 2021-02-15 NOTE — DISCHARGE NOTE NURSING/CASE MANAGEMENT/SOCIAL WORK - NSDCPNINST_GEN_ALL_CORE
Take medication as ordered, follow up with MD as advised, visiting RN will come to assess mini chest tube, gradually increase activity, eat heart healthy diet, notify MD for fever   over 100.5F or any sign of infection at chest tube site

## 2021-02-15 NOTE — DISCHARGE NOTE NURSING/CASE MANAGEMENT/SOCIAL WORK - NSSCNAMETXT_GEN_ALL_CORE
[facility] The nurse will call you the day after discharge to see you in your home.

## 2021-02-16 PROBLEM — I21.9 ACUTE MYOCARDIAL INFARCTION, UNSPECIFIED: Chronic | Status: ACTIVE | Noted: 2021-01-01

## 2021-02-16 PROBLEM — R91.1 SOLITARY PULMONARY NODULE: Chronic | Status: ACTIVE | Noted: 2021-01-01

## 2021-02-16 PROBLEM — E78.5 HYPERLIPIDEMIA, UNSPECIFIED: Chronic | Status: ACTIVE | Noted: 2021-01-01

## 2021-02-16 PROBLEM — Z92.89 PERSONAL HISTORY OF OTHER MEDICAL TREATMENT: Chronic | Status: ACTIVE | Noted: 2019-05-01

## 2021-02-16 PROBLEM — Z86.39 PERSONAL HISTORY OF OTHER ENDOCRINE, NUTRITIONAL AND METABOLIC DISEASE: Chronic | Status: ACTIVE | Noted: 2019-05-01

## 2021-02-16 PROBLEM — K86.1 OTHER CHRONIC PANCREATITIS: Chronic | Status: ACTIVE | Noted: 2021-01-01

## 2021-02-16 PROBLEM — I73.9 PERIPHERAL VASCULAR DISEASE, UNSPECIFIED: Chronic | Status: ACTIVE | Noted: 2021-01-01

## 2021-02-16 PROBLEM — Z86.79 PERSONAL HISTORY OF OTHER DISEASES OF THE CIRCULATORY SYSTEM: Chronic | Status: ACTIVE | Noted: 2021-01-01

## 2021-02-18 NOTE — DISCHARGE NOTE NURSING/CASE MANAGEMENT/SOCIAL WORK - TOBACCO CESSATION EDUCATION/COUNSELLING(PROVIDED IF TOBACCO USED IN THE PAST 30 DAYS) NON CORE MEASURE SITES
02/18/21      Hilaria Case   [address]  [facility] [address]      Hilaria,    As we discussed on the phone today, please seek care at an urgent care near you ([place] or [place]) for your symptoms.      We have you scheduled with Dr. Hill in the [facility] office next week on 02/23 at 10:30 (if you are not suspected of having COVID-19).  Our address is noted below, please don't hesitate to call us with any questions, thank you!          Sincerely,           [facility]/Otolaryngology-[address] [address]    Phone: [phone]  Fax: [phone]              
Offered and provided

## 2021-02-22 PROBLEM — J93.9 RECURRENT PNEUMOTHORAX: Status: ACTIVE | Noted: 2021-01-01

## 2021-03-04 PROBLEM — N17.9 AKI (ACUTE KIDNEY INJURY): Status: ACTIVE | Noted: 2018-05-15

## 2021-03-12 PROBLEM — N18.4 CHRONIC KIDNEY DISEASE (CKD), STAGE IV (SEVERE): Status: ACTIVE | Noted: 2021-01-01

## 2021-03-12 NOTE — REASON FOR VISIT
[Home] : at home, [unfilled] , at the time of the visit. [Medical Office: ([facility])___] : at the medical office located in  [Follow-Up] : a follow-up visit

## 2021-03-12 NOTE — HISTORY OF PRESENT ILLNESS
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  His medical history is significant for rheumatoid arthritis which was diagnosed decades ago.  He has followed regularly with a physician and about 18 years ago he was diagnosed with diabetes, HTN.  He also describes having had a heart attack in the past.  He reports that his diabetes has recently been well controlled on insulin pump.  He say he has neuropathy from diabetes but no retinopathy.  He says he has been aware of an abnormality in his kidney function for years and upon review of available records he has had a creatinine of about 1.5 in 2015 and has intermittently had microalbuminuria usually about 30.  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  His creatinine has gone up, his proteinuria has increased.  Since stopping biologics his complements, hapto, and dsDNA have improved and yet his proteinuria and creatinine continue to worsen.  Of note the patient has also recently had worsening swelling and worsening hypertension over the last few months.  His dose of lasix was recently increased by his cardiologist and this has subsequently improved.  On evaluation today he denies any sob or cp, he has no urinary complaints and voids normally, he has some intermittent joint pains for which he continues to take celecoxib.  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  His steroids are being tapered down.  We have been working on his swelling he was on lasix 80 BID with metolazone as needed to get weight down to 157 at home.  With this reduction in weight his edema is better, he can get on his shoes.  He has not had any dyspnea, CP, NVD, he has not been taking any NSAIDs.  His creatinine checked recently with Dr. Polo has been stable.  He continues to maintain a low potassium diet.  \par \par 10/19/18 Since our last meeting Julián is feeling markedly better and his edema is vastly improved.  His blood pressure at home has been ranging 120 to 130 / 60 to 70.  He has not been using NSAIDs at all.  He has not had any chest pain or urination issues.  He is still smoking.  He is thinking about trying to taper himself off of dilaudid.\par \par 1/25/19 -- Julián is doing really well today.  His weight has been very stable recently.  He has some body aches which is chronic for him.  He reports that his edema is substantially improved.\par \par 5/28/19 -- feels well.  had hand surgery.  swelling improved.\par \par 12/6/19 -- feels well.  recently stopped xeljanz and is on steroids at the moment.  no increase in swelling no sob no cp no nausea or vomiting.  no urinary complaints.\par \par 6/18/20 -- I saw Julián today and he is feeling well overall staying mostly distanced due to covid 19.  he reports that his edema almost completely resolved.  however in the last few weeks the swelling returned worse on the right.  he is also recently having some cramping in his hands and calves.\par \par \par 11/16/20 -- Since our last visit Julián feels ok.  He has noted some weight gain and swelling around his eyes but has no increased dyspnea.  Still has a "smoker's cough."  Having some GI upset.\par \par 3/12/21 -- Since our last visit Julián has been admitted and was found to have lung CA and was resected now considering additional chemo which he is leaning against.  He has been having worsening osteoarthritis pain and so he has been taking ibuprofen 200 mg three days per week.

## 2021-03-12 NOTE — ASSESSMENT
[FreeTextEntry1] : The patient is a 64 years old man with a history of CAD, DM, and RA here for follow up of CKD.\par \par Chronic Kidney Disease Stage IV -- The etiology on biopsy proven diabetic nephropathy and the patient is at high risk for long term progression.  We spent most of the visit discussing chronic kidney disease, its stages, risk for progression and strategies for prevention including notifying me of medication changes.  We explicitly discussed the the risk of chemotherapy to his kidneys and I previously explained that the proposed regimen is usually considered to be less kidney toxic but still occurs.  He is carefully considering his options but is leaning against chemo.  He is taking NSAIDs because he cannot bear the pain and its the only thing that helps.  He will use sparing ly and due to the concern for kidney toxicity will have to monitor renal panel closely (is having blood work today).\par \par Hypertension -- BP improved at home this morning was 130/70\par \par Hyperkalemia -- Most recent potassium acceptable.  Stay on bicarbonate and low k diet.  holding ramipril for now.\par \par RTO 3 to 4 months.

## 2021-03-12 NOTE — REVIEW OF SYSTEMS
[Lower Ext Edema] : lower extremity edema [Cough] : cough [Diarrhea] : diarrhea [Arthralgias] : arthralgias [Joint Pain] : joint pain [Fever] : no fever [Chills] : no chills [Feeling Poorly] : not feeling poorly [Feeling Tired] : not feeling tired [Eyesight Problems] : no eyesight problems [Nosebleeds] : no nosebleeds [Chest Pain] : no chest pain [Palpitations] : no palpitations [Shortness Of Breath] : no shortness of breath [Wheezing] : no wheezing [Abdominal Pain] : no abdominal pain [Vomiting] : no vomiting [Dysuria] : no dysuria [Incontinence] : no incontinence [Dizziness] : no dizziness [Fainting] : no fainting [Easy Bleeding] : no tendency for easy bleeding [Easy Bruising] : no tendency for easy bruising

## 2021-04-09 PROBLEM — D22.9 MULTIPLE MELANOCYTIC NEVI: Status: ACTIVE | Noted: 2021-01-01

## 2021-04-09 PROBLEM — L82.1 SEBORRHEIC KERATOSES: Status: ACTIVE | Noted: 2018-06-01

## 2021-04-09 PROBLEM — D48.5 NEOPLASM OF UNCERTAIN BEHAVIOR OF SKIN: Status: ACTIVE | Noted: 2018-06-01

## 2021-04-12 NOTE — PHYSICAL EXAM
[Alert] : alert [No Acute Distress] : no acute distress [Normal Sclera/Conjunctiva] : normal sclera/conjunctiva [No Proptosis] : no proptosis [No LAD] : no lymphadenopathy [Thyroid Not Enlarged] : the thyroid was not enlarged [No Respiratory Distress] : no respiratory distress [Clear to Auscultation] : lungs were clear to auscultation bilaterally [Normal S1, S2] : normal S1 and S2 [Regular Rhythm] : with a regular rhythm [Normal Bowel Sounds] : normal bowel sounds [Not Tender] : non-tender [Soft] : abdomen soft [No Spinal Tenderness] : no spinal tenderness [Kyphosis] : kyphosis present [No Involuntary Movements] : no involuntary movements were seen [Normal Strength/Tone] : muscle strength and tone were normal [Normal Reflexes] : deep tendon reflexes were 2+ and symmetric [Normal Affect] : the affect was normal [Normal Mood] : the mood was normal

## 2021-04-13 NOTE — ASSESSMENT
[FreeTextEntry1] : 65 year old man with T2DM and chronic pancreatitis, with improved hba1c\par \par  CGm downloaded and reviewed.  Pt with 49% time in range, 1% hypoglycemia.  Pt with frequent postprandial hyperglycemia.  He takes the prandial insulin after meals, sometimes up to 30 minutes or more.   Recommend continue current pump settings, take some insulin before meal and the rest immediately postprandially when he knows how much he has eaten. .Send data in 2 weeks.\par Discussed with pt adding jardiance, he does not want to add anything at this time\par  - Retinopathy screening up to date\par  - had flu shot and covid vaccine\par \par \par HTN -, temporarily off  ACE, followed by nephrology for ckd\par \par Hyperlipidemia- continue lovastatin\par  - check lipids, bmp\par  \par \par vitamin D def'y - continue vitamin D supplementation\par \par \par \par f/u 3 months

## 2021-04-13 NOTE — HISTORY OF PRESENT ILLNESS
[Continuous Glucose Monitoring] : Continuous Glucose Monitoring: Yes [Dexcom] : Dexcom [FreeTextEntry1] : cc: diabetes\par \par 65 year old man with T2DM (diagnosed in 2002, on insulin pump since late 2015), chronic pancreatitis, with reasonable control. Using omnipod pump (with U100 Humalog) and dexcom sensor (G6). Taking methylprednisolone 8 mg,  He has been limiting carbohydrate intake.  He walks a little. Overall, glucose has been fairly well controlled, has occasional hypoglycemia, unsure of triggers\par Had flu shot and covid vaccine\par + neuropathy\par Saw opthalmologist  ~ 9/2020, No retinopathy, \par + h/o MI\par \par \par HTN - blood pressure has been controlled. Off ramipril due to elevated K+\par \par Hyperlipidemia - taking statin\par \par Smoking, does not currently want to stop\par \par Also with osteoporosis, managed by rheumatology, on prolia

## 2021-05-14 NOTE — HISTORY OF PRESENT ILLNESS
[FreeTextEntry1] : He is status post with his right VATS and lobectomy.  He is doing well.\par He had stopped some of his antihypertensive medications preoperatively and has not restarted them.\par He continues to smoke.\par He has no exertional chest pains.\par \par \par Prior:\par He was seen today preoperatively prior to planned right VATS and left lower lobectomy.\par He is a 65-year-old with known chronic nonischemic cardiomyopathy with recent congestive heart failure who has been doing well since.\par Coronary angiography done earlier this month showed mild to moderate nonobstructive coronary artery disease without any flow-limiting lesions.  The right coronary artery was not visualized\par  \par No orthopnea or PND.\par Still continue to smoke a bit less than pack a day.\par He continues on lasix 80 bid\par

## 2021-05-14 NOTE — PHYSICAL EXAM
[General Appearance - Well Developed] : well developed [General Appearance - Well Nourished] : well nourished [General Appearance - In No Acute Distress] : no acute distress [Normal Conjunctiva] : the conjunctiva exhibited no abnormalities [No Oral Pallor] : no oral pallor [Normal Jugular Venous V Waves Present] : normal jugular venous V waves present [Respiration, Rhythm And Depth] : normal respiratory rhythm and effort [Auscultation Breath Sounds / Voice Sounds] : lungs were clear to auscultation bilaterally [Heart Sounds] : normal S1 and S2 [Murmurs] : no murmurs present [Arterial Pulses Normal] : the arterial pulses were normal [Edema] : no peripheral edema present [Regular] : the rhythm was regular [Abdomen Soft] : soft [Bowel Sounds] : normal bowel sounds [Abnormal Walk] : normal gait [Nail Clubbing] : no clubbing of the fingernails [Cyanosis, Localized] : no localized cyanosis [Skin Turgor] : normal skin turgor [Oriented To Time, Place, And Person] : oriented to person, place, and time [Impaired Insight] : insight and judgment were intact [Affect] : the affect was normal

## 2021-05-14 NOTE — DISCUSSION/SUMMARY
[FreeTextEntry1] : He is a 66-year-old with a history of RA/psoriatic arthritis on chronic steroids, Non-obstructive CAD, now with severe LV dysfunction and now stable Systolic CHF on his current regimen.\par He tolerated the right VATS well.\par His blood pressure is borderline and I and I have suggested that he restart his ACE inhibitor.\par Continue dual antiplatelet therapy for peripheral vascular disease and coronary disease.\par He is not interested in stopping smoking.\par Routine follow-up in 3 months.

## 2021-05-16 PROBLEM — R26.89 BALANCE PROBLEM: Status: ACTIVE | Noted: 2020-09-22

## 2021-05-16 NOTE — COUNSELING
[Fall prevention counseling provided] : Fall prevention counseling provided [No throw rugs] : No throw rugs [Adequate lighting] : Adequate lighting [Engage in a relaxing activity] : Engage in a relaxing activity

## 2021-05-16 NOTE — PHYSICAL EXAM
[No Acute Distress] : no acute distress [Normal Sclera/Conjunctiva] : normal sclera/conjunctiva [No JVD] : no jugular venous distention [No Lymphadenopathy] : no lymphadenopathy [Supple] : supple [No Respiratory Distress] : no respiratory distress  [No Accessory Muscle Use] : no accessory muscle use [Clear to Auscultation] : lungs were clear to auscultation bilaterally [Normal Rate] : normal rate  [Regular Rhythm] : with a regular rhythm [Normal S1, S2] : normal S1 and S2 [No Murmur] : no murmur heard [No Extremity Clubbing/Cyanosis] : no extremity clubbing/cyanosis [Soft] : abdomen soft [No Masses] : no abdominal mass palpated [No HSM] : no HSM [de-identified] : sl cachectic at face/temples; rest of weight/frame maintained [de-identified] : no icterus/pallor [de-identified] : 1+ non pitting, no skin breakdown

## 2021-05-16 NOTE — ASSESSMENT
[FreeTextEntry1] : 1) had flu vacc this season, s/p two doses of Pfizer COVID vacc; on immunosuppressive rx, to continue w his precautions.  2) recent dx lung CA, may still have residual dz in LLL, prx a concern.  He has discussed his wishes w wife, me.  He will want to not exert heroic efforts if he is in situation w no prognosis.  He has AD documents at home, shared w wife.  3) does not want to give up cigs.  Would love to be as active as he can, walk golf course w friends - at moment feeling less able to do so.  Will try to meet his spirits and his sources of chronic pain w course of duloxetine we agreed to - starting w 20 mg daily, will observe, have to consider CKD/liver dz in titration.  4) continues w his multidisciplinary care from thoracic surg/onc/rheum/endo/Gi-hep/card, which has been excellent - appreciate all.  5) continues on rosuva from years ago, careful b asa/plavix, beta blocker; ACE/ARB has been complex given GFR/K.  6) last A1C 8.4 - has ranged 7.2 - 9.2 in last 2 years, current control acceptable, has tendency to be brittle.  On insulin, which he is good partner in regulating w his prandial insulin - on CGM, has had issue w hypoglycemia at times.

## 2021-05-16 NOTE — REVIEW OF SYSTEMS
[Chest Pain] : no chest pain [Lower Ext Edema] : lower extremity edema [Palpitations] : no palpitations [Orthopena] : no orthopnea [Shortness Of Breath] : no shortness of breath [Wheezing] : no wheezing [Cough] : cough [Dyspnea on Exertion] : not dyspnea on exertion [Negative] : Genitourinary [de-identified] : can have some moments of balance being off (?neuropathy based)

## 2021-05-16 NOTE — HISTORY OF PRESENT ILLNESS
[FreeTextEntry1] : Here for f/u [de-identified] : Here for f/u/check in on complex past/recent med hx.  Has dx of lung CA, s/p R VATS, R lower lobectomy.  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  Has residual small effusion L base, known LLL nodule as well.  Since this surgery, he has not had increased sob; has some cough.  Continues cigs, has little desire to quit.  Was offered adjuvant chemo for post op period - he has held off looking at outcomes differences, how he feels overall.  He has good health care literacy, discussed w wife/long time nurse in system.  She is his healthcare proxy, and will defer to his perspective.  \par \par DM is followed by Dr. Estrada; has DJD/RA/PsA/osteopenia just shy of -2.5 followed by Dr. Beltrán.  Has CKD followed by Dr. Castro.  Has multifactorial cirrhosis w/o end stage syx x likely multifactorial edema, ascites x 1 in past, chronic pancreatitis followed PRN by GI, has CAD/hx PVD followed currently by Dr. Lisker.\par \par Active issue today is fatigue, on and off need to nap during day, also pain in LEs at times he thinks is most c/w neuropathy of his DM/CKD.  Tolerable, but on discussion of his overall spirits/tendency to get some relief of neuropathy from some of the SNRIs, pt took time after this visit but on consideration, decided we might trial duloxetine for spirits/neuropathy discomfort/overall chronic pain (no longer on opiates he was on in past, can't take extensive NSAIDs).

## 2021-06-02 NOTE — DISCUSSION/SUMMARY
[de-identified] : Patient was seen today for continued management of chronic knee pain secondary to underlying osteoarthritis.  We discussed various treatment options as well as associated risk/benefits/alternatives and patient elected to proceed with hyaluronic acid injection therapy. \par The first of three Euflexxa injections was given today under sterile conditions into the Left  knee joint without complication (see procedure note). The patient was instructed on modification of activities over the next 48-72 hours. I advised the patient to ice the knee as needed for control of local irritation from the injection. I advised that some patients have immediate benefit from the initial injection therapy, however it usually takes the medication a number of weeks (~8wks) to provide significant relief of osteoarthritic symptoms. \par \par I  will see the patient back for the second injection in approximately 1 week.\par \par This note was generated using dragon medical dictation software. A reasonable effort has been made for proofreading its contents, but typos may still remain. If there are any questions or points of clarification needed please notify my office.

## 2021-06-02 NOTE — PROCEDURE
[de-identified] : Injection: Left  knee joint.\par Indication: Osteoarthritis.\par \par A discussion was had with the patient regarding this procedure and all questions were answered. All risks, benefits and alternatives were discussed. These included but were not limited to bleeding, infection, and allergic reaction. Alcohol was used to clean the skin, and betadine was used to sterilize and prep the area in the lateral joint line aspect of the knee. Ethyl chloride spray was then used as a topical anesthetic. A 22-gauge needle was used to inject 2 cc of Euflexxa into the knee with ease. A sterile bandage was then applied. The patient tolerated the procedure well and there were no complications. \par \par Lot #:  [id number]\par Exp:  11/29/21\par

## 2021-06-02 NOTE — HISTORY OF PRESENT ILLNESS
[de-identified] : Patient has a history of knee osteoarthritis.  We discussed treatment options and have obtained insurance authorization to proceed with a series of hyaluronic acid injections and patient would like to proceed at this time.  No significant interval change in knee pain since last evaluated.

## 2021-06-07 PROBLEM — M79.89 RIGHT LEG SWELLING: Status: ACTIVE | Noted: 2021-01-01

## 2021-06-09 PROBLEM — M17.12 PRIMARY OSTEOARTHRITIS OF LEFT KNEE: Status: ACTIVE | Noted: 2020-03-02

## 2021-06-09 NOTE — PHYSICAL EXAM
[de-identified] : Constitutional: Well-nourished, well-developed, No acute distress\par Respiratory: Good respiratory effort, no SOB\par Lymphatic: No regional lymphadenopathy, no lymphedema\par Psychiatric: Pleasant and normal affect, alert and oriented x3\par Skin: Clean dry and intact B/L UE/LE\par Musculoskeletal: normal except where as noted in regional exam\par \par \par BIOMECHANICAL EXAM: no marked leg length discrepancy, moderate hip abductor weakness b/l, no marked pes planus or foot pronation, tight hams and ITB b/l. Normal gait and station\par \par Left Knee:\par APPEARANCE: no marked deformities, no swelling or malalignment\par POSITIVE TENDERNESS: + crepitus of the anterior knee, and tenderness of patellar retinaculum\par NONTENDER: jt lines b/l, patellar & quadriceps tendons, MCL/LCL, ITB at the lateral femoral condyle & Gerdy's tubercle, pes bursa. \par ROM: full & painless, although some discomfort in deep knee flexion\par RESISTIVE TESTING: + discomfort with knee ext from deep knee flexion (stretched position), painless knee flexion. \par SPECIAL TESTS: stable v/v stress. painless grind. neg Lachman's. neg ant/post drawer. neg Sandra's. neg Thessaly test. neg Adrien's & Malacrae's\par NEURO: Normal sensation of LE, DTRs 2+/4 patella and achilles\par PULSES: 2+ DP/PT pulses\par \par

## 2021-06-09 NOTE — DISCUSSION/SUMMARY
[de-identified] : The second and third Euflexxa injections were given today under sterile conditions due to COVID-19 pandemic into the left knee joint without complication (see procedure note). I discussed the effects of this medication and how long it may provide benefit. Patient has obtained moderate immediate improvement. If no significant long-term benefit, the patient may elect for additional treatment strategies as previously discussed. However, if the patient obtains good relief of symptoms, the injection therapy series can be repeated over the next 6-12 months.\par \par Will have the patient followup on an as-needed basis at this time.  Patient appreciates and agrees with current plan.\par \par This note was generated using dragon medical dictation software.  A reasonable effort has been made for proofreading its contents, but typos may still remain.  If there are any questions or points of clarification needed please notify my office.\par

## 2021-06-09 NOTE — HISTORY OF PRESENT ILLNESS
[de-identified] : Patient is here today to follow-up on knee pain.  There has been some mild improvement after the first hyaluronic acid injection performed at last visit.  No adverse reaction thus far.  Patient would like to proceed with the second injection in the series at this time.

## 2021-06-09 NOTE — PROCEDURE
[de-identified] : Injection: Left  knee joint.\par Indication: Osteoarthritis.\par \par A discussion was had with the patient regarding this procedure and all questions were answered. All risks, benefits and alternatives were discussed. These included but were not limited to bleeding, infection, and allergic reaction. Alcohol was used to clean the skin, and betadine was used to sterilize and prep the area in the lateral joint line aspect of the knee. Ethyl chloride spray was then used as a topical anesthetic. A 22-gauge needle was used to inject 4 cc of Euflexxa into the knee with ease. A sterile bandage was then applied. The patient tolerated the procedure well and there were no complications. \par \par Lot #:  [id number]\par Exp:  11/29/21\par

## 2021-06-21 PROBLEM — C44.42 SQUAMOUS CELL CARCINOMA OF SCALP: Status: ACTIVE | Noted: 2021-01-01

## 2021-07-19 NOTE — PHYSICAL EXAM
[Alert] : alert [No Acute Distress] : no acute distress [Normal Sclera/Conjunctiva] : normal sclera/conjunctiva [No Proptosis] : no proptosis [No LAD] : no lymphadenopathy [Thyroid Not Enlarged] : the thyroid was not enlarged [No Respiratory Distress] : no respiratory distress [Clear to Auscultation] : lungs were clear to auscultation bilaterally [Normal S1, S2] : normal S1 and S2 [Regular Rhythm] : with a regular rhythm [Normal Bowel Sounds] : normal bowel sounds [Not Tender] : non-tender [Soft] : abdomen soft [No Spinal Tenderness] : no spinal tenderness [Kyphosis] : kyphosis present [No Involuntary Movements] : no involuntary movements were seen [Normal Strength/Tone] : muscle strength and tone were normal [Normal Reflexes] : deep tendon reflexes were 2+ and symmetric [No Tremors] : no tremors [Normal Affect] : the affect was normal [Normal Mood] : the mood was normal [de-identified] : b/l le edema

## 2021-07-19 NOTE — ASSESSMENT
[FreeTextEntry1] : 66 year old man with T2DM and chronic pancreatitis, with improved hba1c\par \par  CGm downloaded and reviewed.  Pt with 27% time in range, <1% hypoglycemia.  He has postprandial hyperglycemia, and does not fully correct after taking correctional insulin.  Recommend change I:C to 1:4 and ISF to 40.  Send download in 2 weeks.  He will also be more  attentive to changing pods on time.  Continue current basal rate for now\par Discussed with pt adding farxiga, he will discuss with his nephrologist. \par  - Retinopathy screening up to date\par  - had  covid vaccine\par \par \par HTN -, temporarily off  ACE, followed by nephrology for ckd\par \par Hyperlipidemia- continue lovastatin\par \par \par vitamin D def'y - continue vitamin D supplementation\par \par \par \par f/u 3 months

## 2021-07-19 NOTE — HISTORY OF PRESENT ILLNESS
[Continuous Glucose Monitoring] : Continuous Glucose Monitoring: Yes [Dexcom] : Dexcom [FreeTextEntry1] : cc: diabetes\par \par 66 year old man with T2DM (diagnosed in 2002, on insulin pump since late 2015), chronic pancreatitis, with reasonable control. Using omnipod pump (with U100 Humalog) and dexcom sensor (G6). Taking methylprednisolone 8 mg,  He has been limiting carbohydrate intake.  Minimal exercise walks a little. Glucose values have been elevated.  Has not been careful about changing pods on time\par Had covid vaccine, pfizer\par has neuropathy\par Saw opthalmologist  ~ 9/2020, No retinopathy, \par + h/o MI\par \par \par HTN - blood pressure has been controlled. Off ramipril due to elevated K+\par \par Hyperlipidemia - taking statin\par \par Smoking, does not currently want to stop at this time\par \par Also with osteoporosis, managed by rheumatology, on prolia\par \par Diagnosed with lung cancer, has follow up with cardiothoracic surgeon later today.  He has decided against adjuvant chemotherapy

## 2021-07-22 NOTE — HISTORY OF PRESENT ILLNESS
[FreeTextEntry1] : Mr. NORM DEAN, 66 year old male, current smoker, former ETOH abuse, w/ hx of MI (2002), CKD (Stage III), HTN, chronic pancreatitis, DM2, Left Carotid Stenosis, RA, OA , who presented to [facility] ED with complaints of Dyspnea and b/l LE edema. During workup, CT scan of chest revealing several > 2 cm nodules (RLL, RUL, LLL), new and increased in size compared to 2014, in setting of extensive smoking history, and imaging characteristics very concerning for malignancy. \par \par Patient is s/p Right VATS, RLLobectomy, MLND and intercostal nerve block on 02/09/2021. Path of RLLobectomy revealed adenocarcinoma, micropapillary predominant, solid and complex gland formation, 2.5 cm, G3, + GERARD, + lymphovascular invasion, + visceral pleura invasion, + hilar LN, pT2aN1, Stage IIB. \par \par Post-op c/b delayed chest tube removal due to subcutaneous air and a small apical pneumothorax and high output. Patient discharged home with miniatrium. Miniatrium removed in office on 02/22/2021. \par \par Patient was evaluated by Dr. Baldwin on 03/02/2021. Molecular testing was negative, Dr. Baldwin recommended adjuvant chemotherapy for 4 cycles. However, patient prefers surveillance at current time secondary to kidney risk he discussed with his nephrologist Dr. José Luis Castro. \par \par CT chest on 07/05/2021: \par - post-op changes\par - Left lower lobe part solid 2.7 cm nodule with 1 cm nodular component has enlarged solid component compared to the prior study. \par - Left lower lobe subpleural dependent 8mm nodular opacity (2, 25) is unchanged from 12/21/2020 but enlarged from 7/25/2014. \par -  4 mm right upper lobe nodules unchanged. \par -Small to moderate loculated right pleural effusion is likely postsurgical. \par \par Patient followed up with Dr. Dang Porter for PET/CT with FDG-avid lucency, left maxillary alveolus TMJ, surgery was recommended but patient opted against.\par \par Patient is here today for a follow up. Patient denies worsening shortness of breath, cough, chest pain, fever, chills, unintentional weight loss.

## 2021-07-22 NOTE — ASSESSMENT
[FreeTextEntry1] : Mr. NORM DEAN, 65 year old male, current smoker, former ETOH abuse, w/ hx of MI (2002), CKD (Stage III), HTN, chronic pancreatitis, DM2, Left Carotid Stenosis, RA, OA , who presented to [facility] ED with complaints of Dyspnea and b/l LE edema. During workup, CT scan of chest revealing several > 2 cm nodules (RLL, RUL, LLL), new and increased in size compared to 2014, in setting of extensive smoking history, and imaging characteristics very concerning for malignancy. \par \par Patient is s/p Right VATS, RLLobectomy, MLND and intercostal nerve block on 02/09/2021. Path of RLLobectomy revealed adenocarcinoma, micropapillary predominant, solid and complex gland formation, 2.5 cm, G3, + GERARD, + lymphovascular invasion, + visceral pleura invasion, + hilar LN, pT2aN1, Stage IIB. \par \par Post-op c/b delayed chest tube removal due to subcutaneous air and a small apical pneumothorax and high output. Patient discharged home with miniatrium. Miniatrium removed in office on 02/22/2021. \par \par I have reviewed the patient's medical records and diagnostic images at time of this office consultation and have made the following recommendation:\par 1. CT chest reviewed and explained to patient; left lower lobe part solid 2.7 cm nodule with nodular component. Patient to be discussed during upcoming Tumor Board to determine next steps.	\par \par I personally performed the services described in the documentation, reviewed the documentation recorded by the scribe in my presence and it accurately and completely records my words and actions.\par \par I, KASSANDRA Price, am scribing for and in the presence of TIFFANIE Johnson, the following sections HISTORY OF PRESENT ILLNESS, PAST MEDICAL/FAMILY/SOCIAL HISTORY; REVIEW OF SYSTEMS; VITAL SIGNS; PHYSICAL EXAM; DISPOSITION.\par  \par \par

## 2021-07-22 NOTE — CONSULT LETTER
[Dear  ___] : Dear  [unfilled], [Consult Letter:] : I had the pleasure of evaluating your patient, [unfilled]. [( Thank you for referring [unfilled] for consultation for _____ )] : Thank you for referring [unfilled] for consultation for [unfilled] [Please see my note below.] : Please see my note below. [Consult Closing:] : Thank you very much for allowing me to participate in the care of this patient.  If you have any questions, please do not hesitate to contact me. [Sincerely,] : Sincerely, [DrFeli  ___] : Dr. WOODY [DrFeli ___] : Dr. WOODY [FreeTextEntry3] : Maurisio Lucia MD \par Attending Surgeon \par Division of Thoracic Surgery \par , Cardiovascular and Thoracic Surgery \par [facility] [employer] [facility]\par \par  [FreeTextEntry2] : Dr. Cr Kidd (MED)\par Dr Jay Lisker ( Cardiology) \par Dr. Maurisio Baldwin (Hem/Onc)

## 2021-07-31 NOTE — PATIENT PROFILE ADULT. - PHONE #
INTERVAL HPI:    Interviewed with help of Language line solution( Danny # [id number]).  59M with a PMH of latent TB?, DM, HLD who presents to the ED for non productive cough, fever for two weeks and exertional dyspnea for one week.  Saw physician, given Levaquin 1st then changed to Augmentin which he stopped due to diarrhea.  In ED with tachycardia, tachypnea and temperature of 102.1.  CTA showed large loculated right sided pleural effusion with volume loss.    Was admitted here in July 2020 with cough, fever, poor appetite and weight loss. At that time found to have hyperglycemia, RUL lung opacities.   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  07/24/20: Had bronchoscopy with BAL and biopsy( showed chronic inflammation, fibrosis).  Sedrate less than 5 x 2.    HIV negative.  ACE level normal.  DANIELLE, C-ANCA, P- ANCA  negative.  Hepatitis negative.  Suspicious for MTB was low, got discharged,  and claims that had a tele visit with DR. Riddle, no meds started.    OVERNIGHT EVENTS:  Resting comfortably    Vital Signs Last 24 Hrs  T(C): 36.7 (31 Jul 2021 17:54), Max: 37 (30 Jul 2021 23:28)  T(F): 98.1 (31 Jul 2021 17:54), Max: 98.6 (30 Jul 2021 23:28)  HR: 91 (31 Jul 2021 17:54) (87 - 96)  BP: 122/70 (31 Jul 2021 17:54) (100/58 - 128/76)  BP(mean): --  RR: 18 (31 Jul 2021 17:54) (18 - 18)  SpO2: 95% (31 Jul 2021 17:54) (95% - 98%)    PHYSICAL EXAM:  GEN:        comfortable.  HEENT:    Normal.    RESP:        no distress     CVS:          Regular rate and rhythm.   ABD:         Soft, non-tender, non-distended;     MEDICATIONS  (STANDING):  aspirin enteric coated 81 milliGRAM(s) Oral daily  atorvastatin 20 milliGRAM(s) Oral at bedtime  azithromycin   Tablet 500 milliGRAM(s) Oral daily  dextrose 40% Gel 15 Gram(s) Oral once  dextrose 5%. 1000 milliLiter(s) (50 mL/Hr) IV Continuous <Continuous>  dextrose 5%. 1000 milliLiter(s) (100 mL/Hr) IV Continuous <Continuous>  dextrose 50% Injectable 25 Gram(s) IV Push once  dextrose 50% Injectable 12.5 Gram(s) IV Push once  dextrose 50% Injectable 25 Gram(s) IV Push once  glucagon  Injectable 1 milliGRAM(s) IntraMuscular once  insulin lispro (ADMELOG) corrective regimen sliding scale   SubCutaneous three times a day before meals  meropenem  IVPB 1000 milliGRAM(s) IV Intermittent every 8 hours  vancomycin  IVPB 750 milliGRAM(s) IV Intermittent every 12 hours    MEDICATIONS  (PRN):  acetaminophen   Tablet .. 650 milliGRAM(s) Oral every 6 hours PRN Temp greater or equal to 38C (100.4F)    LABS:                        12.1   7.44  )-----------( 792      ( 30 Jul 2021 07:20 )             35.2     07-30    130<L>  |  97  |  14  ----------------------------<  138<H>  4.1   |  29  |  0.93    Ca    9.0      30 Jul 2021 07:20  < from: CT Chest No Cont (07.31.21 @ 11:38) >  EXAM:  CT CHEST                          PROCEDURE DATE:  07/31/2021      INTERPRETATION:  Clinical information: Pneumonia. Exam is compared to previous study of 7/25/2021.    CT scan of the chest was obtained without administration of intravenous contrast.    Few calcified and noncalcified lymph nodes are present in the pretracheal space.    Heart is normal in size. Trace pericardial effusion is noted.    No endobronchial lesions are noted. 1.8 cm nodular opacity is noted in the apical segment of the right upper lobe. Additional linear opacities are also noted in the right upper lobe. Above findings are unchanged when compared to previous exam. Small loculated right pleural effusion is noted. The effusion has decreased when compared to previous exam.    Below the diaphragm, the visualized portions of the abdomen are unremarkable.    Visualized osseous structures are within normal limits.    IMPRESSION: Stable 1.8 cm nodular opacity in the apical segment of the right upper lobe ofuncertain etiology.    Small loculated right pleural effusion. The effusion has decreased when compared to previous exam.    RACHEL FIGUEROA MD; Attending Radiologist  This document has been electronically signed. Jul 31 2021 12:13PM     ASSESSMENT AND PLAN:  ·	SOB.  ·	Large loculated right pleural effusion.  ·	Anemia.  ·	Hyponatremia.  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  ·	HLD    Still with temperature spike from time to time.  Antibiotics per ID  Pleural fluid is Lymphocytic exudate.  NO AFB.  No malignant cells.  Treatment for Mycobacterium abscessus prolonged, difficult.   Compliance may also be a main issue.   [id number]

## 2021-08-03 PROBLEM — K21.9 GERD (GASTROESOPHAGEAL REFLUX DISEASE): Status: ACTIVE | Noted: 2021-01-01

## 2021-08-03 PROBLEM — N28.9 RENAL INSUFFICIENCY: Status: ACTIVE | Noted: 2021-01-01

## 2021-08-03 PROBLEM — I25.10 CAD (CORONARY ARTERY DISEASE): Status: ACTIVE | Noted: 2021-01-01

## 2021-08-04 NOTE — ED ADULT NURSE NOTE - FALL HARM RISK TYPE OF ASSESSMENT
bilateral upper extremity Active ROM was WFL (within functional limits)/bilateral  lower extremity Active ROM was WFL (within functional limits) Daily Assessment

## 2021-08-10 NOTE — HISTORY OF PRESENT ILLNESS
[FreeTextEntry1] : Mr. NORM DEAN, 66 year old male, current smoker, former ETOH abuse, w/ hx of MI (2002), CKD (Stage III), HTN, chronic pancreatitis, DM2, Left Carotid Stenosis, RA, OA , who presented with new nodules in the RLL and LLL s/p RLL lobectomy, MLND showing Stage IIB pT2aN1 on 2/09/21. Adjuvant chemotherapy was recommended but he declined due to concerns for his kidney function. He now presents with an enlarging LLL nodule.\par \par He presented to [facility] ED in December 2020 with complaints of dyspnea and b/l LE edema. During workup, CT scan of chest showed:2.4 cm solid nodule in the medial RLL, 3.4 cm GGO in the LLL, 3mm nodule in the RUL which is unchanged since 2014. There are also mild pulmonary edema and small bilateral pelural effusions. Given his extensive smoking history, findings were concerning for malignancy.\par \par PET scan 1/13/21 showed FDG avid RLL nodule, minimally FDG avid avid LLL GGO, small R pleural effusion, as well as R TMJ uptake and L maxillary alveolus uptake.\par \par Patient is s/p Right VATS, RLLobectomy, MLND and intercostal nerve block on 02/09/2021 under the care of Dr. Lucia. Path RLLobectomy revealed adenocarcinoma, micropapillary predominant, solid and complex gland formation, 2.5 cm, G3, + GERARD, + lymphovascular invasion, + visceral pleura invasion,  1/5 hilar LN was positive, Lvl 4, 7, and 11 were negative., pT2aN1, Stage IIB. \par \par Post-op c/b delayed chest tube removal due to subcutaneous air and a small apical pneumothorax and high output. Patient discharged home with miniatrium. Miniatrium removed in office on 02/22/2021. \par \par Patient was evaluated by Dr. Baldwin on 03/02/2021. Molecular testing was negative, Dr. Baldwin recommended adjuvant chemotherapy for 4 cycles. However, patient prefers surveillance at current time secondary to kidney risk he discussed with his nephrologist Dr. José Luis Castro. \par \par 3/16/21 MRI brain showed small linear enhancement in the L superior frontal gyrus that is nonspecific. Follow up MRI in 3 months recommended.\par \par CT chest on 07/05/2021 showed left lower lobe part solid 2.7 cm nodule with 1 cm nodular component has enlarged solid component compared to the prior study. Left lower lobe subpleural dependent 8mm nodular opacity (2, 25) is unchanged from 12/21/2020 but enlarged from 7/25/2014. 4 mm right upper lobe nodules were unchanged. \par \par Patient followed up with Dr. Dang Porter for PET/CT with FDG-avid lucency seen in January, left maxillary alveolus TMJ, surgery was recommended but patient opted against.\par \par His case was discussed at tumor boards.\par \par Patient denies worsening shortness of breath, cough, chest pain, fever, chills, unintentional weight loss. Eating well,no dysphasia noted. No complaints of pain.

## 2021-08-10 NOTE — REVIEW OF SYSTEMS
[Cough] : cough [SOB on Exertion] : shortness of breath during exertion [Joint Pain] : joint pain [Disturbance Of Gait] : gait disturbance [Difficulty Walking] : difficulty walking [Easy Bruising] : a tendency for easy bruising [Negative] : Genitourinary [Constipation: Grade 0] : Constipation: Grade 0 [Diarrhea: Grade 0] : Diarrhea: Grade 0 [Dysphagia: Grade 0] : Dysphagia: Grade 0 [Nausea: Grade 0] : Nausea: Grade 0 [Vomiting: Grade 0] : Vomiting: Grade 0 [Cough: Grade 1 - Mild symptoms; nonprescription intervention indicated] : Cough: Grade 1 - Mild symptoms; nonprescription intervention indicated [Dyspnea: Grade 1 - Shortness of breath with moderate exertion] : Dyspnea: Grade 1 - Shortness of breath with moderate exertion [FreeTextEntry6] : semi-productive and dry at times

## 2021-08-10 NOTE — LETTER CLOSING
[Consult Closing:] : Thank you for allowing me to participate in the care of this patient.  If you have any questions, please do not hesitate to contact me. [Sincerely yours,] : Sincerely yours, [FreeTextEntry3] : Tushar Macias MD\par  and Residency \par Department of Radiation Medicine\par [facility]\par [facility]\par [address] [place] [address]\[place] [address]\par Tel: [phone]\par Fax: [phone]\par \par \par

## 2021-08-16 PROBLEM — Z01.810 PREOP CARDIOVASCULAR EXAM: Status: ACTIVE | Noted: 2021-01-01

## 2021-08-16 PROBLEM — Z01.818 PREOP TESTING: Status: ACTIVE | Noted: 2021-01-01

## 2021-08-18 NOTE — HISTORY OF PRESENT ILLNESS
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  His medical history is significant for rheumatoid arthritis which was diagnosed decades ago.  He has followed regularly with a physician and about 18 years ago he was diagnosed with diabetes, HTN.  He also describes having had a heart attack in the past.  He reports that his diabetes has recently been well controlled on insulin pump.  He say he has neuropathy from diabetes but no retinopathy.  He says he has been aware of an abnormality in his kidney function for years and upon review of available records he has had a creatinine of about 1.5 in 2015 and has intermittently had microalbuminuria usually about 30.  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  His creatinine has gone up, his proteinuria has increased.  Since stopping biologics his complements, hapto, and dsDNA have improved and yet his proteinuria and creatinine continue to worsen.  Of note the patient has also recently had worsening swelling and worsening hypertension over the last few months.  His dose of lasix was recently increased by his cardiologist and this has subsequently improved.  On evaluation today he denies any sob or cp, he has no urinary complaints and voids normally, he has some intermittent joint pains for which he continues to take celecoxib.  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  His steroids are being tapered down.  We have been working on his swelling he was on lasix 80 BID with metolazone as needed to get weight down to 157 at home.  With this reduction in weight his edema is better, he can get on his shoes.  He has not had any dyspnea, CP, NVD, he has not been taking any NSAIDs.  His creatinine checked recently with Dr. Polo has been stable.  He continues to maintain a low potassium diet.  \par \par 10/19/18 Since our last meeting Julián is feeling markedly better and his edema is vastly improved.  His blood pressure at home has been ranging 120 to 130 / 60 to 70.  He has not been using NSAIDs at all.  He has not had any chest pain or urination issues.  He is still smoking.  He is thinking about trying to taper himself off of dilaudid.\par \par 1/25/19 -- Julián is doing really well today.  His weight has been very stable recently.  He has some body aches which is chronic for him.  He reports that his edema is substantially improved.\par \par 5/28/19 -- feels well.  had hand surgery.  swelling improved.\par \par 12/6/19 -- feels well.  recently stopped xeljanz and is on steroids at the moment.  no increase in swelling no sob no cp no nausea or vomiting.  no urinary complaints.\par \par 6/18/20 -- I saw Julián today and he is feeling well overall staying mostly distanced due to covid 19.  he reports that his edema almost completely resolved.  however in the last few weeks the swelling returned worse on the right.  he is also recently having some cramping in his hands and calves.\par \par \par 11/16/20 -- Since our last visit Julián feels ok.  He has noted some weight gain and swelling around his eyes but has no increased dyspnea.  Still has a "smoker's cough."  Having some GI upset.
Fall with Harm Risk

## 2021-09-02 NOTE — ED PROVIDER NOTE - PHYSICAL EXAMINATION
Gen: WDWN, NAD  HEENT: EOMI, no nasal discharge, mucous membranes moist  CV: 2+ radial pulses b/l  Resp: CTAB, no W/R/R, no accessory muscle use, no increased work of breathing  GI: Abdomen soft non-distended, NTTP  MSK: No open wounds, no bruising. 1+ RLE edema. No C spine TTP.   Neuro: A&Ox4, following commands, moving all four extremities spontaneously  Psych: appropriate mood

## 2021-09-02 NOTE — ED PROVIDER NOTE - OBJECTIVE STATEMENT
65YO male hx of DM, PAD, RLL lobectomy, lung CA not on chemo, p/w fall 6d prior, mechanical, no LOC. fell onto R foot. denies trauma to head/neck. endorses R foot pain. 5d prior, notes onset of SOB, denies chest pain. 10d prior had R thoracentesis. Pt not on AC, is on plavix, decided to DC plavix 4d prior.

## 2021-09-02 NOTE — ED ADULT NURSE NOTE - OBJECTIVE STATEMENT
pt 65 yo male hx lung cancer diagnosed Jan 2021 and lobectomy in Feb 2021 at [facility] states sob post thoracentiesis 10 days ago had 1 liter fluid drained had Pet scan past Friday had fall there was seen by doctor at [address] [place] [address] sob onset next day pt is current smoker 1/2 PPD no chemo or radiation as yet for cancer pt with open toe shoe on to right foot pt with good clear ekta;ateral breath sounds left foot edematous and bruising to all 5 toes pt states it has improved pt states he stopped blood thinners plavix pt states he has not taken it in last 3 days pt 65 yo male hx lung cancer diagnosed Jan 2021 and lobectomy in Feb 2021 at [facility] states sob post thoracentiesis 10 days ago had 1 liter fluid drained had Pet scan past Friday had fall there was seen by doctor at [address] [place] [address] sob onset next day pt is current smoker 1/2 PPD no chemo or radiation as yet for cancer pt with open toe shoe on to right foot pt with good clear ekta;ateral breath sounds left foot edematous and bruising to all 5 toes pt states it has improved pt states he stopped blood thinners plavix pt states he has not taken it in last 3 days due to bruising to foot

## 2021-09-02 NOTE — ED PROVIDER NOTE - ATTENDING CONTRIBUTION TO CARE
Need for prophylactic measure Dr. Stanton (Attending Physician)  67yo male hx of DM, PAD, RLL lobectomy, lung CA not on chemo, p/w fall 6d prior, mechanical, no LOC and 5d of SOB. had recent thoracentesis 2 weeks ago. VS stable, satting 100% on RA. Low suspicion for rib fractures, pneumothorax since pain is lower sternum/epigastrium likely pleural effusion. low suspicion PE given no CP, normal O2 saturation. plan for basic labs, c/o RLE pain with ecchymosis over foot and tib fib will xray foot and chest; no additional O2 supplementation indicated.

## 2021-09-02 NOTE — ED PROVIDER NOTE - CLINICAL SUMMARY MEDICAL DECISION MAKING FREE TEXT BOX
Christelle Contreras MD, PGY-2: 67YO male hx of DM, PAD, RLL lobectomy, lung CA not on chemo, p/w fall 6d prior, mechanical, no LOC and 5d of SOB. had recent thoracentesis. VS stable, satting 100% on RA. suspect rib fractures, pneumothorax, pleural effusion. low suspicion PE given no CP, normal O2 saturation. plan for basic labs, xray foot and chest; no additional O2 supplementation indicated.

## 2021-09-02 NOTE — ED ADULT NURSE REASSESSMENT NOTE - NS ED NURSE REASSESS COMMENT FT1
MD Carballo at bedside to update pt on results. Pt admitted to hospital and aware of plan of care, pending bed assignment. VS as documented. Pt ambulatory with steady gait to bathroom. Bed locked and lowered. Comfort and safety measures maintained.

## 2021-09-02 NOTE — ED PROVIDER NOTE - NS ED ROS FT
Gen: Denies fevers  HEENT: denies h/a  CV: Denies chest pain  Resp: + SOB, cough  GI: Denies nausea, vomiting  Msk: + R foot pain  : Denies dysuria  Neuro: Denies LOC  all other ROS negative unless indicated in HPI

## 2021-09-03 NOTE — CONSULT NOTE ADULT - ASSESSMENT
A/P: 65 yo M with PMH of HTN,, CAD, type 2DM, chronic pancreatitis now insulin dependent on omnipod pump with dexcom CGM, hx of lung adenocarcinoma s/p right lobectomy who presented with SOB. Endocrine consulted for hyperglycemia.    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 Treat like T1DM. No evidence of DKA. On solumedrol at home and here.  - recommend increasing lantus to 14 units QHS   - recommend lispro 6 units with meals (pt has aggressive carb ratio on steroids. may need more prandial insulin)  - recommend moderate dose correction scale with meals, low dose correction scale before bed  - patient wants to continue insulin injections today. Depending on imaging studies and discharge plan, pt may want to go back on pump tomorrow  - consistent carb diet  - premeal glucose goal <140, random glucose goal <180  - do NOT hold basal insulin even if NPO as patient could be at risk for DKA    D/c planning:  - pt to resume pump. Based on timing of discharge and last basal insulin shot, may need to instruct temp basal rate which pt is comfortable with  - pt should be prescribed back up insulin pens in case of pump failure or other issues (frequent imaging as in his case): lantus and admelog    DISCHARGE RX:  - Lantus Solostar Pen (or Basaglar Kwikpen): Inject 12 units sc in case of pump failure, dispense 5 pens (3 refills)   - Novolog Flexpen (or Humalog Kwikpen): Inject 6-12 units sc before meals in case of pump failure, dispense 5 pens (3 refills)  - BD vincenzo 4mm pen needles: dispense #100, use as directed (3 refills)  - Alcohol pads: dispense #100, use as directed (3 refills)    # Hypertension  - goal BP <130/80  - management per primary team    #Hyperlipidemia  - continue atorvastatin 80 mg    Discussed with primary team  Insulin orders placed    Patito Mcpherson MD  Attending Physician  Department of Endocrinology, Diabetes and Metabolism           A/P: 67 yo M with PMH of HTN,, CAD, type 2DM, chronic pancreatitis now insulin dependent on omnipod pump with dexcom CGM, hx of lung adenocarcinoma s/p right lobectomy who presented with SOB. Endocrine consulted for hyperglycemia.    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 Treat like T1DM. No evidence of DKA. On solumedrol at home and here.  - recommend increasing lantus to 14 units QHS (will give at midnight tonight, then can space to bedtime on 9/4 since received basal insulin at 2 am today)  - recommend lispro 6 units with meals (pt has aggressive carb ratio on steroids. may need more prandial insulin)  - recommend moderate dose correction scale with meals, low dose correction scale before bed  - patient wants to continue insulin injections today. Depending on imaging studies and discharge plan, pt may want to go back on pump tomorrow  - consistent carb diet  - premeal glucose goal <140, random glucose goal <180  - do NOT hold basal insulin even if NPO as patient could be at risk for DKA    D/c planning:  - pt to resume pump. Based on timing of discharge and last basal insulin shot, may need to instruct temp basal rate which pt is comfortable with  - pt should be prescribed back up insulin pens in case of pump failure or other issues (frequent imaging as in his case): lantus and admelog    DISCHARGE RX:  - Lantus Solostar Pen (or Basaglar Kwikpen): Inject 12 units sc in case of pump failure, dispense 5 pens (3 refills)   - Novolog Flexpen (or Humalog Kwikpen): Inject 6-12 units sc before meals in case of pump failure, dispense 5 pens (3 refills)  - BD vincenzo 4mm pen needles: dispense #100, use as directed (3 refills)  - Alcohol pads: dispense #100, use as directed (3 refills)    # Hypertension  - goal BP <130/80  - management per primary team    #Hyperlipidemia  - continue atorvastatin 80 mg    Discussed with primary team  Insulin orders placed    Patito Mcpherson MD  Attending Physician  Department of Endocrinology, Diabetes and Metabolism

## 2021-09-03 NOTE — H&P ADULT - NSHPSOCIALHISTORY_GEN_ALL_CORE
Still smoking cigarettes as above.  Quit heavy ethanol use 11 years ago.   .   Retired banker.  Patient declines for examiner to contact spouse at this hour.

## 2021-09-03 NOTE — H&P ADULT - HISTORY OF PRESENT ILLNESS
NIGHT HOSPITALIST:   Patient UNKNOWN to me previously, assigned to me at this point via the ER to admit this 65 y/o M--followed by his office physicians above--patient with a 50 pack/year still current cigarette smoker (refuses to quit and refuses pharmacologic options), essential HTN on Toprol XL, Lasix and an ARB added due to still persistent elevated BP, CAD with MI in 2002 with nonobstructive CAD with cardiac cath in Jan 2021, chronic kidney disease stage 3 (improved from stage 4 apparently from patient's ARB), chronic pancreatitis reportedly from autoimmune but patient with resolved past ethanol abuse (quit 11 years ago), with apparent type 2 DM but with the use of an insulin pump (patient has self discontinued his Omnipod pump and DEXCOM due to cost issues by patient and with frequent need to remove pump with imaging studies required for his lung CA), S/P RIGHT lobe lobectomy for lung mass with adenocarcinoma, with patient apparently with a presumed mechanical trip over his shoes while at an imaging office this past Friday with patient sustaining right anterior foot pain and RIGHT leg pain, with patient apparently refusing referral to the ER, with patient then with progressive and persistent dyspnoea with activity since Saturday.    Patient with dry cough but denies fever, chills, rigors.  No chest pain/pressure.  NO palpitations.  NO N/V.  No radiation of symptoms.  Patient received his COVID-19 vaccine series this spring 2021.      Patient self discontinued his Omnipod insulin pump earlier in the week as noted above. NIGHT HOSPITALIST:   Patient UNKNOWN to me previously, assigned to me at this point via the ER to admit this 65 y/o M--followed by his office physicians above--patient with a 50 pack/year still current cigarette smoker (refuses to quit and refuses pharmacologic options), essential HTN on Toprol XL, Lasix and an ARB added due to still persistent elevated BP, CAD with MI in 2002 with nonobstructive CAD with cardiac cath in Jan 2021, chronic kidney disease stage 3 (improved from stage 4 apparently from patient's ARB), chronic pancreatitis reportedly from autoimmune but patient with resolved past ethanol abuse (quit 11 years ago), with apparent type 2 DM but with the use of an insulin pump (patient has self discontinued his Omnipod pump and DEXCOM due to cost issues by patient and with frequent need to remove pump with imaging studies required for his lung CA), S/P RIGHT lobe lobectomy for lung mass with adenocarcinoma, with patient apparently with a presumed mechanical trip over his shoes while at an imaging office this past Friday with patient sustaining right anterior foot pain and RIGHT leg pain, with patient apparently refusing referral to the ER, with patient then with progressive and persistent dyspnoea with activity since Saturday.    Patient with dry cough but denies fever, chills, rigors.  No chest pain/pressure.  NO palpitations.  NO N/V.  No radiation of symptoms.  Patient received his COVID-19 vaccine series this spring 2021.      Patient reports he is undergoing RT for the LEFT lung.    Patient is also S/P RIGHT thoracentesis on 8/18/21.   Noted improvement of dyspnoea during therapeutic thoracentesis at the time.    Patient self discontinued his Omnipod insulin pump earlier in the week as noted above.

## 2021-09-03 NOTE — H&P ADULT - NSHPREVIEWOFSYSTEMS_GEN_ALL_CORE
NO N/V.  NO abdominal pain, no red blood per rectum or melena.  NO chest pain/pressure.  No palpitations.  NO HA, no focal weakness.  NO back pain, no tearing back pain.  NO rash.    No polyuria, no polydipsia.  Patient with anorexia and involuntary weight loss  NO SI/HI.  NO thyroid symptoms.  NO hematuria, no dysuria.  NO fever, no chills, no rigors.

## 2021-09-03 NOTE — H&P ADULT - PROBLEM SELECTOR PLAN 6
Transitions of Care Status:  1.  Name of PCP:   Cr Kidd  [phone]  2.  PCP Contacted on Admission: [ ] Y    [x ] N    3.  PCP contacted at Discharge: [ ] Y    [ ] N    [ ] N/A  4.  Post-Discharge Appointment Date and Location:  5.  Summary of Handoff given to PCP:

## 2021-09-03 NOTE — CHART NOTE - NSCHARTNOTEFT_GEN_A_CORE
66 year old male with history of T2DM treated as T1DM as he has pancreatitic insufficiency ( on Omnipod insulin pump and DEXCOM), HTN, HLD, RA, OA, CAD- non-obstructive, PAD s/p carotid endarterectomy, chronic pancreatitis & cirrhosis- former ETOH, current smoker- 50+ PPD who pulled off his insulin pump several days ago and presented with hyperglycemia.  initially. Not in DKA. Received 10 units Lantus at 2am but no other insulin. BG still 309. Advised primary team to make NPO and make low correction dose insulin q6h for now. Of note, patient seen in hospital by Endocrine in the past and follows outpatient with Dr. Estrada.    Luis Armando Morris DO, Endocrinology Fellow  Pager [phone] from 9am to 5pm. After hours and on weekends, please call [phone].

## 2021-09-03 NOTE — H&P ADULT - PROBLEM SELECTOR PLAN 1
See above.   CTT chest no contrast ordered.   Would consider contacting patient's oncologist and CTS in the AM.

## 2021-09-03 NOTE — H&P ADULT - ASSESSMENT
Multiple attempts to contact the [employer] website without success.    NIGHT HOSPITALIST:   Multiple attempts to contact the [employer] website without success.    NIGHT HOSPITALIST:    Progressive dyspnoea in the setting of patient with S/P lobectomy for lung CA but still with continued tobacco use--apparent tumour involving the opposite lung with RT treatments as such-- CTT chest noncontrast ordered to help clarify RIGHT basilar lung markings.  Would consider contacting patient's oncologist and CTS in the AM.    Patient has also self discontinued his insulin pump at home with presenting glucose toxicity but no clinical evidence of DKA.   Will provide 0.15 U/ kg/24 HR Lantus x 1>> 10 U Lantus now with FS S/S but would consider formal endocrinology evaluation in the AM.    Patient's CKD4 has appeared to have improved to CKD3 since Feb 2021, presumably on the ARB and Toprol XL but would consider formal renal evaluation in the AM.    Patient refuses to quit tobacco and refuses pharmacologic options to quit.   Will provide Duoneb treatment but at present no indication for higher doses of his maintenance steroids.    Patient agrees to pharmacologic DVT prophylaxis. Multiple attempts to contact the above [employer] [facility] website without success.        NIGHT HOSPITALIST:    Progressive dyspnoea in the setting of patient with S/P lobectomy for lung CA but still with continued tobacco use--apparent tumour involving the opposite lung with RT treatments as such-- CTT chest noncontrast ordered to help clarify RIGHT basilar lung markings.  Would consider contacting patient's oncologist and CTS in the AM.    Patient has also self discontinued his insulin pump at home with presenting glucose toxicity but no clinical evidence of DKA.   Will provide 0.15 U/ kg/24 HR Lantus x 1>> 10 U Lantus now with FS S/S but would consider formal endocrinology evaluation in the AM.    Patient's CKD4 has appeared to have improved to CKD3 since Feb 2021, presumably on the ARB and Toprol XL but would consider formal renal evaluation in the AM for optimization of his CKD and BP control.    Patient refuses to quit tobacco and refuses pharmacologic options to quit.   Will provide Duoneb treatment but at present no indication for higher doses of his maintenance steroids.    Patient agrees to pharmacologic DVT prophylaxis.

## 2021-09-03 NOTE — H&P ADULT - NSHPADDITIONALINFOADULT_GEN_ALL_CORE
NIGHT HOSPITALIST:    Patient aware of course and agrees with plan/care as above.   Spouse aware of admission but patient did not wish examiner to contact spouse at this hour.   Given patient's comorbidities, patient's long term prognosis is guarded.   Emotional support provided to patient.   Care reviewed with covering NP/PA for endorsement to my physician colleagues.    Stevenson Mcdermott MD  [phone] NIGHT HOSPITALIST:    Patient aware of course and agrees with plan/care as above.   Spouse aware of admission but patient did not wish examiner to contact spouse at this hour.   Given patient's comorbidities, patient's long term prognosis is guarded.   Emotional support provided to patient.   Care reviewed with covering NP, Kelli,  for endorsement to my physician colleagues.    Stevenson Mcdermott MD  [phone]

## 2021-09-03 NOTE — H&P ADULT - NSHPPHYSICALEXAM_GEN_ALL_CORE
Physical exam with a middle aged, cachectic, chronically ill appearing M, appears older than stated age.    Afebrile.   HR  87   RR 14   BP  162/92   94% on RA    HEENT<PERRL< EOMI, bitemporal wasting.  Neck supple, trachea midline  Chest bony rib cage, decreased breath sounds RIGHT base  Cor s1 s2, no murmurs noted.  Abdomen, scaphoid, soft nontender, normal bowel sounds.  Skin dry NO ulcers.  Ext poor nail hygiene, RIGHT toes 1-5 with scattered ecchymoses.  NO deformities.    Full range of motion of the RIGHT knee.  NO erythema or crepitus noted.  Neurologic AXOx3.  Speech fluent.  cognition intact.  UE/LE 5/5.  NO SI/HI.

## 2021-09-03 NOTE — H&P ADULT - NSHPSOURCEINFOTX_GEN_ALL_CORE
Reviewed with patient Medex--copy in chart.   Spouse aware of admission and patient did not wish examiner to contact spouse at this hour.

## 2021-09-03 NOTE — H&P ADULT - NSHPLABSRESULTS_GEN_ALL_CORE
WBC 10.0    Hgb 9.0    Platelets of 221K.    85% N.    INR 1.0    cath from 1/22/21 with nonobstructive CAD D1 and OM1.    Random glucose of 492.   HCO3 22    AG 14.    Cr 2.0  (improved from 2.79 in Feb 2021)    K+ 5.2    Alb 3.2    COVID-19 PCR>>negative.    RIGHT Duplex no DVT  RIGHT foot with no fracture  RIGHT ankle with no fracture  RIGHT tib/fib with no fracture.    Pathology from 2/19/21 with adenocarcinoma.    Chest radiograph reviewed with increased RIGHT costophrenic angle markings. WBC 10.0    Hgb 9.0    Platelets of 221K.    85% N.    INR 1.0    cath from 1/22/21 with nonobstructive CAD D1 and OM1.    Random glucose of 492.   HCO3 22    AG 14.    Cr 2.0  (improved from 2.79 in Feb 2021)    K+ 5.2    Alb 3.2    COVID-19 PCR>>negative.    RIGHT Duplex no DVT  RIGHT foot with no fracture  RIGHT ankle with no fracture  RIGHT tib/fib with no fracture.    Pathology from 2/19/21 with adenocarcinoma.    Chest radiograph reviewed with increased RIGHT costophrenic angle markings.    EKG tracing reviewed with sinus at 90 with PVC and QTc 528.

## 2021-09-03 NOTE — CONSULT NOTE ADULT - SUBJECTIVE AND OBJECTIVE BOX
HPI: 67 yo M with PMH of HTN,, CAD, type 2DM, chronic pancreatitis now insulin dependent previously on omnipod pump now self-discontinued due to cost/frequent imaging studies, hx of lung adenocarcinoma s/p right lobectomy who presented with SOB. Endocrine consulted for hyperglycemia.      Patient was diagnosed with T2DM  in 2000. Follows with Dr. Estrada. Has ***been hospitalized for hyperglycemia/DKA before.   Diabetes complications include:  Reports family history of DM in ***.  Denies blurry vision, polyuria, polydipsia, and paresthesias.    Pt is ordered for PO methylprednisolone 4 mg (was on 8 mg in 7/2021). Per last visit with Dr. Estrada in 7/2021, pt was on omnipod pump still (settings below).    Endocrinologist: Dr. Estrada    Last HbA1c:      PUMP SETTINGS:  Model:  Basal Rates:    I:C    ISF    Target BG    Active Insulin Time:    Date of last site change:  Date of next site change:  Pump interruptions:  Insulin used:  Patient has their insulin pump supplies in the hospital.    PAST MEDICAL & SURGICAL HISTORY:  Diabetes mellitus  Type II, diagnosed in 2000, insulin pump    Coronary artery disease    H/O: HTN (hypertension)    History of MI (myocardial infarction)  in 2002    Carotid stenosis, left    H/O sleep apnea  mild, unable to use CPAP    H/O rheumatoid arthritis  on medication    Cigarette smoker    Hyperlipidemia    Gastroesophageal reflux disease    Osteoarthritis    History of chronic pancreatitis  started in 1999, followed by pain management for pain control    Alcohol abuse  recovering 2003, now only has an occasional drink    Portal vein thrombosis  2004 and treated with blood thinners, no longer follows with hepatology    H/O insertion of insulin pump  2015, omnipod, changed q 3 days    H/O hyperkalemia  treated with sodium bicarbonate    Vitamin D deficiency    Renal failure, chronic, stage 3 (moderate)    Hearing loss  bilateral    Cataract  bilateral    Nasal drainage  post nasal drip    Peripheral neuropathy  feet    Fatty liver    Thumb pain, right    PAD (peripheral artery disease)    H/O carotid stenosis    Myocardial infarction    HLD (hyperlipidemia)    Chronic pancreatitis  &quot;enzymes don&#x27;t work&quot;    Solitary pulmonary nodule    S/P cholecystectomy  2003, laparoscopic    H/O vasectomy  1994    S/P insertion of intrathecal pump  placed in 2007  removed in 2008    History of inguinal herniorrhaphy  right and left, total of 6    S/P carotid endarterectomy  left 11/9/2016    H/O umbilical hernia repair  2014    S/P insertion of spinal cord stimulator  inserted 2006 and removed 2007    S/P tonsillectomy and adenoidectomy  age 16    H/O arthroscopy of right knee  1988    S/P colonoscopy  2014, benign polyps    H/O coronary angiogram    S/P hip replacement, right        FAMILY HISTORY:  No pertinent family history in first degree relatives  adopted        Social History:  Denies tobacco, etoh, or drug use.    Outpatient Medications:  MEDICATIONS  (STANDING):  albuterol/ipratropium for Nebulization 3 milliLiter(s) Nebulizer every 6 hours  aspirin enteric coated 81 milliGRAM(s) Oral <User Schedule>  atorvastatin 80 milliGRAM(s) Oral at bedtime  cholecalciferol 2000 Unit(s) Oral daily  clopidogrel Tablet 75 milliGRAM(s) Oral daily  dextrose 40% Gel 15 Gram(s) Oral once  dextrose 5%. 1000 milliLiter(s) (50 mL/Hr) IV Continuous <Continuous>  dextrose 5%. 1000 milliLiter(s) (100 mL/Hr) IV Continuous <Continuous>  dextrose 50% Injectable 25 Gram(s) IV Push once  dextrose 50% Injectable 12.5 Gram(s) IV Push once  dextrose 50% Injectable 25 Gram(s) IV Push once  furosemide    Tablet 80 milliGRAM(s) Oral two times a day  glucagon  Injectable 1 milliGRAM(s) IntraMuscular once  heparin   Injectable 5000 Unit(s) SubCutaneous every 8 hours  insulin lispro (ADMELOG) corrective regimen sliding scale   SubCutaneous every 6 hours  methylPREDNISolone 4 milliGRAM(s) Oral daily  metoprolol succinate ER 50 milliGRAM(s) Oral daily  multivitamin 1 Tablet(s) Oral daily  pantoprazole    Tablet 40 milliGRAM(s) Oral before breakfast  sodium bicarbonate 1300 milliGRAM(s) Oral three times a day  valsartan 40 milliGRAM(s) Oral daily    MEDICATIONS  (PRN):  acetaminophen   Tablet .. 650 milliGRAM(s) Oral every 6 hours PRN Temp greater or equal to 38C (100.4F), Mild Pain (1 - 3)      Allergies    amitriptyline (Rash)  Plaquenil (Hives)    Intolerances        Review of Systems:  Constitutional: No fever, good appetite/po intake  Eyes: No blurry vision, diplopia  Neuro: No tremors  HEENT: No pain  Cardiovascular: No chest pain, palpitations  Respiratory: No SOB, no cough  GI: No nausea, vomiting,   : No dysuria, hematuria  Skin: no rash  Psych: no depression  Endocrine: no polyuria, polydipsia  Hem/lymph: no swelling  Osteoporosis: no fractures    ALL OTHER SYSTEMS REVIEWED AND ARE NEGATIVE    PHYSICAL EXAM:  VITALS: T(C): 37.1 (09-03-21 @ 11:39)  T(F): 98.8 (09-03-21 @ 11:39), Max: 99.1 (09-02-21 @ 16:03)  HR: 86 (09-03-21 @ 11:39) (85 - 99)  BP: 149/77 (09-03-21 @ 11:39) (149/77 - 168/67)  RR:  (18 - 20)  SpO2:  (94% - 97%)  Wt(kg): --  GENERAL: NAD, well-groomed, well-developed  EYES: No proptosis, no lid lag, anicteric, extraocular movements intact  HEENT:  Atraumatic, Normocephalic, moist mucous membranes  THYROID: Normal size, no palpable nodules, no thyromegaly  RESPIRATORY: Clear to auscultation bilaterally; No rales, rhonchi, wheezing, or rubs  CARDIOVASCULAR: Regular rate and rhythm; No murmurs; no peripheral edema  GI: Soft, nontender, non distended, normal bowel sounds  SKIN: Dry, intact, No rashes or lesions  NEURO: sensation intact, no tremor  EXTREMITIES: no foot ulcers and bilateral distal pedal pulses intact  PSYCH: reactive affect, euthymic mood                              8.5    8.56  )-----------( 201      ( 03 Sep 2021 06:22 )             26.7       09-03    135  |  100  |  30<H>  ----------------------------<  309<H>  4.0   |  22  |  1.98<H>    EGFR if : 39<L>  EGFR if non : 34<L>    Ca    9.0      09-03  Mg     1.7     09-03    TPro  6.9  /  Alb  3.2<L>  /  TBili  0.5  /  DBili  x   /  AST  32  /  ALT  19  /  AlkPhos  122<H>  09-02      Thyroid Function Tests:          Radiology:     A/P: *** yo M/F with PMH of T1DM admitted for ***. Endocrinology was consulted for T1DM and insulin pump management.    #Type 1 Diabetes Mellitus    -Patient signed hospital insulin pump contract   -Insulin and pump orders are in place  - Please call Endocrinology if new AMS, NPO status or planned procedures    # Hypertension      #Hyperlipidemia      Patito Mcpherson MD  Attending Physician  Department of Endocrinology, Diabetes and Metabolism           HPI: 65 yo M with PMH of HTN,, CAD, type 2DM, chronic pancreatitis now insulin dependent on omnipod pump with dexcom CGM, hx of lung adenocarcinoma s/p right lobectomy who presented with SOB. Endocrine consulted for hyperglycemia.    Patient was diagnosed with T2DM  in 2000. Follows with Dr. Estrada. Has not been hospitalized for hyperglycemia/DKA before. Diabetes complications include: CAD, HTN. Denies retinopathy. Denies family hx of DM. Denies blurry vision, polyuria, polydipsia, and paresthesias. He reports a good appetite. For the last week, he has had a lot of imaging studies requiring him to remove his pump so he decided to keep pump and CGM off. Instead, he was injecting humalog with syringe 3-5x daily based on carbs he was eating and based on correction dosing. He reports being hyperglycemic mostly in 200s. He does not have insulin pens at home but knows how to use them. Pt is ordered for PO methylprednisolone 4 mg BID, which is a chronic med for him.   He has pump supplies with him but prefers to continue injections for today.    Glucoses above goal. Received lantus 10 units at 2 am this morning. 5 units of lispro total today with glucose 231. Pt ate lunch without standing lispro order.    labs:  bicarb 22, AG 14, creatinine 1.98 (b/l 2-3)    Endocrinologist: Dr. Estrada    PUMP SETTINGS:  Model: omnipod (humalog) + dexcom cgm  Basal Rates:  12AM: 0.5  Total basal: 12 units    I:C:  12AM 1:5    ISF:  12AM: 40    Target     Active Insulin Time: 4 hours    Date of last site change: >1 week ago, has pods with him    PAST MEDICAL & SURGICAL HISTORY:  Diabetes mellitus  Type II, diagnosed in 2000, insulin pump    Coronary artery disease    H/O: HTN (hypertension)    History of MI (myocardial infarction)  in 2002    Carotid stenosis, left    H/O sleep apnea  mild, unable to use CPAP    H/O rheumatoid arthritis  on medication    Cigarette smoker    Hyperlipidemia    Gastroesophageal reflux disease    Osteoarthritis    History of chronic pancreatitis  started in 1999, followed by pain management for pain control    Alcohol abuse  recovering 2003, now only has an occasional drink    Portal vein thrombosis  2004 and treated with blood thinners, no longer follows with hepatology    H/O insertion of insulin pump  2015, omnipod, changed q 3 days    H/O hyperkalemia  treated with sodium bicarbonate    Vitamin D deficiency    Renal failure, chronic, stage 3 (moderate)    Hearing loss  bilateral    Cataract  bilateral    Nasal drainage  post nasal drip    Peripheral neuropathy  feet    Fatty liver    Thumb pain, right    PAD (peripheral artery disease)    H/O carotid stenosis    Myocardial infarction    HLD (hyperlipidemia)    Chronic pancreatitis  &quot;enzymes don&#x27;t work&quot;    Solitary pulmonary nodule    S/P cholecystectomy  2003, laparoscopic    H/O vasectomy  1994    S/P insertion of intrathecal pump  placed in 2007  removed in 2008    History of inguinal herniorrhaphy  right and left, total of 6    S/P carotid endarterectomy  left 11/9/2016    H/O umbilical hernia repair  2014    S/P insertion of spinal cord stimulator  inserted 2006 and removed 2007    S/P tonsillectomy and adenoidectomy  age 16    H/O arthroscopy of right knee  1988    S/P colonoscopy  2014, benign polyps    H/O coronary angiogram    S/P hip replacement, right        FAMILY HISTORY:  No pertinent family history in first degree relatives  adopted  no FH DM    Social History:  Denies tobacco, etoh, or drug use.    Outpatient Medications:  MEDICATIONS  (STANDING):  albuterol/ipratropium for Nebulization 3 milliLiter(s) Nebulizer every 6 hours  aspirin enteric coated 81 milliGRAM(s) Oral <User Schedule>  atorvastatin 80 milliGRAM(s) Oral at bedtime  cholecalciferol 2000 Unit(s) Oral daily  clopidogrel Tablet 75 milliGRAM(s) Oral daily  dextrose 40% Gel 15 Gram(s) Oral once  dextrose 5%. 1000 milliLiter(s) (50 mL/Hr) IV Continuous <Continuous>  dextrose 5%. 1000 milliLiter(s) (100 mL/Hr) IV Continuous <Continuous>  dextrose 50% Injectable 25 Gram(s) IV Push once  dextrose 50% Injectable 12.5 Gram(s) IV Push once  dextrose 50% Injectable 25 Gram(s) IV Push once  furosemide    Tablet 80 milliGRAM(s) Oral two times a day  glucagon  Injectable 1 milliGRAM(s) IntraMuscular once  heparin   Injectable 5000 Unit(s) SubCutaneous every 8 hours  insulin lispro (ADMELOG) corrective regimen sliding scale   SubCutaneous every 6 hours  methylPREDNISolone 4 milliGRAM(s) Oral daily  metoprolol succinate ER 50 milliGRAM(s) Oral daily  multivitamin 1 Tablet(s) Oral daily  pantoprazole    Tablet 40 milliGRAM(s) Oral before breakfast  sodium bicarbonate 1300 milliGRAM(s) Oral three times a day  valsartan 40 milliGRAM(s) Oral daily    MEDICATIONS  (PRN):  acetaminophen   Tablet .. 650 milliGRAM(s) Oral every 6 hours PRN Temp greater or equal to 38C (100.4F), Mild Pain (1 - 3)      Allergies    amitriptyline (Rash)  Plaquenil (Hives)    Intolerances    Review of Systems:  Constitutional: No fever, good appetite/po intake  Eyes: No blurry vision, diplopia  Neuro: No tremors  HEENT: No pain  Cardiovascular: No chest pain, palpitations  Respiratory: No SOB, no cough  GI: No nausea, vomiting,   : No dysuria, hematuria  Skin: no rash  Psych: no depression  Endocrine: no polyuria, polydipsia  Hem/lymph: no swelling  Osteoporosis: no fractures    ALL OTHER SYSTEMS REVIEWED AND ARE NEGATIVE    PHYSICAL EXAM:  VITALS: T(C): 37.1 (09-03-21 @ 11:39)  T(F): 98.8 (09-03-21 @ 11:39), Max: 99.1 (09-02-21 @ 16:03)  HR: 86 (09-03-21 @ 11:39) (85 - 99)  BP: 149/77 (09-03-21 @ 11:39) (149/77 - 168/67)  RR:  (18 - 20)  SpO2:  (94% - 97%)  Wt(kg): --  GENERAL: NAD, well-groomed, well-developed  EYES: No proptosis, no lid lag, anicteric, extraocular movements intact  HEENT:  Atraumatic, Normocephalic, moist mucous membranes  THYROID: Normal size, no palpable nodules, no thyromegaly  RESPIRATORY: Clear to auscultation bilaterally; No rales, rhonchi, wheezing, or rubs  CARDIOVASCULAR: Regular rate and rhythm; No murmurs; 1+peripheral edema  GI: Soft, nontender, non distended, normal bowel sounds  SKIN: Dry, intact, No rashes or lesions  NEURO: sensation intact, no tremor  EXTREMITIES: no foot ulcers and bilateral distal pedal pulses intact; right foot has diffuse ecchymoses healing  PSYCH: reactive affect, euthymic mood                              8.5    8.56  )-----------( 201      ( 03 Sep 2021 06:22 )             26.7       09-03    135  |  100  |  30<H>  ----------------------------<  309<H>  4.0   |  22  |  1.98<H>    EGFR if : 39<L>  EGFR if non : 34<L>    Ca    9.0      09-03  Mg     1.7     09-03    TPro  6.9  /  Alb  3.2<L>  /  TBili  0.5  /  DBili  x   /  AST  32  /  ALT  19  /  AlkPhos  122<H>  09-02

## 2021-09-03 NOTE — H&P ADULT - PROBLEM SELECTOR PLAN 3
See above.  Improved from CKD4 from Feb 2021, will cautiously continue the ARB, but would consider formal renal evaluation in the AM.

## 2021-09-03 NOTE — CHART NOTE - NSCHARTNOTEFT_GEN_A_CORE
[facility] Division of Hospital Medicine  Radha Giraldo MD  Pager (M-F, 8A-5P): [phone]  Other Times:  [phone]      SUBJECTIVE / OVERNIGHT EVENTS: Pt seen and examined at bedside this morning. He appears well. NAD.   ADDITIONAL REVIEW OF SYSTEMS: 11 pt ros neg    MEDICATIONS  (STANDING):  albuterol/ipratropium for Nebulization 3 milliLiter(s) Nebulizer every 6 hours  aspirin enteric coated 81 milliGRAM(s) Oral <User Schedule>  atorvastatin 80 milliGRAM(s) Oral at bedtime  cholecalciferol 2000 Unit(s) Oral daily  clopidogrel Tablet 75 milliGRAM(s) Oral daily  dextrose 40% Gel 15 Gram(s) Oral once  dextrose 5%. 1000 milliLiter(s) (50 mL/Hr) IV Continuous <Continuous>  dextrose 5%. 1000 milliLiter(s) (100 mL/Hr) IV Continuous <Continuous>  dextrose 50% Injectable 25 Gram(s) IV Push once  dextrose 50% Injectable 12.5 Gram(s) IV Push once  dextrose 50% Injectable 25 Gram(s) IV Push once  furosemide    Tablet 80 milliGRAM(s) Oral two times a day  glucagon  Injectable 1 milliGRAM(s) IntraMuscular once  heparin   Injectable 5000 Unit(s) SubCutaneous every 8 hours  insulin lispro (ADMELOG) corrective regimen sliding scale   SubCutaneous every 6 hours  methylPREDNISolone 4 milliGRAM(s) Oral daily  metoprolol succinate ER 50 milliGRAM(s) Oral daily  multivitamin 1 Tablet(s) Oral daily  pantoprazole    Tablet 40 milliGRAM(s) Oral before breakfast  sodium bicarbonate 1300 milliGRAM(s) Oral three times a day  valsartan 40 milliGRAM(s) Oral daily    MEDICATIONS  (PRN):  acetaminophen   Tablet .. 650 milliGRAM(s) Oral every 6 hours PRN Temp greater or equal to 38C (100.4F), Mild Pain (1 - 3)      I&O's Summary      PHYSICAL EXAM:  Vital Signs Last 24 Hrs  T(C): 37.1 (03 Sep 2021 11:39), Max: 37.1 (03 Sep 2021 11:39)  T(F): 98.8 (03 Sep 2021 11:39), Max: 98.8 (03 Sep 2021 11:39)  HR: 86 (03 Sep 2021 11:39) (85 - 99)  BP: 149/77 (03 Sep 2021 11:39) (149/77 - 168/67)  BP(mean): --  RR: 19 (03 Sep 2021 11:39) (18 - 20)  SpO2: 97% (03 Sep 2021 11:39) (94% - 97%)  CONSTITUTIONAL: NAD, well-developed, well-groomed  EYES: PERRLA; conjunctiva and sclera clear  ENMT: Moist oral mucosa, no pharyngeal injection or exudates; normal dentition  NECK: Supple, no palpable masses; no thyromegaly  RESPIRATORY: Normal respiratory effort; lungs are clear to auscultation bilaterally  CARDIOVASCULAR: Regular rate and rhythm, normal S1 and S2, no murmur/rub/gallop; No lower extremity edema; Peripheral pulses are 2+ bilaterally  ABDOMEN: Nontender to palpation, normoactive bowel sounds, no rebound/guarding; No hepatosplenomegaly  MUSCULOSKELETAL:  Normal gait; no clubbing or cyanosis of digits; no joint swelling or tenderness to palpation  PSYCH: A+O to person, place, and time; affect appropriate  NEUROLOGY: CN 2-12 are intact and symmetric; no gross sensory deficits   SKIN: No rashes; no palpable lesions    LABS:                        8.5    8.56  )-----------( 201      ( 03 Sep 2021 06:22 )             26.7     09-03    135  |  100  |  30<H>  ----------------------------<  309<H>  4.0   |  22  |  1.98<H>    Ca    9.0      03 Sep 2021 06:20  Mg     1.7     09-03    TPro  6.9  /  Alb  3.2<L>  /  TBili  0.5  /  DBili  x   /  AST  32  /  ALT  19  /  AlkPhos  122<H>  09-02    PT/INR - ( 02 Sep 2021 18:07 )   PT: 12.5 sec;   INR: 1.04 ratio         PTT - ( 02 Sep 2021 18:07 )  PTT:30.9 sec        Problem/Plan - 1:  ·  Problem: Acute Respiratory Failure due to Bilateral Pleural Effusions from Lung cancer.   ·  Plan: CT contrast appreciated. According to the pt, he was seen by CT surgery and told they would follow up a second CT Chest which is unclear. Contacted CT Surgery and deferred to another team who is not responding. Will call pt's private CT surgeon.      Problem/Plan - 2:  ·  Problem: Type 2 diabetes mellitus.   ·  Plan: See above.   Pump reinstated by Endocrine.     Problem/Plan - 3:  ·  Problem: Stage 3 chronic kidney disease.   ·  Plan: See above.  Improved from CKD4 from Feb 2021, will cautiously continue the ARB, but would consider formal renal evaluation in the AM.     Problem/Plan - 4:  ·  Problem: Tobacco dependency.   ·  Plan: Patient refuses to quit tobacco.     Problem/Plan - 5:  ·  Problem: Need for prophylactic measure.   ·  Plan: DVT prophylaxis.  Prior or Outpatient Records Reviewed [Y/N]:

## 2021-09-03 NOTE — H&P ADULT - PROBLEM SELECTOR PLAN 2
See above.   Patient has self discontinued his insulin pump several days ago.   Lantus given x 1 as above, but would consider formal endocrinology evaluation in the AM.   Unclear if the patient had an autoimmune issue with his pancreas if patient is a functional type 1 (?).

## 2021-09-04 NOTE — PROGRESS NOTE ADULT - SUBJECTIVE AND OBJECTIVE BOX
[facility] Division of Hospital Medicine  Bret GeetaDO kvng  Pager (SHADE, 4M-2Z): [phone]  Other Times:  [phone]    Patient is a 66y old  Male who presents with a chief complaint of Progressive dyspnoea since this past Saturday (04 Sep 2021 10:17)    SUBJECTIVE / OVERNIGHT EVENTS: No acute events overnight. Patient seen and examined at bedside this morning, states that he is fatigued but denies shortness of breath, chest pain, palpitations or any other complaints.    REVIEW OF SYSTEMS:    CONSTITUTIONAL: No weakness, fevers or chills but does endorse fatigue  EYES/ENT: No visual changes;  No vertigo or throat pain   NECK: No pain or stiffness  RESPIRATORY: No cough, wheezing, hemoptysis; No shortness of breath  CARDIOVASCULAR: No chest pain or palpitations  GASTROINTESTINAL: No abdominal or epigastric pain. No nausea, vomiting, or hematemesis; No diarrhea or constipation. No melena or hematochezia.  GENITOURINARY: No dysuria, frequency or hematuria  NEUROLOGICAL: No numbness or weakness  SKIN: No itching, burning, rashes, or lesions  MSK: No joint pain, no back pain  HEME: No easy bleeding, no easy bruising  All other review of systems is negative unless indicated above.    MEDICATIONS  (STANDING):  albuterol/ipratropium for Nebulization 3 milliLiter(s) Nebulizer every 6 hours  aspirin enteric coated 81 milliGRAM(s) Oral <User Schedule>  atorvastatin 80 milliGRAM(s) Oral at bedtime  cholecalciferol 2000 Unit(s) Oral daily  clopidogrel Tablet 75 milliGRAM(s) Oral daily  dextrose 40% Gel 15 Gram(s) Oral once  dextrose 5%. 1000 milliLiter(s) (50 mL/Hr) IV Continuous <Continuous>  dextrose 5%. 1000 milliLiter(s) (100 mL/Hr) IV Continuous <Continuous>  dextrose 50% Injectable 25 Gram(s) IV Push once  dextrose 50% Injectable 12.5 Gram(s) IV Push once  dextrose 50% Injectable 25 Gram(s) IV Push once  furosemide    Tablet 80 milliGRAM(s) Oral two times a day  glucagon  Injectable 1 milliGRAM(s) IntraMuscular once  heparin   Injectable 5000 Unit(s) SubCutaneous every 8 hours  influenza   Vaccine 0.5 milliLiter(s) IntraMuscular once  insulin glargine Injectable (LANTUS) 14 Unit(s) SubCutaneous at bedtime  insulin lispro (ADMELOG) corrective regimen sliding scale   SubCutaneous at bedtime  insulin lispro (ADMELOG) corrective regimen sliding scale   SubCutaneous three times a day before meals  insulin lispro Injectable (ADMELOG) 6 Unit(s) SubCutaneous three times a day before meals  methylPREDNISolone 4 milliGRAM(s) Oral daily  metoprolol succinate ER 50 milliGRAM(s) Oral daily  multivitamin 1 Tablet(s) Oral daily  pantoprazole    Tablet 40 milliGRAM(s) Oral before breakfast  sodium bicarbonate 1300 milliGRAM(s) Oral three times a day  valsartan 40 milliGRAM(s) Oral daily    MEDICATIONS  (PRN):  acetaminophen   Tablet .. 650 milliGRAM(s) Oral every 6 hours PRN Temp greater or equal to 38C (100.4F), Mild Pain (1 - 3)      CAPILLARY BLOOD GLUCOSE  105 (04 Sep 2021 07:30)      POCT Blood Glucose.: 105 mg/dL (04 Sep 2021 07:15)  POCT Blood Glucose.: 120 mg/dL (04 Sep 2021 05:43)  POCT Blood Glucose.: 150 mg/dL (04 Sep 2021 00:45)  POCT Blood Glucose.: 93 mg/dL (03 Sep 2021 23:40)  POCT Blood Glucose.: 137 mg/dL (03 Sep 2021 17:55)  POCT Blood Glucose.: 231 mg/dL (03 Sep 2021 12:56)    I&O's Summary    03 Sep 2021 07:01  -  04 Sep 2021 07:00  --------------------------------------------------------  IN: 0 mL / OUT: 400 mL / NET: -400 mL        PHYSICAL EXAM:  Vital Signs Last 24 Hrs  T(C): 36.9 (04 Sep 2021 08:50), Max: 37.1 (03 Sep 2021 11:39)  T(F): 98.4 (04 Sep 2021 08:50), Max: 98.8 (03 Sep 2021 11:39)  HR: 79 (04 Sep 2021 08:50) (79 - 92)  BP: 146/67 (04 Sep 2021 08:50) (110/69 - 162/81)  BP(mean): --  RR: 18 (04 Sep 2021 08:50) (18 - 19)  SpO2: 93% (04 Sep 2021 08:50) (93% - 98%)    CONSTITUTIONAL: Frail, elderly appearing male laying in bed in NAD, well groomed  EYES: EOMI; conjunctiva and sclera clear  ENMT: Moist oral mucosa, no pharyngeal injection or exudates; normal dentition  RESPIRATORY: Decreased breath sounds at R base but otherwise clear to auscultation no wheezing, rhonchi or rales, normal respiratory effort  CARDIOVASCULAR: Regular rate and rhythm, normal S1 and S2, no murmur/rub/gallop; No lower extremity edema  ABDOMEN: Nontender to palpation, normoactive bowel sounds, no rebound/guarding  MUSCULOSKELETAL: No clubbing or cyanosis of digits; no joint swelling or tenderness to palpation  PSYCH: A+O to person, place, and time; affect appropriate  NEUROLOGY: CN 2-12 are intact and symmetric; no gross sensory deficits   SKIN: No rashes; no palpable lesions    LABS:                        8.5    8.56  )-----------( 201      ( 03 Sep 2021 06:22 )             26.7     09-03    135  |  100  |  30<H>  ----------------------------<  309<H>  4.0   |  22  |  1.98<H>    Ca    9.0      03 Sep 2021 06:20  Mg     1.7     09-03    TPro  6.9  /  Alb  3.2<L>  /  TBili  0.5  /  DBili  x   /  AST  32  /  ALT  19  /  AlkPhos  122<H>  09-02    PT/INR - ( 02 Sep 2021 18:07 )   PT: 12.5 sec;   INR: 1.04 ratio         PTT - ( 02 Sep 2021 18:07 )  PTT:30.9 sec

## 2021-09-04 NOTE — PROGRESS NOTE ADULT - PROBLEM SELECTOR PLAN 3
-Improved from CKD4 from Feb 2021, will cautiously continue the ARB, avoid further nephrotoxins  -Renally dose medications

## 2021-09-04 NOTE — DISCHARGE NOTE PROVIDER - NSDCCPCAREPLAN_GEN_ALL_CORE_FT
PRINCIPAL DISCHARGE DIAGNOSIS  Diagnosis: Shortness of breath  Assessment and Plan of Treatment:   CXRwith  Bilateral pleural effusions, right greater than left  CT Chest:  Status post right lower lobectomy with no gross change along the suture line, limited evaluation without IV contrast, Attention on follow-up.  Redemonstrated left lower lobe mixed solid and cystic nodular opacity which is increased in size and with increased density with compared to prior CT chest 12/21/2020. Finding is consistent with a second primary malignancy.  Slightly increased right loculated pleural effusion and new small left pleural effusion with interlobular septal thickening, findings consistent with pulmonary edema.  Centrilobular emphysema.  CTS consulted to discuss pleural effusion, no drainage at this time. This was discussed with medicine attending.   You are tolerating room air at this time adn ok for discharge home.      SECONDARY DISCHARGE DIAGNOSES  Diagnosis: Lung cancer  Assessment and Plan of Treatment: Follow up with outpt oncologist/hematology    Diagnosis: Type 2 diabetes mellitus  Assessment and Plan of Treatment: A1C 9.7  - endocrine saw you can be discharged to home from endocrine standpoint as well  - resume Dexcom CGM use, applied to abdomen  - resume Omnipod insulin pump, basal rate 0.4u/hr to start at 10pm tonight, currently on 0% temp basal   - resume pre-meal insulin with correction via omnipod insulin pump with same settings. Advised patient to notify RN once bolus entered  - endocrine discussed with you that if FS<80 is too low, you have glucose tablets in his bag, advised to take 3 tabs and recheck Fasting sugar in 15 minutes  - patient advised to call endocrinologist this weekend at [phone] incase of any hypoglycemia or hyperglycemia issues since I am on-call  - RN/CDE from office to call you next week to touch base post-hosp care  - consistent carb diet  - premeal glucose goal <140, random glucose goal <180  Insulin pump in place  You will be discharge with   - Lantus Solostar Pen (or Basaglar Kwikpen): Inject 12 units sc in case of pump failure, dispense 5 pens/1box (3 refills)   - Novolog Flexpen (or Humalog Kwikpen): Inject 6-12 units sc before meals in case of pump failure, dispense 5 pens/1box (3 refills)  - BD vincenzo 4mm pen needles: dispense #100, use as directed 3x/day (3 refills)  - Alcohol pads: dispense #100, use as directed 4x/day (3 refills)  - follow up with endocrinologist, Dr. Estrada on 11/22/21 at 10:30 am at [address] [facility], [facility] [address], [place] [address]  insulin pump in place  will send home with insulin pens incase of pump dysfunction  - will need to follow up with endocrine- sees Dr. Estrada     PRINCIPAL DISCHARGE DIAGNOSIS  Diagnosis: Shortness of breath  Assessment and Plan of Treatment: CXRwith  Bilateral pleural effusions, right greater than left  CT Chest:  Status post right lower lobectomy with no gross change along the suture line, limited evaluation without IV contrast, Attention on follow-up.  Redemonstrated left lower lobe mixed solid and cystic nodular opacity which is increased in size and with increased density with compared to prior CT chest 12/21/2020. Finding is consistent with a second primary malignancy.  Slightly increased right loculated pleural effusion and new small left pleural effusion with interlobular septal thickening, findings consistent with pulmonary edema.  Centrilobular emphysema.  CTS consulted to discuss pleural effusion, no drainage at this time. This was discussed with medicine attending.   You are tolerating room air at this time adn ok for discharge home.  Follow up with CT surgery Dr. Lucia and your heme.Onc, Dr. Baldwin      SECONDARY DISCHARGE DIAGNOSES  Diagnosis: Lung cancer  Assessment and Plan of Treatment: Follow up with outpt oncologist/hematology    Diagnosis: Type 2 diabetes mellitus  Assessment and Plan of Treatment: A1C 9.7  - endocrine saw you can be discharged to home from endocrine standpoint as well  - resume Dexcom CGM use, applied to abdomen  - resume Omnipod insulin pump, basal rate 0.4u/hr to start at 10pm tonight, currently on 0% temp basal   - resume pre-meal insulin with correction via omnipod insulin pump with same settings. Advised patient to notify RN once bolus entered  - endocrine discussed with you that if FS<80 is too low, you have glucose tablets in his bag, advised to take 3 tabs and recheck Fasting sugar in 15 minutes  - patient advised to call endocrinologist this weekend at [phone] incase of any hypoglycemia or hyperglycemia issues since I am on-call  - RN/CDE from office to call you next week to touch base post-hosp care  - consistent carb diet  - premeal glucose goal <140, random glucose goal <180  Insulin pump in place  You will be discharge with   - Lantus Solostar Pen (or Basaglar Kwikpen): Inject 12 units sc in case of pump failure, dispense 5 pens/1box (3 refills)   - Novolog Flexpen (or Humalog Kwikpen): Inject 6-12 units sc before meals in case of pump failure, dispense 5 pens/1box (3 refills)  - BD vincenzo 4mm pen needles: dispense #100, use as directed 3x/day (3 refills)  - Alcohol pads: dispense #100, use as directed 4x/day (3 refills)  - follow up with endocrinologist, Dr. Estrada on 11/22/21 at 10:30 am at [address] [facility], [facility] [address], [place] [address]  insulin pump in place  will send home with insulin pens incase of pump dysfunction  - will need to follow up with endocrine- sees Dr. Estrada

## 2021-09-04 NOTE — DISCHARGE NOTE PROVIDER - PROVIDER TOKENS
PROVIDER:[TOKEN:[[id number]],FOLLOWUP:[1 week]],PROVIDER:[TOKEN:[[id number]],FOLLOWUP:[1 week]] PROVIDER:[TOKEN:[[id number]],FOLLOWUP:[1 week]],PROVIDER:[TOKEN:[[id number]],FOLLOWUP:[1 week]],PROVIDER:[TOKEN:[[id number]],FOLLOWUP:[1 week]],PROVIDER:[TOKEN:[[id number]],FOLLOWUP:[1 week]]

## 2021-09-04 NOTE — PROGRESS NOTE ADULT - SUBJECTIVE AND OBJECTIVE BOX
Chronic Cough with Uncertain Cause (Adult)    Everyone has had a cough as part of the common cold, flu, or bronchitis. This kind of cough occurs along with an achy feeling, low-grade fever, nasal and sinus congestion, and a scratchy or sore throat. This usually gets better in 2 to 3 weeks. A cough that lasts longer than 3 weeks may be due to other causes.  If your cough does not improve over the next 2 weeks, further testing may be needed. Follow up with your healthcare provider as advised. Cough suppressants may be recommended. Based on your exam today, the exact cause of your cough is not certain. Below are some common causes for persistent cough.  Smoker's cough  Smoker s cough doesn t go away. If you continue to smoke, it only gets worse. The cough is from irritation in the air passages. Talk to your healthcare provider about quitting. Medicines or nicotine-replacement products, like gum or the patch, may make quitting easier.  Postnasal drip  A cough that is worse at night may be due to postnasal drip. Excess mucus in the nose drains from the back of your nose to your throat. This triggers the cough reflex. Postnasal drip may be due to a sinus infection or allergy. Common allergens include dust, tobacco smoke (both inhaled and secondhand smoke), environmental pollutants, pollen, mold, pets, cleaning agents, room deodorizers, and chemical fumes. Over-the-counter antihistamines or decongestants may be helpful for allergies. A sinus infection may requires antibiotic treatment. See your healthcare provider if symptoms continue.  Medicines  Certain prescribed medicines can cause a chronic cough in some people:    ACE inhibitors for high blood pressure. These include benazepril, captopril, enalapril, fosinopril, lisinopril, quinapril, ramipril, and others.    Beta-blockers for high blood pressure and other conditions. These include propranolol, atenolol, metoprolol, nadolol, and others.  Let your healthcare      Chief Complaint/Follow-up on: T2DM  65 yo M with PMH of HTN,, CAD, type 2DM, chronic pancreatitis now insulin dependent on omnipod pump with dexcom CGM, hx of lung adenocarcinoma s/p right lobectomy who presented with SOB.    Subjective: Methylprednisone down to 4 mg QD from BID, chronic medication for him. Patient only received 7 units last night and  this morning. Prior to that, he had received 10 units on 09/03 12am    PUMP SETTINGS:  Model: omnipod (humalog) + dexcom cgm  Basal Rates:  12AM: 0.5  Total basal: 12 units  I:C:  12AM 1:5  ISF:  12AM: 40  Target     MEDICATIONS  (STANDING):  albuterol/ipratropium for Nebulization 3 milliLiter(s) Nebulizer every 6 hours  aspirin enteric coated 81 milliGRAM(s) Oral <User Schedule>  atorvastatin 80 milliGRAM(s) Oral at bedtime  cholecalciferol 2000 Unit(s) Oral daily  clopidogrel Tablet 75 milliGRAM(s) Oral daily  dextrose 40% Gel 15 Gram(s) Oral once  dextrose 5%. 1000 milliLiter(s) (50 mL/Hr) IV Continuous <Continuous>  dextrose 5%. 1000 milliLiter(s) (100 mL/Hr) IV Continuous <Continuous>  dextrose 50% Injectable 25 Gram(s) IV Push once  dextrose 50% Injectable 12.5 Gram(s) IV Push once  dextrose 50% Injectable 25 Gram(s) IV Push once  furosemide    Tablet 80 milliGRAM(s) Oral two times a day  glucagon  Injectable 1 milliGRAM(s) IntraMuscular once  heparin   Injectable 5000 Unit(s) SubCutaneous every 8 hours  influenza   Vaccine 0.5 milliLiter(s) IntraMuscular once  insulin glargine Injectable (LANTUS) 14 Unit(s) SubCutaneous at bedtime  insulin lispro (ADMELOG) corrective regimen sliding scale   SubCutaneous at bedtime  insulin lispro (ADMELOG) corrective regimen sliding scale   SubCutaneous three times a day before meals  insulin lispro Injectable (ADMELOG) 6 Unit(s) SubCutaneous three times a day before meals  methylPREDNISolone 4 milliGRAM(s) Oral daily  metoprolol succinate ER 50 milliGRAM(s) Oral daily  multivitamin 1 Tablet(s) Oral daily  pantoprazole    Tablet 40 milliGRAM(s) Oral before breakfast  sodium bicarbonate 1300 milliGRAM(s) Oral three times a day  valsartan 40 milliGRAM(s) Oral daily    MEDICATIONS  (PRN):  acetaminophen   Tablet .. 650 milliGRAM(s) Oral every 6 hours PRN Temp greater or equal to 38C (100.4F), Mild Pain (1 - 3)      PHYSICAL EXAM:  VITALS: T(C): 36.9 (09-04-21 @ 08:50)  T(F): 98.4 (09-04-21 @ 08:50), Max: 98.8 (09-03-21 @ 11:39)  HR: 79 (09-04-21 @ 08:50) (79 - 92)  BP: 146/67 (09-04-21 @ 08:50) (110/69 - 162/81)  RR:  (18 - 19)  SpO2:  (93% - 98%)  Wt(kg): 68 kg    GENERAL: NAD, well-groomed, well-developed  EYES: No proptosis, no injection  HEENT:  Atraumatic, Normocephalic, moist mucous membranes  THYROID: Normal size, no palpable nodules  RESPIRATORY: Clear to auscultation bilaterally; No rales, rhonchi, wheezing, or rubs  CARDIOVASCULAR: Regular rate and rhythm; No murmurs; no peripheral edema  GI: Soft, nontender, non distended, normal bowel sounds  CUSHING'S SIGNS: no striae    POCT Blood Glucose.: 105 mg/dL (09-04-21 @ 07:15)  POCT Blood Glucose.: 120 mg/dL (09-04-21 @ 05:43)  POCT Blood Glucose.: 150 mg/dL (09-04-21 @ 00:45)    POCT Blood Glucose.: 93 mg/dL (09-03-21 @ 23:40)  POCT Blood Glucose.: 137 mg/dL (09-03-21 @ 17:55)  POCT Blood Glucose.: 231 mg/dL (09-03-21 @ 12:56)  POCT Blood Glucose.: 256 mg/dL (09-03-21 @ 08:59) A6  POCT Blood Glucose.: 369 mg/dL (09-03-21 @ 01:57)  POCT Blood Glucose.: 377 mg/dL (09-03-21 @ 00:33)    09-03    135  |  100  |  30<H>  ----------------------------<  309<H>  4.0   |  22  |  1.98<H>    EGFR if : 39<L>  EGFR if non : 34<L>    Ca    9.0      09-03  Mg     1.7     09-03    TPro  6.9  /  Alb  3.2<L>  /  TBili  0.5  /  DBili  x   /  AST  32  /  ALT  19  /  AlkPhos  122<H>  09-02  a1A1C with Estimated Average Glucose (09.04.21 @ 09:13)   A1C with Estimated Average Glucose Result: 9.7%       provider know if you are taking any of these.  Asthma  Cough may be the only sign of mild asthma. You may have tests to find out if asthma is causing your cough. You may also take asthma medicine on a trial basis.  Acid reflux (heartburn, GERD)  The esophagus is the tube that carries food from the mouth to the stomach. A valve at its lower end prevents stomach acids from flowing upward. If this valve does not work properly, acid from the stomach enters the esophagus. This may cause a burning pain in the upper abdomen or lower chest, belching, or cough. Symptoms are often worse when lying flat. Avoid eating or drinking before bedtime. Try using extra pillows to raise your upper body, or place 4-inch blocks under the head of your bed. You may try an over-the-counter antacid or an acid-blocking medicine such as famotidine, cimetidine, ranitidine, esomeprazole, lansoprazole, or omeprazole. Stronger medicines for this condition can be prescribed by your healthcare provider.  Follow-up care  Follow up with your healthcare provider, or as advised, if your cough does not improve. Further testing may be needed.  Note: If an X-ray was taken, a specialist will review it. You will be notified of any new findings that may affect your care.  When to seek medical advice  Call your healthcare provider right away if any of these occur:    Mild wheezing or difficulty breathing    Fever of 100.4 F (38 C) or higher, or as directed by your healthcare provider    Unexpected weight loss    Coughing up large amounts of colored sputum    Night sweats (sheets and pajamas get soaking wet)  Call 911  Call 911 if any of these occur:    Coughing up blood    Moderate to severe trouble breathing or wheezing  Date Last Reviewed: 9/13/2015 2000-2017 [employer]. [address] [place] [address], [place] [address]. All rights reserved. This information is not intended as a substitute for professional medical care. Always follow your healthcare  professional's instructions.             Chief Complaint/Follow-up on: T2DM  67 yo M with PMH of HTN,, CAD, type 2DM, chronic pancreatitis now insulin dependent on omnipod pump with dexcom CGM, hx of lung adenocarcinoma s/p right lobectomy who presented with SOB.    Subjective: Methylprednisone down to 4 mg QD from BID, chronic medication for him. Patient only received 7 units last night and  this morning. Prior to that, he had received 10 units on 09/03 12am. Spoke to patient, he feels well., ate breakfast and did not get bolus insulin, will correct before his lunch meal. Inserted Dexcom CGM at this time on his LLQ and inserted Omnipod insulin pump on LLQ of abdomen.     PUMP SETTINGS: Basal #2 (Basal #1 is 12 units TDD as prior to admission)  Model: omnipod (humalog) + dexcom cgm  Basal Rates:  12AM: 0.4  Total basal: 9.6  I:C:  12AM 1:5  ISF:  12AM: 40  Target     MEDICATIONS  (STANDING):  albuterol/ipratropium for Nebulization 3 milliLiter(s) Nebulizer every 6 hours  aspirin enteric coated 81 milliGRAM(s) Oral <User Schedule>  atorvastatin 80 milliGRAM(s) Oral at bedtime  cholecalciferol 2000 Unit(s) Oral daily  clopidogrel Tablet 75 milliGRAM(s) Oral daily  dextrose 40% Gel 15 Gram(s) Oral once  dextrose 5%. 1000 milliLiter(s) (50 mL/Hr) IV Continuous <Continuous>  dextrose 5%. 1000 milliLiter(s) (100 mL/Hr) IV Continuous <Continuous>  dextrose 50% Injectable 25 Gram(s) IV Push once  dextrose 50% Injectable 12.5 Gram(s) IV Push once  dextrose 50% Injectable 25 Gram(s) IV Push once  furosemide    Tablet 80 milliGRAM(s) Oral two times a day  glucagon  Injectable 1 milliGRAM(s) IntraMuscular once  heparin   Injectable 5000 Unit(s) SubCutaneous every 8 hours  influenza   Vaccine 0.5 milliLiter(s) IntraMuscular once  insulin glargine Injectable (LANTUS) 14 Unit(s) SubCutaneous at bedtime  insulin lispro (ADMELOG) corrective regimen sliding scale   SubCutaneous at bedtime  insulin lispro (ADMELOG) corrective regimen sliding scale   SubCutaneous three times a day before meals  insulin lispro Injectable (ADMELOG) 6 Unit(s) SubCutaneous three times a day before meals  methylPREDNISolone 4 milliGRAM(s) Oral daily  metoprolol succinate ER 50 milliGRAM(s) Oral daily  multivitamin 1 Tablet(s) Oral daily  pantoprazole    Tablet 40 milliGRAM(s) Oral before breakfast  sodium bicarbonate 1300 milliGRAM(s) Oral three times a day  valsartan 40 milliGRAM(s) Oral daily    MEDICATIONS  (PRN):  acetaminophen   Tablet .. 650 milliGRAM(s) Oral every 6 hours PRN Temp greater or equal to 38C (100.4F), Mild Pain (1 - 3)      PHYSICAL EXAM:  VITALS: T(C): 36.9 (09-04-21 @ 08:50)  T(F): 98.4 (09-04-21 @ 08:50), Max: 98.8 (09-03-21 @ 11:39)  HR: 79 (09-04-21 @ 08:50) (79 - 92)  BP: 146/67 (09-04-21 @ 08:50) (110/69 - 162/81)  RR:  (18 - 19)  SpO2:  (93% - 98%)  Wt(kg): 68 kg    GENERAL: NAD, well-groomed, well-developed  EYES: No proptosis, no injection  HEENT:  Atraumatic, Normocephalic, moist mucous membranes  THYROID: Normal size, no palpable nodules  RESPIRATORY: Clear to auscultation bilaterally; No rales, rhonchi, wheezing, or rubs  CARDIOVASCULAR: Regular rate and rhythm; No murmurs; no peripheral edema  GI: Soft, nontender, non distended, normal bowel sounds. Omnipod on LLQ and Dexcom on LLQ  CUSHING'S SIGNS: no striae    POCT Blood Glucose.: 105 mg/dL (09-04-21 @ 07:15)  POCT Blood Glucose.: 120 mg/dL (09-04-21 @ 05:43)  POCT Blood Glucose.: 150 mg/dL (09-04-21 @ 00:45)    POCT Blood Glucose.: 93 mg/dL (09-03-21 @ 23:40)  POCT Blood Glucose.: 137 mg/dL (09-03-21 @ 17:55)  POCT Blood Glucose.: 231 mg/dL (09-03-21 @ 12:56)  POCT Blood Glucose.: 256 mg/dL (09-03-21 @ 08:59) A6  POCT Blood Glucose.: 369 mg/dL (09-03-21 @ 01:57)  POCT Blood Glucose.: 377 mg/dL (09-03-21 @ 00:33)    09-03    135  |  100  |  30<H>  ----------------------------<  309<H>  4.0   |  22  |  1.98<H>    EGFR if : 39<L>  EGFR if non : 34<L>    Ca    9.0      09-03  Mg     1.7     09-03    TPro  6.9  /  Alb  3.2<L>  /  TBili  0.5  /  DBili  x   /  AST  32  /  ALT  19  /  AlkPhos  122<H>  09-02  a1A1C with Estimated Average Glucose (09.04.21 @ 09:13)   A1C with Estimated Average Glucose Result: 9.7%

## 2021-09-04 NOTE — DISCHARGE NOTE PROVIDER - NSDCFUSCHEDAPPT_GEN_ALL_CORE_FT
NORM DEAN ; 09/08/2021 ; [facility] Rad Med [address] NORM Mae ; 11/08/2021 ; [facility] Nephro [address] NORM Villavicencio ; 11/10/2021 ; [facility] [address] [facility] ; 11/22/2021 ; [facility] Endocr [address] [facility] NORM DEAN ; 09/13/2021 ; [facility] GenInt [address] NORM Esquivel ; 09/13/2021 ; [facility] [id number] [facility] ; 11/08/2021 ; [facility] Nephro [address] NORM Villavicencio ; 11/10/2021 ; [facility] Med GenInt [address] NORM Esquivel ; 11/22/2021 ; [facility] Endocr [address] [place] [address]

## 2021-09-04 NOTE — PROGRESS NOTE ADULT - NSPROGADDITIONALINFOA_GEN_ALL_CORE
Discussed with patient, 6 [place] ACP. Discussed with patient, 6 [place] ACP, thoracic surgery-total time spent discharge planning 45 minutes.

## 2021-09-04 NOTE — PROGRESS NOTE ADULT - PROBLEM SELECTOR PLAN 1
-CT chest with Slightly increased right loculated pleural effusion and new small left pleural effusion  -Thoracic surgery consulted, follow up recs

## 2021-09-04 NOTE — DISCHARGE NOTE NURSING/CASE MANAGEMENT/SOCIAL WORK - NSDCVIVACCINE_GEN_ALL_CORE_FT
Tdap; 20-Dec-2020 14:20; Colletta, Morgan (GENIE); [employer]; [id number] (Exp. Date: 21-Jul-2022); IntraMuscular; Deltoid Left.; 0.5 milliLiter(s); VIS (VIS Published: 09-May-2013, VIS Presented: 20-Dec-2020);

## 2021-09-04 NOTE — DISCHARGE NOTE PROVIDER - NSFOLLOWUPCLINICS_GEN_ALL_ED_FT
[facility] Endocrinology  Endocrinology  [address] [place] [address]  Phone: [phone]  Fax:   Follow Up Time: 1 week

## 2021-09-04 NOTE — PROGRESS NOTE ADULT - ASSESSMENT
A/P: 67 yo M with PMH of HTN,, CAD, type 2DM, chronic pancreatitis now insulin dependent on omnipod pump with dexcom CGM, hx of lung adenocarcinoma s/p right lobectomy who presented with SOB. Endocrine consulted for hyperglycemia.    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 Treat like T1DM. No evidence of DKA. On solumedrol at home and here 4 mg QD.  - patient was given Lantus 7 units last night and plan for discharge to home today, can be discharged to home from endocrine standpoint as well  - resume Dexcom CGM use, applied to LLQ abdomen  - resume Omnipod insulin pump, basal rate 0.4u/hr to start at 10pm tonight, currently on 0% temp basal   - resume pre-meal insulin with correction via omnipod insulin pump with same settings. Advised patient to notify RN once bolus entered  - we discussed FS<80 is too low, he has glucose tablets in his bag, advised to take 3 tabs and recheck FS in 15 minutes  - patient advised to call me this weekend at [phone] incase of any hypoglycemia or hyperglycemia issues since I am on-call  - RN/CDE from office to call him next week to touch base post-hosp care  - Patient to follow up with endocrinologist, Dr. Estrada on 11/22/21 at 10:30 am at [address] [facility], [place] [address]  - consistent carb diet  - premeal glucose goal <140, random glucose goal <180  - do NOT hold basal insulin even if NPO as patient could be at risk for DKA    DISCHARGE RX:  - Lantus Solostar Pen (or Basaglar Kwikpen): Inject 12 units sc in case of pump failure, dispense 5 pens/1box (3 refills)   - Novolog Flexpen (or Humalog Kwikpen): Inject 6-12 units sc before meals in case of pump failure, dispense 5 pens/1box (3 refills)  - BD vincenzo 4mm pen needles: dispense #100, use as directed 3x/day (3 refills)  - Alcohol pads: dispense #100, use as directed 4x/day (3 refills)    # Hypertension  - goal BP <130/80, BP is high today  - management per primary team    #Hyperlipidemia  - continue atorvastatin 80 mg  - F/u LDL outpatient    Discussed with primary team  Insulin orders placed    *Please note a different provider maybe managing this patient tomorrow/daily*. Please contact our office at [phone] regarding follow-up queries about this patient. For any endocrine emergencies, please state urgency when calling [phone] during business hours and to speak with on-call fellow after 5pm and on weekends.    Cornel De La O DO  Pager: 9AM - 5PM (Mon-Fri): [phone]  After 5PM and on Weekends: [phone]         
66 year old male with PMH HTN, CAD with MI, CKD 3, chronic pancreatitis, DM, s/p right lower lobectomy presenting with dyspnea.

## 2021-09-04 NOTE — DISCHARGE NOTE PROVIDER - HOSPITAL COURSE
HPI:  67 y/o M--followed by his office physicians above--patient with a 50 pack/year still current cigarette smoker (refuses to quit and refuses pharmacologic options), essential HTN on Toprol XL, Lasix and an ARB added due to still persistent elevated BP, CAD with MI in 2002 with nonobstructive CAD with cardiac cath in Jan 2021, chronic kidney disease stage 3 (improved from stage 4 apparently from patient's ARB), chronic pancreatitis reportedly from autoimmune but patient with resolved past ethanol abuse (quit 11 years ago), with apparent type 2 DM but with the use of an insulin pump (patient has self discontinued his Omnipod pump and DEXCOM due to cost issues by patient and with frequent need to remove pump with imaging studies required for his lung CA), S/P RIGHT lobe lobectomy for lung mass with adenocarcinoma, with patient apparently with a presumed mechanical trip over his shoes while at an imaging office this past Friday with patient sustaining right anterior foot pain and RIGHT leg pain, with patient apparently refusing referral to the ER, with patient then with progressive and persistent dyspnoea with activity since Saturday.    Patient with dry cough but denies fever, chills, rigors.  No chest pain/pressure.  NO palpitations.  NO N/V.  No radiation of symptoms.  Patient received his COVID-19 vaccine series this spring 2021.      Patient reports he is undergoing RT for the LEFT lung.    Patient is also S/P RIGHT thoracentesis on 8/18/21.   Noted improvement of dyspnoea during therapeutic thoracentesis at the time.    Patient self discontinued his Omnipod insulin pump earlier in the week as noted above.    Hospital Course:  #SOB  - cxr AND Ct chest done:  CXR:  Right lower lung zone opacity which may represent atelectasis or pneumonia in the proper clinical setting.    Bilateral pleural effusions, right greater than left    CT Chest:  Status post right lower lobectomy with no gross change along the suture line, limited evaluation without IV contrast, Attention on follow-up.    Redemonstrated left lower lobe mixed solid and cystic nodular opacity which is increased in size and with increased density with compared to prior CT chest 12/21/2020. Finding is consistent with a second primary malignancy.    Slightly increased right loculated pleural effusion and new small left pleural effusion with interlobular septal thickening, findings consistent with pulmonary edema.    Centrilobular emphysema.    CTS consulted to discuss pleural effusion,? drainage  Pt is tolerating RA at this time97%    # DM  A1C 9.7  D/c planning:  - pt to resume pump. Based on timing of discharge and last basal insulin shot, may need to instruct temp basal rate which pt is comfortable with  - pt should be prescribed back up insulin pens in case of pump failure or other issues (frequent imaging as in his case): lantus and admelog    DISCHARGE RX:  - Lantus Solostar Pen (or Basaglar Kwikpen): Inject 12 units sc in case of pump failure, dispense 5 pens (3 refills)   - Novolog Flexpen (or Humalog Kwikpen): Inject 6-12 units sc before meals in case of pump failure, dispense 5 pens (3 refills)  - BD vincenzo 4mm pen needles: dispense #100, use as directed (3 refills)  - Alcohol pads: dispense #100, use as directed (3 refills)    - will need to follow up with endocrine- sees Dr. Estrada    # Hypertension  - goal BP <130/80    #Hyperlipidemia  - continue atorvastatin 80 mg     HPI:  65 y/o M--followed by his office physicians above--patient with a 50 pack/year still current cigarette smoker (refuses to quit and refuses pharmacologic options), essential HTN on Toprol XL, Lasix and an ARB added due to still persistent elevated BP, CAD with MI in 2002 with nonobstructive CAD with cardiac cath in Jan 2021, chronic kidney disease stage 3 (improved from stage 4 apparently from patient's ARB), chronic pancreatitis reportedly from autoimmune but patient with resolved past ethanol abuse (quit 11 years ago), with apparent type 2 DM but with the use of an insulin pump (patient has self discontinued his Omnipod pump and DEXCOM due to cost issues by patient and with frequent need to remove pump with imaging studies required for his lung CA), S/P RIGHT lobe lobectomy for lung mass with adenocarcinoma, with patient apparently with a presumed mechanical trip over his shoes while at an imaging office this past Friday with patient sustaining right anterior foot pain and RIGHT leg pain, with patient apparently refusing referral to the ER, with patient then with progressive and persistent dyspnoea with activity since Saturday.    Patient with dry cough but denies fever, chills, rigors.  No chest pain/pressure.  NO palpitations.  NO N/V.  No radiation of symptoms.  Patient received his COVID-19 vaccine series this spring 2021.      Patient reports he is undergoing RT for the LEFT lung.    Patient is also S/P RIGHT thoracentesis on 8/18/21.   Noted improvement of dyspnoea during therapeutic thoracentesis at the time.    Patient self discontinued his Omnipod insulin pump earlier in the week as noted above.    Hospital Course:  #SOB  - cxr AND Ct chest done:  CXR:  Right lower lung zone opacity which may represent atelectasis or pneumonia in the proper clinical setting.    Bilateral pleural effusions, right greater than left    CT Chest:  Status post right lower lobectomy with no gross change along the suture line, limited evaluation without IV contrast, Attention on follow-up.    Redemonstrated left lower lobe mixed solid and cystic nodular opacity which is increased in size and with increased density with compared to prior CT chest 12/21/2020. Finding is consistent with a second primary malignancy.    Slightly increased right loculated pleural effusion and new small left pleural effusion with interlobular septal thickening, findings consistent with pulmonary edema.    Centrilobular emphysema.    CTS consulted to discuss pleural effusion,? 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 Treat like T1DM. No evidence of DKA. On solumedrol at home and here 4 mg QD.  - patient was given Lantus 7 units last night and plan for discharge to home today, can be discharged to home from endocrine standpoint as well  - resume Dexcom CGM use, applied to LLQ abdomen  - resume Omnipod insulin pump, basal rate 0.4u/hr to start at 10pm tonight, currently on 0% temp basal   - resume pre-meal insulin with correction via omnipod insulin pump with same settings. Advised patient to notify RN once bolus entered  - endocrine following discussed FS<80 is too low, he has glucose tablets in his bag, advised to take 3 tabs and recheck FS in 15 minutes  - patient advised to call me this weekend at [phone] incase of any hypoglycemia or hyperglycemia issues since I am on-call  - RN/CDE from office to call him next week to touch base post-hosp care  - Patient to follow up with endocrinologist, Dr. Estrada on 11/22/21 at 10:30 am at [address] [facility], [place] [address]  - consistent carb diet  - premeal glucose goal <140, random glucose goal <180  - do NOT hold basal insulin even if NPO as patient could be at risk for DKA    DISCHARGE RX:  - Lantus Solostar Pen (or Basaglar Kwikpen): Inject 12 units sc in case of pump failure, dispense 5 pens/1box (3 refills)   - Novolog Flexpen (or Humalog Kwikpen): Inject 6-12 units sc before meals in case of pump failure, dispense 5 pens/1box (3 refills)  - BD vincenzo 4mm pen needles: dispense #100, use as directed 3x/day (3 refills)  - Alcohol pads: dispense #100, use as directed 4x/day (3 refills)      A1C 9.7  insulin pump in place  will send home with insulin pens incase of pump dysfunction    - will need to follow up with endocrine- sees Dr. Estrada    # Hypertension  - goal BP <130/80    #Hyperlipidemia  - continue atorvastatin 80 mg     HPI:  65 y/o M--followed by his office physicians above--patient with a 50 pack/year still current cigarette smoker (refuses to quit and refuses pharmacologic options), essential HTN on Toprol XL, Lasix and an ARB added due to still persistent elevated BP, CAD with MI in 2002 with nonobstructive CAD with cardiac cath in Jan 2021, chronic kidney disease stage 3 (improved from stage 4 apparently from patient's ARB), chronic pancreatitis reportedly from autoimmune but patient with resolved past ethanol abuse (quit 11 years ago), with apparent type 2 DM but with the use of an insulin pump (patient has self discontinued his Omnipod pump and DEXCOM due to cost issues by patient and with frequent need to remove pump with imaging studies required for his lung CA), S/P RIGHT lobe lobectomy for lung mass with adenocarcinoma, with patient apparently with a presumed mechanical trip over his shoes while at an imaging office this past Friday with patient sustaining right anterior foot pain and RIGHT leg pain, with patient apparently refusing referral to the ER, with patient then with progressive and persistent dyspnoea with activity since Saturday.    Patient with dry cough but denies fever, chills, rigors.  No chest pain/pressure.  NO palpitations.  NO N/V.  No radiation of symptoms.  Patient received his COVID-19 vaccine series this spring 2021.      Patient reports he is undergoing RT for the LEFT lung.    Patient is also S/P RIGHT thoracentesis on 8/18/21.   Noted improvement of dyspnoea during therapeutic thoracentesis at the time.    Patient self discontinued his Omnipod insulin pump earlier in the week as noted above.    Hospital Course:  #SOB  - cxr AND Ct chest done:  CXR:  Right lower lung zone opacity which may represent atelectasis or pneumonia in the proper clinical setting.    Bilateral pleural effusions, right greater than left    CT Chest:  Status post right lower lobectomy with no gross change along the suture line, limited evaluation without IV contrast, Attention on follow-up.    Redemonstrated left lower lobe mixed solid and cystic nodular opacity which is increased in size and with increased density with compared to prior CT chest 12/21/2020. Finding is consistent with a second primary malignancy.    Slightly increased right loculated pleural effusion and new small left pleural effusion with interlobular septal thickening, findings consistent with pulmonary edema.    Centrilobular emphysema.    CTS consulted to discuss pleural effusion, no drainage at this time. This was discussed with medicine attending.   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 Treat like T1DM. No evidence of DKA. On solumedrol at home and here 4 mg QD.  - patient was given Lantus 7 units last night and plan for discharge to home today, can be discharged to home from endocrine standpoint as well  - resume Dexcom CGM use, applied to LLQ abdomen  - resume Omnipod insulin pump, basal rate 0.4u/hr to start at 10pm tonight, currently on 0% temp basal   - resume pre-meal insulin with correction via omnipod insulin pump with same settings. Advised patient to notify RN once bolus entered  - endocrine following discussed FS<80 is too low, he has glucose tablets in his bag, advised to take 3 tabs and recheck FS in 15 minutes  - patient advised to call me this weekend at [phone] incase of any hypoglycemia or hyperglycemia issues since I am on-call  - RN/CDE from office to call him next week to touch base post-hosp care  - Patient to follow up with endocrinologist, Dr. Estrada on 11/22/21 at 10:30 am at [address] [facility], [facility] [address], [place] [address]  - consistent carb diet  - premeal glucose goal <140, random glucose goal <180  - do NOT hold basal insulin even if NPO as patient could be at risk for DKA    DISCHARGE RX:  - Lantus Solostar Pen (or Basaglar Kwikpen): Inject 12 units sc in case of pump failure, dispense 5 pens/1box (3 refills)   - Novolog Flexpen (or Humalog Kwikpen): Inject 6-12 units sc before meals in case of pump failure, dispense 5 pens/1box (3 refills)  - BD vincenzo 4mm pen needles: dispense #100, use as directed 3x/day (3 refills)  - Alcohol pads: dispense #100, use as directed 4x/day (3 refills)    A1C 9.7  insulin pump in place  will send home with insulin pens incase of pump dysfunction    - will need to follow up with endocrine- sees Dr. Estrada    # Hypertension  - goal BP <130/80    #Hyperlipidemia  - continue atorvastatin 80 mg    Pt HDS and safe for discharge. Discussed with medicine attending. Med rec to be reviewed prior to arrival.

## 2021-09-04 NOTE — DISCHARGE NOTE PROVIDER - CARE PROVIDERS DIRECT ADDRESSES
,[email or web address]

## 2021-09-04 NOTE — DISCHARGE NOTE NURSING/CASE MANAGEMENT/SOCIAL WORK - NSDCFUADDAPPT_GEN_ALL_CORE_FT
Follow up with your outpt hem/oncologist  Follow up with endocrine:Dr. Estrada on 11/22/21 at 10:30 am at [address] [facility], [facility] [address], [place] [address]    APPTS ARE READY TO BE MADE: [x ] YES    Best Family or Patient Contact (if needed):    Additional Information about above appointments (if needed):    1:   2:   3:     Other comments or requests:

## 2021-09-04 NOTE — DISCHARGE NOTE NURSING/CASE MANAGEMENT/SOCIAL WORK - PATIENT PORTAL LINK FT
You can access the FollowMyHealth Patient Portal offered by [facility] by registering at the following website: [email or web address]. By joining [facility]’s FollowMyHealth portal, you will also be able to view your health information using other applications (apps) compatible with our system.

## 2021-09-04 NOTE — DISCHARGE NOTE PROVIDER - CARE PROVIDER_API CALL
Cr Kidd)  Internal Medicine  [address] [facility], [facility] [address]  [place] [address]  Phone: [phone]  Fax: [phone]  Follow Up Time: 1 week    Brenda Estrada)  EndocrinologyMetabDiabetes  [address] [facility], [facility] [address]  [place] [address]  Phone: [phone]  Fax: [phone]  Follow Up Time: 1 week   Cr Kidd)  Internal Medicine  [address] [facility], [address]  [place] [address]  Phone: [phone]  Fax: [phone]  Follow Up Time: 1 week    Brenda Estrada)  EndocrinologyMetabDiabetes  [address] [facility], [address]  [place] [address]  Phone: [phone]  Fax: [phone]  Follow Up Time: 1 week    Maurisio Baldwin)  Hematology; Medical Oncology  [address] [place] [address]  Phone: [phone]  Fax: [phone]  Follow Up Time: 1 week    Maurisio Lucia)  Surgery; Thoracic Surgery  [address] [place] [address], [place] [address]  Phone: [phone]  Fax: [phone]  Follow Up Time: 1 week

## 2021-09-04 NOTE — DISCHARGE NOTE PROVIDER - NSDCMRMEDTOKEN_GEN_ALL_CORE_FT
Aspir 81 oral delayed release tablet: one tab orally on monday and friday  calcium citrate: 800mg one tab orally in AM  Cosentyx: 200mg  subcutaneous once a month  furosemide: 80mg tab one tab orally twice a day  HumaLOG: VIA OMNIPOD--PATIENT SELF DISCONTINUED PUMP  HumaLOG 100 units/mL subcutaneous solution: via pump  marijuana medical:  as needed  Medrol 4 mg oral tablet: one tab orally in AM  metoprolol succinate 50 mg oral tablet, extended release: 1 tab(s) orally once a day, in AM  Multiple Vitamins oral tablet: 1 tab(s) orally once a day, l  last dose on 02/02/21  omeprazole 20 mg oral delayed release capsule: 1 cap(s) orally once a day, in AM  oxyCODONE 5 mg oral tablet: 1 tab(s) orally every 6 hours, As Needed -for moderate pain MDD:4 tabs   Plavix 75 mg oral tablet: 1 tab(s) orally once a day,  last dose on 02/01/21  rosuvastatin 20 mg oral tablet: 1 tab(s) orally once a day at bedtime  sodium bicarbonate: 1300 mg orally three times a day   Tylenol: 500 mg 2 tabs orally as needed  valsartan 40 mg oral tablet: 1 tab(s) orally once a day  Vitamin D3 2000 intl units (50 mcg) oral tablet: one tab orally at bedtime   alcohol swabs : Apply topically to affected area 4 times a day   Aspir 81 oral delayed release tablet: one tab orally on monday and friday  atorvastatin 80 mg oral tablet: 1 tab(s) orally once a day (at bedtime)  calcium citrate: 800mg one tab orally in AM  Cosentyx: 200mg  subcutaneous once a month  furosemide: 80mg tab one tab orally twice a day  HumaLOG KwikPen 100 units/mL injectable solution: 6  to 12 unit(s) injectable 3 times a day (before meals) if insulin pump malfunctions  Insulin Pen Needles, 4mm: 1 application subcutaneously 4 times a day. ** Use with insulin pen **   lancets: 1 application subcutaneously 4 times a day   Lantus Solostar Pen 100 units/mL subcutaneous solution: 12  subcutaneous once a day (at bedtime) if insulin pump malfunctions  Medrol 4 mg oral tablet: one tab orally in AM  metoprolol succinate 50 mg oral tablet, extended release: 1 tab(s) orally once a day, in AM  Multiple Vitamins oral tablet: 1 tab(s) orally once a day, l  last dose on 02/02/21  omeprazole 20 mg oral delayed release capsule: 1 cap(s) orally once a day, in AM  oxyCODONE 5 mg oral tablet: 1 tab(s) orally every 6 hours, As Needed -for moderate pain MDD:4 tabs   Plavix 75 mg oral tablet: 1 tab(s) orally once a day,  last dose on 02/01/21  sodium bicarbonate: 1300 mg orally three times a day   test strips (per patient&#x27;s insurance): 1 application subcutaneously 4 times a day. ** Compatible with patient&#x27;s glucometer **  Tylenol: 500 mg 2 tabs orally as needed  valsartan 40 mg oral tablet: 1 tab(s) orally once a day  Vitamin D3 2000 intl units (50 mcg) oral tablet: one tab orally at bedtime

## 2021-09-04 NOTE — PROGRESS NOTE ADULT - PROBLEM SELECTOR PLAN 6
Transitions of Care Status:  1.  Name of PCP:   Cr Kidd  [phone]  2.  PCP Contacted on Admission: [ ] Y    [x ] N    3.  PCP contacted at Discharge: [ ] Y    [ ] N    [ ] N/A  4.  Post-Discharge Appointment Date and Location:  5.  Summary of Handoff given to PCP:

## 2021-09-04 NOTE — DISCHARGE NOTE PROVIDER - NSDCFUADDAPPT_GEN_ALL_CORE_FT
Follow up with your outpt hem/oncologist    APPTS ARE READY TO BE MADE: [x ] YES    Best Family or Patient Contact (if needed):    Additional Information about above appointments (if needed):    1:   2:   3:     Other comments or requests:    Follow up with your outpt hem/oncologist  Follow up with endocrine:Dr. Estrada on 11/22/21 at 10:30 am at [address] [facility], [facility] [address], [place] [address]    APPTS ARE READY TO BE MADE: [x ] YES    Best Family or Patient Contact (if needed):    Additional Information about above appointments (if needed):    1:   2:   3:     Other comments or requests:    Follow up with your outpt hem/oncologist  Follow up with endocrine:Dr. Estrada on 11/22/21 at 10:30 am at [address] [facility], [facility] [address], [place] [address]    APPTS ARE READY TO BE MADE: [x ] YES    Best Family or Patient Contact (if needed):    Additional Information about above appointments (if needed):    1:   2:   3:     Other comments or requests:     Patient provided with scheduling information: Patient was provided with information for Cr Kidd, Brenda Estrada, and Maurisio Lucia. Patient has their phone numbers, and was advised to call to schedule follow up within specified timeframe (as we were unable to do so). Patient gave us permission to outreach them to confirm appointments have been scheduled on 09/07/2021.     Patient Declined Services: Patient made aware of follow up request. At this time patient declined scheduling assistance for Maurisio Baldwin.   Follow up with your outpt hem/oncologist  Follow up with endocrine:Dr. Estrada on 11/22/21 at 10:30 am at [address] [facility], [facility] [address], [place] [address]    APPTS ARE READY TO BE MADE: [x ] YES    Best Family or Patient Contact (if needed):        Additional Information about above appointments (if needed):    1:   2:   3:     Other comments or requests:     Patient provided with scheduling information: Patient was provided with information for Cr Kidd, Brenda Estrada, and Maurisio Lucia. Patient has their phone numbers, and was advised to call to schedule follow up within specified timeframe (as we were unable to do so). Patient gave us permission to outreach them to confirm appointments have been scheduled on 09/07/2021.     Patient Declined Services: Patient made aware of follow up request. At this time patient declined scheduling assistance for Maurisio Baldwin.    Patient was scheduled with  09/13/2021 [id number] [place] [address] Oncology Building Level [address] [facility] [address] and  Dr. Kidd 09/13/2021 [address] [place] [address] [address]  Patient advised of appointment details.

## 2021-09-08 NOTE — HISTORY OF PRESENT ILLNESS
[Medical Office: ([facility])___] : at the medical office located in  [Verbal consent obtained from patient] : the patient, [unfilled] [FreeTextEntry1] : Mr. NORM DEAN, 66 year old male, current smoker, former ETOH abuse, w/ hx of MI (2002), CKD (Stage III), HTN, chronic pancreatitis, DM2, Left Carotid Stenosis, RA, OA , who presented with new nodules in the RLL and LLL s/p RLL lobectomy, MLND showing Stage IIB pT2aN1 on 2/09/21. Adjuvant chemotherapy was recommended but he declined due to concerns for his kidney function. He now presents with an enlarging LLL nodule.\par \par He presented to [facility] ED in December 2020 with complaints of dyspnea and b/l LE edema. During workup, CT scan of chest showed:2.4 cm solid nodule in the medial RLL, 3.4 cm GGO in the LLL, 3mm nodule in the RUL which is unchanged since 2014. There are also mild pulmonary edema and small bilateral pelural effusions. Given his extensive smoking history, findings were concerning for malignancy.\par \par PET scan 1/13/21 showed FDG avid RLL nodule, minimally FDG avid avid LLL GGO, small R pleural effusion, as well as R TMJ uptake and L maxillary alveolus uptake.\par \par Patient is s/p Right VATS, RLLobectomy, MLND and intercostal nerve block on 02/09/2021 under the care of Dr. Lucia. Path RLLobectomy revealed adenocarcinoma, micropapillary predominant, solid and complex gland formation, 2.5 cm, G3, + GERARD, + lymphovascular invasion, + visceral pleura invasion,  1/5 hilar LN was positive, Lvl 4, 7, and 11 were negative., pT2aN1, Stage IIB. \par \par Post-op c/b delayed chest tube removal due to subcutaneous air and a small apical pneumothorax and high output. Patient discharged home with miniatrium. Miniatrium removed in office on 02/22/2021. \par \par Patient was evaluated by Dr. Baldwin on 03/02/2021. Molecular testing was negative, Dr. Baldwin recommended adjuvant chemotherapy for 4 cycles. However, patient prefers surveillance at current time secondary to kidney risk he discussed with his nephrologist Dr. José Luis Castro. \par \par 3/16/21 MRI brain showed small linear enhancement in the L superior frontal gyrus that is nonspecific. Follow up MRI in 3 months recommended.\par \par CT chest on 07/05/2021 showed left lower lobe part solid 2.7 cm nodule with 1 cm nodular component has enlarged solid component compared to the prior study. Left lower lobe subpleural dependent 8mm nodular opacity (2, 25) is unchanged from 12/21/2020 but enlarged from 7/25/2014. 4 mm right upper lobe nodules were unchanged. \par \par Patient followed up with Dr. Dang Porter for PET/CT with FDG-avid lucency seen in January, left maxillary alveolus TMJ, surgery was recommended but patient opted against.\par \par His case was discussed at tumor boards.\par \par Patient denies worsening shortness of breath, cough, chest pain, fever, chills, unintentional weight loss. Eating well,no dysphasia noted. No complaints of pain\par \par PET scan 8/27/21 showed an FDG-avid part solid 2.7 cm left lower lobe pulmonary nodule with enlarging nodular component, is unchanged since 7/15/2021, compatible with a primary lung neoplasm.  Minimally FDG-avid 8 mm left upper lobe nodule adjacent to a small cyst, better delineated on recent diagnostic CT, is unchanged since 7/5/2021, and is new since 12/21/2020, concerning for synchronous lung neoplasm.  Non-FDG-avid left lower lobe subpleural nodular opacity, unchanged since 12/21/2020, but enlarged since 7/25/2014, is indeterminate. Moderate, partially loculated right pleural effusion,, mildly increased since 7/5/2021. Trace left pleural effusion. Small FDG-avid focus left level II cervical region, may correspond to a nonspecific lymph node. Further evaluation may be performed with ultrasound.\par \par Mr. Dean reported that he had bilateral TMJ pain and had dental work done recently.\par \par Telephonic visit was set up to discuss his PET scan results

## 2021-09-08 NOTE — HISTORY OF PRESENT ILLNESS
[FreeTextEntry1] : Mr. ONRM DEAN, 66 year old male, current smoker, former ETOH abuse, w/ hx of MI (2002), CKD (Stage III), HTN, chronic pancreatitis, DM2, Left Carotid Stenosis, RA, OA , who presented with new nodules in the RLL and LLL s/p RLL lobectomy, MLND showing Stage IIB pT2aN1 on 2/09/21. Adjuvant chemotherapy was recommended but he declined due to concerns for his kidney function. He now presents with an enlarging LLL nodule.\par \par He presented to [facility] ED in December 2020 with complaints of dyspnea and b/l LE edema. During workup, CT scan of chest showed:2.4 cm solid nodule in the medial RLL, 3.4 cm GGO in the LLL, 3mm nodule in the RUL which is unchanged since 2014. There are also mild pulmonary edema and small bilateral pelural effusions. Given his extensive smoking history, findings were concerning for malignancy.\par \par PET scan 1/13/21 showed FDG avid RLL nodule, minimally FDG avid avid LLL GGO, small R pleural effusion, as well as R TMJ uptake and L maxillary alveolus uptake.\par \par Patient is s/p Right VATS, RLLobectomy, MLND and intercostal nerve block on 02/09/2021 under the care of Dr. Lucia. Path RLLobectomy revealed adenocarcinoma, micropapillary predominant, solid and complex gland formation, 2.5 cm, G3, + GERARD, + lymphovascular invasion, + visceral pleura invasion,  1/5 hilar LN was positive, Lvl 4, 7, and 11 were negative., pT2aN1, Stage IIB. \par \par Post-op c/b delayed chest tube removal due to subcutaneous air and a small apical pneumothorax and high output. Patient discharged home with miniatrium. Miniatrium removed in office on 02/22/2021. \par \par Patient was evaluated by Dr. Baldwin on 03/02/2021. Molecular testing was negative, Dr. Baldwin recommended adjuvant chemotherapy for 4 cycles. However, patient prefers surveillance at current time secondary to kidney risk he discussed with his nephrologist Dr. José Luis Castro. \par \par 3/16/21 MRI brain showed small linear enhancement in the L superior frontal gyrus that is nonspecific. Follow up MRI in 3 months recommended.\par \par CT chest on 07/05/2021 showed left lower lobe part solid 2.7 cm nodule with 1 cm nodular component has enlarged solid component compared to the prior study. Left lower lobe subpleural dependent 8mm nodular opacity (2, 25) is unchanged from 12/21/2020 but enlarged from 7/25/2014. 4 mm right upper lobe nodules were unchanged. \par \par Patient followed up with Dr. Dang Porter for PET/CT with FDG-avid lucency seen in January, left maxillary alveolus TMJ, surgery was recommended but patient opted against.\par \par His case was discussed at tumor boards.\par \par Patient denies worsening shortness of breath, cough, chest pain, fever, chills, unintentional weight loss. Eating well,no dysphasia noted. No complaints of pain\par \par PET scan 8/27/21 showed an FDG-avid part solid 2.7 cm left lower lobe pulmonary nodule with enlarging nodular component, is unchanged since 7/15/2021, compatible with a primary lung neoplasm.  Minimally FDG-avid 8 mm left upper lobe nodule adjacent to a small cyst, better delineated on recent diagnostic CT, is unchanged since 7/5/2021, and is new since 12/21/2020, concerning for synchronous lung neoplasm.  Non-FDG-avid left lower lobe subpleural nodular opacity, unchanged since 12/21/2020, but enlarged since 7/25/2014, is indeterminate. Moderate, partially loculated right pleural effusion,, mildly increased since 7/5/2021. Trace left pleural effusion. Small FDG-avid focus left level II cervical region, may correspond to a nonspecific lymph node. Further evaluation may be performed with ultrasound.\par \par Mr. Dean presents today for follow up prior to CT simulation for consent signing

## 2021-09-09 NOTE — QUALITY MEASURES
[Visual inspection, sensory exam] : Foot exam, including visual inspection, sensory exam with mono filament, and pulse exam, was performed within the last 12 months [Nephrology Follow-Up] : patient is currently receiving treatment via nephrology follow-up Alert and oriented, no focal deficits, no motor or sensory deficits.

## 2021-09-09 NOTE — HISTORY OF PRESENT ILLNESS
[Home] : at home, [unfilled] , at the time of the visit. [Medical Office: ([facility])___] : at the medical office located in  [FreeTextEntry1] : cc: diabetes\par \par 66 year old man with T2DM (diagnosed in 2002, on insulin pump since late 2015), chronic pancreatitis, with reasonable control. Recently discharged from hospital (admitted with shortness of breath). Feeling much better.  Undergoing tx for lung CA.  He had stopped using the insulin pump during lung cancer evaluation/imaging.  Resumed post discharge, Using omnipod pump (with U100 Humalog) and dexcom sensor (G6). Basal rate was adjusted, just resumed normal basal rate today. Values have been high, have started to decrease. Recently resumed CGM. Taking methylprednisolone 4 mg twice daily,  He has been limiting carbohydrate intake.  Minimal exercise \par Had covid vaccine, pfizer\par has neuropathy\par Saw opthalmologist  ~ 9/2020, No retinopathy, \par + h/o MI\par \par \par HTN - blood pressure has been controlled. Off ramipril due to elevated K+\par \par Hyperlipidemia - taking statin\par \par \par Also with osteoporosis, managed by rheumatology, on prolia\par

## 2021-09-09 NOTE — ASSESSMENT
[FreeTextEntry1] : 66 year old man with T2DM and chronic pancreatitis, post recent hospitalization\par  - Just resumed pre-hospital pump settings and cgm use.\par  - Come in for download in 1-2 weeks.   Based on results will also come up with injection regimen to use while undergoing radiation\par  - Will hold off on farxiga for now.\par  - Retinopathy screening up to date\par  - had  covid vaccine\par \par \par HTN -, on ARB followed by nephrology for ckd\par \par Hyperlipidemia- continue atorvastatin\par \par \par vitamin D def'y - continue vitamin D supplementation\par \par \par \par f/u 2-3 months with MD, ~ 2 weeks with CDE

## 2021-09-13 PROBLEM — R91.8 LUNG NODULES: Status: ACTIVE | Noted: 2021-01-01

## 2021-09-16 NOTE — CONSULT LETTER
[Consult Letter:] : I had the pleasure of evaluating your patient, [unfilled]. [( Thank you for referring [unfilled] for consultation for _____ )] : Thank you for referring [unfilled] for consultation for [unfilled] [Please see my note below.] : Please see my note below. [Consult Closing:] : Thank you very much for allowing me to participate in the care of this patient.  If you have any questions, please do not hesitate to contact me. [Sincerely,] : Sincerely, [FreeTextEntry2] : Dr. Cr Kidd (MED)\par Dr Jay Lisker ( Cardiology) \par Dr. Maurisio Baldwin (Hem/Onc) \par Dr. Tushar Macias (Rad/Onc) [FreeTextEntry3] : Maurisio Lucia MD \par Attending Surgeon \par Division of Thoracic Surgery \par , Cardiovascular and Thoracic Surgery \par [facility]\par

## 2021-09-16 NOTE — ASSESSMENT
[FreeTextEntry1] : Mr. NORM DEAN, 66 year old male, current smoker, former ETOH abuse, w/ hx of MI (2002), CKD (Stage III), HTN, chronic pancreatitis, DM2, Left Carotid Stenosis, RA, OA, and lung CA.\par \par Patient is s/p Right VATS, RLLobectomy, MLND and intercostal nerve block on 02/09/2021. Path of RLLobectomy revealed adenocarcinoma, micropapillary predominant, solid and complex gland formation, 2.5 cm, G3, + GERARD, + lymphovascular invasion, + visceral pleura invasion, + hilar LN, pT2aN1, Stage IIB. \par \par I have independently reviewed his CT and PET scans, patient is highly suspicious for recurrent or new primary lung CA to left lung, he declined chemotherapy and surgical interventions, I recommended patient to f/u with Rad/Onc Dr. Macias for CT-simulation.\par RTC in 8 weeks with CXR via Telehealth.\par \par \par I personally performed the services described in the documentation, reviewed the documentation recorded by the scribe in my presence and it accurately and completely records my words and actions.\par \par I, Angela Hitchcock NP, am scribing for and the presence of TIFFANIE Johnson, the following sections HISTORY OF PRESENT ILLNESS, PAST MEDICAL/FAMILY/SOCIAL HISTORY; REVIEW OF SYSTEMS; VITAL SIGNS; PHYSICAL EXAM; DISPOSITION.\par \par

## 2021-09-16 NOTE — DATA REVIEWED
[FreeTextEntry1] : I have independently reviewed the following:\par PET/CT on 8/27/21:\par - 2.7cm SUV=5.0 part solid LLL nodule, w/ a 1cm nodular component that has increased in size (image 96)\par - 8mm SUV=2.1 KENY nodule (image 85)\par - 8mm non-FDG-avid LLL nodule \par \par CT Chest w/o contrast on 9/3/21 showed a 2.7cm LLL mixed solid and cystic nodular opacity with a 1.2cm solid component (3:90), increased in size and density; stable 7mm KENY nodule abutting small Lt-sided pleural effusion; increasing size small Rt-sided pleural effusion; a stable 4mm RUL nodule (3:88).

## 2021-09-16 NOTE — HISTORY OF PRESENT ILLNESS
[FreeTextEntry1] : Mr. NORM DEAN, 66 year old male, current smoker, former ETOH abuse, w/ hx of MI (2002), CKD (Stage III), HTN, chronic pancreatitis, DM2, Left Carotid Stenosis, RA, OA , who presented to [facility] ED with complaints of Dyspnea and b/l LE edema. During workup, CT scan of chest revealing several > 2 cm nodules (RLL, RUL, LLL), new and increased in size compared to 2014, in setting of extensive smoking history, and imaging characteristics very concerning for malignancy. \par \par Patient is s/p Right VATS, RLLobectomy, MLND and intercostal nerve block on 02/09/2021. Path of RLLobectomy revealed adenocarcinoma, micropapillary predominant, solid and complex gland formation, 2.5 cm, G3, + GERARD, + lymphovascular invasion, + visceral pleura invasion, + hilar LN, pT2aN1, Stage IIB. \par \par Post-op c/b delayed chest tube removal due to subcutaneous air and a small apical pneumothorax and high output. Patient discharged home with miniatrium. Miniatrium removed in office on 02/22/2021. \par \par Patient was evaluated by Dr. Baldwin on 03/02/2021. Molecular testing was negative, Dr. Baldwin recommended adjuvant chemotherapy for 4 cycles. However, patient prefers surveillance at current time secondary to kidney risk he discussed with his nephrologist Dr. José Luis Castro. \par \par CT chest on 07/05/2021: \par - post-op changes\par - Left lower lobe part solid 2.7 cm nodule with 1 cm nodular component has enlarged solid component compared to the prior study. \par - Left lower lobe subpleural dependent 8mm nodular opacity (2, 25) is unchanged from 12/21/2020 but enlarged from 7/25/2014. \par - 4 mm right upper lobe nodules unchanged. \par -Small to moderate loculated right pleural effusion is likely postsurgical. \par \par Patient followed up with Dr. Dang Porter for PET/CT with FDG-avid lucency, left maxillary alveolus TMJ, surgery was recommended but patient opted against.\par \par He followed up with Dr. Maurisio Baldwin, no tx recommended.\par He followed up with Dr. Tushar Macias, who recommended CT-Simulation.\par \par He called on 9/2/21 and reported he fell 8/27 walking into building for scheduled PET/CT. He fell face first, denies hitting head, has an abrasion on his lip, sore breast bone, and right foot bruising and swelling. He said he was evaluated by a doctor on site but no interventions. We instructed him to go to [facility] ED, he prefers to the emergency room at [facility]. He was admitted and CT Chest w/o contrast on 9/3/21 showed increasing size small Rt-sided pleural effusion, he was instructed to f/u with us after discharge.\par \par Patient is here today for a follow up, admits to SOB on exertion and on/off productive cough.\par

## 2021-09-16 NOTE — PHYSICAL EXAM
[] : no respiratory distress [Auscultation Breath Sounds / Voice Sounds] : lungs were clear to auscultation bilaterally [Heart Rate And Rhythm] : heart rate was normal and rhythm regular [Heart Sounds] : normal S1 and S2 [Heart Sounds Gallop] : no gallops [Murmurs] : no murmurs [Heart Sounds Pericardial Friction Rub] : no pericardial rub [Examination Of The Chest] : the chest was normal in appearance [Chest Visual Inspection Thoracic Asymmetry] : no chest asymmetry [Diminished Respiratory Excursion] : normal chest expansion [FreeTextEntry1] : decreased BS to Rt lower lung

## 2021-09-26 PROBLEM — R06.02 SOB (SHORTNESS OF BREATH) ON EXERTION: Status: ACTIVE | Noted: 2021-01-01

## 2021-09-26 PROBLEM — R53.83 FATIGUE: Status: ACTIVE | Noted: 2017-09-11

## 2021-09-26 PROBLEM — J90 PLEURAL EFFUSION ON RIGHT: Status: ACTIVE | Noted: 2021-01-01

## 2021-09-26 PROBLEM — R42 DYSEQUILIBRIUM: Status: ACTIVE | Noted: 2021-01-01

## 2021-09-26 PROBLEM — R60.0 EDEMA, LEG: Status: ACTIVE | Noted: 2018-08-28

## 2021-09-26 NOTE — ASSESSMENT
[FreeTextEntry1] : 1) s/p covid vaccine; to take flu vacc Oct.  2) s/p short hospitalization for pleural effusions, ?fluid overload, sob/shah in setting of his lung CA/?element of copd.  Received IV diuresis, no thoracentesis done.  [place] much better, was ambulatory, went home in short time.  Now looking baseline.  3) has radiation RT upcoming, will need to d/c dexcom for that, and has seen endo for that plan.  4) meds checked at d/c - consistent with our chart list.  Possible if moments of windedness recur, can have him try metolazone on top of furosemide prn syx/weight.  Not wheezing/pursing lips, has mild obstruction on PFTs, but signifcant dec DLCO.  Holding off sprays.  5) he will be pursuing radiation rx for tumor on L base, but overarching GOC/dispo known to him, wife is proxy.  He prefers to keep his cigs for time he has.  6) on medrol 4 daily already for rheum/psoriatic/RA.

## 2021-09-26 NOTE — HISTORY OF PRESENT ILLNESS
[Post-hospitalization from ___ Hospital] : Post-hospitalization from [unfilled] Hospital [Admitted on: ___] : The patient was admitted on [unfilled] [Discharged on ___] : discharged on [unfilled] [Pertinent Labs] : pertinent labs [Discharge Summary] : discharge summary [Radiology Findings] : radiology findings [Discharge Med List] : discharge medication list [Med Reconciliation] : medication reconciliation has been completed [FreeTextEntry2] : Here post discharge from [facility] - had short stay for increased feeling of sob at home.  With any exertion, was more winded.  More limited physically when this occurs.  Stairs were do able, but harder.  Had no cp with it; sleeping has been tougher w some orthopnea/finding good position.  Last 2 nights has slept better.  Not feeling too winded now - ambulatory here in office. \par \par In hospital another round of imaging done, showing same L base nodular/cystic mass w some growth, to have RT for that CA.  s/p RLL lobectomy; has effusions complicating his CA, R > L.  In hospital degree of fluids felt not to be enough to tap, and windedness felt to be out of porportion to the effusions.  Has had subocclusive CAD found on cath within the last year with some decrement in his EF, seeing Dr. Lisker, on furosemide 80 BID.  Has stable CKD, urinating in response to his furosemide.  Has relatively less edema in LEs at moment.  Effusion on L base is septated on imaging.

## 2021-09-26 NOTE — PHYSICAL EXAM
[No Acute Distress] : no acute distress [No JVD] : no jugular venous distention [Supple] : supple [No Respiratory Distress] : no respiratory distress  [No Accessory Muscle Use] : no accessory muscle use [Normal Rate] : normal rate  [Regular Rhythm] : with a regular rhythm [Normal S1, S2] : normal S1 and S2 [No Carotid Bruits] : no carotid bruits [No Extremity Clubbing/Cyanosis] : no extremity clubbing/cyanosis [Soft] : abdomen soft [Non Tender] : non-tender [No Masses] : no abdominal mass palpated [Normal Bowel Sounds] : normal bowel sounds [No HSM] : no HSM [de-identified] : sl cachectic at face [de-identified] : no icterus, pallor noted at conj [de-identified] : no rales/wheeze; no appreciable dec bs at bases; some dullness on percussion in bases [de-identified] : some truncal weight; not clearly with ascites on shifting [de-identified] : no pitting 2+ to proximal lower LEs, no skin breakdown noted

## 2021-10-06 PROBLEM — D64.89 ANEMIA DUE TO OTHER CAUSE, NOT CLASSIFIED: Status: ACTIVE | Noted: 2019-12-10

## 2021-10-09 PROBLEM — R10.84 ABDOMINAL DISCOMFORT, GENERALIZED: Status: ACTIVE | Noted: 2021-01-01

## 2021-10-11 PROBLEM — C34.90 MALIGNANT LUNG NEOPLASM: Status: ACTIVE | Noted: 2021-01-01

## 2021-10-13 NOTE — ED ADULT NURSE NOTE - NS ED NURSE REPORT GIVEN DT
Patient had quant flow testing, which apparently showed decreased flow on the left. She has no coldness or pain int he foot. She does have pain in the left hip at times. No discoloration of the foot. Dr. Maguire reports increased athersclerotic burden, however, unclear where this diagnosis is from. No mention on recent CT., reviewed myself, did not see plaque in aorta or otherwise.    13-Oct-2021 18:54

## 2021-10-13 NOTE — H&P ADULT - PROBLEM SELECTOR PLAN 4
Last A1C 9.7 from september  Insulin was changed from pump to basal bolus?  Presented today with a glucose of 395 on BMP Last A1C 9.7 from september  Has insulin pump, but was removed for imaging?  Presented today with a glucose of 395 on BMP Last A1C 9.7 from september  Has insulin pump, treat as type 1 diabetes as per endo note from last admission  Was removed for imaging?  Presented today with a glucose of 395 on BMP  Started on lantus 10 at night with sliding scale  Patient hasn't been eating, so wasn't started on premeal  Endocrine consult placed Nonhypoxic SOB  Dyspnea may be due to recurrence of cancer  98% on room air

## 2021-10-13 NOTE — ED ADULT NURSE NOTE - OBJECTIVE STATEMENT
65 yo M here for low sodium and R rib pain. Pt also reporting lack of appetite, and not eating or drinking in the last 2 wks. Pt wife at bedside. Pt also has insulin pump. IV line placed labs drawn and sent. Provider at bedside evaluating.

## 2021-10-13 NOTE — ED PROVIDER NOTE - PHYSICAL EXAMINATION
GENERAL: Awake. Alert. NAD. Cachectic appearing.  HEENT: NC/AT, PERRL, EOMI, Conjunctiva pink, no scleral icterus. Airway patent. Dry mucous membranes.  LUNGS: CTAB. No wheezes or rales noted.  CARDIAC: Chest non-tender to palpation. RRR. S1 and S2 intact. No murmurs noted.  ABDOMEN: No masses noted. Soft, NT, ND, no rebound, no guarding.  BACK: No midline spinal tenderness, no CVA tenderness, no TTP at b/l ribs  EXT: No edema, no calf tenderness, distal pulses 2+ bilaterally, no deformities.  NEURO: A&Ox3. Moving all extremities. Sensation intact throughout. Slight decreased strength in RLE compared to the LLE. UE strength intact. CN II-XII intact.  SKIN: Warm and dry.  PSYCH: Normal affect.

## 2021-10-13 NOTE — ED PROVIDER NOTE - OBJECTIVE STATEMENT
66M PMH lung cx s/p lobectomy, CKD4, DM2, CAD s/p stent on plavix, HLD, cirrhosis sent in by PCP for hyponatremia. Reports associated weakness and confusion as per the wife (slower to answer questions and think of words) for past 3 weeks, worsening s/p mechanical fall 1 month ago (no head trauma, no loc, wasn't evaluated at that time). Reports R sided thoracic rib and back pain, described as intermittent ache. 66M PMH lung cx s/p lobectomy, CKD4, DM2, CAD s/p stent on plavix, HLD, cirrhosis sent in by PCP for hyponatremia. Reports associated weakness and confusion as per the wife (slower to answer questions and think of words) for past 3 weeks, worsening s/p mechanical fall 1 month ago (no head trauma, no loc, wasn't evaluated at that time). Reports R sided thoracic rib and back pain, described as intermittent ache. + 35lb weight loss over 5 weeks. Denies fevers, chills, chest pain, new SOB, cough, headache, nausea, diarrhea, dysuria.

## 2021-10-13 NOTE — H&P ADULT - PROBLEM SELECTOR PLAN 1
Leukocytosis up to 16k, tachycardia, BP 90s/60s low from base line (baseline runs to 140's)  Possibly UTI, pending culture Symptomatic hyponatremia with reported mental status change + fatigue  Outpatient Na+ level 118. Na+ 120 on presentation, corrected Na+ is 125 for glucose of 395 using Santa correction  Recent increase in diuretic use (+ metalozone on top of furosemide 80 mg PO bid) along with lack of PO intake may have contributed to hyponatremia- Hold diuretics  Pending   Outpatient note concerning for SIADH, but unlikely given rise in creatine  Serum osmolality 294  Pending urine lytes, urine urea, urine osmolality  Check STAT BMP and follow up with nephro

## 2021-10-13 NOTE — H&P ADULT - ATTENDING COMMENTS
Seen, examined the patient this am with house staff  Resting in bed, looks dehydrated, weak, afebrile, c/o some pain in lower ribs. SBP in   Reviewed labs, imaging    This is a 66M with h/o lung Ca, s/p right lung lobectomy, chronic systolic HF on diuretics, CKD4, CAD s/p stent on Plavix, DM2, cirrhosis sent in by PCP for symptomatic hyponatremia, weight loss 30Lbs in a moth with low appetite. In ED Na was 120, Scr 2.88. CT chest showed trace R effusion and solid left lower lobe lesion which remain suspicious for a synchronous lung cancer.    - Na 125 this am, off IV hydration, Scr 2.5. EF 30-35%. Closely f/u BMP q 8 hrs    Held all diuretics (Lasix, Metolazone)  - appreciated Renal consult- Low Na is likely from metolazone; poor solute intake; decreased free water excretion due to CKD & some   pseudohyponatremia from high blood sugars  - Spoke to Cardiology- Dr Lisker, will see him today. Agree with plan  - Weight loss could be related to current medical issues and solid left lower lobe lesion which remain suspicious for a synchronous lung cancer.    Nutrition consult, Marinol 2.5mg bid  - PT eval in 24-48 hrs  ** spoke to wife at bedside Spoke to house staff on 10/13/21, plan discussed as below-    This is a 66M with h/o lung Ca, s/p right lung lobectomy, chronic systolic HF on diuretics, CKD4, CAD s/p stent on Plavix, DM2, cirrhosis sent in by PCP for symptomatic hyponatremia, weight loss 30Lbs in a moth with low appetite. In ED Na was 120, Scr 2.88. CT chest showed trace R effusion and solid left lower lobe lesion which remain suspicious for a synchronous lung cancer.    - Na 120, Scr 2.5. normal serum Osm, EF 30-35%. Hyponatremia is related to diuretics in the setting of low PO solute intake, elevated FSBS    Hold all diuretics (Lasix, Metolazone), IVF NS for now for several hrs, repeat BMP- do not correct Na >8meq/24hrs    Renal consult  - Will call cardiology given chronic systolic HF with EF 30-35%, needing IVF  - Weight loss could be related to current medical issues and solid left lower lobe lesion which remain suspicious for a synchronous lung cancer.    Nutrition consult, Marinol 2.5mg bid  - Endocrine consult for uncontrolled BG  - PT eval

## 2021-10-13 NOTE — H&P ADULT - HISTORY OF PRESENT ILLNESS
66M PMH lung cx s/p lobectomy, CKD4, DM2, CAD s/p stent on plavix, HLD, cirrhosis sent in by PCP for hyponatremia. Reports associated weakness and confusion as per the wife (slower to answer questions and think of words) for past 3 weeks, worsening s/p mechanical fall 1 month ago (no head trauma, no loc, wasn't evaluated at that time). Reports R sided thoracic rib and back pain, described as intermittent ache. + 35lb weight loss over 5 weeks. Denies fevers, chills, chest pain, new SOB, cough, headache, nausea, diarrhea, dysuria.    ED Course: Given     LABS:     66M PMH lung cx s/p right lung lobectomy, CKD4, DM2, CAD s/p stent on plavix, HLD, cirrhosis sent in by PCP for hyponatremia of 118 10/12. Reports associated weakness and confusion as per the wife (slower to answer questions and think of words) for past 3 weeks, worsening s/p mechanical fall 1 month ago (no head trauma, no loc, wasn't evaluated at that time). Patient also reports extreme fatigue and sob when lying down. As a result patient was started on a "heavy duty" diuretic about a week ago. Reports R sided thoracic rib and back pain, described as intermittent ache. + 35lb weight loss over 5 weeks. Denies fevers, chills, chest pain, new SOB, cough, headache, nausea, diarrhea, dysuria.    ED Course: BP 97/60, , RR 18, Temp 97.7 F oral, SpO2 98 on RA  Given 1000 mL NS. and 4 mg morphine IV push.

## 2021-10-13 NOTE — ED ADULT NURSE NOTE - CHIEF COMPLAINT QUOTE
Was sent by PMD for Sodium level of 118 from blood work drawn yesterday   Family reports AMS and weakness x 2 weeks   hx of lung ca

## 2021-10-13 NOTE — ED PROVIDER NOTE - ATTENDING CONTRIBUTION TO CARE
Dr. Stanton (Attending Physician)  I performed a history and physical exam of the patient and discussed their management with the resident. I reviewed the resident's note and agree with the documented findings and plan of care. My medical decision making and observations are found above.

## 2021-10-13 NOTE — H&P ADULT - NSHPLABSRESULTS_GEN_ALL_CORE
10-13    120<LL>  |  75<L>  |  79<H>  ----------------------------<  395<H>  4.1   |  24  |  2.88<H>    Ca    9.4      13 Oct 2021 13:51  Phos  4.7     10-13  Mg     1.7     10-13    TPro  7.3  /  Alb  3.5  /  TBili  0.5  /  DBili  x   /  AST  24  /  ALT  13  /  AlkPhos  140<H>  10-13    Magnesium, Serum: 1.7 mg/dL (10-13-21 @ 13:51)    Phosphorus Level, Serum: 4.7 mg/dL (10-13-21 @ 13:51)                        10.6   16.26 )-----------( 297      ( 13 Oct 2021 13:51 )             30.9

## 2021-10-13 NOTE — H&P ADULT - REASON FOR ADMISSION
Sepsis + Symptomatic hyponatremia SIRS + Symptomatic hyponatremia Symptomatic hyponatremia + Leukocytosis

## 2021-10-13 NOTE — CHART NOTE - NSCHARTNOTEFT_GEN_A_CORE
65 yo M with PMH of HTN,, CAD, type 2DM, chronic pancreatitis now managed as type 1, omnipod pump with dexcom CGM, hx of lung adenocarcinoma s/p right lobectomy, sent in by PCP for symptomatic hyponatremia s/p increased diuretic use. Endocrine consulted for diabetes on insulin pump with hyperglycemia.    Spoke to team earlier this evening. Per team, pt was off insulin pump for scan and was off for 2-3 hours. BMP at that time with glucose 320, no AG. Per team, pt preferred to not resume use of insulin pump while in the hospital. Recommended to give STAT dose of Lantus 10 units, admelog 5 units TID pre-meal, low pre-meal and low bedtime correction sales based on pump settings and previous basal bolus regimen pt was on during last admission In Sept 2021. Per team, pt currently with low appetite/PO intake. However, noted lantus dose still not given upon review several hours later. Per nurse, order was placed around 7pm STAT but still waiting for pharmacy to send as pt was transferred from ED to floor. Last glucose up-trending, 390. DW nurse to call pharmacy and give Lantus dose STAT along with bedtime correction.    DW Team    Full consult tomorrow    Katharine Babin DO, Endocrine Fellow   Pager [phone] from 9-5PM. After hours and on weekends please call [phone]. 67 yo M with PMH of HTN,, CAD, type 2DM, chronic pancreatitis now managed as type 1, omnipod pump with dexcom CGM, hx of lung adenocarcinoma s/p right lobectomy, sent in by PCP for symptomatic hyponatremia s/p increased diuretic use. Endocrine consulted for diabetes on insulin pump with hyperglycemia.    Spoke to team earlier this evening. Per team, pt was off insulin pump for scan and was off for 2-3 hours. BMP at that time with glucose 320, no AG. Per team, pt preferred to not resume use of insulin pump while in the hospital. Recommended to give STAT dose of Lantus 10 units, admelog 5 units TID pre-meal, low pre-meal and low bedtime correction sales based on pump settings and previous basal bolus regimen pt was on during last admission In Sept 2021. Per team, pt currently with low appetite/PO intake. However, noted lantus dose still not given upon review several hours later. Per nurse, order was placed around 7pm STAT but still waiting for pharmacy to send as pt was transferred from ED to floor. Last glucose up-trending, 390. DW nurse to call pharmacy and give Lantus dose STAT along with bedtime correction.     DW Team    Full consult tomorrow    Katharine Babin DO, Endocrine Fellow   Pager [phone] from 9-5PM. After hours and on weekends please call [phone]. 65 yo M with PMH of HTN,, CAD, type 2DM, chronic pancreatitis now managed as type 1, omnipod pump with dexcom CGM, hx of lung adenocarcinoma s/p right lobectomy, sent in by PCP for symptomatic hyponatremia s/p increased diuretic use. Endocrine consulted for diabetes on insulin pump with hyperglycemia.    Spoke to team earlier this evening. Per team, pt was off insulin pump for scan and was off for 2-3 hours. BMP at that time with glucose 320, no AG. Per team, pt preferred to not resume use of insulin pump while in the hospital. Recommended to give STAT dose of Lantus 10 units, admelog 5 units TID pre-meal, low pre-meal and low bedtime correction sales based on pump settings and previous basal bolus regimen pt was on during last admission In Sept 2021. Per team, pt currently with low appetite/PO intake. However, noted lantus dose still not given upon review several hours later. Per nurse, order was placed around 7pm STAT but still waiting for pharmacy to send as pt was transferred from ED to floor. Last glucose up-trending, 390. DW nurse to call pharmacy and give Lantus dose STAT along with bedtime correction. Recommend to check 3AM FS and if persistently hyperglycemic >350, give low bedtime correction scale coverage and check BMP with BHB to ensure pt not developing DKA.     DW Team    Full consult tomorrow    Katharine Babin DO, Endocrine Fellow   Pager [phone] from 9-5PM. After hours and on weekends please call [phone]. 65 yo M with PMH of HTN,, CAD, type 2DM, chronic pancreatitis now managed as type 1, omnipod pump with humalog insulin, dexcom CGM, hx of lung adenocarcinoma s/p right lobectomy, sent in by PCP for symptomatic hyponatremia s/p increased diuretic use. Endocrine consulted for diabetes on insulin pump with hyperglycemia.    Spoke to team earlier this evening. Per team, pt was off insulin pump for scan and was off for 2-3 hours. BMP at that time with glucose 320, no AG. Per team, pt preferred to not resume use of insulin pump while in the hospital. Recommended to give STAT dose of Lantus 10 units, admelog 5 units TID pre-meal, low pre-meal and low bedtime correction sales based on pump settings and previous basal bolus regimen pt was on during last admission In Sept 2021. Per team, pt currently with low appetite/PO intake. However, noted lantus dose still not given upon review several hours later. Per nurse, order was placed around 7pm STAT but still waiting for pharmacy to send as pt was transferred from ED to floor. Last glucose up-trending, 390. DW nurse to call pharmacy and give Lantus dose STAT along with bedtime correction. Recommend to check 3AM FS and if persistently hyperglycemic >350, give low bedtime correction scale coverage and check BMP with BHB to ensure pt not developing DKA.     PUMP SETTINGS (obtained per pt via phone):    Basal Rates:  12AM: 0.5  Total basal: 12 units    I:C:  12AM 1:4    ISF:  12AM: 1:40    Target     Active Insulin Time: 4 hours      DW Team    Full consult tomorrow    Katharine Babin DO, Endocrine Fellow   Pager [phone] from 9-5PM. After hours and on weekends please call [phone].

## 2021-10-13 NOTE — H&P ADULT - PROBLEM SELECTOR PLAN 2
Outpatient note concerning for SIADH  Normal serum osmolality  Pending urine lytes, urine urea, urine osmolality Symptomatic hyponatremia with reported mental status change + fatigue  Outpatient Na+ level 118. Na+ 120 on presentation, corrected Na+ is 125 for glucose of 395 usizng Santa correction  Recent increase in diuretic use may have contributed to hyponatremia- Hold diuretic  Outpatient note concerning for SIADH  Serum osmolality 294  Pending urine lytes, urine urea, urine osmolality Symptomatic hyponatremia with reported mental status change + fatigue  Outpatient Na+ level 118. Na+ 120 on presentation, corrected Na+ is 125 for glucose of 395 usizng Santa correction  Recent increase in diuretic use may have contributed to hyponatremia- Hold diuretic  Outpatient note concerning for SIADH  Serum osmolality 294  Pending urine lytes, urine urea, urine osmolality  Endocrine consult placed Leukocytosis up to 16k, tachycardia, BP 90s/60s low from base line (baseline runs to 140's)  Unlikely to be due to infection.  More likely to be due to dehydration

## 2021-10-13 NOTE — H&P ADULT - ASSESSMENT
66M PMH lung cx s/p right lung lobectomy, CKD4, DM2, CAD s/p stent on plavix, HLD, cirrhosis sent in by PCP for symptomatic hyponatremia s/p increased diuretic use.

## 2021-10-13 NOTE — H&P ADULT - PROBLEM SELECTOR PLAN 3
Creatinine 2.88  Baseline 2+  + Rouleaux formation  Pending SPEP UPEP to r/o MM (álvaro + anemia) Last A1C 9.7 from september  Has insulin pump, treat as type 1 diabetes as per endo note from last admission  Was removed for imaging?  Presented today with a glucose of 395 on BMP  Endocrine consult placed- lantus 10 stat as per endo

## 2021-10-13 NOTE — ED PROVIDER NOTE - CLINICAL SUMMARY MEDICAL DECISION MAKING FREE TEXT BOX
Dr. Stanton (Attending Physician)  Pt. with ho lung ca, ckd, dm, HFrEF on lasix pw ams slow to respond, but alert and oriented x 3 with normal neuro exam, found to be hyponatremic on outpt labs, mild back pain, fall 1 month ago without head trauma but on plavix. Will check labs, serum osm, urine osm, urine sodium, cxr, ct head and reassess. Dr. Stanton (Attending Physician)  Pt. with ho lung ca, ckd, dm, HFrEF on lasix pw ams slow to respond, but alert and oriented x 3 with normal neuro exam, found to be hyponatremic on outpt labs, mild back pain, fall 1 month ago without head trauma but on plavix. Will check labs, serum osm, urine osm, urine sodium, cxr, ct head and reassess.    Katharine Hassan DO (PGY1): 66M PMH lung cx s/p lobectomy, CKD4, DM2, CAD s/p stent on plavix, HLD, cirrhosis sent in by PCP for hyponatremia. Associated weakness, confusion, R sided thoracic rib and back ache for past 3 weeks, worsening s/p mechanical fall 1 month ago (no head trauma, no loc, wasn't evaluated at that time). Denies fever, chills, chest pain, new SOB, cough, headache, nausea, diarrhea, dysuria.   BP 97/60 , , O2 98%, dry mucous membrane, no LLE edema, lungs CTA, AAOx3, slight weakness in RLE compared to LLE, no spinal midline TTP  Hyponatremia, concern for SIADH vs adrenal insuf vs pseudohyponatremia. R/o ICH vs brain mets, r/o rib fracture vs pneumonia  CMP, Urine osm, Urine Na, Serum Osm, CXR, CT head, UA  Reassess, fluids

## 2021-10-13 NOTE — ED PROVIDER NOTE - PROGRESS NOTE DETAILS
Dr. Stanton (Attending Physician)  hyperglycemic on labs will add on betahydroxy butyrate Katharine Hassan DO (PGY1): Contacted pt Endocrinologist answering service, left message regarding DM2 management s/p insulin pump removal. Waiting for call back. Hospitalist contacted, pt to be admitted.

## 2021-10-13 NOTE — ED CLERICAL - NS ED CLERK NOTE PRE-ARRIVAL INFORMATION; ADDITIONAL PRE-ARRIVAL INFORMATION
CC/Reason For referral: Hx lung ca , pleural effusions, cad, cirrhosis, ckd.  recent wt loss.  Sodium on 10/12 117 , patient should be evaluated for hypertonic saline  Preferred Consultant(if applicable): critical care if needed  Who admits for you (if needed): hospitalist  Do you have documents you would like to fax over? no  Would you still like to speak to an ED attending? yes

## 2021-10-13 NOTE — H&P ADULT - NSHPPHYSICALEXAM_GEN_ALL_CORE
T(C): 36.5 (10-13-21 @ 12:43), Max: 36.5 (10-13-21 @ 12:43)  HR: 103 (10-13-21 @ 12:43) (103 - 103)  BP: 97/60 (10-13-21 @ 12:43) (97/60 - 97/60)  RR: 18 (10-13-21 @ 12:43) (18 - 18)  SpO2: 98% (10-13-21 @ 12:43) (98% - 98%)  Wt(kg): --Vital Signs Last 24 Hrs  T(C): 36.5 (13 Oct 2021 12:43), Max: 36.5 (13 Oct 2021 12:43)  T(F): 97.7 (13 Oct 2021 12:43), Max: 97.7 (13 Oct 2021 12:43)  HR: 103 (13 Oct 2021 12:43) (103 - 103)  BP: 97/60 (13 Oct 2021 12:43) (97/60 - 97/60)  BP(mean): --  RR: 18 (13 Oct 2021 12:43) (18 - 18)  SpO2: 98% (13 Oct 2021 12:43) (98% - 98%)    PHYSICAL EXAM:  GENERAL: NAD, well-groomed, well-developed  HEAD:  Atraumatic, Normocephalic  EYES: EOMI, PERRLA, conjunctiva and sclera clear  ENMT: No tonsillar erythema, exudates, or enlargement; Moist mucous membranes, Good dentition, No lesions  NECK: Supple, No JVD, Normal thyroid  NERVOUS SYSTEM:  Alert & Oriented X3, Good concentration; Motor Strength 5/5 B/L upper and lower extremities; DTRs 2+ intact and symmetric  CHEST/LUNG: Clear to percussion bilaterally; No rales, rhonchi, wheezing, or rubs  HEART: Regular rate and rhythm; No murmurs, rubs, or gallops  ABDOMEN: Soft, Nontender, Nondistended; Bowel sounds present  EXTREMITIES:  2+ Peripheral Pulses, No clubbing, cyanosis, or edema  LYMPH: No lymphadenopathy noted  SKIN: No rashes or lesions

## 2021-10-14 NOTE — PROGRESS NOTE ADULT - PROBLEM SELECTOR PLAN 1
Symptomatic hyponatremia with reported mental status change + fatigue  Outpatient Na+ level 118. Na+ 120 on presentation, corrected Na+ is 125 for glucose of 395 using Santa correction  Recent increase in diuretic use (+ metalozone on top of furosemide 80 mg PO bid) along with lack of PO intake may have contributed to hyponatremia- Hold diuretics  Pending   Outpatient note concerning for SIADH, but unlikely given rise in creatine  Serum osmolality 294  Pending urine lytes, urine urea, urine osmolality  Check STAT BMP and follow up with nephro

## 2021-10-14 NOTE — PROGRESS NOTE ADULT - SUBJECTIVE AND OBJECTIVE BOX
NORM DEAN  66y  MRN: [id number]    Patient is a 66y old  Male who presents with a chief complaint of Symptomatic hyponatremia + Leukocytosis (13 Oct 2021 18:00)      Subjective: no events ON. Denies fever, CP, SOB, abn pain, N/V, dysuria. Tolerating diet.      MEDICATIONS  (STANDING):  aspirin enteric coated 81 milliGRAM(s) Oral <User Schedule>  atorvastatin 80 milliGRAM(s) Oral at bedtime  cholecalciferol 2000 Unit(s) Oral at bedtime  clopidogrel Tablet 75 milliGRAM(s) Oral daily  dextrose 40% Gel 15 Gram(s) Oral once  dextrose 5%. 1000 milliLiter(s) (50 mL/Hr) IV Continuous <Continuous>  dextrose 5%. 1000 milliLiter(s) (100 mL/Hr) IV Continuous <Continuous>  dextrose 50% Injectable 25 Gram(s) IV Push once  dextrose 50% Injectable 12.5 Gram(s) IV Push once  dextrose 50% Injectable 25 Gram(s) IV Push once  glucagon  Injectable 1 milliGRAM(s) IntraMuscular once  influenza   Vaccine 0.5 milliLiter(s) IntraMuscular once  insulin lispro (ADMELOG) corrective regimen sliding scale   SubCutaneous three times a day before meals  insulin lispro (ADMELOG) corrective regimen sliding scale   SubCutaneous at bedtime  insulin lispro Injectable (ADMELOG) 5 Unit(s) SubCutaneous three times a day before meals  lactated ringers. 1000 milliLiter(s) (75 mL/Hr) IV Continuous <Continuous>  methylPREDNISolone 4 milliGRAM(s) Oral daily  metoprolol succinate ER 50 milliGRAM(s) Oral daily  multivitamin 1 Tablet(s) Oral daily  pantoprazole    Tablet 40 milliGRAM(s) Oral before breakfast  sodium bicarbonate 1300 milliGRAM(s) Oral three times a day  valsartan 40 milliGRAM(s) Oral at bedtime    MEDICATIONS  (PRN):  acetaminophen   Tablet .. 650 milliGRAM(s) Oral every 6 hours PRN Temp greater or equal to 38C (100.4F), Mild Pain (1 - 3), Moderate Pain (4 - 6)      Objective:    Vitals: Vital Signs Last 24 Hrs  T(C): 36.5 (10-14-21 @ 04:47), Max: 36.7 (10-13-21 @ 16:29)  T(F): 97.7 (10-14-21 @ 04:47), Max: 98 (10-13-21 @ 16:29)  HR: 83 (10-14-21 @ 07:05) (77 - 103)  BP: 128/76 (10-14-21 @ 07:05) (90/60 - 130/67)  BP(mean): --  RR: 17 (10-14-21 @ 04:47) (17 - 18)  SpO2: 98% (10-14-21 @ 04:47) (98% - 100%)            I&O's Summary    13 Oct 2021 07:01  -  14 Oct 2021 07:00  --------------------------------------------------------  IN: 0 mL / OUT: 325 mL / NET: -325 mL        PHYSICAL EXAM:  GENERAL: NAD  HEAD:  Atraumatic, Normocephalic  EYES: EOMI, conjunctiva and sclera clear  CHEST/LUNG: Clear to percussion bilaterally; No rales, rhonchi, wheezing, or rubs  HEART: Regular rate and rhythm; No murmurs, rubs, or gallops  ABDOMEN: Soft, Nontender, Nondistended;   SKIN: No rashes or lesions  NERVOUS SYSTEM:  Alert & Oriented X3, no focal deficit    LABS:  10-14    122<L>  |  82<L>  |  77<H>  ----------------------------<  404<H>  3.5   |  24  |  2.47<H>  10-13    120<LL>  |  80<L>  |  80<H>  ----------------------------<  320<H>  4.6   |  23  |  2.56<H>  10-13    120<LL>  |  75<L>  |  79<H>  ----------------------------<  395<H>  4.1   |  24  |  2.88<H>    Ca    8.9      14 Oct 2021 01:13  Ca    8.7      13 Oct 2021 19:23  Ca    9.4      13 Oct 2021 13:51  Phos  4.7     10-13  Mg     1.7     10-13    TPro  7.3  /  Alb  3.5  /  TBili  0.5  /  DBili  x   /  AST  24  /  ALT  13  /  AlkPhos  140<H>  10-13                                              8.5    10.56 )-----------( 207      ( 14 Oct 2021 07:00 )             24.6                         10.6   16.26 )-----------( 297      ( 13 Oct 2021 13:51 )             30.9     CAPILLARY BLOOD GLUCOSE      POCT Blood Glucose.: 396 mg/dL (14 Oct 2021 03:10)  POCT Blood Glucose.: 374 mg/dL (13 Oct 2021 23:50)  POCT Blood Glucose.: 390 mg/dL (13 Oct 2021 22:37)

## 2021-10-14 NOTE — PROGRESS NOTE ADULT - PROBLEM SELECTOR PLAN 7
12/20 EF (Visual Estimate): 30-35 %  Pt on home lasix 80 mg PO BID, and started on metolazone.  Currently holding diuretics in setting of hyponatremia

## 2021-10-14 NOTE — PROGRESS NOTE ADULT - PROBLEM SELECTOR PLAN 2
Leukocytosis up to 16k, tachycardia, BP 90s/60s low from base line (baseline runs to 140's)  Unlikely to be due to infection.  More likely to be due to dehydration

## 2021-10-14 NOTE — PROVIDER CONTACT NOTE (HYPOGLYCEMIA EVENT) - NS PROVIDER CONTACT BACKGROUND-HYPO
Age: 66y    Gender: Male    POCT Blood Glucose:  396 mg/dL (10-14-21 @ 03:10)  374 mg/dL (10-13-21 @ 23:50)  390 mg/dL (10-13-21 @ 22:37)      eMAR:atorvastatin   80 milliGRAM(s) Oral (10-13-21 @ 23:04)    insulin glargine Injectable (LANTUS)   10 Unit(s) SubCutaneous (10-13-21 @ 23:58)    insulin lispro (ADMELOG) corrective regimen sliding scale   3 Unit(s) SubCutaneous (10-13-21 @ 23:02)    insulin lispro Injectable (ADMELOG).   3 Unit(s) SubCutaneous (10-14-21 @ 03:33)

## 2021-10-14 NOTE — CONSULT NOTE ADULT - SUBJECTIVE AND OBJECTIVE BOX
Patient seen and evaluated @   Chief Complaint:     HPI:  66M PMH lung cx s/p right lung lobectomy, CKD4, DM2, CAD s/p stent on plavix, HLD, cirrhosis sent in by PCP for hyponatremia of 118 10/12. Reports associated weakness and confusion as per the wife (slower to answer questions and think of words) for past 3 weeks, worsening s/p mechanical fall 1 month ago (no head trauma, no loc, wasn't evaluated at that time). Patient also reports extreme fatigue and sob when lying down. As a result patient was started on a "heavy duty" diuretic about a week ago. Reports R sided thoracic rib and back pain, described as intermittent ache. + 35lb weight loss over 5 weeks. Denies fevers, chills, chest pain, new SOB, cough, headache, nausea, diarrhea, dysuria.    ED Course: BP 97/60, , RR 18, Temp 97.7 F oral, SpO2 98 on RA  Given 1000 mL NS. and 4 mg morphine IV push.  (13 Oct 2021 18:00)    PMH:   Rheumatoid arthritis    Diabetes mellitus    Coronary artery disease    DM type 2 with diabetic dyslipidemia    H/O: HTN (hypertension)    History of MI (myocardial infarction)    Carotid stenosis, left    H/O acute pancreatitis    H/O sleep apnea    History of blood clots    H/O rheumatoid arthritis    Cigarette smoker    Osteoarthritis    Hyperlipidemia    Gastroesophageal reflux disease    Osteoarthritis    History of chronic pancreatitis    Alcohol abuse    Portal vein thrombosis    H/O insertion of insulin pump    H/O hyperkalemia    Vitamin D deficiency    Renal failure, chronic, stage 3 (moderate)    Hearing loss    Cataract    Nasal drainage    Peripheral neuropathy    Fatty liver    Thumb pain, right    PAD (peripheral artery disease)    H/O carotid stenosis    Myocardial infarction    HLD (hyperlipidemia)    Chronic pancreatitis    Solitary pulmonary nodule      PSH:   S/P cholecystectomy    H/O vasectomy    S/P insertion of intrathecal pump    History of inguinal herniorrhaphy    S/P carotid endarterectomy    H/O umbilical hernia repair    S/P insertion of spinal cord stimulator    S/P tonsillectomy and adenoidectomy    H/O arthroscopy of right knee    S/P colonoscopy    H/O coronary angiogram    S/P hip replacement, right      Medications:   acetaminophen   Tablet .. 650 milliGRAM(s) Oral every 6 hours PRN  aspirin enteric coated 81 milliGRAM(s) Oral <User Schedule>  atorvastatin 80 milliGRAM(s) Oral at bedtime  cholecalciferol 2000 Unit(s) Oral at bedtime  clopidogrel Tablet 75 milliGRAM(s) Oral daily  dextrose 40% Gel 15 Gram(s) Oral once  dextrose 5%. 1000 milliLiter(s) IV Continuous <Continuous>  dextrose 5%. 1000 milliLiter(s) IV Continuous <Continuous>  dextrose 50% Injectable 25 Gram(s) IV Push once  dextrose 50% Injectable 12.5 Gram(s) IV Push once  dextrose 50% Injectable 25 Gram(s) IV Push once  dronabinol 2.5 milliGRAM(s) Oral two times a day  glucagon  Injectable 1 milliGRAM(s) IntraMuscular once  influenza   Vaccine 0.5 milliLiter(s) IntraMuscular once  insulin lispro (ADMELOG) corrective regimen sliding scale   SubCutaneous three times a day before meals  insulin lispro (ADMELOG) corrective regimen sliding scale   SubCutaneous at bedtime  insulin lispro Injectable (ADMELOG) 5 Unit(s) SubCutaneous three times a day before meals  methylPREDNISolone 4 milliGRAM(s) Oral daily  metoprolol succinate ER 50 milliGRAM(s) Oral daily  multivitamin 1 Tablet(s) Oral daily  ondansetron Injectable 4 milliGRAM(s) IV Push every 6 hours PRN  pantoprazole    Tablet 40 milliGRAM(s) Oral before breakfast  sodium bicarbonate 1300 milliGRAM(s) Oral three times a day  valsartan 40 milliGRAM(s) Oral at bedtime    Allergies:  amitriptyline (Rash)  Plaquenil (Hives)    FAMILY HISTORY:    Social History:  Smoking:  Alcohol:  Drugs:    Review of Systems:  [ ]Unable to obtain  The remainder of the ROS is negative.    Physical Exam:  T(C): 36.4 (10-14-21 @ 11:13), Max: 36.7 (10-13-21 @ 21:59)  HR: 76 (10-14-21 @ 12:15) (76 - 83)  BP: 105/57 (10-14-21 @ 12:15) (95/61 - 130/67)  RR: 17 (10-14-21 @ 11:13) (17 - 18)  SpO2: 99% (10-14-21 @ 11:13) (98% - 100%)  Wt(kg): --    10-13 @ 07:01  -  10-14 @ 07:00  --------------------------------------------------------  IN: 0 mL / OUT: 325 mL / NET: -325 mL    10-14 @ 07:01  -  10-14 @ 18:31  --------------------------------------------------------  IN: 880 mL / OUT: 600 mL / NET: 280 mL      Daily     Daily     Appearance: Normal, NAD  Eyes: PERRL, EOMI, no scleral icterus   HENT: moist oral mucosa  Cardiovascular: Regular rhythm, Normal S1 and S2, no murmur, rub; no peripheral edema; no JVD  Respiratory: Clear to auscultation bilaterally  Gastrointestinal: Soft, +BS  Musculoskeletal: No clubbing   Neurologic: No focal weakness  Lymphatic: No lymphadenopathy  Psychiatry: A&Ox3 with appropriate mood and affect  Skin: No rashes, ecchymoses, or cyanosis    Cardiovascular Diagnostic Testing:  ECG:    Echo: < from: Transthoracic Echocardiogram (12.21.20 @ 15:20) >  Conclusions:  1. Mitral annular calcification. Tethered mitral valve  leaflets with normal opening. Moderate-severe mitral  regurgitation.  2. Calcified trileaflet aortic valve with normal opening.  Mild-moderate aortic regurgitation.  3. Severely dilated left atrium.  LA volume index = 54  cc/m2.  4. Severe global left ventricular systolic dysfunction.  5. Normal right ventricular size and systolic function.  *** Compared with echocardiogram of 7/30/2014, therehas  been an interval decline in left ventricular systolic  function and an increase in the degree of mitral  regurgitation.  ------------------------------------------------------------------------  Confirmed on  12/22/2020 - 15:08:23 by Glen Montague M.D.  ------------------------------------------------------------------------    < end of copied text >      Stress Testing:    Cath:    Interpretation of Telemetry:    Imaging:    Labs:                        8.5    10.56 )-----------( 207      ( 14 Oct 2021 07:00 )             24.6     10-14    126<L>  |  86<L>  |  78<H>  ----------------------------<  180<H>  3.6   |  27  |  2.50<H>    Ca    8.7      14 Oct 2021 14:24  Phos  4.6     10-14  Mg     1.6     10-14    TPro  6.0  /  Alb  3.0<L>  /  TBili  0.3  /  DBili  x   /  AST  20  /  ALT  11  /  AlkPhos  101  10-14     Thyroid Stimulating Hormone, Serum: 2.03 uIU/mL (10-14 @ 12:06)  
[facility] DIVISION OF KIDNEY DISEASES AND HYPERTENSION -- [phone]  -- INITIAL CONSULT NOTE  --------------------------------------------------------------------------------  HPI:  66M [facility] lung cx s/p right lung lobectomy, CKD4, DM2, CAD s/p stent on plavix, HLD, cirrhosis sent in by PCP for hyponatremia of 118 10/12. Reports associated weakness and confusion as per the wife (slower to answer questions and think of words) for past 3 weeks, worsening s/p mechanical fall 1 month ago (no head trauma, no loc, wasn't evaluated at that time). Patient also reports extreme fatigue and sob when lying down. As a result patient was started on a "heavy duty" diuretic about a week ago. Reports R sided thoracic rib and back pain, described as intermittent ache. + 35lb weight loss over 5 weeks. Denies fevers, chills, chest pain, new SOB, cough, headache, nausea, diarrhea, dysuria.  Nephrology called for CKD management. Pt sees Dr. José Luis Castro in clinic. Last seen in in September.  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 He has been on lasix 80 BID with metolazone as needed for LE edema & weight gain as outpatient. SCr at baseline is around 2.0-2.6 as outpatient SCr on arrival was 2.8-->now 2.5 after IVF.     Patient seen & examined. Complains of dry mouth, not eating well, and 30 lbs weight loss in 3 weeks. Denies decreased urinary frequency, dysuria, hematuria, pus in urine, frothy urine, SOB, leg edema, loss of appetite, N/V/D      PAST HISTORY  --------------------------------------------------------------------------------  PAST MEDICAL & SURGICAL HISTORY:  Diabetes mellitus  Type II, diagnosed in 2000, insulin pump    Coronary artery disease    H/O: HTN (hypertension)    History of MI (myocardial infarction)  in 2002    Carotid stenosis, left    H/O sleep apnea  mild, unable to use CPAP    H/O rheumatoid arthritis  on medication    Cigarette smoker    Hyperlipidemia    Gastroesophageal reflux disease    Osteoarthritis    History of chronic pancreatitis  started in 1999, followed by pain management for pain control    Alcohol abuse  recovering 2003, now only has an occasional drink    Portal vein thrombosis  2004 and treated with blood thinners, no longer follows with hepatology    H/O insertion of insulin pump  2015, omnipod, changed q 3 days    H/O hyperkalemia  treated with sodium bicarbonate    Vitamin D deficiency    Renal failure, chronic, stage 3 (moderate)    Hearing loss  bilateral    Cataract  bilateral    Nasal drainage  post nasal drip    Peripheral neuropathy  feet    Fatty liver    Thumb pain, right    PAD (peripheral artery disease)    H/O carotid stenosis    Myocardial infarction    HLD (hyperlipidemia)    Chronic pancreatitis  &quot;enzymes don&#x27;t work&quot;    Solitary pulmonary nodule    S/P cholecystectomy  2003, laparoscopic    H/O vasectomy  1994    S/P insertion of intrathecal pump  placed in 2007  removed in 2008    History of inguinal herniorrhaphy  right and left, total of 6    S/P carotid endarterectomy  left 11/9/2016    H/O umbilical hernia repair  2014    S/P insertion of spinal cord stimulator  inserted 2006 and removed 2007    S/P tonsillectomy and adenoidectomy  age 16    H/O arthroscopy of right knee  1988    S/P colonoscopy  2014, benign polyps    H/O coronary angiogram    S/P hip replacement, right      FAMILY HISTORY:    PAST SOCIAL HISTORY:    ALLERGIES & MEDICATIONS  --------------------------------------------------------------------------------  Allergies    amitriptyline (Rash)  Plaquenil (Hives)    Intolerances      Standing Inpatient Medications  aspirin enteric coated 81 milliGRAM(s) Oral <User Schedule>  atorvastatin 80 milliGRAM(s) Oral at bedtime  cholecalciferol 2000 Unit(s) Oral at bedtime  clopidogrel Tablet 75 milliGRAM(s) Oral daily  dextrose 40% Gel 15 Gram(s) Oral once  dextrose 5%. 1000 milliLiter(s) IV Continuous <Continuous>  dextrose 5%. 1000 milliLiter(s) IV Continuous <Continuous>  dextrose 50% Injectable 25 Gram(s) IV Push once  dextrose 50% Injectable 12.5 Gram(s) IV Push once  dextrose 50% Injectable 25 Gram(s) IV Push once  glucagon  Injectable 1 milliGRAM(s) IntraMuscular once  influenza   Vaccine 0.5 milliLiter(s) IntraMuscular once  insulin lispro (ADMELOG) corrective regimen sliding scale   SubCutaneous three times a day before meals  insulin lispro (ADMELOG) corrective regimen sliding scale   SubCutaneous at bedtime  insulin lispro Injectable (ADMELOG) 5 Unit(s) SubCutaneous three times a day before meals  methylPREDNISolone 4 milliGRAM(s) Oral daily  metoprolol succinate ER 50 milliGRAM(s) Oral daily  multivitamin 1 Tablet(s) Oral daily  pantoprazole    Tablet 40 milliGRAM(s) Oral before breakfast  potassium chloride    Tablet ER 40 milliEquivalent(s) Oral once  sodium bicarbonate 1300 milliGRAM(s) Oral three times a day  valsartan 40 milliGRAM(s) Oral at bedtime    PRN Inpatient Medications  acetaminophen   Tablet .. 650 milliGRAM(s) Oral every 6 hours PRN  ondansetron Injectable 4 milliGRAM(s) IV Push every 6 hours PRN      REVIEW OF SYSTEMS  --------------------------------------------------------------------------------  Gen: No  fevers/chills, +dry mouth, +weigth loss  Respiratory: No dyspnea, cough  CV: No chest pain  GI: No abdominal pain, diarrhea,  nausea, vomiting  : No increased frequency, dysuria, hematuria  MSK:  no edema  Neuro: No dizziness/lightheadedness    All other systems were reviewed and are negative, except as noted.    VITALS/PHYSICAL EXAM  --------------------------------------------------------------------------------  T(C): 36.4 (10-14-21 @ 11:13), Max: 36.7 (10-13-21 @ 16:29)  HR: 77 (10-14-21 @ 11:13) (77 - 103)  BP: 128/76 (10-14-21 @ 07:05) (90/60 - 130/67)  RR: 17 (10-14-21 @ 11:13) (17 - 18)  SpO2: 99% (10-14-21 @ 11:13) (98% - 100%)  Wt(kg): --  Height (cm): 177.8 (10-13-21 @ 12:43)      10-13-21 @ 07:01  -  10-14-21 @ 07:00  --------------------------------------------------------  IN: 0 mL / OUT: 325 mL / NET: -325 mL      Physical Exam:  	Gen: NAD, frail  	HEENT: Dry MM  	Pulm: CTA B/L  	CV: S1S2  	Abd: Soft, +BS   	Ext: No LE edema B/L, no asterixis  	Neuro: Awake, alert  	Skin: Warm and dry  	Vascular access:    LABS/STUDIES  --------------------------------------------------------------------------------              8.5    10.56 >-----------<  207      [10-14-21 @ 07:00]              24.6     125  |  85  |  79  ----------------------------<  260      [10-14-21 @ 07:07]  3.2   |  25  |  2.48        Ca     9.0     [10-14-21 @ 07:07]      Mg     1.6     [10-14-21 @ 07:07]      Phos  4.6     [10-14-21 @ 07:07]    TPro  6.0  /  Alb  3.0  /  TBili  0.3  /  DBili  x   /  AST  20  /  ALT  11  /  AlkPhos  101  [10-14-21 @ 07:07]        Uric acid 13.6      [10-14-21 @ 07:07]  Serum Osmolality 294      [10-13-21 @ 13:52]    Creatinine Trend:  SCr 2.48 [10-14 @ 07:07]  SCr 2.47 [10-14 @ 01:13]  SCr 2.56 [10-13 @ 19:23]  SCr 2.88 [10-13 @ 13:51]    Urinalysis - [12-20-20 @ 23:47]      Color Colorless / Appearance Clear / SG 1.007 / pH 6.0      Gluc Trace / Ketone Negative  / Bili Negative / Urobili Negative       Blood Trace / Protein Trace / Leuk Est Negative / Nitrite Negative      RBC 2 / WBC 0 / Hyaline 1 / Gran  / Sq Epi  / Non Sq Epi 0 / Bacteria Negative      PTH -- (Ca 7.1)      [12-22-20 @ 20:42]   312  Vitamin D (25OH) 27.3      [12-22-20 @ 20:42]  HbA1c 7.8      [05-02-19 @ 00:50]    HCV 0.06, Nonreact      [12-21-20 @ 07:24]    
    HPI:  65 y/o M  with T2DM dx in 2000. Follows with Dr. Estrada. Diabetes complications include: CAD, HTN. Denies retinopathy. Denies family hx of DM. Denies blurry vision, polyuria, polydipsia, and paresthesias. He reports a good appetite. At home on Omnipod with Humalog and DEXCOM CGM with settings as below. He reports being hyperglycemic mostly in 200s with rare hypoglycemia without pattern but with symptoms. He knows how to use insulin pens. Pt is ordered for PO methylprednisolone 4 mg daily, which is a chronic med for him.   He has pump supplies with him but prefers to continue injections for today.    Glucoses above goal. Received lantus 10 units at 2 am this morning. 5 units of lispro today with glucose 262.     Endocrinologist: Dr. Estrada    PUMP SETTINGS:  Model: omnipod (humalog) + dexcom cgm  Basal Rates:  12AM: 0.5  Total basal: 12 units    I:C:  12AM 1:5    ISF:  12AM: 40    Target     Active Insulin Time: 4 hours    Date of last site change: yesterday, has pods with him    Also hx of lung cx s/p right lung lobectomy, CKD4, CAD s/p stent on plavix, HLD, cirrhosis sent in by PCP for hyponatremia of 118 10/12. Reports associated weakness and confusion as per the wife (slower to answer questions and think of words) for past 3 weeks, worsening s/p mechanical fall 1 month ago (no head trauma, no loc, wasn't evaluated at that time). Patient also reports extreme fatigue and sob when lying down. As a result patient was started on a "heavy duty" diuretic about a week ago. Reports R sided thoracic rib and back pain, described as intermittent ache. + 35lb weight loss over 5 weeks. Denies fevers, chills, chest pain, new SOB, cough, headache, nausea, diarrhea, dysuria.      PAST MEDICAL & SURGICAL HISTORY:  Diabetes mellitus  Type II, diagnosed in 2000, insulin pump    Coronary artery disease    H/O: HTN (hypertension)    History of MI (myocardial infarction)  in 2002    Carotid stenosis, left    H/O sleep apnea  mild, unable to use CPAP    H/O rheumatoid arthritis  on medication    Hyperlipidemia    Gastroesophageal reflux disease    Osteoarthritis    History of chronic pancreatitis  started in 1999, followed by pain management for pain control    Alcohol abuse  recovering 2003, now only has an occasional drink    Portal vein thrombosis  2004 and treated with blood thinners, no longer follows with hepatology    H/O insertion of insulin pump  2015, omnipod, changed q 3 days    H/O hyperkalemia  treated with sodium bicarbonate    Vitamin D deficiency    Renal failure, chronic, stage 3 (moderate)    Hearing loss  bilateral    Cataract  bilateral    Nasal drainage  post nasal drip    Peripheral neuropathy  feet    Fatty liver    Thumb pain, right    PAD (peripheral artery disease)    H/O carotid stenosis    Myocardial infarction    HLD (hyperlipidemia)    Chronic pancreatitis  &quot;enzymes don&#x27;t work&quot;    Solitary pulmonary nodule    S/P cholecystectomy  2003, laparoscopic    H/O vasectomy  1994    S/P insertion of intrathecal pump  placed in 2007  removed in 2008    History of inguinal herniorrhaphy  right and left, total of 6    S/P carotid endarterectomy  left 11/9/2016    H/O umbilical hernia repair  2014    S/P insertion of spinal cord stimulator  inserted 2006 and removed 2007    S/P tonsillectomy and adenoidectomy  age 16    H/O arthroscopy of right knee  1988    S/P colonoscopy  2014, benign polyps    H/O coronary angiogram    S/P hip replacement, right        FAMILY HISTORY: No hx of diabetes in parents or siblings      Social History: +former tobacco use    Outpatient Medications:  · 	HumaLOG KwikPen 100 units/mL injectable solution: 6  to 12 unit(s) injectable 3 times a day (before meals) if insulin pump malfunctions  · 	Lantus Solostar Pen 100 units/mL subcutaneous solution: 12  subcutaneous once a day (at bedtime) if insulin pump malfunctions  · 	atorvastatin 80 mg oral tablet: 1 tab(s) orally once a day (at bedtime)  · 	test strips (per patient's insurance): 1 application subcutaneously 4 times a day. ** Compatible with patient's glucometer **  · 	lancets: 1 application subcutaneously 4 times a day   · 	Insulin Pen Needles, 4mm: 1 application subcutaneously 4 times a day. ** Use with insulin pen **   · 	alcohol swabs : Apply topically to affected area 4 times a day   · 	oxyCODONE 5 mg oral tablet: 1 tab(s) orally every 6 hours, As Needed -for moderate pain MDD:4 tabs   · 	Aspir 81 oral delayed release tablet: one tab orally on monday and friday  · 	metoprolol succinate 50 mg oral tablet, extended release: 1 tab(s) orally once a day, in AM  · 	sodium bicarbonate: 1300 mg orally three times a day   · 	Plavix 75 mg oral tablet: 1 tab(s) orally once a day,  	last dose on 02/01/21  · 	furosemide: 80mg tab one tab orally twice a day  · 	omeprazole 20 mg oral delayed release capsule: 1 cap(s) orally once a day, in AM  · 	Medrol 4 mg oral tablet: one tab orally in AM  · 	calcium citrate: 800mg one tab orally in AM  · 	Vitamin D3 2000 intl units (50 mcg) oral tablet: one tab orally at bedtime  · 	Multiple Vitamins oral tablet: 1 tab(s) orally once a day, l  	last dose on 02/02/21  · 	Cosentyx: 200mg  subcutaneous once a month  · 	Tylenol: 500 mg 2 tabs orally as needed  · 	valsartan 40 mg oral tablet: 1 tab(s) orally once a day    MEDICATIONS  (STANDING):  aspirin enteric coated 81 milliGRAM(s) Oral <User Schedule>  atorvastatin 80 milliGRAM(s) Oral at bedtime  cholecalciferol 2000 Unit(s) Oral at bedtime  clopidogrel Tablet 75 milliGRAM(s) Oral daily  dextrose 40% Gel 15 Gram(s) Oral once  dextrose 5%. 1000 milliLiter(s) (50 mL/Hr) IV Continuous <Continuous>  dextrose 5%. 1000 milliLiter(s) (100 mL/Hr) IV Continuous <Continuous>  dextrose 50% Injectable 25 Gram(s) IV Push once  dextrose 50% Injectable 12.5 Gram(s) IV Push once  dextrose 50% Injectable 25 Gram(s) IV Push once  dronabinol 2.5 milliGRAM(s) Oral two times a day  glucagon  Injectable 1 milliGRAM(s) IntraMuscular once  influenza   Vaccine 0.5 milliLiter(s) IntraMuscular once  insulin lispro (ADMELOG) corrective regimen sliding scale   SubCutaneous three times a day before meals  insulin lispro (ADMELOG) corrective regimen sliding scale   SubCutaneous at bedtime  insulin lispro Injectable (ADMELOG) 5 Unit(s) SubCutaneous three times a day before meals  methylPREDNISolone 4 milliGRAM(s) Oral daily  metoprolol succinate ER 50 milliGRAM(s) Oral daily  multivitamin 1 Tablet(s) Oral daily  pantoprazole    Tablet 40 milliGRAM(s) Oral before breakfast  sodium bicarbonate 1300 milliGRAM(s) Oral three times a day  valsartan 40 milliGRAM(s) Oral at bedtime    MEDICATIONS  (PRN):  acetaminophen   Tablet .. 650 milliGRAM(s) Oral every 6 hours PRN Temp greater or equal to 38C (100.4F), Mild Pain (1 - 3), Moderate Pain (4 - 6)  ondansetron Injectable 4 milliGRAM(s) IV Push every 6 hours PRN Nausea and/or Vomiting      Allergies    amitriptyline (Rash)  Plaquenil (Hives)    Intolerances      Review of Systems:  Constitutional: No fever, +weight loss  Eyes: No blurry vision  Neuro: No headache, No paresthesias  HEENT: No throat pain +dry mouth  Cardiovascular: No chest pain  Respiratory: No SOB  GI: No nausea or vomiting  : No polyuria  Skin: no rash  Psych: no depression  Endocrine: No polydipsia, No heat or cold intolerance, rest as noted in HPI  Hem/lymph: no swelling    All other review of systems negative      PHYSICAL EXAM:  VITALS: T(C): 36.4 (10-14-21 @ 11:13)  T(F): 97.6 (10-14-21 @ 11:13), Max: 98 (10-13-21 @ 16:29)  HR: 76 (10-14-21 @ 12:15) (76 - 98)  BP: 105/57 (10-14-21 @ 12:15) (90/60 - 130/67)  RR:  (17 - 18)  SpO2:  (98% - 100%)  Wt(kg): --  GENERAL: NAD at this time, thin  EYES: No proptosis, EOMI  HEENT:  Atraumatic, Normocephalic,   THYROID: Normal size, no palpable nodules  RESPIRATORY: Clear to auscultation bilaterally, full excursion, non-labored  CARDIOVASCULAR: Regular rhythm; No murmurs; no peripheral edema  GI: Soft, nontender, non distended, normal bowel sounds  SKIN: Dry, intact, No rashes or lesions  MUSCULOSKELETAL: normal strength  NEURO: follows commands  PSYCH: Alert and oriented x 3, normal affect, normal mood  CUSHING'S SIGNS: no striae      POCT Blood Glucose.: 199 mg/dL (10-14-21 @ 12:55)  POCT Blood Glucose.: 275 mg/dL (10-14-21 @ 08:47)  POCT Blood Glucose.: 396 mg/dL (10-14-21 @ 03:10)  POCT Blood Glucose.: 374 mg/dL (10-13-21 @ 23:50)  POCT Blood Glucose.: 390 mg/dL (10-13-21 @ 22:37)                              8.5    10.56 )-----------( 207      ( 14 Oct 2021 07:00 )             24.6       10-14    125<L>  |  85<L>  |  79<H>  ----------------------------<  260<H>  3.2<L>   |  25  |  2.48<H>    EGFR if : 30<L>  EGFR if non : 26<L>    Ca    9.0      10-14  Mg     1.6     10-14  Phos  4.6     10-14    TPro  6.0  /  Alb  3.0<L>  /  TBili  0.3  /  DBili  x   /  AST  20  /  ALT  11  /  AlkPhos  101  10-14    Thyroid Function Tests:  10-14 @ 12:06 TSH 2.03 FreeT4 -- T3 -- Anti TPO -- Anti Thyroglobulin Ab -- TSI --            Radiology:

## 2021-10-14 NOTE — CONSULT NOTE ADULT - REASON FOR ADMISSION
Symptomatic hyponatremia + Leukocytosis

## 2021-10-14 NOTE — PROGRESS NOTE ADULT - ATTENDING COMMENTS
Seen, examined the patient this am with house staff  Resting in bed, looks dehydrated, weak, afebrile, c/o some pain in lower ribs. SBP in   Reviewed labs, imaging    This is a 66M with h/o lung Ca, s/p right lung lobectomy, chronic systolic HF on diuretics, CKD4, CAD s/p stent on Plavix, DM2, cirrhosis sent in by PCP for symptomatic hyponatremia, weight loss 30Lbs in a moth with low appetite. In ED Na was 120, Scr 2.88. CT chest showed trace R effusion and solid left lower lobe lesion which remain suspicious for a synchronous lung cancer.    - Na 125 this am, off IV hydration, Scr 2.5. EF 30-35%. Closely f/u BMP q 8 hrs    Held all diuretics (Lasix, Metolazone)  - appreciated Renal consult- Low Na is likely from metolazone; poor solute intake; decreased free water excretion due to CKD & some   pseudohyponatremia from high blood sugars  - Spoke to Cardiology- Dr Lisker, will see him today. Agree with plan  - Weight loss could be related to current medical issues and solid left lower lobe lesion which remain suspicious for a synchronous lung cancer.    Nutrition consult, Marinol 2.5mg bid  - PT eval in 24-48 hrs  ** spoke to wife at bedside . Seen, examined the patient this am with house staff  Resting in bed, looks dehydrated, weak, afebrile, c/o some pain in lower ribs. SBP in   Reviewed labs, imaging    This is a 66M with h/o lung Ca, s/p right lung lobectomy, chronic systolic HF on diuretics, CKD4, CAD s/p stent on Plavix, DM2, cirrhosis sent in by PCP for symptomatic hyponatremia, weight loss 30Lbs in a moth with low appetite. In ED Na was 120, Scr 2.88. CT chest showed trace R effusion and solid left lower lobe lesion which remain suspicious for a synchronous lung cancer.    - Na 125 this am, off IV hydration, Scr 2.5. EF 30-35%. Closely f/u BMP q 8 hrs    Held all diuretics (Lasix, Metolazone)  - appreciated Renal consult- Low Na is likely from metolazone; poor solute intake; decreased free water excretion due to CKD & some   pseudohyponatremia from high blood sugars  - Spoke to Cardiology- Dr Lisker, will see him today. Agree with plan  - Weight loss could be related to current medical issues and solid left lower lobe lesion which remain suspicious for a synchronous lung cancer.    Nutrition consult, Marinol 2.5mg bid  - appreciated Endocrine consult. Has been on Insulin Pump. FS has been uncontrolled. On Medrol outpatient    added Admelog 5units AC  - PT eval in 24-48 hrs  ** spoke to wife at bedside .

## 2021-10-14 NOTE — PROGRESS NOTE ADULT - PROBLEM SELECTOR PLAN 3
Last A1C 9.7 from september  Has insulin pump, treat as type 1 diabetes as per endo note from last admission  Was removed for imaging?  Presented today with a glucose of 395 on BMP  Endocrine consult placed- lantus 10 stat as per endo

## 2021-10-14 NOTE — PHARMACOTHERAPY INTERVENTION NOTE - COMMENTS
Medication reconciliation completed. Please refer to specifics in home medication list (outpatient medication review). Medications verified with patient.     Aspirin 81mg DR tab take 1 tab po on Monday and Friday   Metoprolol succinate 50mg tab take 1 tab po once daily   Sodium bicarbonate 1300mg tab take 1 tab po TID   Plavix 75mg tab take 1 tab po once daily   Furosemide 80mg tab take 1 tab po twice daily   Omeprazole 20mg po take 1 tab po once daily   Calcium citrate 800mg tab take 1 tab po once daily   Vitamin D3 2000 U (50mcg) tab take 1 tab po once daily   Multiple vitamins tab take 1 tab po once daily   Tylenol 500mg take 2 tab po once daily PRN pain   Atorvastatin 80mg tab take 1 tab po once daily   Valsartan 40mg tab take 1 tab po once daily       Added:  Voltaren 1% gel apply to affected area as needed for pain     Changed:  Medrol 4mg tab take 1 tab twice daily (from 1 tab once daily)     Removed:  Alcohol swabs, insulin pen needles, lancets, test strips     Time spent:  15 minutes Medication reconciliation completed. Please refer to specifics in home medication list (outpatient medication review). Medications verified with patient.     Aspirin 81mg DR tab take 1 tab po on Monday and Friday   Metoprolol succinate 50mg tab take 1 tab po once daily   Sodium bicarbonate 1300mg tab take 1 tab po TID   Plavix 75mg tab take 1 tab po once daily   Furosemide 80mg tab take 1 tab po twice daily   Omeprazole 20mg po take 1 tab po once daily   Calcium citrate 800mg tab take 1 tab po once daily   Vitamin D3 2000 U (50mcg) tab take 1 tab po once daily   Multiple vitamins tab take 1 tab po once daily   Tylenol 500mg take 2 tab po once daily PRN pain   Atorvastatin 80mg tab take 1 tab po once daily   Valsartan 40mg tab take 1 tab po once daily       Added:  Voltaren 1% gel apply to affected area as needed for pain     Changed:  Medrol 4mg tab take 1 tab twice daily (from 1 tab once daily)     Removed:  Alcohol swabs, insulin pen needles, lancets, test strips     Time spent:  15 minutes    Gildardo Greer, PharmD Candidate  Mendoza Rosales, PkD, BCPS  [phone]  Available on Microsoft Teams

## 2021-10-14 NOTE — CONSULT NOTE ADULT - ASSESSMENT
66M PMH lung cx s/p right lung lobectomy, CKD4, DM2, CAD s/p stent on plavix, HLD, cirrhosis sent in by PCP for hyponatremia of 118 10/12. Reports associated weakness and confusion as per the wife (slower to answer questions and think of words) for past 3 weeks, worsening s/p mechanical fall 1 month ago (no head trauma, no loc, wasn't evaluated at that time). Patient also reports extreme fatigue and sob when lying down. As a result patient was started on a "heavy duty" diuretic about a week ago. Reports R sided thoracic rib and back pain, described as intermittent ache. + 35lb weight loss over 5 weeks. Denies fevers, chills, chest pain, new SOB, cough, headache, nausea, diarrhea, dysuria.Nephrology called for CKD management.             IVA on CKD IV-   The etiology on biopsy proven diabetic nephropathy and the patient is at high risk for long term progression.  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 Pt sees Dr. José Luis Castro in clinic. Last seen in in September.  He has been on lasix 80 BID with metolazone as needed for LE edema & weight gain as outpatient. SCr at baseline is around 2.0-2.6 as outpatient SCr on arrival was 2.8-->now 2.5 after IVF. Would hold off on lasix & metolazone as pt appears dry. Hold valsartan for now. Check bladder scan & Renal US. Recommend Lazaro catheter placement if retaining. s/p NS x 2L. Hold off on further IVF. Monitor labs and urine output. Avoid NSAIDs, ACEI/ARBS, RCA and nephrotoxins. Dose medications as per eGFR.      Hyponatremia- in the setting of metolazone; poor solute intake; decreased free water excretion due to CKD & some pseudohyponatremia from high blood sugars  Hold metolazone. s/p NS x 2L. Hold off on further IVF. Na 120-->125. Free water restriction to < 1L/day.  Avoid over-correction by >6mEQ in 24 hours. Liberalize salt intake & encourage solute intake. Monitor SNa Q6 hours. f/u BMP q4      Hypertension -SBP in 100's hold all BP meds. S/p IVF    Hypokalemia -replace, f/u BMP    Upon discharge please make appointment with Nephrology clinic. For scheduling please email Nephrology at [email or web address]    If you have any questions, please feel free to contact me  Irish Bowen  Nephrology Fellow  Pager NS: [phone]/ [facility]: [phone]    (After 5 pm or on weekends please page the on-call fellow, can check [email or web address] for schedule. Login is osbaldo mendoza, schedule under [facility] medicine, psych, derm)      66M PMH lung cx s/p right lung lobectomy, CKD4, DM2, CAD s/p stent on plavix, HLD, cirrhosis sent in by PCP for hyponatremia of 118 10/12. Reports associated weakness and confusion as per the wife (slower to answer questions and think of words) for past 3 weeks, worsening s/p mechanical fall 1 month ago (no head trauma, no loc, wasn't evaluated at that time). Patient also reports extreme fatigue and sob when lying down. As a result patient was started on a "heavy duty" diuretic about a week ago. Reports R sided thoracic rib and back pain, described as intermittent ache. + 35lb weight loss over 5 weeks. Denies fevers, chills, chest pain, new SOB, cough, headache, nausea, diarrhea, dysuria.Nephrology called for CKD management.     IVA on CKD IV-   The etiology of CKD was biopsy proven diabetic nephropathy and the patient is at high risk for long term progression.  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 Pt sees Dr. José Luis Castro in clinic. Last seen in in September.  He has been on lasix 80 BID with metolazone as needed for LE edema & weight gain as outpatient. SCr at baseline is around 2.0-2.6 as outpatient SCr on arrival was 2.8-->now 2.5 after IVF. Would hold off on lasix & metolazone as pt appears dry. Hold valsartan for now. Check bladder scan & Renal US. Recommend Lazaro catheter placement if retaining. s/p NS x 2L. Hold off on further IVF. Monitor labs and urine output. Avoid NSAIDs, ACEI/ARBS, RCA and nephrotoxins. Dose medications as per eGFR.      Hyponatremia- in the setting of metolazone; poor solute intake; decreased free water excretion due to CKD & some pseudohyponatremia from high blood sugars  Hold metolazone. s/p NS x 2L. Hold off on further IVF. Na 120-->125. Free water restriction to < 1L/day.  Avoid over-correction by >6mEQ in 24 hours. Liberalize salt intake & encourage solute intake. Monitor SNa Q6 hours. f/u BMP q4      Hypertension -SBP in 100's hold all BP meds. S/p IVF    Hypokalemia -replace, f/u BMP    Upon discharge please make appointment with Nephrology clinic. For scheduling please email Nephrology at [email or web address]    If you have any questions, please feel free to contact me  Irish Bowen  Nephrology Fellow  Pager NS: [phone]/ [facility]: [phone]    (After 5 pm or on weekends please page the on-call fellow, can check [email or web address] for schedule. Login is osbaldo mendoza, schedule under [facility] medicine, psych, derm)

## 2021-10-14 NOTE — CONSULT NOTE ADULT - ASSESSMENT
66 year old with Severe LV dysfunction and non-obstructive CAD with a history of lung cancer and chronic steroid use for RA/psoriatic arthritis, now presents with hypovolemic hyponatremia and unexplained weight loss.  Metolazone use since 9/24 explains his hypovolemic hyponatremia.  Symptoms and sodium improved with withdrawal of diuretics.  No CHF on exam today.  CAD: continue \asa/statin.  HTN and HFrEF: continue beta-blocker.  Renal function near baseline, will monitor.  Weight loss workup underway.

## 2021-10-14 NOTE — CONSULT NOTE ADULT - ASSESSMENT
65 yo M with PMH of HTN,, CAD, type 2DM, chronic pancreatitis now insulin dependent on omnipod pump with dexcom CGM, hx of lung adenocarcinoma s/p right lobectomy who presented with hyponatremia. Endocrine consulted for hyperglycemia (high risk patient with severely uncontrolled Type 2 DM w/ hyperglycemia and A1c of 9.7% on insulin pump at high risk of CAD and CVA with high level decision-making).     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 Treat like T1DM. No evidence of DKA. On solumedrol at home and here.  - recommend monitor on Lantus 10 units for now despite fasting above goal today as he received 10 units last night and his glucose dropped significantly overnight without additional correction. Also only received Lantus 7 units qhs on last admission with fasting at goal.  - recommend lispro 6 units with meals (pt has aggressive carb ratio on steroids. may need more prandial insulin)  - recommend moderate dose correction scale with meals, low dose correction scale before bed  - patient wants to continue insulin injections today. Depending on imaging studies and discharge plan, pt may want to go back on pump tomorrow  - consistent carb diet  - premeal glucose goal <140, random glucose goal <180  - do NOT hold basal insulin even if NPO as patient could be at risk for DKA    D/c planning:  - pt to resume pump. Based on timing of discharge and last basal insulin shot, may need to instruct temp basal rate which pt is comfortable with  - pt should be prescribed back up insulin pens in case of pump failure or other issues (frequent imaging as in his case): lantus and admelog    DISCHARGE RX:  - Lantus Solostar Pen (or Basaglar Kwikpen): Inject 12 units sc in case of pump failure, dispense 5 pens (3 refills)   - Novolog Flexpen (or Humalog Kwikpen): Inject 6-12 units sc before meals in case of pump failure, dispense 5 pens (3 refills)  - BD vincenzo 4mm pen needles: dispense #100, use as directed (3 refills)  - Alcohol pads: dispense #100, use as directed (3 refills)    # Hypertension  - goal BP <130/80. On antihypertensives at home.   - management per primary team    #Hyperlipidemia  - continue atorvastatin 80 mg        Manuel Jackson D.O  [phone]

## 2021-10-14 NOTE — CONSULT NOTE ADULT - ATTENDING COMMENTS
Hyponatremia due to thiazide use + poor solute intake. has received a total of 3 L normal saline since admission.     hold off on further iv fluids. hold diuretics for now.     jelly shah  nephrology attending   Cell# [phone]   [facility]- [phone]

## 2021-10-15 NOTE — PROGRESS NOTE ADULT - SUBJECTIVE AND OBJECTIVE BOX
NORM DEAN  66y  MRN: [id number]    Patient is a 66y old  Male who presents with a chief complaint of Symptomatic hyponatremia + Leukocytosis (14 Oct 2021 18:30)      Subjective: no events ON. Denies fever, CP, SOB, abn pain, N/V, dysuria. Tolerating diet.      MEDICATIONS  (STANDING):  aspirin enteric coated 81 milliGRAM(s) Oral <User Schedule>  atorvastatin 80 milliGRAM(s) Oral at bedtime  cholecalciferol 2000 Unit(s) Oral at bedtime  clopidogrel Tablet 75 milliGRAM(s) Oral daily  dextrose 40% Gel 15 Gram(s) Oral once  dextrose 5%. 1000 milliLiter(s) (50 mL/Hr) IV Continuous <Continuous>  dextrose 5%. 1000 milliLiter(s) (100 mL/Hr) IV Continuous <Continuous>  dextrose 50% Injectable 25 Gram(s) IV Push once  dextrose 50% Injectable 12.5 Gram(s) IV Push once  dextrose 50% Injectable 25 Gram(s) IV Push once  dronabinol 2.5 milliGRAM(s) Oral two times a day  glucagon  Injectable 1 milliGRAM(s) IntraMuscular once  influenza   Vaccine 0.5 milliLiter(s) IntraMuscular once  insulin glargine Injectable (LANTUS) 10 Unit(s) SubCutaneous once  insulin lispro (ADMELOG) corrective regimen sliding scale   SubCutaneous three times a day before meals  insulin lispro (ADMELOG) corrective regimen sliding scale   SubCutaneous at bedtime  insulin lispro Injectable (ADMELOG) 5 Unit(s) SubCutaneous three times a day before meals  methylPREDNISolone 4 milliGRAM(s) Oral daily  metoprolol succinate ER 50 milliGRAM(s) Oral daily  multivitamin 1 Tablet(s) Oral daily  pantoprazole    Tablet 40 milliGRAM(s) Oral before breakfast  sodium bicarbonate 1300 milliGRAM(s) Oral three times a day  valsartan 40 milliGRAM(s) Oral at bedtime    MEDICATIONS  (PRN):  acetaminophen   Tablet .. 650 milliGRAM(s) Oral every 6 hours PRN Temp greater or equal to 38C (100.4F), Mild Pain (1 - 3), Moderate Pain (4 - 6)  ondansetron Injectable 4 milliGRAM(s) IV Push every 6 hours PRN Nausea and/or Vomiting      Objective:    Vitals: Vital Signs Last 24 Hrs  T(C): 36.9 (10-15-21 @ 04:50), Max: 36.9 (10-15-21 @ 04:50)  T(F): 98.4 (10-15-21 @ 04:50), Max: 98.4 (10-15-21 @ 04:50)  HR: 87 (10-15-21 @ 04:50) (72 - 87)  BP: 90/48 (10-15-21 @ 04:50) (90/48 - 138/78)  BP(mean): --  RR: 17 (10-15-21 @ 04:50) (17 - 18)  SpO2: 97% (10-15-21 @ 04:50) (97% - 100%)            I&O's Summary    14 Oct 2021 07:01  -  15 Oct 2021 07:00  --------------------------------------------------------  IN: 880 mL / OUT: 1250 mL / NET: -370 mL        PHYSICAL EXAM:  GENERAL: NAD  HEAD:  Atraumatic, Normocephalic  EYES: EOMI, conjunctiva and sclera clear  CHEST/LUNG: Clear to percussion bilaterally; No rales, rhonchi, wheezing, or rubs  HEART: Regular rate and rhythm; No murmurs, rubs, or gallops  ABDOMEN: Soft, Nontender, Nondistended;   SKIN: No rashes or lesions  NERVOUS SYSTEM:  Alert & Oriented X3, no focal deficit    LABS:  10-14    127<L>  |  87<L>  |  78<H>  ----------------------------<  160<H>  4.2   |  27  |  2.58<H>  10-14    126<L>  |  86<L>  |  78<H>  ----------------------------<  180<H>  3.6   |  27  |  2.50<H>  10-14    125<L>  |  85<L>  |  79<H>  ----------------------------<  260<H>  3.2<L>   |  25  |  2.48<H>    Ca    8.9      14 Oct 2021 21:07  Ca    8.7      14 Oct 2021 14:24  Ca    9.0      14 Oct 2021 07:07  Phos  4.6     10-14  Mg     1.6     10-14    TPro  6.0  /  Alb  3.0<L>  /  TBili  0.3  /  DBili  x   /  AST  20  /  ALT  11  /  AlkPhos  101  10-14  TPro  7.3  /  Alb  3.5  /  TBili  0.5  /  DBili  x   /  AST  24  /  ALT  13  /  AlkPhos  140<H>  10-13                    Urinalysis Basic - ( 15 Oct 2021 01:19 )    Color: Light Yellow / Appearance: Clear / S.009 / pH: x  Gluc: x / Ketone: Negative  / Bili: Negative / Urobili: Negative   Blood: x / Protein: Trace / Nitrite: Negative   Leuk Esterase: Negative / RBC: 1 /hpf / WBC 0 /HPF   Sq Epi: x / Non Sq Epi: 1 /hpf / Bacteria: Negative                              9.2    15.81 )-----------( 285      ( 15 Oct 2021 06:41 )             27.6                         8.5    10.56 )-----------( 207      ( 14 Oct 2021 07:00 )             24.6                         10.6   16.26 )-----------( 297      ( 13 Oct 2021 13:51 )             30.9     CAPILLARY BLOOD GLUCOSE      POCT Blood Glucose.: 155 mg/dL (14 Oct 2021 21:31)  POCT Blood Glucose.: 168 mg/dL (14 Oct 2021 17:19)  POCT Blood Glucose.: 199 mg/dL (14 Oct 2021 12:55)  POCT Blood Glucose.: 275 mg/dL (14 Oct 2021 08:47)

## 2021-10-15 NOTE — PHYSICAL THERAPY INITIAL EVALUATION ADULT - PERTINENT HX OF CURRENT PROBLEM, REHAB EVAL
66M PMH lung cx s/p right lung lobectomy, CKD4, DM2, CAD s/p stent on plavix, HLD, cirrhosis sent in by PCP for hyponatremia of 118 10/12. Reports associated weakness and confusion as per the wife (slower to answer questions and think of words)

## 2021-10-15 NOTE — PROGRESS NOTE ADULT - PROBLEM SELECTOR PLAN 6
Lung cancer, s/p right half lung lobectomy Lung cancer, s/p right half lung lobectomy  Allscripts note shows that heme onc (Dr. Baldwin) signed off on patient on september 2nd  Patient is scheduled for outpatient Radiation Therapy with Dr. Macias but didn't get it due to a recent fall about a week ago.  Dr. Macias's office is contacted  Pt pending inpatient rad onc consult recs

## 2021-10-15 NOTE — CHART NOTE - NSCHARTNOTEFT_GEN_A_CORE
Thank you for making us aware of this patient. Case discussed with attending physician. Defer management to primary team. No role for inpatient radiation. Please call our office when patient is being discharged so appropriate followup can be arranged. Please feel free to reach out if the situation changes. He will complete SBRT to lung as outpatient when discharged.    Raghav Coats, PGY-2    [facility], Radiation Oncology  [phone]  [address] [place] [address], [place] [address]

## 2021-10-15 NOTE — PHYSICAL THERAPY INITIAL EVALUATION ADULT - ASSISTIVE DEVICE FOR TRANSFER: SIT/STAND, REHAB EVAL
harvinder walker

## 2021-10-15 NOTE — PHYSICAL THERAPY INITIAL EVALUATION ADULT - ACTIVE RANGE OF MOTION EXAMINATION, REHAB EVAL
ekta. upper extremity Active ROM was WNL (within normal limits)/bilateral lower extremity Active ROM was WNL (within normal limits)

## 2021-10-15 NOTE — DIETITIAN INITIAL EVALUATION ADULT. - PROBLEM SELECTOR PLAN 5
Creatinine 2.88  Baseline 2.4-2.6  + Rouleaux formation  Pending SPEP UPEP to r/o MM (álvaro + anemia)

## 2021-10-15 NOTE — DIETITIAN INITIAL EVALUATION ADULT. - OTHER INFO
Pt reports improved good appetite and PO intake in house since yesterday. Noted 75% PO intake as per breakfast tray at bedside. Denies drinking Glucerna so far but states will drink it. Denies difficulty chewing/swallowing. Reports vomiting yesterday in the morning- denies further episodes after. Denies nausea, diarrhea, or constipation, last BM yesterday (10/14).     Pt reports 35 pounds weight loss x 3 months PTA due to decreased PO intake and "breathing" problems from 151 to 116 pounds. Weight as per previous RD note (02/12/2021) 153 pounds. No weight as per flow sheets. Pt sitting in chair at this time.     Provided recommendations to optimize PO and protein intake, recommended nutrient-dense snacks, non-perishable food, or nutritional supplement between meals and to start with protein; reviewed foods with protein, nutrient-dense snacks, and menu order procedures in hospital. Pt denies having further questions/concerns about diet and nutrition - made aware RD remains available.

## 2021-10-15 NOTE — PROGRESS NOTE ADULT - ATTENDING COMMENTS
Seen, examined the patient this am with house staff  Sitting in bed, looks dehydrated, weak, afebrile, c/o some pain in lower ribs. SBP in   Reviewed labs, imaging    This is a 66M with h/o lung Ca, s/p right lung lobectomy, chronic systolic HF on diuretics, CKD4, CAD s/p stent on Plavix, DM2, cirrhosis sent in by PCP for symptomatic hyponatremia, weight loss 30Lbs in a moth with low appetite. In ED Na was 120, Scr 2.88. CT chest showed trace R effusion and solid left lower lobe lesion which remain suspicious for a synchronous lung cancer.    - Na 126 from 128, off IV hydration, Scr 2.6. EF 30-35%. Closely f/u BMP q 8 hrs    Held all diuretics (Lasix, Metolazone)  - appreciated Renal consult- Low Na is likely from metolazone; poor solute intake; decreased free water excretion due to CKD & some     pseudohyponatremia from high blood sugars  - Cardiology consult appreciated, agreed on Rx- off diuretics, Hold ARB, c/w BB  - Weight loss could be related to current medical issues and CT chest- solid left lower lobe lesion which remain suspicious for a synchronous lung   cancer.    Will call Rad/Onc consult    Nutrition evaluated, Marinol 2.5mg bid  - Endocrine f/u plan noted.  Off Insulin Pump. Rec Lantus 10 units qhs and Admelog 5units AC. On Medrol for RA/Psoriasis  - PT in progress-> home PT  ** spoke to wife at bedside .

## 2021-10-15 NOTE — PROGRESS NOTE ADULT - SUBJECTIVE AND OBJECTIVE BOX
Chief Complaint:     History:    MEDICATIONS  (STANDING):  aspirin enteric coated 81 milliGRAM(s) Oral <User Schedule>  atorvastatin 80 milliGRAM(s) Oral at bedtime  cholecalciferol 2000 Unit(s) Oral at bedtime  clopidogrel Tablet 75 milliGRAM(s) Oral daily  dextrose 40% Gel 15 Gram(s) Oral once  dextrose 5%. 1000 milliLiter(s) (50 mL/Hr) IV Continuous <Continuous>  dextrose 5%. 1000 milliLiter(s) (100 mL/Hr) IV Continuous <Continuous>  dextrose 50% Injectable 25 Gram(s) IV Push once  dextrose 50% Injectable 12.5 Gram(s) IV Push once  dextrose 50% Injectable 25 Gram(s) IV Push once  dronabinol 2.5 milliGRAM(s) Oral two times a day  glucagon  Injectable 1 milliGRAM(s) IntraMuscular once  influenza   Vaccine 0.5 milliLiter(s) IntraMuscular once  insulin lispro (ADMELOG) corrective regimen sliding scale   SubCutaneous three times a day before meals  insulin lispro (ADMELOG) corrective regimen sliding scale   SubCutaneous at bedtime  insulin lispro Injectable (ADMELOG) 5 Unit(s) SubCutaneous three times a day before meals  methylPREDNISolone 4 milliGRAM(s) Oral daily  metoprolol succinate ER 50 milliGRAM(s) Oral daily  multivitamin 1 Tablet(s) Oral daily  pantoprazole    Tablet 40 milliGRAM(s) Oral before breakfast  sodium bicarbonate 1300 milliGRAM(s) Oral three times a day    MEDICATIONS  (PRN):  acetaminophen   Tablet .. 650 milliGRAM(s) Oral every 6 hours PRN Temp greater or equal to 38C (100.4F), Mild Pain (1 - 3), Moderate Pain (4 - 6)  ondansetron Injectable 4 milliGRAM(s) IV Push every 6 hours PRN Nausea and/or Vomiting  oxyCODONE    IR 5 milliGRAM(s) Oral every 6 hours PRN Severe Pain (7 - 10)      Allergies    amitriptyline (Rash)  Plaquenil (Hives)    Intolerances      Review of Systems:  Constitutional: No fever  Eyes: No blurry vision  Neuro: No tremors  HEENT: No pain  Cardiovascular: No chest pain, palpitations  Respiratory: No SOB, no cough  GI: No nausea, vomiting, abdominal pain  : No dysuria  Skin: no rash  Psych: no depression  Endocrine: no polyuria, polydipsia  Hem/lymph: no swelling  Osteoporosis: no fractures    ALL OTHER SYSTEMS REVIEWED AND NEGATIVE    UNABLE TO OBTAIN    PHYSICAL EXAM:  VITALS: T(C): 36.3 (10-15-21 @ 11:00)  T(F): 97.4 (10-15-21 @ 11:00), Max: 98.4 (10-15-21 @ 04:50)  HR: 80 (10-15-21 @ 11:00) (72 - 87)  BP: 106/67 (10-15-21 @ 11:00) (90/48 - 138/78)  RR:  (17 - 18)  SpO2:  (97% - 100%)  Wt(kg): --  GENERAL: NAD, well-groomed, well-developed  EYES: No proptosis, no lid lag, anicteric  HEENT:  Atraumatic, Normocephalic, moist mucous membranes  THYROID: Normal size, no palpable nodules  RESPIRATORY: Clear to auscultation bilaterally; No rales, rhonchi, wheezing, or rubs  CARDIOVASCULAR: Regular rate and rhythm; No murmurs; no peripheral edema  GI: Soft, nontender, non distended, normal bowel sounds  SKIN: Dry, intact, No rashes or lesions  MUSCULOSKELETAL: Full range of motion, normal strength  NEURO: sensation intact, extraocular movements intact, no tremor, normal reflexes  PSYCH: Alert and oriented x 3, normal affect, normal mood  CUSHING'S SIGNS: no striae    POCT Blood Glucose.: 244 mg/dL (10-15-21 @ 08:48)  POCT Blood Glucose.: 155 mg/dL (10-14-21 @ 21:31)  POCT Blood Glucose.: 168 mg/dL (10-14-21 @ 17:19)  POCT Blood Glucose.: 199 mg/dL (10-14-21 @ 12:55)  POCT Blood Glucose.: 275 mg/dL (10-14-21 @ 08:47)  POCT Blood Glucose.: 396 mg/dL (10-14-21 @ 03:10)  POCT Blood Glucose.: 374 mg/dL (10-13-21 @ 23:50)  POCT Blood Glucose.: 390 mg/dL (10-13-21 @ 22:37)      10-15    128<L>  |  87<L>  |  77<H>  ----------------------------<  186<H>  4.3   |  25  |  2.68<H>    EGFR if : 27<L>  EGFR if non : 23<L>    Ca    8.8      10-15  Mg     1.6     10-15  Phos  4.1     10-15    TPro  6.0  /  Alb  3.0<L>  /  TBili  0.3  /  DBili  x   /  AST  20  /  ALT  11  /  AlkPhos  101  10-14          Thyroid Function Tests:  10-14 @ 12:06 TSH 2.03 FreeT4 -- T3 -- Anti TPO -- Anti Thyroglobulin Ab -- TSI --                           Chief Complaint: T2DM    History: No acute events. Patient did not receive Lantus last night. He was given Lantus 10 Units this AM.     MEDICATIONS  (STANDING):  aspirin enteric coated 81 milliGRAM(s) Oral <User Schedule>  atorvastatin 80 milliGRAM(s) Oral at bedtime  cholecalciferol 2000 Unit(s) Oral at bedtime  clopidogrel Tablet 75 milliGRAM(s) Oral daily  dextrose 40% Gel 15 Gram(s) Oral once  dextrose 5%. 1000 milliLiter(s) (50 mL/Hr) IV Continuous <Continuous>  dextrose 5%. 1000 milliLiter(s) (100 mL/Hr) IV Continuous <Continuous>  dextrose 50% Injectable 25 Gram(s) IV Push once  dextrose 50% Injectable 12.5 Gram(s) IV Push once  dextrose 50% Injectable 25 Gram(s) IV Push once  dronabinol 2.5 milliGRAM(s) Oral two times a day  glucagon  Injectable 1 milliGRAM(s) IntraMuscular once  influenza   Vaccine 0.5 milliLiter(s) IntraMuscular once  insulin lispro (ADMELOG) corrective regimen sliding scale   SubCutaneous three times a day before meals  insulin lispro (ADMELOG) corrective regimen sliding scale   SubCutaneous at bedtime  insulin lispro Injectable (ADMELOG) 5 Unit(s) SubCutaneous three times a day before meals  methylPREDNISolone 4 milliGRAM(s) Oral daily  metoprolol succinate ER 50 milliGRAM(s) Oral daily  multivitamin 1 Tablet(s) Oral daily  pantoprazole    Tablet 40 milliGRAM(s) Oral before breakfast  sodium bicarbonate 1300 milliGRAM(s) Oral three times a day    MEDICATIONS  (PRN):  acetaminophen   Tablet .. 650 milliGRAM(s) Oral every 6 hours PRN Temp greater or equal to 38C (100.4F), Mild Pain (1 - 3), Moderate Pain (4 - 6)  ondansetron Injectable 4 milliGRAM(s) IV Push every 6 hours PRN Nausea and/or Vomiting  oxyCODONE    IR 5 milliGRAM(s) Oral every 6 hours PRN Severe Pain (7 - 10)      Allergies    amitriptyline (Rash)  Plaquenil (Hives)    Intolerances      Review of Systems:  Constitutional: No fever  Eyes: No blurry vision  Neuro: No tremors  HEENT: No pain  Cardiovascular: No chest pain, palpitations  Respiratory: No SOB, no cough  GI: No nausea, vomiting, abdominal pain  : No dysuria  Skin: no rash  Psych: no depression  Endocrine: no polyuria, polydipsia    ALL OTHER SYSTEMS REVIEWED AND NEGATIVE      PHYSICAL EXAM:  VITALS: T(C): 36.3 (10-15-21 @ 11:00)  T(F): 97.4 (10-15-21 @ 11:00), Max: 98.4 (10-15-21 @ 04:50)  HR: 80 (10-15-21 @ 11:00) (72 - 87)  BP: 106/67 (10-15-21 @ 11:00) (90/48 - 138/78)  RR:  (17 - 18)  SpO2:  (97% - 100%)  Wt(kg): --  GENERAL: NAD, well-groomed, well-developed  EYES: No proptosis, no lid lag, anicteric  HEENT:  Atraumatic, Normocephalic, moist mucous membranes  RESPIRATORY: Clear to auscultation bilaterally  CARDIOVASCULAR: Regular rate and rhythm  GI: Soft, nontender, non distended, normal bowel sounds  SKIN: Dry, intact, No rashes or lesions  PSYCH: Alert and oriented x 3, normal affect, normal mood      POCT Blood Glucose.: 244 mg/dL (10-15-21 @ 08:48)  POCT Blood Glucose.: 155 mg/dL (10-14-21 @ 21:31)  POCT Blood Glucose.: 168 mg/dL (10-14-21 @ 17:19)  POCT Blood Glucose.: 199 mg/dL (10-14-21 @ 12:55)  POCT Blood Glucose.: 275 mg/dL (10-14-21 @ 08:47)  POCT Blood Glucose.: 396 mg/dL (10-14-21 @ 03:10)  POCT Blood Glucose.: 374 mg/dL (10-13-21 @ 23:50)  POCT Blood Glucose.: 390 mg/dL (10-13-21 @ 22:37)      10-15    128<L>  |  87<L>  |  77<H>  ----------------------------<  186<H>  4.3   |  25  |  2.68<H>    EGFR if : 27<L>  EGFR if non : 23<L>    Ca    8.8      10-15  Mg     1.6     10-15  Phos  4.1     10-15    TPro  6.0  /  Alb  3.0<L>  /  TBili  0.3  /  DBili  x   /  AST  20  /  ALT  11  /  AlkPhos  101  10-14          Thyroid Function Tests:  10-14 @ 12:06 TSH 2.03 FreeT4 -- T3 -- Anti TPO -- Anti Thyroglobulin Ab -- TSI --

## 2021-10-15 NOTE — PROGRESS NOTE ADULT - ASSESSMENT
67 yo M with PMH of HTN,, CAD, type 2DM, chronic pancreatitis now insulin dependent on omnipod pump with dexcom CGM, hx of lung adenocarcinoma s/p right lobectomy who presented with hyponatremia. Endocrine consulted for hyperglycemia (high risk patient with severely uncontrolled Type 2 DM w/ hyperglycemia and A1c of 9.7% on insulin pump at high risk of CAD and CVA with high level decision-making).     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 Treat like T1DM. No evidence of DKA. On solumedrol at home and here.  - recommend monitor on Lantus 10 units for now despite fasting above goal today as he received 10 units last night and his glucose dropped significantly overnight without additional correction. Also only received Lantus 7 units qhs on last admission with fasting at goal.  - recommend lispro 6 units with meals (pt has aggressive carb ratio on steroids. may need more prandial insulin)  - recommend moderate dose correction scale with meals, low dose correction scale before bed  - patient wants to continue insulin injections today. Depending on imaging studies and discharge plan, pt may want to go back on pump tomorrow  - consistent carb diet  - premeal glucose goal <140, random glucose goal <180  - do NOT hold basal insulin even if NPO as patient could be at risk for DKA    D/c planning:  - pt to resume pump. Based on timing of discharge and last basal insulin shot, may need to instruct temp basal rate which pt is comfortable with  - pt should be prescribed back up insulin pens in case of pump failure or other issues (frequent imaging as in his case): lantus and admelog    DISCHARGE RX:  - Lantus Solostar Pen (or Basaglar Kwikpen): Inject 12 units sc in case of pump failure, dispense 5 pens (3 refills)   - Novolog Flexpen (or Humalog Kwikpen): Inject 6-12 units sc before meals in case of pump failure, dispense 5 pens (3 refills)  - BD vincenzo 4mm pen needles: dispense #100, use as directed (3 refills)  - Alcohol pads: dispense #100, use as directed (3 refills)    # Hypertension  - goal BP <130/80. On antihypertensives at home.   - management per primary team    #Hyperlipidemia  - continue atorvastatin 80 mg         67 yo M with PMH of HTN,, CAD, type 2DM, chronic pancreatitis now insulin dependent on omnipod pump with dexcom CGM, hx of lung adenocarcinoma s/p right lobectomy who presented with hyponatremia. Endocrine consulted for hyperglycemia (high risk patient with severely uncontrolled Type 2 DM w/ hyperglycemia and A1c of 9.7% on insulin pump at high risk of CAD and CVA with high level decision-making).     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 Treat like T1DM. No evidence of DKA. On solumedrol at home and here.  - Please ensure that patient is ordered for Lantus every day  - Can continue Lantus 10 Units every AM  - recommend lispro 5 units with meals   - recommend moderate dose correction scale with meals, low dose correction scale before bed  - consistent carb diet  - premeal glucose goal <140, random glucose goal <180  - do NOT hold basal insulin even if NPO as patient could be at risk for DKA    D/c planning:  - pt to resume pump. Based on timing of discharge and last basal insulin shot, may need to instruct temp basal rate which pt is comfortable with  - pt should be prescribed back up insulin pens in case of pump failure or other issues (frequent imaging as in his case): lantus and admelog    DISCHARGE RX:  - Lantus Solostar Pen (or Basaglar Kwikpen): Inject 12 units sc in case of pump failure, dispense 5 pens (3 refills)   - Novolog Flexpen (or Humalog Kwikpen): Inject 6-12 units sc before meals in case of pump failure, dispense 5 pens (3 refills)  - BD vincenzo 4mm pen needles: dispense #100, use as directed (3 refills)  - Alcohol pads: dispense #100, use as directed (3 refills)    # Hypertension  - goal BP <130/80. On antihypertensives at home.   - management per primary team    #Hyperlipidemia  - continue atorvastatin 80 mg      Jaja Aguilera DO

## 2021-10-15 NOTE — DIETITIAN INITIAL EVALUATION ADULT. - PHYSCIAL ASSESSMENT
Skin: pressure ulcers in sacrum and ekta. heel stage 1 as per documentation.  Performed nutrition focused physical exam with pt's consent and noted: underweight

## 2021-10-15 NOTE — PROGRESS NOTE ADULT - SUBJECTIVE AND OBJECTIVE BOX
[facility] Division of Kidney Diseases & Hypertension  FOLLOW UP NOTE  [phone]--------------------------------------------------------------------------------  Chief Complaint:Hyponatremia, hypo-osmolarity, or hypo-osmolar hyponatremia        24 hour events/subjective: Patient seen & examined. Labs & vitals reviewed. No events overnight. BP borderline. UO in the last 24 hours 1.2L         PAST HISTORY  --------------------------------------------------------------------------------  No significant changes to PMH, PSH, FHx, SHx, unless otherwise noted    ALLERGIES & MEDICATIONS  --------------------------------------------------------------------------------  Allergies    amitriptyline (Rash)  Plaquenil (Hives)    Intolerances      Standing Inpatient Medications  aspirin enteric coated 81 milliGRAM(s) Oral <User Schedule>  atorvastatin 80 milliGRAM(s) Oral at bedtime  cholecalciferol 2000 Unit(s) Oral at bedtime  clopidogrel Tablet 75 milliGRAM(s) Oral daily  dextrose 40% Gel 15 Gram(s) Oral once  dextrose 5%. 1000 milliLiter(s) IV Continuous <Continuous>  dextrose 5%. 1000 milliLiter(s) IV Continuous <Continuous>  dextrose 50% Injectable 25 Gram(s) IV Push once  dextrose 50% Injectable 12.5 Gram(s) IV Push once  dextrose 50% Injectable 25 Gram(s) IV Push once  dronabinol 2.5 milliGRAM(s) Oral two times a day  glucagon  Injectable 1 milliGRAM(s) IntraMuscular once  influenza   Vaccine 0.5 milliLiter(s) IntraMuscular once  insulin lispro (ADMELOG) corrective regimen sliding scale   SubCutaneous three times a day before meals  insulin lispro (ADMELOG) corrective regimen sliding scale   SubCutaneous at bedtime  insulin lispro Injectable (ADMELOG) 5 Unit(s) SubCutaneous three times a day before meals  methylPREDNISolone 4 milliGRAM(s) Oral daily  metoprolol succinate ER 50 milliGRAM(s) Oral daily  multivitamin 1 Tablet(s) Oral daily  pantoprazole    Tablet 40 milliGRAM(s) Oral before breakfast  sodium bicarbonate 1300 milliGRAM(s) Oral three times a day    PRN Inpatient Medications  acetaminophen   Tablet .. 650 milliGRAM(s) Oral every 6 hours PRN  ondansetron Injectable 4 milliGRAM(s) IV Push every 6 hours PRN  oxyCODONE    IR 5 milliGRAM(s) Oral every 6 hours PRN      REVIEW OF SYSTEMS  --------------------------------------------------------------------------------  Gen: No  fevers/chills  Respiratory: No dyspnea, cough  CV: No chest pain  GI: No abdominal pain, diarrhea,  nausea, vomiting  : No increased frequency, dysuria, hematuria  MSK:  no edema  Neuro: No dizziness/lightheadedness      All other systems were reviewed and are negative, except as noted.    VITALS/PHYSICAL EXAM  --------------------------------------------------------------------------------  T(C): 36.3 (10-15-21 @ 11:00), Max: 36.9 (10-15-21 @ 04:50)  HR: 80 (10-15-21 @ 11:00) (72 - 87)  BP: 106/67 (10-15-21 @ 11:00) (90/48 - 138/78)  RR: 18 (10-15-21 @ 11:00) (17 - 18)  SpO2: 100% (10-15-21 @ 11:00) (97% - 100%)  Wt(kg): --  Height (cm): 177.8 (10-13-21 @ 12:43)      10-14-21 @ 07:01  -  10-15-21 @ 07:00  --------------------------------------------------------  IN: 880 mL / OUT: 1250 mL / NET: -370 mL    10-15-21 @ 07:01  -  10-15-21 @ 12:27  --------------------------------------------------------  IN: 240 mL / OUT: 0 mL / NET: 240 mL      Physical Exam:  	Gen: NAD, frail  	HEENT: Dry MM  	Pulm: CTA B/L  	CV: S1S2  	Abd: Soft, +BS   	Ext: No LE edema B/L, no asterixis  	Neuro: Awake, alert  	Skin: Warm and dry  	Vascular access:      LABS/STUDIES  --------------------------------------------------------------------------------              9.2    15.81 >-----------<  285      [10-15-21 @ 06:41]              27.6     128  |  87  |  77  ----------------------------<  186      [10-15-21 @ 06:39]  4.3   |  25  |  2.68        Ca     8.8     [10-15-21 @ 06:39]      Mg     1.6     [10-15-21 @ 06:39]      Phos  4.1     [10-15-21 @ 06:39]    TPro  6.0  /  Alb  3.0  /  TBili  0.3  /  DBili  x   /  AST  20  /  ALT  11  /  AlkPhos  101  [10-14-21 @ 07:07]        Uric acid 13.6      [10-14-21 @ 07:07]  Serum Osmolality 294      [10-13-21 @ 13:52]    Creatinine Trend:  SCr 2.68 [10-15 @ 06:39]  SCr 2.58 [10-14 @ 21:07]  SCr 2.50 [10-14 @ 14:24]  SCr 2.48 [10-14 @ 07:07]  SCr 2.47 [10-14 @ 01:13]    Urinalysis - [10-15-21 @ 01:19]      Color Light Yellow / Appearance Clear / SG 1.009 / pH 5.5      Gluc Negative / Ketone Negative  / Bili Negative / Urobili Negative       Blood Negative / Protein Trace / Leuk Est Negative / Nitrite Negative      RBC 1 / WBC 0 / Hyaline  / Gran  / Sq Epi  / Non Sq Epi 1 / Bacteria Negative    Urine Sodium 34      [10-15-21 @ 01:19]  Urine Osmolality 255      [10-15-21 @ 01:19]    TSH 2.03      [10-14-21 @ 12:06]

## 2021-10-15 NOTE — DIETITIAN INITIAL EVALUATION ADULT. - ADD RECOMMEND
1. Will continue to monitor PO intake, weight, labs, skin, GI status, diet. 2. Encourage PO intake and obtain food preferences as able (pt did not recall any at this time). 3. Continue Multivitamin ONLY as medically feasible to optimize nutrient intake and further aid with pressure ulcer healing. 4. Continue appetite stimulant if no medical contraindications. 5. Provided recommendations to optimize PO and protein intake - made aware RD remains available. 6. Malnutrition/BMI <19 notification placed in chart.

## 2021-10-15 NOTE — DIETITIAN INITIAL EVALUATION ADULT. - PERTINENT LABORATORY DATA
ankle injury (09/04) HbA1c 9.7%; Finger sticks: (10/15) 244 (10/14) 155 - 396; (10/15) BUN 77, Cr 2.68,

## 2021-10-15 NOTE — PROGRESS NOTE ADULT - ASSESSMENT
66M PMH lung cx s/p right lung lobectomy, CKD4, DM2, CAD s/p stent on plavix, HLD, cirrhosis sent in by PCP for hyponatremia of 118 10/12. Reports associated weakness and confusion as per the wife (slower to answer questions and think of words) for past 3 weeks, worsening s/p mechanical fall 1 month ago (no head trauma, no loc, wasn't evaluated at that time). Patient also reports extreme fatigue and sob when lying down. As a result patient was started on a "heavy duty" diuretic about a week ago. Reports R sided thoracic rib and back pain, described as intermittent ache. + 35lb weight loss over 5 weeks. Denies fevers, chills, chest pain, new SOB, cough, headache, nausea, diarrhea, dysuria.Nephrology called for CKD management.     IVA on CKD IV-   The etiology of CKD was biopsy proven diabetic nephropathy and the patient is at high risk for long term progression.  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 Pt sees Dr. José Luis Castro in clinic. Last seen in in September.  He has been on lasix 80 BID with metolazone as needed for LE edema & weight gain as outpatient. SCr at baseline is around 2.0-2.6 as outpatient SCr on arrival was 2.8-->now 2.6 (at baseline) after IVF. Would hold off on lasix & metolazone as pt appears dry. Hold valsartan for now. Renal US with 9 cm kidneys & no hydro. BP borderline. Agree with NS x 1L bolus today.  Monitor labs and urine output. Avoid NSAIDs, ACEI/ARBS, RCA and nephrotoxins. Dose medications as per eGFR.      Hyponatremia- in the setting of metolazone; poor solute intake; decreased free water excretion due to CKD   Hold metolazone. s/p IVF Hold off on further IVF. Na 120-->125-->128. Free water restriction to < 1L/day.  Avoid over-correction by >6mEQ in 24 hours. Liberalize salt intake & encourage solute intake. Monitor SNa Q6 hours. f/u BMP q4      Hypertension -now hypotensive. SBP in 90's hold all BP meds. IVF as needed to maintain MAP > 65      Hypokalemia -replace, f/u BMP      Upon discharge please make appointment with Nephrology clinic. For scheduling please email Nephrology at [email or web address]    If you have any questions, please feel free to contact me  Irish Bowen  Nephrology Fellow  Pager [place]: [phone]/ [facility]: [phone]    (After 5 pm or on weekends please page the on-call fellow, can check [email or web address] for schedule. Login is osbaldo mendoza, schedule under [facility] medicine, psych, derm)   66M PMH lung cx s/p right lung lobectomy, CKD4, DM2, CAD s/p stent on plavix, HLD, cirrhosis sent in by PCP for hyponatremia of 118 10/12. Reports associated weakness and confusion as per the wife (slower to answer questions and think of words) for past 3 weeks, worsening s/p mechanical fall 1 month ago (no head trauma, no loc, wasn't evaluated at that time). Patient also reports extreme fatigue and sob when lying down. As a result patient was started on a "heavy duty" diuretic about a week ago. Reports R sided thoracic rib and back pain, described as intermittent ache. + 35lb weight loss over 5 weeks. Denies fevers, chills, chest pain, new SOB, cough, headache, nausea, diarrhea, dysuria.Nephrology called for CKD management.     IVA on CKD IV-   The etiology of CKD was biopsy proven diabetic nephropathy and the patient is at high risk for long term progression.  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 Pt sees Dr. José Luis Castro in clinic. Last seen in in September.  He has been on lasix 80 BID with metolazone as needed for LE edema & weight gain as outpatient. SCr at baseline is around 2.0-2.6 as outpatient SCr on arrival was 2.8-->now 2.6 (at baseline) after IVF. Would hold off on lasix & metolazone as pt appears dry. Hold valsartan for now. Renal US with 9 cm kidneys & no hydro. BP borderline. Agree with NS- 100cc /hour times 10 hours Monitor labs and urine output. Avoid NSAIDs, ACEI/ARBS, RCA and nephrotoxins. Dose medications as per eGFR.      Hyponatremia- in the setting of metolazone; poor solute intake; decreased free water excretion due to CKD   Hold metolazone. s/p IVF Hold off on further IVF. Na 120-->125-->128. Free water restriction to < 1L/day.  Avoid over-correction by >6mEQ in 24 hours. Liberalize salt intake & encourage solute intake. Monitor SNa Q6 hours. f/u BMP q4      Hypertension -now hypotensive. SBP in 90's hold all BP meds. IVF as needed to maintain MAP > 65      Hypokalemia -replace, f/u BMP      Upon discharge please make appointment with Nephrology clinic. For scheduling please email Nephrology at [email or web address]    If you have any questions, please feel free to contact me  Irish Bowen  Nephrology Fellow  Pager NS: [phone]/ [facility]: [phone]    (After 5 pm or on weekends please page the on-call fellow, can check [email or web address] for schedule. Login is osbaldo mendoza, schedule under [facility] medicine, psych, derm)

## 2021-10-15 NOTE — DIETITIAN INITIAL EVALUATION ADULT. - REASON FOR ADMISSION
Pt 65 y/o M with PMH as per chart: lung cx S/P right lung lobectomy, CKD4, DM2, CAD S/P stent on plavix, HLD, HTN, MI, chronic pancreatitis, cirrhosis, OA, PAD, vitamin D deficiency, sent in by PCP for hyponatremia of 118, weakness, confusion, found with leukocytosis S/P increased diuretic use, SIRS; weight loss, started Marinol. Pt 67 y/o M with PMH as per chart: lung cx S/P right lung lobectomy, CKD4, DM2, CAD S/P stent on plavix, HLD, HTN, MI, chronic pancreatitis, cirrhosis, OA, PAD, vitamin D deficiency, sent in by PCP for hyponatremia of 118 S/P increased diuretic use, weakness, confusion, found with leukocytosis, SIRS; weight loss, started Marinol.

## 2021-10-15 NOTE — DIETITIAN NUTRITION RISK NOTIFICATION - TREATMENT: THE FOLLOWING DIET HAS BEEN RECOMMENDED
Diet, Regular:   Consistent Carbohydrate {No Snacks} (CSTCHO)  Supplement Feeding Modality:  Oral  Glucerna Shake Cans or Servings Per Day:  2       Frequency:  Daily (10-14-21 @ 09:49) [Active]

## 2021-10-15 NOTE — PHYSICAL THERAPY INITIAL EVALUATION ADULT - PRECAUTIONS/LIMITATIONS, REHAB EVAL
for past 3 weeks, worsening s/p mechanical fall 1 month ago (no head trauma, no loc, wasn't evaluated at that time). Patient also reports extreme fatigue and sob when lying down. As a result patient was started on a "heavy duty" diuretic about a week ago. Reports R sided thoracic rib and back pain, described as intermittent ache. + 35lb weight loss over 5 weeks. Denies fevers, chills, chest pain, new SOB, cough, headache, nausea, diarrhea, dysuria./fall precautions

## 2021-10-15 NOTE — DIETITIAN INITIAL EVALUATION ADULT. - REASON INDICATOR FOR ASSESSMENT
Nutrition consult received for assessment and education.  Information obtained from: medical record, previous RD note, and pt.

## 2021-10-15 NOTE — DIETITIAN INITIAL EVALUATION ADULT. - DIET TYPE
1. Recommend Consistent Carbohydrate with snack diet. Will continue to monitor as able and adjust as needed. 2. Continue Glucerna Shake 240mls 2x daily (440kcals, 20g protein) to optimize kcal and protein intake.

## 2021-10-15 NOTE — DIETITIAN INITIAL EVALUATION ADULT. - ORAL INTAKE PTA/DIET HISTORY
Pt reports poor appetite and PO intake x~3 months, worsened in the last 2 weeks PTA due to SOB and lung cancer. Confirms NKFA. Reports taking Multivitamin, Vitamin D3, Calcium, and Glucerna 2-3xday PTA. Reports following a low K+ diet for kidney disease and low sugar diet for DM at home. Reports BG monitoring via continuous monitor, unable to recall ranges, states it has been high due to steroids, and states taking Humalog via insulin pump at home; HbA1c (09/04) 9.7% - indicates poor BG control.

## 2021-10-15 NOTE — PROGRESS NOTE ADULT - ATTENDING COMMENTS
jelly shah  nephrology attending   Cell# [phone]   [facility] [phone]

## 2021-10-15 NOTE — PROVIDER CONTACT NOTE (OTHER) - ACTION/TREATMENT ORDERED:
BMP q6hrs, continue t o monitor urine output
md made aware  as per md reschedule metoprolol to 10am  pending further orders  will continue to monitor

## 2021-10-15 NOTE — PHYSICAL THERAPY INITIAL EVALUATION ADULT - ADDITIONAL COMMENTS
Pt lives in  with spouse, 1 step to enter. PTA, pt was I with all ADLs with difficulty and was using RW/SC for ambulation.

## 2021-10-15 NOTE — PROVIDER CONTACT NOTE (OTHER) - ASSESSMENT
patient is alert and oriented bp 90/48 other VSS  patient complaining of generalized neck and shoulder pain po tylenol administered as ordered for pain
pt has urinary hesitancy

## 2021-10-16 NOTE — CHART NOTE - NSCHARTNOTEFT_GEN_A_CORE
65 yo M with PMH of HTN,, CAD, type 2DM, chronic pancreatitis now insulin dependent on omnipod pump with dexcom CGM, hx of lung adenocarcinoma s/p right lobectomy who presented with hyponatremia. Endocrine consulted for hyperglycemia (high risk patient with severely uncontrolled Type 2 DM w/ hyperglycemia and A1c of 9.7% on insulin pump at high risk of CAD and CVA with high level decision-making).    POC glucose, insulin requirements, lab values reviewed. 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 Treat like T1DM. No evidence of DKA. On solumedrol at home and here.  - Please ensure that patient is ordered for Lantus every day  -c/w Lantus 10 Units qd, move back by 2 hours every day to reach bedtime   - recommend lispro 7 units with meals if pt tolerating atleast 50% of meals   - recommend moderate dose correction scale with meals, low dose correction scale before bed  - consistent carb diet  - premeal glucose goal <140, random glucose goal <180  - do NOT hold basal insulin even if NPO as patient could be at risk for DKA 65 yo M with PMH of HTN,, CAD, type 2DM, chronic pancreatitis now insulin dependent on omnipod pump with dexcom CGM, hx of lung adenocarcinoma s/p right lobectomy who presented with hyponatremia. Endocrine consulted for hyperglycemia (high risk patient with severely uncontrolled Type 2 DM w/ hyperglycemia and A1c of 9.7% on insulin pump at high risk of CAD and CVA with high level decision-making).    POC glucose, insulin requirements, lab values reviewed. continues on chronic home dose MTP. 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 Treat like T1DM. No evidence of DKA. On solumedrol at home and here.  - Please ensure that patient is ordered for Lantus every day  -c/w Lantus 10 Units qd, move back by 2 hours every day to reach bedtime   - recommend lispro 7 units with meals if pt tolerating atleast 50% of meals , may require further increase in doses I:C ratio on omnipod is 1:5  - recommend moderate dose correction scale with meals, low dose correction scale before bed  - consistent carb diet  - premeal glucose goal <140, random glucose goal <180  - do NOT hold basal insulin even if NPO as patient could be at risk for DKA

## 2021-10-16 NOTE — PROGRESS NOTE ADULT - ATTENDING COMMENTS
Seen, examined the patient this am with house staff  Feels good, no SOB, fever, eating better. c/o constipation,  -138  Reviewed labs, imaging    This is a 66M with h/o lung Ca, s/p right lung lobectomy, chronic systolic HF on diuretics, CKD4, CAD s/p stent on Plavix, DM2, cirrhosis sent in by PCP for symptomatic hyponatremia, weight loss 30Lbs in a moth with low appetite. In ED Na was 120, Scr 2.88. CT chest showed trace R effusion and solid left lower lobe lesion which remain suspicious for a synchronous lung cancer.    - Na 133 from 126 with IV hydration, Off IVF now. Scr 2.3. EF 30-35%. Closely f/u BMP q 12 hrs    Held all diuretics (Lasix, Metolazone)  - appreciated Renal consult- Low Na is likely from metolazone; poor solute intake; decreased free water excretion due to CKD & some       pseudohyponatremia from high blood sugars  - Cardiology consult appreciated, agreed on Rx- off diuretics, Hold ARB, c/w BB  - Weight loss could be related to current medical issues and CT chest- solid left lower lobe lesion which remain suspicious for a synchronous lung     cancer. He has been eating better since admission    Called Rad/Onc - rec outpatient RT as scheduled    Nutrition evaluated, added Marinol 2.5mg bid  - Endocrine f/u plan noted.  Off Insulin Pump. Rec Lantus 10 units qhs and Admelog 5units AC. On Medrol for RA/Psoriasis  - PT in progress-> home PT  ** spoke to wife at bedside

## 2021-10-16 NOTE — DISCHARGE NOTE PROVIDER - PROVIDER TOKENS
PROVIDER:[[id number]],FOLLOWUP:[1 week],ESTABLISHEDPATIENT:[T]] PROVIDER:[[id number]],FOLLOWUP:[1 week],ESTABLISHEDPATIENT:[T]],PROVIDER:[[id number]],FOLLOWUP:[1 week],ESTABLISHEDPATIENT:[T]] PROVIDER:[[id number]],FOLLOWUP:[1 week],ESTABLISHEDPATIENT:[T]],PROVIDER:[[id number]],FOLLOWUP:[1 week],ESTABLISHEDPATIENT:[T]],PROVIDER:[[id number]],FOLLOWUP:[2 weeks],ESTABLISHEDPATIENT:[T]]

## 2021-10-16 NOTE — PROGRESS NOTE ADULT - PROBLEM SELECTOR PLAN 2
Leukocytosis up to 16k, tachycardia, BP 90s/60s low from base line (baseline runs to 140's)  Unlikely to be due to infection.  More likely to be due to dehydration Leukocytosis up to 16k, tachycardia, BP 90s/60s low from base line (baseline runs to 140's)  Unlikely to be due to infection.  More likely to be due to dehydration  Monitor off abx

## 2021-10-16 NOTE — DISCHARGE NOTE PROVIDER - CARE PROVIDERS DIRECT ADDRESSES
,[email or web address]

## 2021-10-16 NOTE — DISCHARGE NOTE PROVIDER - NSDCMRMEDTOKEN_GEN_ALL_CORE_FT
Aspir 81 oral delayed release tablet: one tab orally on monday and friday  atorvastatin 80 mg oral tablet: 1 tab(s) orally once a day (at bedtime)  calcium citrate: 800mg one tab orally in AM  furosemide: 80mg tab one tab orally twice a day  ibuprofen 200 mg oral tablet: 2 tab(s) orally once a day, As Needed  Medrol 4 mg oral tablet: 1  orally 2 times a day  metoprolol succinate 50 mg oral tablet, extended release: 1 tab(s) orally once a day, in AM  Multiple Vitamins oral tablet: 1 tab(s) orally once a day  omeprazole 20 mg oral delayed release capsule: 1 cap(s) orally once a day, in AM  Plavix 75 mg oral tablet: 1 tab(s) orally once a day  sodium bicarbonate: 1300 mg orally three times a day   Tylenol: 500 mg 2 tabs orally as needed  valsartan 40 mg oral tablet: 1 tab(s) orally once a day (at bedtime)  Vitamin D3 2000 intl units (50 mcg) oral tablet: one tab orally at bedtime  Voltaren 1% topical gel: Apply topically to affected area , As Needed   Aspir 81 oral delayed release tablet: one tab orally on monday and friday  atorvastatin 80 mg oral tablet: 1 tab(s) orally once a day (at bedtime)  Medrol 4 mg oral tablet: 1  orally 2 times a day  metoprolol succinate 50 mg oral tablet, extended release: 1 tab(s) orally once a day, in AM  Multiple Vitamins oral tablet: 1 tab(s) orally once a day  omeprazole 20 mg oral delayed release capsule: 1 cap(s) orally once a day, in AM  Plavix 75 mg oral tablet: 1 tab(s) orally once a day  polyethylene glycol 3350 oral powder for reconstitution: 17 gram(s) orally once a day (at bedtime)  senna oral tablet: 2 tab(s) orally once a day (at bedtime)  sodium bicarbonate: 1300 mg orally three times a day   Tylenol: 500 mg 2 tabs orally as needed  Vitamin D3 2000 intl units (50 mcg) oral tablet: one tab orally at bedtime  Voltaren 1% topical gel: Apply topically to affected area , As Needed   Aspir 81 oral delayed release tablet: one tab orally on monday and friday  atorvastatin 80 mg oral tablet: 1 tab(s) orally once a day (at bedtime)  Medrol 4 mg oral tablet: 1  orally 2 times a day  metoprolol succinate 50 mg oral tablet, extended release: 1 tab(s) orally once a day, in AM  Multiple Vitamins oral tablet: 1 tab(s) orally once a day  omeprazole 20 mg oral delayed release capsule: 1 cap(s) orally once a day, in AM  Outpatient Physical Therapy: 1x Outpatient Physical Therapy  Plavix 75 mg oral tablet: 1 tab(s) orally once a day  polyethylene glycol 3350 oral powder for reconstitution: 17 gram(s) orally once a day (at bedtime)  senna oral tablet: 2 tab(s) orally once a day (at bedtime)  sodium bicarbonate: 1300 mg orally three times a day   Tylenol: 500 mg 2 tabs orally as needed  Vitamin D3 2000 intl units (50 mcg) oral tablet: one tab orally at bedtime  Voltaren 1% topical gel: Apply topically to affected area , As Needed   alcohol swabs : Apply topically to affected area 4 times a day   Aspir 81 oral delayed release tablet: one tab orally on monday and friday  atorvastatin 80 mg oral tablet: 1 tab(s) orally once a day (at bedtime)  Basaglar KwikPen 100 units/mL subcutaneous solution: 12 unit(s) subcutaneous once a day   furosemide 80 mg oral tablet: 1 tab(s) orally once a day   HumaLOG KwikPen 100 units/mL injectable solution: 6-12 unit(s) subcutaneous 3 times a day (with meals)   Insulin Pen Needles, 4mm: 1 application subcutaneously 4 times a day. ** Use with insulin pen **   Marinol 2.5 mg oral capsule: 1 cap(s) orally 2 times a day MDD:2  Medrol 4 mg oral tablet: 1  orally 2 times a day  metoprolol succinate 50 mg oral tablet, extended release: 1 tab(s) orally once a day, in AM  Multiple Vitamins oral tablet: 1 tab(s) orally once a day  omeprazole 20 mg oral delayed release capsule: 1 cap(s) orally once a day, in AM  Outpatient Physical Therapy: 1x Outpatient Physical Therapy  oxyCODONE 5 mg oral capsule: 1 cap(s) orally every 6 hours, As Needed for Severe Pain (7-10 out of 10 pain) MDD:4  Plavix 75 mg oral tablet: 1 tab(s) orally once a day  polyethylene glycol 3350 oral powder for reconstitution: 17 gram(s) orally once a day (at bedtime)  senna oral tablet: 2 tab(s) orally once a day (at bedtime)  sodium bicarbonate: 1300 mg orally three times a day   Tylenol: 500 mg 2 tabs orally as needed  Vitamin D3 2000 intl units (50 mcg) oral tablet: one tab orally at bedtime  Voltaren 1% topical gel: Apply topically to affected area , As Needed

## 2021-10-16 NOTE — PROGRESS NOTE ADULT - SUBJECTIVE AND OBJECTIVE BOX
INCOMPLETE NOTE PROGRESS NOTE:     Patient is a 66y old  Male who presents with a chief complaint of Symptomatic hyponatremia + Leukocytosis (16 Oct 2021 09:58)      SUBJECTIVE / OVERNIGHT EVENTS:  Overnight, Na 135 after 10 hrs fluids  No acute complaints, feels tired  Leg swelling stable    ADDITIONAL REVIEW OF SYSTEMS:  No fevers, chest pain, shortness of breath, abdominal pain, lower extremity swelling or pain, dysuria, or constipation.    MEDICATIONS  (STANDING):  aspirin enteric coated 81 milliGRAM(s) Oral <User Schedule>  atorvastatin 80 milliGRAM(s) Oral at bedtime  cholecalciferol 2000 Unit(s) Oral at bedtime  clopidogrel Tablet 75 milliGRAM(s) Oral daily  dextrose 40% Gel 15 Gram(s) Oral once  dextrose 5%. 1000 milliLiter(s) (50 mL/Hr) IV Continuous <Continuous>  dextrose 5%. 1000 milliLiter(s) (100 mL/Hr) IV Continuous <Continuous>  dextrose 50% Injectable 25 Gram(s) IV Push once  dextrose 50% Injectable 12.5 Gram(s) IV Push once  dextrose 50% Injectable 25 Gram(s) IV Push once  dronabinol 2.5 milliGRAM(s) Oral two times a day  glucagon  Injectable 1 milliGRAM(s) IntraMuscular once  influenza   Vaccine 0.5 milliLiter(s) IntraMuscular once  insulin glargine Injectable (LANTUS) 10 Unit(s) SubCutaneous every morning  insulin lispro (ADMELOG) corrective regimen sliding scale   SubCutaneous three times a day before meals  insulin lispro (ADMELOG) corrective regimen sliding scale   SubCutaneous at bedtime  insulin lispro Injectable (ADMELOG) 5 Unit(s) SubCutaneous three times a day before meals  methylPREDNISolone 4 milliGRAM(s) Oral daily  metoprolol succinate ER 50 milliGRAM(s) Oral daily  multivitamin 1 Tablet(s) Oral daily  pantoprazole    Tablet 40 milliGRAM(s) Oral before breakfast  polyethylene glycol 3350 17 Gram(s) Oral at bedtime  senna 2 Tablet(s) Oral at bedtime  sodium bicarbonate 1300 milliGRAM(s) Oral three times a day  sodium chloride 1 Gram(s) Oral two times a day    MEDICATIONS  (PRN):  acetaminophen   Tablet .. 650 milliGRAM(s) Oral every 6 hours PRN Temp greater or equal to 38C (100.4F), Mild Pain (1 - 3), Moderate Pain (4 - 6)  ondansetron Injectable 4 milliGRAM(s) IV Push every 6 hours PRN Nausea and/or Vomiting  oxyCODONE    IR 5 milliGRAM(s) Oral every 6 hours PRN Severe Pain (7 - 10)      CAPILLARY BLOOD GLUCOSE      POCT Blood Glucose.: 289 mg/dL (16 Oct 2021 13:06)  POCT Blood Glucose.: 123 mg/dL (16 Oct 2021 09:02)  POCT Blood Glucose.: 187 mg/dL (15 Oct 2021 21:44)  POCT Blood Glucose.: 253 mg/dL (15 Oct 2021 17:30)    I&O's Summary    15 Oct 2021 07:01  -  16 Oct 2021 07:00  --------------------------------------------------------  IN: 240 mL / OUT: 0 mL / NET: 240 mL    16 Oct 2021 07:01  -  16 Oct 2021 13:44  --------------------------------------------------------  IN: 240 mL / OUT: 700 mL / NET: -460 mL        PHYSICAL EXAM:  Vital Signs Last 24 Hrs  T(C): 36.9 (16 Oct 2021 11:50), Max: 37.2 (15 Oct 2021 19:53)  T(F): 98.5 (16 Oct 2021 11:50), Max: 98.9 (15 Oct 2021 19:53)  HR: 69 (16 Oct 2021 11:50) (69 - 81)  BP: 101/55 (16 Oct 2021 11:50) (101/55 - 138/72)  BP(mean): --  RR: 18 (16 Oct 2021 11:50) (18 - 18)  SpO2: 99% (16 Oct 2021 11:50) (95% - 99%)    CONSTITUTIONAL: NAD, well-developed  CARDIOVASCULAR: Regular rate and rhythm. Normal S1 and S2. No murmurs. +JVD  RESPIRATORY: Crackles at left lower lobe.  EXTREMITIES: No BINDU, peripheral pulses intact.  ABDOMEN: Nontender to palpation, normoactive bowel sounds, no rebound/guarding; No hepatosplenomegaly  MUSCLOSKELETAL: no clubbing or cyanosis of digits; no joint swelling or tenderness to palpation  PSYCH: A+O to person, place, and time; affect appropriate    LABS:                        8.4    13.72 )-----------( 250      ( 16 Oct 2021 07:21 )             26.3     10-16    132<L>  |  94<L>  |  69<H>  ----------------------------<  226<H>  4.8   |  24  |  2.20<H>    Ca    8.1<L>      16 Oct 2021 12:44  Phos  3.9     10-16  Mg     1.6     10-16            Urinalysis Basic - ( 15 Oct 2021 01:19 )    Color: Light Yellow / Appearance: Clear / S.009 / pH: x  Gluc: x / Ketone: Negative  / Bili: Negative / Urobili: Negative   Blood: x / Protein: Trace / Nitrite: Negative   Leuk Esterase: Negative / RBC: 1 /hpf / WBC 0 /HPF   Sq Epi: x / Non Sq Epi: 1 /hpf / Bacteria: Negative        Culture - Blood (collected 14 Oct 2021 00:23)  Source: .Blood Blood-Peripheral  Preliminary Report (15 Oct 2021 01:02):    No growth to date.    Culture - Blood (collected 14 Oct 2021 00:21)  Source: .Blood Blood-Peripheral  Preliminary Report (15 Oct 2021 01:02):    No growth to date.        RADIOLOGY & ADDITIONAL TESTS:  Results Reviewed: Cr improving, na stable  Imaging Personally Reviewed:  Electrocardiogram Personally Reviewed:    COORDINATION OF CARE:  Care Discussed with Consultants/Other Providers [Y/N]:  Prior or Outpatient Records Reviewed [Y/N]:

## 2021-10-16 NOTE — DISCHARGE NOTE PROVIDER - DETAILS OF MALNUTRITION DIAGNOSIS/DIAGNOSES
This patient has been assessed with a concern for Malnutrition and was treated during this hospitalization for the following Nutrition diagnosis/diagnoses:     -  10/15/2021: Severe protein-calorie malnutrition   -  10/15/2021: Underweight (BMI < 19)

## 2021-10-16 NOTE — DISCHARGE NOTE PROVIDER - HOSPITAL COURSE
66M PMH lung cx s/p right lung lobectomy, CKD4, DM2 (treated like type 1), CAD s/p stent on plavix, HLD, HF, cirrhosis sent in by PCP for symptomatic hyponatremia (increased weakness and confusin as per wife) s/p increased diuretic use. Pt had associated weakness and confusion as per the wife (slower to answer questions and think of words) for past 3 weeks, worsening s/p mechanical fall 1 month ago (no head trauma, no loc, wasn't evaluated at that time). Patient also reports extreme fatigue and sob when lying down. As a result, patient was started on a "heavy duty" diuretic metolazone about a week ago. Patient had 35lb weight loss over 5 weeks. Denies fevers, chills, chest pain, new SOB, cough, headache, nausea, diarrhea, dysuria.    When pt presented to the hospital, patient’s sodium was 120 (125 after corrected for hyperglycemia). Patient’s home diuretic was held. Pt serum osmolality and urine osmolality were collected. Nephrology was consulted for management of hyponatremia. Endocrinology was consulted for management of diabetes mellitus. As per endocrinology, patient was started on lantus 10 units daily with 5 units premeal. Cardiology follows patient for history of heart failure.    Patient was admitted to medical floors. During his hospital stay, patient’s sodium improved to 125 (129 corrected) in the first day. As per nephrology, patient was fluid restricted to 1 L free water daily to not correct the hyponatremia too quickly. While on the floors, patient had episodes of hypotension with systolic down to 80’s-90’s. Home BP med valsartan at night is held.     Radiation oncology was also consulted while patient was in the hospital. Patient had a regimen planned outpatient for radiation therapy, but it was delayed due to history of recent falls. Pt was evaluated by radiation oncology team, and number was left in note for discharge planning for outpatient radiotherapy.    At time of discharge, patient is medically stable for discharge. Patient's hyponatremia improved to ____. Patient will be discharged on insulin pump. Also, patient will be discharged with Lantus Solostar Pen (or Basaglar Kwikpen): Inject 12 units sc in case of pump failure, dispense 5 pens (3 refills); Novolog Flexpen (or Humalog Kwikpen): Inject 6-12 units sc before meals in case of pump failure, dispense 5 pens (3 refills); BD vincenzo 4mm pen needles: dispense #100, use as directed (3 refills); Alcohol pads: dispense #100, use as directed (3 refills). Patient will follow up with nephrology, endocrinology, and radiation oncology (Dr. Macias).   66M PMH lung cx s/p right lung lobectomy, CKD4, DM2 (treated like type 1), CAD s/p stent on plavix, HLD, HF, cirrhosis sent in by PCP for symptomatic hyponatremia (increased weakness and confusin as per wife) s/p increased diuretic use. Pt had associated weakness and confusion as per the wife (slower to answer questions and think of words) for past 3 weeks, worsening s/p mechanical fall 1 month ago (no head trauma, no loc, wasn't evaluated at that time). Patient also reports extreme fatigue and sob when lying down. As a result, patient was started on a "heavy duty" diuretic metolazone about a week ago. Patient had 35lb weight loss over 5 weeks. Denies fevers, chills, chest pain, new SOB, cough, headache, nausea, diarrhea, dysuria.    When pt presented to the hospital, patient’s sodium was 120 (125 after corrected for hyperglycemia). Patient’s home diuretic was held. Pt serum osmolality and urine osmolality were collected. Nephrology was consulted for management of hyponatremia. Endocrinology was consulted for management of diabetes mellitus. As per endocrinology, patient was started on lantus 10 units daily with 5 units premeal. Cardiology follows patient for history of heart failure.    Patient was admitted to medical floors. During his hospital stay, patient’s sodium improved to 125 (129 corrected) in the first day. As per nephrology, patient was fluid restricted to 1 L free water daily to not correct the hyponatremia too quickly. While on the floors, patient had episodes of hypotension with systolic down to 80’s-90’s. Home BP med valsartan at night is held. Patient also complained of chronic hand pain due to his psoriatic arthritis.    Radiation oncology was also consulted while patient was in the hospital. Patient had a regimen planned outpatient for radiation therapy, but it was delayed due to history of recent falls. Pt was evaluated by radiation oncology team, and number was left in note for discharge planning for outpatient radiotherapy.    At time of discharge, patient is medically stable for discharge. Patient's hyponatremia improved to 129-130. Patient will be discharged on insulin pump. Also, patient will be discharged with Lantus Solostar Pen (or Basaglar Kwikpen): Inject 12 units sc in case of pump failure, dispense 5 pens (3 refills); Novolog Flexpen (or Humalog Kwikpen): Inject 6-12 units sc before meals in case of pump failure, dispense 5 pens (3 refills); BD vincenzo 4mm pen needles: dispense #100, use as directed (3 refills); Alcohol pads: dispense #100, use as directed (3 refills). Patient will follow up with nephrology, endocrinology, rheumatology and radiation oncology (Dr. Macias).   66M PMH lung cx s/p right lung lobectomy, CKD4, DM2 (treated like type 1), CAD s/p stent on plavix, HLD, HF, cirrhosis sent in by PCP for symptomatic hyponatremia (increased weakness and confusin as per wife) s/p increased diuretic use. Pt had associated weakness and confusion as per the wife (slower to answer questions and think of words) for past 3 weeks, worsening s/p mechanical fall 1 month ago (no head trauma, no loc, wasn't evaluated at that time). Patient also reports extreme fatigue and sob when lying down. As a result, patient was started on a "heavy duty" diuretic metolazone about a week ago. Patient had 35lb weight loss over 5 weeks. Denies fevers, chills, chest pain, new SOB, cough, headache, nausea, diarrhea, dysuria.    When pt presented to the hospital, patient’s sodium was 120 (125 after corrected for hyperglycemia). Patient’s home diuretic was held. Pt serum osmolality and urine osmolality were collected. Nephrology was consulted for management of hyponatremia. Endocrinology was consulted for management of diabetes mellitus. As per endocrinology, patient was started on lantus 10 units daily with 5 units premeal. Cardiology follows patient for history of heart failure.    Patient was admitted to medical floors. During his hospital stay, patient’s sodium improved to 125 (129 corrected) in the first day. As per nephrology, patient was fluid restricted to 1 L free water daily to not correct the hyponatremia too quickly. While on the floors, patient had episodes of hypotension with systolic down to 80’s-90’s. Home BP med valsartan at night is held. Patient also complained of chronic hand pain due to his psoriatic arthritis.    Radiation oncology was also consulted while patient was in the hospital. Patient had a regimen planned outpatient for radiation therapy, but it was delayed due to history of recent falls. Pt was evaluated by radiation oncology team, and number was left in note for discharge planning for outpatient radiotherapy.    Per cardiology, diuretics initially to be held, but on day of discharge, pt was sitting in chair for 3+ hours and edema built up in b/l legs without respiratory symptoms. Spoke to cardiology (Dr. Jay Lisker [phone]), pt to resume Lasix 80mg PO daily for now (not BID and to hold metolazone) and to follow up outpatient with Cardiology next week by calling the clinic after discharge. Pt and wife voiced agreement and understanding of plan.    Pt had initially noted that he was on Medrol 4mg daily for his Psoriatic arthritis, however, later informed team that he had mistakenly written down daily and should be BID. This may have explained his general body soreness. Prescription for 3-day course of oxy 5mg q6h PRN was given as pt was taking it during the hospital course (ISTOP #[id number]). Pt agreed to follow up with his outpatient Rheum for his Psoriatic arthritis and to review his medication regimen.     At time of discharge, patient is medically stable for discharge. Patient's hyponatremia improved to 129-130. Patient will be discharged on insulin pump. Also, patient will be discharged with Lantus Solostar Pen (or Basaglar Kwikpen): Inject 12 units sc in case of pump failure, dispense 5 pens (3 refills); Novolog Flexpen (or Humalog Kwikpen): Inject 6-12 units sc before meals in case of pump failure, dispense 5 pens (3 refills); BD vincenzo 4mm pen needles: dispense #100, use as directed (3 refills); Alcohol pads: dispense #100, use as directed (3 refills). Patient will follow up with cardiology, nephrology, endocrinology, rheumatology and radiation oncology (Dr. Macias).   66M PMH lung cx s/p right lung lobectomy, CKD4, DM2 (treated like type 1), CAD s/p stent on plavix, HLD, HF, cirrhosis sent in by PCP for symptomatic hyponatremia (increased weakness and confusin as per wife) s/p increased diuretic use. Pt had associated weakness and confusion as per the wife (slower to answer questions and think of words) for past 3 weeks, worsening s/p mechanical fall 1 month ago (no head trauma, no loc, wasn't evaluated at that time). Patient also reports extreme fatigue and sob when lying down. As a result, patient was started on a "heavy duty" diuretic metolazone about a week ago. Patient had 35lb weight loss over 5 weeks. Denies fevers, chills, chest pain, new SOB, cough, headache, nausea, diarrhea, dysuria.    When pt presented to the hospital, patient’s sodium was 120 (125 after corrected for hyperglycemia). Patient’s home diuretic was held. Pt serum osmolality and urine osmolality were collected. Nephrology was consulted for management of hyponatremia. Endocrinology was consulted for management of diabetes mellitus. As per endocrinology, patient was started on lantus 10 units daily with 5 units premeal. Cardiology follows patient for history of heart failure.    Patient was admitted to medical floors. During his hospital stay, patient’s sodium improved to 125 (129 corrected) in the first day. As per nephrology, patient was fluid restricted to 1 L free water daily to not correct the hyponatremia too quickly. While on the floors, patient had episodes of hypotension with systolic down to 80’s-90’s. Home BP med valsartan at night is held. Patient also complained of chronic hand pain due to his psoriatic arthritis.    Radiation oncology was also consulted while patient was in the hospital. Patient had a regimen planned outpatient for radiation therapy, but it was delayed due to history of recent falls. Pt was evaluated by radiation oncology team, and number was left in note for discharge planning for outpatient radiotherapy.    Per cardiology, diuretics initially to be held, but on day of discharge, pt was sitting in chair for 3+ hours and edema built up in b/l legs without respiratory symptoms. Spoke to cardiology (Dr. Jay Lisker [phone]), pt to resume Lasix 80mg PO daily for now (not BID and to hold metolazone) and to follow up outpatient with Cardiology next week by calling the clinic after discharge. Pt and wife voiced agreement and understanding of plan.    Pt had initially noted that he was on Medrol 4mg daily for his Psoriatic arthritis (initially said RA), however, later informed team that he had mistakenly written down daily and should be BID. This may have explained his general body soreness. Prescription for 3-day course of oxy 5mg q6h PRN was given as pt was taking it during the hospital course (ISTOP #[id number]). Pt agreed to follow up with his outpatient Rheum for his Psoriatic arthritis and to review his medication regimen.     At time of discharge, patient is medically stable for discharge. Patient's hyponatremia improved to 129-130. Patient will be discharged on insulin pump. Also, patient will be discharged with backup basal-bolus insulin pen and supplies as recommended by Endo. Patient will follow up with cardiology, nephrology, endocrinology, rheumatology and radiation oncology (Dr. Macias).

## 2021-10-16 NOTE — PROGRESS NOTE ADULT - PROBLEM SELECTOR PLAN 7
12/20 EF (Visual Estimate): 30-35 %  Pt on home lasix 80 mg PO BID, and started on metolazone.  Currently holding diuretics in setting of hyponatremia 12/20 EF (Visual Estimate): 30-35 %  Pt on home lasix 80 mg PO BID, and started on metolazone.  Currently holding diuretics in setting of hyponatremia  IBNDU stable

## 2021-10-16 NOTE — PROGRESS NOTE ADULT - PROBLEM SELECTOR PLAN 6
Lung cancer, s/p right half lung lobectomy  [employer]scripts note shows that heme onc (Dr. Baldwin) signed off on patient on september 2nd  Patient is scheduled for outpatient Radiation Therapy with Dr. Macias but didn't get it due to a recent fall about a week ago.  Dr. Macias's office is contacted  Pt pending inpatient rad onc consult recs

## 2021-10-16 NOTE — DISCHARGE NOTE PROVIDER - NSDCFUADDAPPT_GEN_ALL_CORE_FT
1. Please follow up with Radiation Oncology department Dr. Macias to schedule for radiation therapy.  [facility], Radiation Oncology  [phone]  [address] [place] [address], [place] [address].   1. Please follow up with Radiation Oncology department Dr. Macias to schedule for radiation therapy.  [facility], Radiation Oncology  [phone]  [address] [place] [address].    2. Please follow up with your Rheumatologists for your psoriatic arthritis.    3. Please follow up with your primary care  1. Please follow up with Radiation Oncology department Dr. Macias to schedule for radiation therapy.  [facility], Radiation Oncology  [phone]  [address] [place] [address], [place] [address].    2. Please follow up with your Rheumatologists for your psoriatic arthritis to review your Psoriatic arthritis medication regimen.    3. Please follow up with your primary care doctor in 1 week.    4. Please call and schedule an appointment to follow up with Cardiology/Dr. Jay Lisker in 1 week to review your symptoms of heart failure and your diuretic regimen.

## 2021-10-16 NOTE — DISCHARGE NOTE PROVIDER - NSDCCPTREATMENT_GEN_ALL_CORE_FT
PRINCIPAL PROCEDURE  Procedure: CT head wo con  Findings and Treatment: IMPRESSION:  Volume loss and microvascular disease without new hemorrhage or midline shift. MRI with gadolinium may be more sensitive for ischemia or metastasis, if symptoms persist consider follow-up head CT or MR if no contraindications      SECONDARY PROCEDURE  Procedure: Ultrasound of kidney and bladder  Findings and Treatment: IMPRESSION:  No hydronephrosis bilaterally.    Procedure: CT abdomen and pelvis wo contrast  Findings and Treatment: IMPRESSION: No evidence of acute visceral organ or bony injury. Trace residual right pleural effusion. Semisolid left lower lobe lesion which remain suspicious for a synchronous lung cancer.

## 2021-10-16 NOTE — PROGRESS NOTE ADULT - ASSESSMENT
66M PMH lung cx s/p right lung lobectomy, CKD4, DM2, CAD s/p stent on plavix, HLD, cirrhosis sent in by PCP for hyponatremia of 118 10/12. Reports associated weakness and confusion as per the wife (slower to answer questions and think of words) for past 3 weeks, worsening s/p mechanical fall 1 month ago (no head trauma, no loc, wasn't evaluated at that time). Patient also reports extreme fatigue and sob when lying down. As a result patient was started on a "heavy duty" diuretic about a week ago. Reports R sided thoracic rib and back pain, described as intermittent ache. + 35lb weight loss over 5 weeks. Denies fevers, chills, chest pain, new SOB, cough, headache, nausea, diarrhea, dysuria.Nephrology called for CKD management.     IVA on CKD IV-   The etiology of CKD was biopsy proven diabetic nephropathy and the patient is at high risk for long term progression.  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 Pt sees Dr. José Luis Castro in clinic. Last seen in in September.  He has been on lasix 80 BID with metolazone as needed for LE edema & weight gain as outpatient. SCr at baseline is around 2.0-2.6 as outpatient SCr on arrival was 2.8-->now 2.6 (at baseline) after IVF. Would hold off on lasix & metolazone as pt appears dry. Hold valsartan for now. Renal US with 9 cm kidneys & no hydro. BP borderline. Monitor labs and urine output. Avoid NSAIDs, ACEI/ARBS, RCA and nephrotoxins. Dose medications as per eGFR.    Hyponatremia- in the setting of metolazone; poor solute intake; decreased free water excretion due to CKD   Hold metolazone. s/p IVF Hold off on further IVF. Na 120-->125-->128--?132. Free water restriction to < 1L/day.  Avoid over-correction by >6mEQ in 24 hours. Liberalize salt intake & encourage solute intake.  f/u BMP q8    Hypertension -then hypotensive--. now normotensive. SBP in 100's, on Toprol, hold all other BP meds. IVF as needed to maintain MAP > 65    Hypokalemia -resolved f/u BMP    Upon discharge please make appointment with Nephrology clinic. For scheduling please email Nephrology at [email or web address]    If you have any questions, please feel free to contact me  Marlon Quintana  Nephrology Fellow  [phone]  (After 5pm or on weekends please page the on-call fellow)     66M PMH lung cx s/p right lung lobectomy, CKD4, DM2, CAD s/p stent on plavix, HLD, cirrhosis sent in by PCP for hyponatremia of 118 10/12. Reports associated weakness and confusion as per the wife (slower to answer questions and think of words) for past 3 weeks, worsening s/p mechanical fall 1 month ago (no head trauma, no loc, wasn't evaluated at that time). Patient also reports extreme fatigue and sob when lying down. As a result patient was started on a "heavy duty" diuretic about a week ago. Reports R sided thoracic rib and back pain, described as intermittent ache. + 35lb weight loss over 5 weeks. Denies fevers, chills, chest pain, new SOB, cough, headache, nausea, diarrhea, dysuria.Nephrology called for CKD management.     IVA on CKD IV-   volume depletion  eating well  no uremic symptoms  cr downtrending  monitor trend     Hyponatremia- in the setting of metolazone; poor solute intake; decreased free water excretion due to CKD    on salt tabs  improving  cont to monitor    Hypertension -then hypotensive--. now normotensive. SBP in 100's, on Toprol, hold all other BP meds. IVF as needed to maintain MAP > 65    Hypokalemia -resolved f/u BMP     metabolic acidosis- on bicarb tabs; may need to decrease as cr improved    If you have any questions, please feel free to contact me  Marlon Quintana  Nephrology Fellow  [phone]  (After 5pm or on weekends please page the on-call fellow)

## 2021-10-16 NOTE — PROGRESS NOTE ADULT - PROBLEM SELECTOR PLAN 3
Last A1C 9.7 from september  Has insulin pump, treat as type 1 diabetes as per endo note from last admission  Was removed for imaging?  Presented today with a glucose of 395 on BMP  Endocrine consult placed- lantus 10 stat as per endo Last A1C 9.7 from september  Has insulin pump, treat as type 1 diabetes as per endo note from last admission  Was removed for imaging?  Presented today with a glucose of 395 on BMP  C/w lantus 10 at night with admelog 5 TID

## 2021-10-16 NOTE — PROGRESS NOTE ADULT - SUBJECTIVE AND OBJECTIVE BOX
[facility] DIVISION OF KIDNEY DISEASES AND HYPERTENSION -- FOLLOW UP NOTE  --------------------------------------------------------------------------------  Chief Complaint:    24 hour events/subjective:    Patient was seen and examined at bedside. Reported feeling well. Denies CP, SOB, fever, chills, nausea, vomiting, diarrhea, LE edema or dysuria      PAST HISTORY  --------------------------------------------------------------------------------  No significant changes to PMH, PSH, FHx, SHx, unless otherwise noted    ALLERGIES & MEDICATIONS  --------------------------------------------------------------------------------  Allergies    amitriptyline (Rash)  Plaquenil (Hives)    Intolerances      Standing Inpatient Medications  aspirin enteric coated 81 milliGRAM(s) Oral <User Schedule>  atorvastatin 80 milliGRAM(s) Oral at bedtime  cholecalciferol 2000 Unit(s) Oral at bedtime  clopidogrel Tablet 75 milliGRAM(s) Oral daily  dextrose 40% Gel 15 Gram(s) Oral once  dextrose 5%. 1000 milliLiter(s) IV Continuous <Continuous>  dextrose 5%. 1000 milliLiter(s) IV Continuous <Continuous>  dextrose 50% Injectable 25 Gram(s) IV Push once  dextrose 50% Injectable 12.5 Gram(s) IV Push once  dextrose 50% Injectable 25 Gram(s) IV Push once  dronabinol 2.5 milliGRAM(s) Oral two times a day  glucagon  Injectable 1 milliGRAM(s) IntraMuscular once  influenza   Vaccine 0.5 milliLiter(s) IntraMuscular once  insulin lispro (ADMELOG) corrective regimen sliding scale   SubCutaneous three times a day before meals  insulin lispro (ADMELOG) corrective regimen sliding scale   SubCutaneous at bedtime  insulin lispro Injectable (ADMELOG) 5 Unit(s) SubCutaneous three times a day before meals  methylPREDNISolone 4 milliGRAM(s) Oral daily  metoprolol succinate ER 50 milliGRAM(s) Oral daily  multivitamin 1 Tablet(s) Oral daily  pantoprazole    Tablet 40 milliGRAM(s) Oral before breakfast  polyethylene glycol 3350 17 Gram(s) Oral at bedtime  senna 2 Tablet(s) Oral at bedtime  sodium bicarbonate 1300 milliGRAM(s) Oral three times a day  sodium chloride 1 Gram(s) Oral two times a day    PRN Inpatient Medications  acetaminophen   Tablet .. 650 milliGRAM(s) Oral every 6 hours PRN  ondansetron Injectable 4 milliGRAM(s) IV Push every 6 hours PRN  oxyCODONE    IR 5 milliGRAM(s) Oral every 6 hours PRN      REVIEW OF SYSTEMS  --------------------------------------------------------------------------------  Gen: No fevers/chills  Skin: No rashes  Head/Eyes/Ears: Normal hearing,   Respiratory: No dyspnea, cough  CV: No chest pain  GI: No abdominal pain, diarrhea  : No dysuria, hematuria  MSK: No  edema  Heme: No easy bruising or bleeding  Psych: No significant depression      All other systems were reviewed and are negative, except as noted.    VITALS/PHYSICAL EXAM  --------------------------------------------------------------------------------  T(C): 36.9 (10-16-21 @ 11:50), Max: 37.2 (10-15-21 @ 19:53)  HR: 69 (10-16-21 @ 11:50) (69 - 81)  BP: 101/55 (10-16-21 @ 11:50) (101/55 - 138/72)  RR: 18 (10-16-21 @ 11:50) (18 - 18)  SpO2: 99% (10-16-21 @ 11:50) (95% - 99%)  Wt(kg): --        10-15-21 @ 07:01  -  10-16-21 @ 07:00  --------------------------------------------------------  IN: 240 mL / OUT: 0 mL / NET: 240 mL    10-16-21 @ 07:01  -  10-16-21 @ 16:18  --------------------------------------------------------  IN: 240 mL / OUT: 700 mL / NET: -460 mL      Physical Exam:  	Gen: NAD  	HEENT: Dry MM  	Pulm: CTA B/L  	CV: S1S2  	Abd: Soft, +BS   	Ext: No LE edema B/L  	Neuro: Awake  	Skin: Warm and dry  	Vascular access: peripheral      LABS/STUDIES  --------------------------------------------------------------------------------              8.4    13.72 >-----------<  250      [10-16-21 @ 07:21]              26.3     132  |  94  |  69  ----------------------------<  226      [10-16-21 @ 12:44]  4.8   |  24  |  2.20        Ca     8.1     [10-16-21 @ 12:44]      Mg     1.6     [10-16-21 @ 07:20]      Phos  3.9     [10-16-21 @ 07:20]      Creatinine Trend:  SCr 2.20 [10-16 @ 12:44]  SCr 2.38 [10-16 @ 07:20]  SCr 2.64 [10-16 @ 01:36]  SCr 2.82 [10-15 @ 21:21]  SCr 2.67 [10-15 @ 13:42]    Urinalysis - [10-15-21 @ 01:19]      Color Light Yellow / Appearance Clear / SG 1.009 / pH 5.5      Gluc Negative / Ketone Negative  / Bili Negative / Urobili Negative       Blood Negative / Protein Trace / Leuk Est Negative / Nitrite Negative      RBC 1 / WBC 0 / Hyaline  / Gran  / Sq Epi  / Non Sq Epi 1 / Bacteria Negative    Urine Sodium 34      [10-15-21 @ 01:19]  Urine Osmolality 255      [10-15-21 @ 01:19]    PTH -- (Ca 7.1)      [12-22-20 @ 20:42]   312  Vitamin D (25OH) 27.3      [12-22-20 @ 20:42]  HbA1c 7.8      [05-02-19 @ 00:50]  TSH 2.03      [10-14-21 @ 12:06]

## 2021-10-16 NOTE — DISCHARGE NOTE PROVIDER - NSDCCPCAREPLAN_GEN_ALL_CORE_FT
PRINCIPAL DISCHARGE DIAGNOSIS  Diagnosis: Hyponatremia  Assessment and Plan of Treatment: You were admitted for symptomatic hyponatremia. When you got to the hospital, you had a sodium level of 120. Due to your elevated blood glucose, your actual sodium level corrected was 125. This is likely because of your recent increase in diuretic use, which causes you to pee out a lot of fluids and salts. To correct for this, we held your diuretics when you were in the hospital and gave you IV fluids. You were seen by nephrology specialists, who agreed with the plan. Because we didn’t want to over correct your sodium too quickly, we fluid restricted you to 1 liter of free water through the IV as per nephrology recommendations. Your sodium levels improved during your hospital stay. Please followup with your primary care doctor within 1-2 weeks of discharge and follow up with your nephrologist within 2 weeks. If you have any new or recurrent symptoms such as dizziness, fatigue, or shortness of breath, please call your doctor or go to the ED.      SECONDARY DISCHARGE DIAGNOSES  Diagnosis: Type 2 diabetes mellitus with hyperglycemia  Assessment and Plan of Treatment: You have type 2 diabetes that is managed with an insulin pump at home. While you were in the hospital, we managed your diabetes with a basal and bolus insulin. You were given 10 units of lantus daily with 5 units of admelog premeal along with a sliding scale. Please follow up with your endocrinologist as scheduled when you leave the hospital. If you have uncontrolled sugars or symptoms of high or low blood sugars, please call your doctor or go to the ED.    Diagnosis: Lung cancer  Assessment and Plan of Treatment: Please follow up with your Radiation Oncologist Dr. Macias when you leave the hospital and follow up with the radiation oncology department to schedule for radiation therapy.

## 2021-10-16 NOTE — DISCHARGE NOTE PROVIDER - CARE PROVIDER_API CALL
Cr Kidd)  Internal Medicine  [address] [facility], [facility] [address]  [place] [address]  Phone: [phone]  Fax: [phone]  Established Patient  Follow Up Time: 1 week   Cr Kidd)  Internal Medicine  [address] [facility] [address]  [place] [address]  Phone: [phone]  Fax: [phone]  Established Patient  Follow Up Time: 1 week    Lisker, Jay J (MD)  Cardiovascular Disease; Internal Medicine  [address] [facility], [facility] [address]  [place] [address]  Phone: [phone]  Fax: [phone]  Established Patient  Follow Up Time: 1 week   Cr Kidd)  Internal Medicine  [address] [facility], [facility] [address]  [place] [address]  Phone: [phone]  Fax: [phone]  Established Patient  Follow Up Time: 1 week    Lisker, Jay J (MD)  Cardiovascular Disease; Internal Medicine  [address] [facility], [facility] [address]  [place] [address]  Phone: [phone]  Fax: [phone]  Established Patient  Follow Up Time: 1 week    Jacqueline Briggs)  Internal Medicine; Rheumatology  [address] [facility], [facility] [address]  [place] [address]  Phone: [phone]  Fax: [phone]  Established Patient  Follow Up Time: 2 weeks

## 2021-10-16 NOTE — PROGRESS NOTE ADULT - PROBLEM SELECTOR PLAN 1
Symptomatic hyponatremia with reported mental status change + fatigue  Outpatient Na+ level 118. Na+ 120 on presentation, corrected Na+ is 125 for glucose of 395 using Santa correction  Recent increase in diuretic use (+ metalozone on top of furosemide 80 mg PO bid) along with lack of PO intake may have contributed to hyponatremia- Hold diuretics  Pending   Outpatient note concerning for SIADH, but unlikely given rise in creatine  Serum osmolality 294  Pending urine lytes, urine urea, urine osmolality  Check STAT BMP and follow up with nephro Symptomatic hyponatremia with reported mental status change + fatigue  Outpatient Na+ level 118. Na+ 120 on presentation, corrected Na+ is 125 for glucose of 395 using Santa correction  Recent increase in diuretic use (+ metalozone on top of furosemide 80 mg PO bid) along with lack of PO intake may have contributed to hyponatremia- Hold diuretics  Serum osm and urine na on 10/15 suggestive of appropriate response to free water  Monitor off diuretics and IVF today

## 2021-10-16 NOTE — DISCHARGE NOTE PROVIDER - NSDCFUSCHEDAPPT_GEN_ALL_CORE_FT
NORM DEAN ; 11/08/2021 ; [facility] ThorSurg [phone] NORM Paul ; 11/08/2021 ; [facility] Med Nephro [address] NORM Villavicencio ; 11/10/2021 ; [facility] [address] [facility] ; 11/22/2021 ; [facility] Endocr [address] [facility]

## 2021-10-17 NOTE — PROGRESS NOTE ADULT - PROBLEM SELECTOR PLAN 2
Leukocytosis up to 16k, tachycardia, BP 90s/60s low from base line (baseline runs to 140's)  Unlikely to be due to infection.  More likely to be due to dehydration  Monitor off abx

## 2021-10-17 NOTE — PROGRESS NOTE ADULT - PROBLEM SELECTOR PLAN 7
12/20 EF (Visual Estimate): 30-35 %  Pt on home lasix 80 mg PO BID, and started on metolazone.  Currently holding diuretics in setting of hyponatremia  BINDU stable

## 2021-10-17 NOTE — PROGRESS NOTE ADULT - PROBLEM SELECTOR PLAN 1
Symptomatic hyponatremia with reported mental status change + fatigue  Outpatient Na+ level 118. Na+ 120 on presentation, corrected Na+ is 125 for glucose of 395 using Santa correction  Recent increase in diuretic use (+ metalozone on top of furosemide 80 mg PO bid) along with lack of PO intake may have contributed to hyponatremia- Hold diuretics  Serum osm and urine na on 10/15 suggestive of appropriate response to free water  Monitor off diuretics and IVF today

## 2021-10-17 NOTE — PROGRESS NOTE ADULT - ATTENDING COMMENTS
Likely will discharge patient tomorrow as his symptoms and hyponatremia have improved. Tomorrow he will bring insulin supplies to reinstate pump. We will touch base with nephrology regarding what diuretics he should (or shouldn't) be discharged on. Family is requesting rheumatology consult for consideration of increasing his steroids since he believes his psoriatic arthritis is flaring.

## 2021-10-17 NOTE — PROGRESS NOTE ADULT - SUBJECTIVE AND OBJECTIVE BOX
DIABETES FOLLOW UP : Seen earlier today    INTERVAL HX: reports he had chocolate chip cookies before his BG was checked for breakfast. Noted Serum BG 93, and POC BG was 209 at breakfast, tolerating 100% of PO, diet gingerale brought from home at bedside, cookies observed on bedside table, counseled pt to avoid cookies to prevent spikes in BG and overcorrection. Pt reports he is not always adherent to DM diet. BG mostly at goal past 24 hours since adjustment of premeal insulin. continuing to move lantus to bedtime today. family at bedside reports she will bring omnipod supplies and insulin on day of discharge so endocrine can help him set a temp basal based on timing of  last lantus dose       Review of Systems:  General: As above  Cardiovascular: No chest pain, palpitations  Respiratory: No SOB, no cough  GI: No nausea, vomiting, abdominal pain  Endocrine: no polyuria, polydipsia or S&Sx of hypoglycemia    Allergies    amitriptyline (Rash)  Plaquenil (Hives)    Intolerances      MEDICATIONS  (STANDING):  aspirin enteric coated 81 milliGRAM(s) Oral <User Schedule>  atorvastatin 80 milliGRAM(s) Oral at bedtime  cholecalciferol 2000 Unit(s) Oral at bedtime  clopidogrel Tablet 75 milliGRAM(s) Oral daily  dextrose 40% Gel 15 Gram(s) Oral once  dextrose 5%. 1000 milliLiter(s) (50 mL/Hr) IV Continuous <Continuous>  dextrose 5%. 1000 milliLiter(s) (100 mL/Hr) IV Continuous <Continuous>  dextrose 50% Injectable 25 Gram(s) IV Push once  dextrose 50% Injectable 12.5 Gram(s) IV Push once  dextrose 50% Injectable 25 Gram(s) IV Push once  dronabinol 2.5 milliGRAM(s) Oral two times a day  glucagon  Injectable 1 milliGRAM(s) IntraMuscular once  heparin   Injectable 5000 Unit(s) SubCutaneous every 8 hours  influenza   Vaccine 0.5 milliLiter(s) IntraMuscular once  insulin glargine Injectable (LANTUS) 10 Unit(s) SubCutaneous <User Schedule>  insulin lispro (ADMELOG) corrective regimen sliding scale   SubCutaneous three times a day before meals  insulin lispro (ADMELOG) corrective regimen sliding scale   SubCutaneous at bedtime  insulin lispro Injectable (ADMELOG) 7 Unit(s) SubCutaneous three times a day before meals  methylPREDNISolone 4 milliGRAM(s) Oral daily  metoprolol succinate ER 50 milliGRAM(s) Oral daily  multivitamin 1 Tablet(s) Oral daily  pantoprazole    Tablet 40 milliGRAM(s) Oral before breakfast  polyethylene glycol 3350 17 Gram(s) Oral at bedtime  senna 2 Tablet(s) Oral at bedtime  sodium bicarbonate 1300 milliGRAM(s) Oral three times a day  sodium chloride 1 Gram(s) Oral two times a day      PHYSICAL EXAM:  VITALS: T(C): 36.4 (10-17-21 @ 11:55)  T(F): 97.6 (10-17-21 @ 11:55), Max: 98.3 (10-16-21 @ 20:19)  HR: 80 (10-17-21 @ 11:55) (76 - 80)  BP: 106/66 (10-17-21 @ 11:55) (106/58 - 124/62)  RR:  (18 - 18)  SpO2:  (97% - 99%)  Wt(kg): --  GENERAL: male sitting in chair in NAD  Abdomen: Soft, nontender, non distended  Extremities: Warm  NEURO: A&O X3    LABS:  POCT Blood Glucose.: 129 mg/dL (10-17-21 @ 13:02)  POCT Blood Glucose.: 209 mg/dL (10-17-21 @ 08:21)  POCT Blood Glucose.: 130 mg/dL (10-16-21 @ 21:41)  POCT Blood Glucose.: 147 mg/dL (10-16-21 @ 17:20)  POCT Blood Glucose.: 289 mg/dL (10-16-21 @ 13:06)  POCT Blood Glucose.: 123 mg/dL (10-16-21 @ 09:02)  POCT Blood Glucose.: 187 mg/dL (10-15-21 @ 21:44)  POCT Blood Glucose.: 253 mg/dL (10-15-21 @ 17:30)  POCT Blood Glucose.: 233 mg/dL (10-15-21 @ 12:15)  POCT Blood Glucose.: 244 mg/dL (10-15-21 @ 08:48)  POCT Blood Glucose.: 155 mg/dL (10-14-21 @ 21:31)  POCT Blood Glucose.: 168 mg/dL (10-14-21 @ 17:19)                            8.0    8.80  )-----------( 179      ( 17 Oct 2021 06:34 )             25.6       10-17    130<L>  |  96  |  66<H>  ----------------------------<  93  4.2   |  24  |  2.36<H>    EGFR if : 32<L>  EGFR if non : 27<L>    Ca    8.0<L>      10-17  Mg     1.5     10-17  Phos  3.8     10-17            Thyroid Function Tests:  10-14 @ 12:06 TSH 2.03 FreeT4 -- T3 -- Anti TPO -- Anti Thyroglobulin Ab -- TSI --          A1C with Estimated Average Glucose Result: 9.7 % (09-04-21 @ 09:13)      Estimated Average Glucose: 232 mg/dL (09-04-21 @ 09:13)

## 2021-10-17 NOTE — PROGRESS NOTE ADULT - PROBLEM SELECTOR PLAN 3
- continue atorvastatin 80 mg    Discussed with patient /family at bedside   Contact via [employer] Teams Tues/Thurs/Friday  On evenings and weekends, please call [phone] or page endocrine fellow on call.   Please note that this patient may be followed by different provider tomorrow. If no answer, contact endocrine fellow on call [phone] M-F 9a-5pm  [employer] password: [email or web address]

## 2021-10-17 NOTE — PROGRESS NOTE ADULT - ASSESSMENT
67 yo M with PMH of HTN,, CAD, type 2DM, chronic pancreatitis now insulin dependent on omnipod pump with dexcom CGM, hx of lung adenocarcinoma s/p right lobectomy who presented with hyponatremia. Endocrine consulted for hyperglycemia (high risk patient with severely uncontrolled Type 2 DM w/ hyperglycemia and A1c of 9.7% on insulin pump at high risk of CAD and CVA with high level decision-making).  prandial BG improved after adjustments, FBG this am due to nutritional indiscretions, Serum BG at goal c/w current regimen. BG Goal 100-180mg/dl            65 yo M with PMH of HTN,, CAD, type 2DM, chronic pancreatitis now insulin dependent on omnipod pump with dexcom CGM, hx of lung adenocarcinoma s/p right lobectomy who presented with hyponatremia, also w/ IVA on CKD this admission w/ Cr trending down . Endocrine consulted for hyperglycemia (high risk patient with severely uncontrolled Type 2 DM w/ hyperglycemia and A1c of 9.7% on insulin pump at high risk of CAD and CVA with high level decision-making).  prandial BG improved after adjustments, FBG this am due to nutritional indiscretions, Serum BG at goal c/w current regimen. BG Goal 100-180mg/dl

## 2021-10-17 NOTE — PROGRESS NOTE ADULT - SUBJECTIVE AND OBJECTIVE BOX
67 yo M with hx. of lung cx s/p right lung lobectomy, CKD4, DM2, CAD s/p stent on Plavix, HLD and cirrhosis.  He was admitted for hyponatremia (118) with associated weakness and confusion over preceding 3 weeks.        , worsening s/p mechanical fall 1 month ago (no head trauma, no loc, wasn't evaluated at that time). Patient also reports extreme fatigue and sob when lying down. As a result patient was started on a "heavy duty" diuretic about a week ago. Reports R sided thoracic rib and back pain, described as intermittent ache. + 35lb weight loss over 5 weeks. Denies fevers, chills, chest pain, new SOB, cough, headache, nausea, diarrhea, dysuria.    ED Course: BP 97/60, , RR 18, Temp 97.7 F oral, SpO2 98 on RA  Given 1000 mL NS. and 4 mg morphine IV push.  (13 Oct 2021 18:00)    Medications:  acetaminophen   Tablet .. 650 milliGRAM(s) Oral every 6 hours PRN  aspirin enteric coated 81 milliGRAM(s) Oral <User Schedule>  atorvastatin 80 milliGRAM(s) Oral at bedtime  cholecalciferol 2000 Unit(s) Oral at bedtime  clopidogrel Tablet 75 milliGRAM(s) Oral daily  dextrose 40% Gel 15 Gram(s) Oral once  dextrose 5%. 1000 milliLiter(s) IV Continuous <Continuous>  dextrose 5%. 1000 milliLiter(s) IV Continuous <Continuous>  dextrose 50% Injectable 25 Gram(s) IV Push once  dextrose 50% Injectable 12.5 Gram(s) IV Push once  dextrose 50% Injectable 25 Gram(s) IV Push once  dronabinol 2.5 milliGRAM(s) Oral two times a day  glucagon  Injectable 1 milliGRAM(s) IntraMuscular once  heparin   Injectable 5000 Unit(s) SubCutaneous every 8 hours  influenza   Vaccine 0.5 milliLiter(s) IntraMuscular once  insulin glargine Injectable (LANTUS) 10 Unit(s) SubCutaneous <User Schedule>  insulin lispro (ADMELOG) corrective regimen sliding scale   SubCutaneous three times a day before meals  insulin lispro (ADMELOG) corrective regimen sliding scale   SubCutaneous at bedtime  insulin lispro Injectable (ADMELOG) 7 Unit(s) SubCutaneous three times a day before meals  methylPREDNISolone 4 milliGRAM(s) Oral daily  metoprolol succinate ER 50 milliGRAM(s) Oral daily  multivitamin 1 Tablet(s) Oral daily  ondansetron Injectable 4 milliGRAM(s) IV Push every 6 hours PRN  oxyCODONE    IR 5 milliGRAM(s) Oral every 6 hours PRN  pantoprazole    Tablet 40 milliGRAM(s) Oral before breakfast  polyethylene glycol 3350 17 Gram(s) Oral at bedtime  senna 2 Tablet(s) Oral at bedtime  sodium bicarbonate 1300 milliGRAM(s) Oral three times a day  sodium chloride 1 Gram(s) Oral two times a day    PMH/PSH/FH/SH: [ ] Unchanged    ROS:  Unchanged      Vitals:  T(C): 36.4 (10-17-21 @ 11:55), Max: 36.8 (10-16-21 @ 20:19)  HR: 80 (10-17-21 @ 11:55) (76 - 80)  BP: 106/66 (10-17-21 @ 11:55) (106/58 - 124/62)  RR: 18 (10-17-21 @ 11:55) (18 - 18)  SpO2: 98% (10-17-21 @ 11:55) (97% - 99%)  Daily Weight in k.7 (17 Oct 2021 10:16)          Appearance: Normal, NAD  Eyes: PERRL, EOMI, no scleral icterus   HENT: moist oral mucosa  Cardiovascular: Regular rhythm, Normal S1 and S2, no murmur, rub; no peripheral edema; no JVD  Respiratory: Clear to auscultation bilaterally  Gastrointestinal: Soft, +BS  Musculoskeletal: No clubbing   Neurologic: No focal weakness  Lymphatic: No lymphadenopathy  Psychiatry: A&Ox3 with appropriate mood and affect  Skin: No rashes, ecchymoses, or cyanosis          I&O's Summary  16 Oct 2021 07:  -  17 Oct 2021 07:00  --------------------------------------------------------  IN: 720 mL / OUT: 1250 mL / NET: -530 mL    17 Oct 2021 07:01  -  17 Oct 2021 12:31  --------------------------------------------------------  IN: 240 mL / OUT: 400 mL / NET: -160 mL        LABS:                     8.0    8.80  )-----------( 179      ( 17 Oct 2021 06:34 )             25.6     10-17  130<L>  |  96  |  66<H>  ----------------------------<  93  4.2   |  24  |  2.36<H>    Ca    8.0<L>      17 Oct 2021 06:33  Phos  3.8     10-  Mg     1.5     10        Echo: < from: Transthoracic Echocardiogram (20 @ 15:20) >  Conclusions:  1. Mitral annular calcification. Tethered mitral valve  leaflets with normal opening. Moderate-severe mitral  regurgitation.  2. Calcified trileaflet aortic valve with normal opening.  Mild-moderate aortic regurgitation.  3. Severely dilated left atrium.  LA volume index = 54  cc/m2.  4. Severe global left ventricular systolic dysfunction.  5. Normal right ventricular size and systolic function.  *** Compared with echocardiogram of 2014, therehas  been an interval decline in left ventricular systolic  function and an increase in the degree of mitral  regurgitation.  ------------------------------------------------------------------------  Confirmed on  2020 - 15:08:23 by Glen Montague M.D. 67 yo M with hx. of lung cx s/p right lung lobectomy, CKD4, DM2, CAD s/p stent on Plavix, HLD and cirrhosis.  He was admitted for hyponatremia (118) with associated weakness and confusion over preceding 3 weeks.    Currently, alert, OOB to chair and in no distress.  No reports no cardiac sxs; no CP, palps or dyspnea.  Appetite improved, feeling better overall but not yet back to baseline.        Medications:  acetaminophen   Tablet .. 650 milliGRAM(s) Oral every 6 hours PRN  aspirin enteric coated 81 milliGRAM(s) Oral <User Schedule>  atorvastatin 80 milliGRAM(s) Oral at bedtime  cholecalciferol 2000 Unit(s) Oral at bedtime  clopidogrel Tablet 75 milliGRAM(s) Oral daily  dextrose 40% Gel 15 Gram(s) Oral once  dextrose 5%. 1000 milliLiter(s) IV Continuous <Continuous>  dextrose 5%. 1000 milliLiter(s) IV Continuous <Continuous>  dextrose 50% Injectable 25 Gram(s) IV Push once  dextrose 50% Injectable 12.5 Gram(s) IV Push once  dextrose 50% Injectable 25 Gram(s) IV Push once  dronabinol 2.5 milliGRAM(s) Oral two times a day  glucagon  Injectable 1 milliGRAM(s) IntraMuscular once  heparin   Injectable 5000 Unit(s) SubCutaneous every 8 hours  influenza   Vaccine 0.5 milliLiter(s) IntraMuscular once  insulin glargine Injectable (LANTUS) 10 Unit(s) SubCutaneous <User Schedule>  insulin lispro (ADMELOG) corrective regimen sliding scale   SubCutaneous three times a day before meals  insulin lispro (ADMELOG) corrective regimen sliding scale   SubCutaneous at bedtime  insulin lispro Injectable (ADMELOG) 7 Unit(s) SubCutaneous three times a day before meals  methylPREDNISolone 4 milliGRAM(s) Oral daily  metoprolol succinate ER 50 milliGRAM(s) Oral daily  multivitamin 1 Tablet(s) Oral daily  ondansetron Injectable 4 milliGRAM(s) IV Push every 6 hours PRN  oxyCODONE    IR 5 milliGRAM(s) Oral every 6 hours PRN  pantoprazole    Tablet 40 milliGRAM(s) Oral before breakfast  polyethylene glycol 3350 17 Gram(s) Oral at bedtime  senna 2 Tablet(s) Oral at bedtime  sodium bicarbonate 1300 milliGRAM(s) Oral three times a day  sodium chloride 1 Gram(s) Oral two times a day    PMH/PSH/FH/SH: [ ] Unchanged    ROS:  Unchanged    Vitals:  T(C): 36.4 (10-17-21 @ 11:55), Max: 36.8 (10-16-21 @ 20:19)  HR: 80 (10-17-21 @ 11:55) (76 - 80)  BP: 106/66 (10-17-21 @ 11:55) (106/58 - 124/62)  RR: 18 (10-17-21 @ 11:55) (18 - 18)  SpO2: 98% (10-17-21 @ 11:55) (97% - 99%)  Daily Weight in k.7 (17 Oct 2021 10:16)    Appearance: Normal, NAD  Eyes: PERRL, EOMI, no scleral icterus   HENT: moist oral mucosa  Cardiovascular: Regular rhythm, Normal S1 and S2, no murmur, rub; no peripheral edema; no JVD  Respiratory: Clear to auscultation bilaterally  Gastrointestinal: Soft, +BS  Musculoskeletal: No clubbing   Neurologic: No focal weakness  Lymphatic: No lymphadenopathy  Psychiatry: A&Ox3 with appropriate mood and affect  Skin: No rashes, ecchymoses, or cyanosis      I&O's Summary  16 Oct 2021 07:  -  17 Oct 2021 07:00  --------------------------------------------------------  IN: 720 mL / OUT: 1250 mL / NET: -530 mL    17 Oct 2021 07:01  -  17 Oct 2021 12:31  --------------------------------------------------------  IN: 240 mL / OUT: 400 mL / NET: -160 mL      LABS:                     8.0    8.80  )-----------( 179      ( 17 Oct 2021 06:34 )             25.6     10-17  130<L>  |  96  |  66<H>  ----------------------------<  93  4.2   |  24  |  2.36<H>    Ca    8.0<L>      17 Oct 2021 06:33  Phos  3.8     10-17  Mg     1.5     10-17      Transthoracic Echocardiogram (20 @ 15:20)   Conclusions:  1. Mitral annular calcification. Tethered mitral valve leaflets with normal opening. Moderate-severe mitral regurgitation.  2. Calcified trileaflet aortic valve with normal opening. Mild-moderate aortic regurgitation.  3. Severely dilated left atrium.  LA volume index = 54 cc/m2.  4. Severe global left ventricular systolic dysfunction.  5. Normal right ventricular size and systolic function.  *** Compared with echocardiogram of 2014, there has been an interval decline in left ventricular systolic function and an increase in the degree of mitral regurgitation.  ------------------------------------------------------------------------  Confirmed on  2020 - 15:08:23 by Glen Montague M.D.

## 2021-10-17 NOTE — PROGRESS NOTE ADULT - SUBJECTIVE AND OBJECTIVE BOX
NORM DEAN  66y  MRN: [id number]    Patient is a 66y old  Male who presents with a chief complaint of Symptomatic hyponatremia + Leukocytosis (16 Oct 2021 16:18)      Subjective: no events ON. Denies fever, CP, SOB, abn pain, N/V, dysuria. Tolerating diet.      MEDICATIONS  (STANDING):  aspirin enteric coated 81 milliGRAM(s) Oral <User Schedule>  atorvastatin 80 milliGRAM(s) Oral at bedtime  cholecalciferol 2000 Unit(s) Oral at bedtime  clopidogrel Tablet 75 milliGRAM(s) Oral daily  dextrose 40% Gel 15 Gram(s) Oral once  dextrose 5%. 1000 milliLiter(s) (50 mL/Hr) IV Continuous <Continuous>  dextrose 5%. 1000 milliLiter(s) (100 mL/Hr) IV Continuous <Continuous>  dextrose 50% Injectable 25 Gram(s) IV Push once  dextrose 50% Injectable 12.5 Gram(s) IV Push once  dextrose 50% Injectable 25 Gram(s) IV Push once  dronabinol 2.5 milliGRAM(s) Oral two times a day  glucagon  Injectable 1 milliGRAM(s) IntraMuscular once  heparin   Injectable 5000 Unit(s) SubCutaneous every 8 hours  influenza   Vaccine 0.5 milliLiter(s) IntraMuscular once  insulin glargine Injectable (LANTUS) 10 Unit(s) SubCutaneous <User Schedule>  insulin lispro (ADMELOG) corrective regimen sliding scale   SubCutaneous three times a day before meals  insulin lispro (ADMELOG) corrective regimen sliding scale   SubCutaneous at bedtime  insulin lispro Injectable (ADMELOG) 7 Unit(s) SubCutaneous three times a day before meals  methylPREDNISolone 4 milliGRAM(s) Oral daily  metoprolol succinate ER 50 milliGRAM(s) Oral daily  multivitamin 1 Tablet(s) Oral daily  pantoprazole    Tablet 40 milliGRAM(s) Oral before breakfast  polyethylene glycol 3350 17 Gram(s) Oral at bedtime  senna 2 Tablet(s) Oral at bedtime  sodium bicarbonate 1300 milliGRAM(s) Oral three times a day  sodium chloride 1 Gram(s) Oral two times a day    MEDICATIONS  (PRN):  acetaminophen   Tablet .. 650 milliGRAM(s) Oral every 6 hours PRN Temp greater or equal to 38C (100.4F), Mild Pain (1 - 3), Moderate Pain (4 - 6)  ondansetron Injectable 4 milliGRAM(s) IV Push every 6 hours PRN Nausea and/or Vomiting  oxyCODONE    IR 5 milliGRAM(s) Oral every 6 hours PRN Severe Pain (7 - 10)      Objective:    Vitals: Vital Signs Last 24 Hrs  T(C): 36.4 (10-17-21 @ 04:15), Max: 36.9 (10-16-21 @ 11:50)  T(F): 97.6 (10-17-21 @ 04:15), Max: 98.5 (10-16-21 @ 11:50)  HR: 76 (10-17-21 @ 04:15) (69 - 78)  BP: 124/62 (10-17-21 @ 04:15) (101/55 - 124/62)  BP(mean): --  RR: 18 (10-17-21 @ 04:15) (18 - 18)  SpO2: 97% (10-17-21 @ 04:15) (97% - 99%)            I&O's Summary    16 Oct 2021 07:01  -  17 Oct 2021 07:00  --------------------------------------------------------  IN: 720 mL / OUT: 1250 mL / NET: -530 mL        PHYSICAL EXAM:  GENERAL: NAD  HEAD:  Atraumatic, Normocephalic  EYES: EOMI, conjunctiva and sclera clear  CHEST/LUNG: Clear to percussion bilaterally; No rales, rhonchi, wheezing, or rubs  HEART: Regular rate and rhythm; No murmurs, rubs, or gallops  ABDOMEN: Soft, Nontender, Nondistended;   SKIN: No rashes or lesions  NERVOUS SYSTEM:  Alert & Oriented X3, no focal deficit    LABS:  10-17    130<L>  |  96  |  66<H>  ----------------------------<  93  4.2   |  24  |  2.36<H>  10-16    128<L>  |  94<L>  |  67<H>  ----------------------------<  132<H>  4.4   |  24  |  2.53<H>  10-16    132<L>  |  94<L>  |  69<H>  ----------------------------<  226<H>  4.8   |  24  |  2.20<H>    Ca    8.0<L>      17 Oct 2021 06:33  Ca    8.3<L>      16 Oct 2021 20:40  Ca    8.1<L>      16 Oct 2021 12:44  Phos  3.8     10-17  Mg     1.5     10-17                                                8.0    8.80  )-----------( 179      ( 17 Oct 2021 06:34 )             25.6                         8.4    13.72 )-----------( 250      ( 16 Oct 2021 07:21 )             26.3                         9.2    15.81 )-----------( 285      ( 15 Oct 2021 06:41 )             27.6     CAPILLARY BLOOD GLUCOSE      POCT Blood Glucose.: 130 mg/dL (16 Oct 2021 21:41)  POCT Blood Glucose.: 147 mg/dL (16 Oct 2021 17:20)  POCT Blood Glucose.: 289 mg/dL (16 Oct 2021 13:06)  POCT Blood Glucose.: 123 mg/dL (16 Oct 2021 09:02)

## 2021-10-17 NOTE — PROGRESS NOTE ADULT - PROBLEM SELECTOR PLAN 1
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 Treat like T1DM. No evidence of DKA. On solumedrol at home and here.  - Please ensure that patient is ordered for Lantus every day  - Can continue Lantus 10 Units continue moving time back every day toward bedtime; Timed for 11 am today.   - recommend c/w lispro 7units with meals   - recommend low dose correction scale with meals, low dose correction scale before bed  - consistent carb diet  - premeal glucose goal <140, random glucose goal <180  - do NOT hold basal insulin even if NPO as patient could be at risk for DKA    D/c planning:   - pt to resume pump at d/c. Based on timing of discharge and last basal insulin shot, may need to instruct temp basal rate which he reports may need assistance with setting  - pt should be prescribed back up insulin pens in case of pump failure or other issues (frequent imaging as in his case): lantus and admelog    DISCHARGE RX:  - Lantus Solostar Pen (or Basaglar Kwikpen): Inject 12 units sc in case of pump failure, dispense 5 pens (3 refills)   - Novolog Flexpen (or Humalog Kwikpen): Inject 6-12 units sc before meals in case of pump failure, dispense 5 pens (3 refills)  - BD vincenzo 4mm pen needles: dispense #100, use as directed (3 refills)  - Alcohol pads: dispense #100, use as directed (3 refills)

## 2021-10-17 NOTE — PROGRESS NOTE ADULT - ASSESSMENT
65 yo M with hx. of severe LV dysfunction and non-obstructive CAD (NICM) and history of lung cancer and chronic steroid use for RA/psoriatic arthritis.  Currently admitted for hypovolemic hyponatremia and unexplained weight loss.  Metolazone use since 9/24 explains the hypovolemic hyponatremia; symptoms and sodium level improved with withdrawal of diuretics.      REC:  No CHF on exam today.  CAD: continue /statin.  HTN and HFrEF: continue beta-blocker.  Renal function near baseline, will monitor.  Weight loss workup underway. 67 yo M with hx. of severe LV dysfunction, moderate to severe MR and non-obstructive CAD at cath in Jan. 2021 s/p prior PCI/stent.  Also, history of lung cancer and chronic steroid use for RA/psoriatic arthritis.  Currently admitted for hypovolemic hyponatremia and unexplained weight loss.  Metolazone use since 9/24 explains the hypovolemic hyponatremia; symptoms and sodium level improved with withdrawal of diuretics.      REC:  1.  Severe LV dysfunction, mod to severe MR..  No CHF on exam today.  - Would continue to hold diuretics at present.    2.  CAD, s/p prior PCI stent.  Non-obstructive dz. at cath 1/2021  - continue /statin.    3.  HTN and HFrEF  - continue beta-blocker.    4.  CKD  - Renal function near baseline, continue to monitor BUN/Cr  - continue strict I and Os      Edil Gong M.D.  (covering for Dr. Lisker)  Office: [phone]

## 2021-10-17 NOTE — PROGRESS NOTE ADULT - PROBLEM SELECTOR PLAN 3
Last A1C 9.7 from september  Has insulin pump, treat as type 1 diabetes as per endo note from last admission  Was removed for imaging?  Presented today with a glucose of 395 on BMP  C/w lantus 10 at night with admelog 5 TID

## 2021-10-18 NOTE — DISCHARGE NOTE NURSING/CASE MANAGEMENT/SOCIAL WORK - NSDCVIVACCINE_GEN_ALL_CORE_FT
Tdap; 20-Dec-2020 14:20; Colletta, Morgan (GENIE); [employer]; [id number] (Exp. Date: 21-Jul-2022); IntraMuscular; Deltoid Left.; 0.5 milliLiter(s); VIS (VIS Published: 09-May-2013, VIS Presented: 20-Dec-2020);

## 2021-10-18 NOTE — PROGRESS NOTE ADULT - PROBLEM SELECTOR PLAN 1
Initially presented with sodium of 120, corrected to 125.  Improved, last sodium level is 129.  Recent increase in diuretic use (+ metalozone on top of furosemide 80 mg PO bid) along with lack of PO intake may have contributed to hyponatremia- Hold diuretics  Monitor off diuretics  Discuss with cardiology when to go back on diuretics

## 2021-10-18 NOTE — DISCHARGE NOTE NURSING/CASE MANAGEMENT/SOCIAL WORK - NSDCPNINST_GEN_ALL_CORE
Pt was educated on his list of meds. Pt was instructed to follow up with his provider after discharge. Pt was educated on skin tears. Nurse showed the pt how to care for skin tears. Pt verbalized understandings.

## 2021-10-18 NOTE — MEDICAL STUDENT PROGRESS NOTE(EDUCATION) - SUBJECTIVE AND OBJECTIVE BOX
Pt is a 66y M with PMHx lung cancer s/p R lobectomy, CKD4, T2DM, CAD s/p stents, and cirrhosis admitted for hyponatremia to 118.     ON: IESHA. Pt reports that he is feeling the best he has been since hospitalization. Reported soft BM, voiding spontaneously, and has good PO intake. Does not report weakness or dizziness and is ambulating well. Shoulder pain still mildly persisting. Pain in b/l hands worsened with activity.    MEDICATIONS  (STANDING):  aspirin enteric coated 81 milliGRAM(s) Oral <User Schedule>  atorvastatin 80 milliGRAM(s) Oral at bedtime  cholecalciferol 2000 Unit(s) Oral at bedtime  clopidogrel Tablet 75 milliGRAM(s) Oral daily  dextrose 40% Gel 15 Gram(s) Oral once  dextrose 5%. 1000 milliLiter(s) (50 mL/Hr) IV Continuous <Continuous>  dextrose 5%. 1000 milliLiter(s) (100 mL/Hr) IV Continuous <Continuous>  dextrose 50% Injectable 25 Gram(s) IV Push once  dextrose 50% Injectable 12.5 Gram(s) IV Push once  dextrose 50% Injectable 25 Gram(s) IV Push once  dronabinol 2.5 milliGRAM(s) Oral two times a day  glucagon  Injectable 1 milliGRAM(s) IntraMuscular once  heparin   Injectable 5000 Unit(s) SubCutaneous every 8 hours  influenza   Vaccine 0.5 milliLiter(s) IntraMuscular once  insulin glargine Injectable (LANTUS) 10 Unit(s) SubCutaneous <User Schedule>  insulin lispro (ADMELOG) corrective regimen sliding scale   SubCutaneous three times a day before meals  insulin lispro (ADMELOG) corrective regimen sliding scale   SubCutaneous at bedtime  insulin lispro Injectable (ADMELOG) 7 Unit(s) SubCutaneous three times a day before meals  methylPREDNISolone 4 milliGRAM(s) Oral daily  metoprolol succinate ER 50 milliGRAM(s) Oral daily  multivitamin 1 Tablet(s) Oral daily  pantoprazole    Tablet 40 milliGRAM(s) Oral before breakfast  polyethylene glycol 3350 17 Gram(s) Oral at bedtime  senna 2 Tablet(s) Oral at bedtime  sodium bicarbonate 1300 milliGRAM(s) Oral three times a day  sodium chloride 1 Gram(s) Oral two times a day    MEDICATIONS  (PRN):  acetaminophen   Tablet .. 650 milliGRAM(s) Oral every 6 hours PRN Temp greater or equal to 38C (100.4F), Mild Pain (1 - 3), Moderate Pain (4 - 6)  ondansetron Injectable 4 milliGRAM(s) IV Push every 6 hours PRN Nausea and/or Vomiting  oxyCODONE    IR 5 milliGRAM(s) Oral every 6 hours PRN Severe Pain (7 - 10)      Gen: well appearing, NAD  HEENT: NC/AT, PERRLA, EOMI, MMM, Throat clear, no LAD   Heart: RRR, S1S2+, no murmur  Lungs: normal effort, CTAB  Abd: soft, NT, ND, BSP, no HSM  Ext: atraumatic, FROM, WWP  Neuro: no focal deficits    Vital Signs Last 24 Hrs  T(C): 37.3 (18 Oct 2021 04:39), Max: 37.3 (18 Oct 2021 04:39)  T(F): 99.2 (18 Oct 2021 04:39), Max: 99.2 (18 Oct 2021 04:39)  HR: 80 (18 Oct 2021 04:39) (69 - 80)  BP: 118/64 (18 Oct 2021 04:39) (106/66 - 118/64)  RR: 18 (18 Oct 2021 04:39) (18 - 18)  SpO2: 96% (18 Oct 2021 04:39) (96% - 98%)                          7.7    8.89  )-----------( 175      ( 18 Oct 2021 06:45 )             23.6   10-18    129<L>  |  94<L>  |  52<H>  ----------------------------<  136<H>  4.4   |  24  |  2.21<H>    Ca    8.1<L>      18 Oct 2021 06:44  Phos  3.2     10-18  Mg     2.0     10-18    CAPILLARY BLOOD GLUCOSE      POCT Blood Glucose.: 204 mg/dL (18 Oct 2021 08:58)  POCT Blood Glucose.: 163 mg/dL (18 Oct 2021 08:21)  POCT Blood Glucose.: 88 mg/dL (17 Oct 2021 22:01)  POCT Blood Glucose.: 172 mg/dL (17 Oct 2021 17:37)  POCT Blood Glucose.: 129 mg/dL (17 Oct 2021 13:02)      < from: Xray Shoulder 2 Views, Left (10.15.21 @ 10:27) >  IMPRESSION:  Focal small nodular soft tissue calcification adjacent to greater tuberosity margin compatible with focus of calcific tendinosis.    No fractures, dislocations, or AC separation.    Preserved joint spaces and smooth articular margins.    Maintained subacromial and coracoclavicular spaces.    Generalized mild osteopenia otherwise no discrete lytic or blastic lesions.    < end of copied text >

## 2021-10-18 NOTE — PROGRESS NOTE ADULT - PROVIDER SPECIALTY LIST ADULT
Nephrology
Cardiology
Endocrinology
Internal Medicine
Endocrinology
Endocrinology
Internal Medicine
Internal Medicine

## 2021-10-18 NOTE — PROGRESS NOTE ADULT - ATTENDING COMMENTS
67 y/o M with h/o lung cancer s/p right lobectomy, CKD4, DM2, CAD s/p stent, HLD, cirrhosis who presented for symptomatic hyponatremia, improved with holding diuretics + fluid restriction. Na remains stable, planning for d/c home today with close outpatient follow up.  Discharge time spent by provider 40 minutes

## 2021-10-18 NOTE — PROGRESS NOTE ADULT - ASSESSMENT
65 yo M with PMH of HTN,, CAD, type 2DM, chronic pancreatitis now insulin dependent on omnipod pump with dexcom CGM, hx of lung adenocarcinoma s/p right lobectomy who presented with hyponatremia, also w/ IVA on CKD this admission w/ Cr trending down . Endocrine consulted for hyperglycemia (high risk patient with severely uncontrolled Type 2 DM w/ hyperglycemia and A1c of 9.7% on insulin pump at high risk of CAD and CVA with high level decision-making). Tolerating POs. To transition back to insulin pump today. Reviewed pump settings. BG goal (100-180mg/dl).

## 2021-10-18 NOTE — PROGRESS NOTE ADULT - PROBLEM SELECTOR PROBLEM 3
Dyslipidemia
Type 2 diabetes mellitus with hyperglycemia
Type 2 diabetes mellitus with hyperglycemia
Dyslipidemia
Essential hypertension
Type 2 diabetes mellitus with hyperglycemia

## 2021-10-18 NOTE — PROGRESS NOTE ADULT - SUBJECTIVE AND OBJECTIVE BOX
Diabetes Follow up note:    Chief complaint: Pancreatic insufficiency diabetes    Interval Hx: BG values 80s-low 200s over past 24 hours. Pt would like to restart insulin pump today/possible discharge home this afternoon. Reports had bites of a cookie at bedtime and intermittingly overnight/this AM. Will be receiving new Dexcom sensor in next 24 hours. Wife at bedside w/extra POD to restart pump today prior to next Lantus dose. Walked w/PT just prior to seeing at bedtime and complaining of some back discomfort.     Review of Systems:  General: as above.   GI: Tolerating POs. Denies N/V/D/Abd pain  CV: Denies CP/SOB  ENDO: No S&Sx of hypoglycemia  MEDS:  atorvastatin 80 milliGRAM(s) Oral at bedtime    insulin lispro (ADMELOG) Pump 1 Each SubCutaneous Continuous Pump  Basal:  12am: 0.5 u/hr  IC: 1:5  ISF: 40  BG target:120mg/dl    methylPREDNISolone 4 milliGRAM(s) Oral daily      Allergies    amitriptyline (Rash)  Plaquenil (Hives)      PE:  General: Male lying in bed. NAD.   Vital Signs Last 24 Hrs  T(C): 36.7 (18 Oct 2021 11:08), Max: 37.3 (18 Oct 2021 04:39)  T(F): 98.1 (18 Oct 2021 11:08), Max: 99.2 (18 Oct 2021 04:39)  HR: 72 (18 Oct 2021 11:08) (69 - 80)  BP: 114/59 (18 Oct 2021 11:08) (106/66 - 118/64)  BP(mean): --  RR: 18 (18 Oct 2021 11:08) (18 - 18)  SpO2: 99% (18 Oct 2021 11:08) (96% - 99%)  Abd: Soft, NT,ND,   Extremities: Warm. no edema x 4 ext.   Neuro: A&O X3    LABS:  POCT Blood Glucose.: 204 mg/dL (10-18-21 @ 08:58)  POCT Blood Glucose.: 163 mg/dL (10-18-21 @ 08:21)  POCT Blood Glucose.: 88 mg/dL (10-17-21 @ 22:01)  POCT Blood Glucose.: 172 mg/dL (10-17-21 @ 17:37)  POCT Blood Glucose.: 129 mg/dL (10-17-21 @ 13:02)  POCT Blood Glucose.: 209 mg/dL (10-17-21 @ 08:21)  POCT Blood Glucose.: 130 mg/dL (10-16-21 @ 21:41)  POCT Blood Glucose.: 147 mg/dL (10-16-21 @ 17:20)  POCT Blood Glucose.: 289 mg/dL (10-16-21 @ 13:06)  POCT Blood Glucose.: 123 mg/dL (10-16-21 @ 09:02)  POCT Blood Glucose.: 187 mg/dL (10-15-21 @ 21:44)  POCT Blood Glucose.: 253 mg/dL (10-15-21 @ 17:30)  POCT Blood Glucose.: 233 mg/dL (10-15-21 @ 12:15)                            7.7    8.89  )-----------( 175      ( 18 Oct 2021 06:45 )             23.6       10-18    129<L>  |  94<L>  |  52<H>  ----------------------------<  136<H>  4.4   |  24  |  2.21<H>    Ca    8.1<L>      18 Oct 2021 06:44  Phos  3.2     10-18  Mg     2.0     10-18        Thyroid Function Tests:  10-14 @ 12:06 TSH 2.03 FreeT4 -- T3 -- Anti TPO -- Anti Thyroglobulin Ab -- TSI --      A1C with Estimated Average Glucose Result: 9.7 % (09-04-21 @ 09:13)  A1C with Estimated Average Glucose Result: 7.0 % (02-02-21 @ 13:39)  A1C with Estimated Average Glucose Result: 7.6 % (12-21-20 @ 07:25)          Contact number: john [phone] or [phone]

## 2021-10-18 NOTE — DISCHARGE NOTE NURSING/CASE MANAGEMENT/SOCIAL WORK - PATIENT PORTAL LINK FT
You can access the FollowMyHealth Patient Portal offered by [facility] by registering at the following website: [email or web address]. By joining [employer]’s FollowMyHealth portal, you will also be able to view your health information using other applications (apps) compatible with our system.

## 2021-10-18 NOTE — PROGRESS NOTE ADULT - ASSESSMENT
66M PMH lung cx s/p right lung lobectomy, CKD4, DM2, CAD s/p stent on plavix, HLD, cirrhosis sent in by PCP for hyponatremia of 118 10/12. Reports associated weakness and confusion as per the wife (slower to answer questions and think of words) for past 3 weeks, worsening s/p mechanical fall 1 month ago (no head trauma, no loc, wasn't evaluated at that time). Patient also reports extreme fatigue and sob when lying down. As a result patient was started on a "heavy duty" diuretic about a week ago. Reports R sided thoracic rib and back pain, described as intermittent ache. + 35lb weight loss over 5 weeks. Denies fevers, chills, chest pain, new SOB, cough, headache, nausea, diarrhea, dysuria.Nephrology called for CKD management.       IVA on CKD IV-   IVA 2/2 volume depletion in the setting of diuretics. CKD 2/2  biopsy proven diabetic nephropathy and the patient is at high risk for long term progression.  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 Pt sees Dr. José Luis Castro in clinic. Last seen in in September.  He has been on lasix 80 BID with metolazone as needed for LE edema & weight gain as outpatient. SCr at baseline is around 2.0-2.6 as outpatient SCr on arrival was 2.8-->now 2.2 (at baseline) after IVF. Would hold off on lasix & metolazone as pt appears dry. Hold valsartan for now. Renal US with 9 cm kidneys & no hydro. BP borderline. s.p no uremic symptoms. No uremic symptoms currently. Monitor labs and urine output. Avoid NSAIDs, ACEI/ARBS, RCA and nephrotoxins. Dose medications as per eGFR.        Hyponatremia- in the setting of metolazone; poor solute intake; decreased free water excretion due to CKD.   Hold metolazone. s/p IVF Hold off on further IVF. Na 120-->125-->128-->129. Free water restriction to < 1L/day.  Avoid over-correction by >6mEQ in 24 hours. Liberalize salt intake & encourage solute (protein) intake. Continue salt tabs 1 grams tid. Monitor SNa Q6 hours. f/u BMP q4      Hypertension -initially hypotensive, now normotensive. SBP in 100's, on Toprol, hold all other BP meds. IVF as needed to maintain MAP > 65      Hypokalemia -replace, f/u BMP       metabolic acidosis- on bicarb tabs; may need to decrease if  HCO3 > 24 as cr improved.          Upon discharge please make appointment with Nephrology clinic. For scheduling please email Nephrology at [email or web address]    If you have any questions, please feel free to contact araceli Bowen  Nephrology Fellow  Pager [place]: [phone]/ [facility]: [phone]    (After 5 pm or on weekends please page the on-call fellow, can check [email or web address] for schedule. Login is osbaldo [email or web address], schedule under [facility] medicine, psych, derm)

## 2021-10-18 NOTE — PROGRESS NOTE ADULT - PROBLEM SELECTOR PLAN 2
-Pt to fill out insulin pump forms/left at bedside. Signed prescriber part  -Pt to restart pump w/home settings at this time.   -Receiving new Dexcom sensor in the next 24 hours but can do FS testing in meantime while on pump  Pt should have backup insulin pens at home in event of pump failure.   DISCHARGE RX:  - Lantus Solostar Pen (or Basaglar Kwikpen): Inject 12 units sc in case of pump failure, dispense 5 pens (3 refills)   - Novolog Flexpen (or Humalog Kwikpen): Inject 6-12 units sc before meals in case of pump failure, dispense 5 pens (3 refills)  - BD vincenzo 4mm pen needles: dispense #100, use as directed (3 refills)  - Alcohol pads: dispense #100, use as directed (3 refills).

## 2021-10-18 NOTE — PROGRESS NOTE ADULT - PROBLEM SELECTOR PLAN 2
On home med Medrol 4 mg PO daily  Pt and Pt family requesting to be seen by rheum 10/18 for arthritic pain and question about adjusting drug dosage.

## 2021-10-18 NOTE — MEDICAL STUDENT PROGRESS NOTE(EDUCATION) - NS MD HP STUD ASPLAN ASSES FT
Pt is a 66y M with PMHx lung cancer s/p R lobectomy, CKD4, T2DM, CAD s/p stents, and cirrhosis admitted for hyponatremia to 118 likely due to diuretic add-on of Metolazone with 80mg PO Lasix BID, with current sodium level okay for d/c following Rheum consult.

## 2021-10-18 NOTE — PROGRESS NOTE ADULT - PROBLEM SELECTOR PROBLEM 7
Chronic systolic HF (heart failure)

## 2021-10-18 NOTE — PROGRESS NOTE ADULT - REASON FOR ADMISSION
Symptomatic hyponatremia + Leukocytosis

## 2021-10-18 NOTE — DISCHARGE NOTE NURSING/CASE MANAGEMENT/SOCIAL WORK - NSDCFUADDAPPT_GEN_ALL_CORE_FT
1. Please follow up with Radiation Oncology department Dr. Macias to schedule for radiation therapy.  [facility] Medicine, Radiation Oncology  [phone]  [address] [place] [address].    2. Please follow up with your Rheumatologists for your psoriatic arthritis.    3. Please follow up with your primary care

## 2021-10-18 NOTE — PROGRESS NOTE ADULT - TIME BILLING
development of plan of care/coordination of care/glycemic control/insulin pump setup/discharge planning through review of labs, blood glucose values and vital signs.
development of plan of care/coordination of care/glycemic control through review of labs, blood glucose values and vital signs.

## 2021-10-18 NOTE — PROGRESS NOTE ADULT - SUBJECTIVE AND OBJECTIVE BOX
[facility] Division of Kidney Diseases & Hypertension  FOLLOW UP NOTE  [phone]--------------------------------------------------------------------------------  Chief Complaint:Hyponatremia, hypo-osmolarity, or hypo-osmolar hyponatremia        24 hour events/subjective: Patient seen & examined. Labs & vitals reviewed. Denies CP, SOB, fever, chills, nausea, vomiting, diarrhea, LE edema or dysuria        PAST HISTORY  --------------------------------------------------------------------------------  No significant changes to PMH, PSH, FHx, SHx, unless otherwise noted    ALLERGIES & MEDICATIONS  --------------------------------------------------------------------------------  Allergies    amitriptyline (Rash)  Plaquenil (Hives)    Intolerances      Standing Inpatient Medications  aspirin enteric coated 81 milliGRAM(s) Oral <User Schedule>  atorvastatin 80 milliGRAM(s) Oral at bedtime  cholecalciferol 2000 Unit(s) Oral at bedtime  clopidogrel Tablet 75 milliGRAM(s) Oral daily  dextrose 40% Gel 15 Gram(s) Oral once  dextrose 5%. 1000 milliLiter(s) IV Continuous <Continuous>  dextrose 5%. 1000 milliLiter(s) IV Continuous <Continuous>  dextrose 50% Injectable 25 Gram(s) IV Push once  dextrose 50% Injectable 12.5 Gram(s) IV Push once  dextrose 50% Injectable 25 Gram(s) IV Push once  dronabinol 2.5 milliGRAM(s) Oral two times a day  glucagon  Injectable 1 milliGRAM(s) IntraMuscular once  heparin   Injectable 5000 Unit(s) SubCutaneous every 8 hours  influenza   Vaccine 0.5 milliLiter(s) IntraMuscular once  insulin lispro (ADMELOG) Pump 1 Each SubCutaneous Continuous Pump  methylPREDNISolone 4 milliGRAM(s) Oral daily  metoprolol succinate ER 50 milliGRAM(s) Oral daily  multivitamin 1 Tablet(s) Oral daily  pantoprazole    Tablet 40 milliGRAM(s) Oral before breakfast  polyethylene glycol 3350 17 Gram(s) Oral at bedtime  senna 2 Tablet(s) Oral at bedtime  sodium bicarbonate 1300 milliGRAM(s) Oral three times a day  sodium chloride 1 Gram(s) Oral two times a day    PRN Inpatient Medications  acetaminophen   Tablet .. 650 milliGRAM(s) Oral every 6 hours PRN  ondansetron Injectable 4 milliGRAM(s) IV Push every 6 hours PRN  oxyCODONE    IR 5 milliGRAM(s) Oral every 6 hours PRN      REVIEW OF SYSTEMS  --------------------------------------------------------------------------------  Gen: No  fevers/chills  Respiratory: No dyspnea, cough  CV: No chest pain  GI: No abdominal pain, diarrhea,  nausea, vomiting  : No increased frequency, dysuria, hematuria  MSK:  no edema  Neuro: No dizziness/lightheadedness      All other systems were reviewed and are negative, except as noted.    VITALS/PHYSICAL EXAM  --------------------------------------------------------------------------------  T(C): 36.7 (10-18-21 @ 11:08), Max: 37.3 (10-18-21 @ 04:39)  HR: 72 (10-18-21 @ 11:08) (69 - 80)  BP: 114/59 (10-18-21 @ 11:08) (113/65 - 118/64)  RR: 18 (10-18-21 @ 11:08) (18 - 18)  SpO2: 99% (10-18-21 @ 11:08) (96% - 99%)  Wt(kg): --        10-17-21 @ 07:01  -  10-18-21 @ 07:00  --------------------------------------------------------  IN: 520 mL / OUT: 650 mL / NET: -130 mL      Physical Exam:  	Gen: NAD  	HEENT: Dry MM  	Pulm: CTA B/L  	CV: S1S2  	Abd: Soft, +BS   	Ext: No LE edema B/L  	Neuro: Awake  	Skin: Warm and dry  	Vascular access:          LABS/STUDIES  --------------------------------------------------------------------------------              7.7    8.89  >-----------<  175      [10-18-21 @ 06:45]              23.6     129  |  94  |  52  ----------------------------<  136      [10-18-21 @ 06:44]  4.4   |  24  |  2.21        Ca     8.1     [10-18-21 @ 06:44]      Mg     2.0     [10-18-21 @ 06:44]      Phos  3.2     [10-18-21 @ 06:44]          Serum Osmolality 290      [10-18-21 @ 06:48]    Creatinine Trend:  SCr 2.21 [10-18 @ 06:44]  SCr 2.36 [10-17 @ 06:33]  SCr 2.53 [10-16 @ 20:40]  SCr 2.20 [10-16 @ 12:44]  SCr 2.38 [10-16 @ 07:20]    Urinalysis - [10-15-21 @ 01:19]      Color Light Yellow / Appearance Clear / SG 1.009 / pH 5.5      Gluc Negative / Ketone Negative  / Bili Negative / Urobili Negative       Blood Negative / Protein Trace / Leuk Est Negative / Nitrite Negative      RBC 1 / WBC 0 / Hyaline  / Gran  / Sq Epi  / Non Sq Epi 1 / Bacteria Negative    Urine Sodium 40      [10-17-21 @ 06:34]  Urine Osmolality 423      [10-17-21 @ 06:34]    TSH 2.03      [10-14-21 @ 12:06]      Immunofixation Serum:   No Monoclonal Band Identified    Reference Range: None Detected      [10-14-21 @ 03:11]  SPEP Interpretation: Normal Electrophoresis Pattern      [10-14-21 @ 03:11]

## 2021-10-18 NOTE — MEDICAL STUDENT PROGRESS NOTE(EDUCATION) - NS MD HP STUD ASPLAN PLAN FT
1) Hyponatremia  Initial Na 118, trending upwards and last sodium is 129 (10/18/21)  -Holding Torsemide and Aldactone as it likely precipitated hyponatremia, d/w cardio and nephro about diuretic regimen upon discharge  -Daily AM CBC/CMP  -PO fluid intake, solid foods    2) Rheumatoid arthritis  Pt is currently on home med Medrol 4 mg PO daily, states pain in b/l hands.  -pending Rheum consult     3) T2DM  Has insulin pump, treat as type 1 diabetes as per endo note from last admission.  -Last POCT glucose 206 (10/18/21) downtrended from ~300 on admission.  -C/w lantus 10 at night with admelog 7 TID per endo recs  -Pt given instructions for temp basal setup with endo on day of discharge    4) IVA on CKD  Baseline 2.4-2.6  -last Cr 2.21 on 10/18/21  -Daily AM CMP  + Rouleaux formation  Pending SPEP UPEP to r/o MM (iva + anemia)    5) Lung cancer.   Lung cancer s/p right half lung lobectomy  -[employer]scripts note shows that heme onc (Dr. Baldwin) signed off on patient on september 2nd  -Patient is scheduled for outpatient Radiation Therapy with Dr. Macias but didn't get it due to a recent fall about a week ago.  -Dr. Macias's office is contacted  -Rad onc left number to follow up with outpatient radiation therapy    6) Chronic systolic HF  12/20 EF (Visual Estimate): 30-35 %  -Pt on home lasix 80 mg PO BID and started on metolazone prior to hospitalization  -Currently holding diuretics  -Will d/w Cardio and Nephro for d/c diuretic instructions

## 2021-10-18 NOTE — PROGRESS NOTE ADULT - PROBLEM SELECTOR PLAN 6
Lung cancer, s/p right half lung lobectomy  [employer]scripts note shows that heme onc (Dr. Baldwin) signed off on patient on september 2nd  Patient is scheduled for outpatient Radiation Therapy with Dr. Macias but didn't get it due to a recent fall about a week ago.  Dr. Macias's office is contacted  Rad onc left number to follow up with outpatient radiation therapy

## 2021-10-18 NOTE — PROGRESS NOTE ADULT - PROBLEM SELECTOR PROBLEM 2
Essential hypertension
Systemic inflammatory response syndrome (SIRS)
Essential hypertension
Rheumatoid arthritis
Systemic inflammatory response syndrome (SIRS)
Insulin pump fitting or adjustment

## 2021-10-18 NOTE — PROGRESS NOTE ADULT - PROBLEM SELECTOR PLAN 1
-test BG AC/HS  -Discontinue SQ insulin  -Pt to restart insulin pump today around 12pm since received Lantus dose around 24 hours ago.   -consistent carb diet  -f/u outpt w/Dr Estrada Nov 22nd 10:30am  -discharge on insulin pump w/home settings if going home today

## 2021-10-18 NOTE — PROGRESS NOTE ADULT - NUTRITIONAL ASSESSMENT
This patient has been assessed with a concern for Malnutrition and has been determined to have a diagnosis/diagnoses of Severe protein-calorie malnutrition and Underweight (BMI < 19).    This patient is being managed with:   Diet Regular-  Consistent Carbohydrate {No Snacks} (CSTCHO)  Supplement Feeding Modality:  Oral  Glucerna Shake Cans or Servings Per Day:  2       Frequency:  Daily  Entered: Oct 14 2021  9:49AM    
This patient has been assessed with a concern for Malnutrition and has been determined to have a diagnosis/diagnoses of Severe protein-calorie malnutrition and Underweight (BMI < 19).    This patient is being managed with:   Diet Regular-  Consistent Carbohydrate {No Snacks} (CSTCHO)  Supplement Feeding Modality:  Oral  Glucerna Shake Cans or Servings Per Day:  2       Frequency:  Daily  Entered: Oct 14 2021  9:49AM    
Diet, Regular:   Consistent Carbohydrate {No Snacks} (CSTCHO)  Supplement Feeding Modality:  Oral  Glucerna Shake Cans or Servings Per Day:  2       Frequency:  Daily (10-14-21 @ 09:49) [Active]
This patient has been assessed with a concern for Malnutrition and has been determined to have a diagnosis/diagnoses of Severe protein-calorie malnutrition and Underweight (BMI < 19).    This patient is being managed with:   Diet Regular-  Consistent Carbohydrate {No Snacks} (CSTCHO)  Supplement Feeding Modality:  Oral  Glucerna Shake Cans or Servings Per Day:  2       Frequency:  Daily  Entered: Oct 14 2021  9:49AM    

## 2021-10-18 NOTE — PROGRESS NOTE ADULT - ATTENDING COMMENTS
Alert, conversant.  DM nephropathy  1.  ARF on CKD4--DM nephropathy + likely med volume challenges with borderline BP.  NO HD requirement at present  2.  Volume overload--avoid overdiuresis, holding ARB    discussed with med team

## 2021-10-18 NOTE — PROGRESS NOTE ADULT - PROBLEM SELECTOR PROBLEM 1
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
Type 2 diabetes mellitus with hyperglycemia
Hyponatremia
Hyponatremia
Type 2 diabetes mellitus with hyperglycemia
Hyponatremia

## 2021-10-18 NOTE — PROGRESS NOTE ADULT - PROBLEM SELECTOR PLAN 4
Nonhypoxic SOB  Dyspnea may be due to recurrence of cancer  98% on room air
continue atorvastatin 80 mg      discussed w/pt and team  pager: [phone]   office:  [phone] (M-F 9a-5pm)               [phone] (nights/weekends)
Nonhypoxic SOB  Dyspnea may be due to recurrence of cancer  98% on room air
Nonhypoxic SOB  Dyspnea may be due to recurrence of cancer  98% on room air

## 2021-10-18 NOTE — PROGRESS NOTE ADULT - SUBJECTIVE AND OBJECTIVE BOX
NORM DEAN  66y  MRN: [id number]    Patient is a 66y old  Male who presents with a chief complaint of Symptomatic hyponatremia + Leukocytosis (17 Oct 2021 14:07)      Subjective: no events ON. Denies fever, CP, SOB, abn pain, N/V, dysuria. Tolerating diet.      MEDICATIONS  (STANDING):  aspirin enteric coated 81 milliGRAM(s) Oral <User Schedule>  atorvastatin 80 milliGRAM(s) Oral at bedtime  cholecalciferol 2000 Unit(s) Oral at bedtime  clopidogrel Tablet 75 milliGRAM(s) Oral daily  dextrose 40% Gel 15 Gram(s) Oral once  dextrose 5%. 1000 milliLiter(s) (50 mL/Hr) IV Continuous <Continuous>  dextrose 5%. 1000 milliLiter(s) (100 mL/Hr) IV Continuous <Continuous>  dextrose 50% Injectable 25 Gram(s) IV Push once  dextrose 50% Injectable 12.5 Gram(s) IV Push once  dextrose 50% Injectable 25 Gram(s) IV Push once  dronabinol 2.5 milliGRAM(s) Oral two times a day  glucagon  Injectable 1 milliGRAM(s) IntraMuscular once  heparin   Injectable 5000 Unit(s) SubCutaneous every 8 hours  influenza   Vaccine 0.5 milliLiter(s) IntraMuscular once  insulin glargine Injectable (LANTUS) 10 Unit(s) SubCutaneous <User Schedule>  insulin lispro (ADMELOG) corrective regimen sliding scale   SubCutaneous three times a day before meals  insulin lispro (ADMELOG) corrective regimen sliding scale   SubCutaneous at bedtime  insulin lispro Injectable (ADMELOG) 7 Unit(s) SubCutaneous three times a day before meals  methylPREDNISolone 4 milliGRAM(s) Oral daily  metoprolol succinate ER 50 milliGRAM(s) Oral daily  multivitamin 1 Tablet(s) Oral daily  pantoprazole    Tablet 40 milliGRAM(s) Oral before breakfast  polyethylene glycol 3350 17 Gram(s) Oral at bedtime  senna 2 Tablet(s) Oral at bedtime  sodium bicarbonate 1300 milliGRAM(s) Oral three times a day  sodium chloride 1 Gram(s) Oral two times a day    MEDICATIONS  (PRN):  acetaminophen   Tablet .. 650 milliGRAM(s) Oral every 6 hours PRN Temp greater or equal to 38C (100.4F), Mild Pain (1 - 3), Moderate Pain (4 - 6)  ondansetron Injectable 4 milliGRAM(s) IV Push every 6 hours PRN Nausea and/or Vomiting  oxyCODONE    IR 5 milliGRAM(s) Oral every 6 hours PRN Severe Pain (7 - 10)      Objective:    Vitals: Vital Signs Last 24 Hrs  T(C): 37.3 (10-18-21 @ 04:39), Max: 37.3 (10-18-21 @ 04:39)  T(F): 99.2 (10-18-21 @ 04:39), Max: 99.2 (10-18-21 @ 04:39)  HR: 80 (10-18-21 @ 04:39) (69 - 80)  BP: 118/64 (10-18-21 @ 04:39) (106/66 - 118/64)  BP(mean): --  RR: 18 (10-18-21 @ 04:39) (18 - 18)  SpO2: 96% (10-18-21 @ 04:39) (96% - 98%)            I&O's Summary    17 Oct 2021 07:01  -  18 Oct 2021 07:00  --------------------------------------------------------  IN: 520 mL / OUT: 650 mL / NET: -130 mL        PHYSICAL EXAM:  GENERAL: NAD  HEAD:  Atraumatic, Normocephalic  EYES: EOMI, conjunctiva and sclera clear  CHEST/LUNG: Clear to percussion bilaterally; No rales, rhonchi, wheezing, or rubs  HEART: Regular rate and rhythm; No murmurs, rubs, or gallops  ABDOMEN: Soft, Nontender, Nondistended;   SKIN: No rashes or lesions  NERVOUS SYSTEM:  Alert & Oriented X3, no focal deficit    LABS:  10-18    129<L>  |  94<L>  |  52<H>  ----------------------------<  136<H>  4.4   |  24  |  2.21<H>  10-17    130<L>  |  96  |  66<H>  ----------------------------<  93  4.2   |  24  |  2.36<H>  10-16    128<L>  |  94<L>  |  67<H>  ----------------------------<  132<H>  4.4   |  24  |  2.53<H>    Ca    8.1<L>      18 Oct 2021 06:44  Ca    8.0<L>      17 Oct 2021 06:33  Ca    8.3<L>      16 Oct 2021 20:40  Phos  3.2     10-18  Mg     2.0     10-18                                                7.7    8.89  )-----------( 175      ( 18 Oct 2021 06:45 )             23.6                         8.0    8.80  )-----------( 179      ( 17 Oct 2021 06:34 )             25.6                         8.4    13.72 )-----------( 250      ( 16 Oct 2021 07:21 )             26.3     CAPILLARY BLOOD GLUCOSE      POCT Blood Glucose.: 88 mg/dL (17 Oct 2021 22:01)  POCT Blood Glucose.: 172 mg/dL (17 Oct 2021 17:37)  POCT Blood Glucose.: 129 mg/dL (17 Oct 2021 13:02)  POCT Blood Glucose.: 209 mg/dL (17 Oct 2021 08:21)

## 2021-10-25 PROBLEM — I50.20 HFREF (HEART FAILURE WITH REDUCED EJECTION FRACTION): Status: ACTIVE | Noted: 2021-01-01

## 2021-10-25 PROBLEM — E11.9 DIABETES: Status: ACTIVE | Noted: 2021-01-01

## 2021-10-25 NOTE — HISTORY OF PRESENT ILLNESS
[FreeTextEntry1] : VIDEO VISIT\par \par TeleHealth Video Encounter:\par  \par Initiated by:\par x__Patient Call\par __Patient Election for TeleHealth visit\par  \par Consented for TeleHealth visit\par Patient Location:  Home\par Physician Location:\par x__Office([address]; [address], [address], [place], [place], [address])\par __Home\par  \par Narrative:\par \par s/p hospitalization for significant dehydration. (was on metolzone daily for a while)\par Now in rehab.\par He has not started farxiga yet.\par No chest pain or dyspnea with rehab.\par Labs planned with PMD for home-draw.\par \par Prior:\par He is status post with his right VATS and lobectomy.  He is doing well.\par He had stopped some of his antihypertensive medications preoperatively and has not restarted them.\par He continues to smoke.\par He has no exertional chest pains.\par \par \par Prior:\par He was seen today preoperatively prior to planned right VATS and left lower lobectomy.\par He is a 65-year-old with known chronic nonischemic cardiomyopathy with recent congestive heart failure who has been doing well since.\par Coronary angiography done earlier this month showed mild to moderate nonobstructive coronary artery disease without any flow-limiting lesions.  The right coronary artery was not visualized\par  \par No orthopnea or PND.\par Still continue to smoke a bit less than pack a day.\par He continues on lasix 80 bid\par

## 2021-10-25 NOTE — DISCUSSION/SUMMARY
[FreeTextEntry1] : He is a 66-year-old with a history of RA/psoriatic arthritis on chronic steroids, Non-obstructive CAD, now with severe LV dysfunction and now stable Systolic CHF on his current regimen.\par He is s/p hospital stay for dehydration.\par HF symtpoms seem stable.\par Encouraged him to start Farxiga for its CV and DM benefits.\par Continue HFrEF meds as is.\par labs in near future.\par see me in 2 weeks.

## 2021-10-25 NOTE — HISTORY OF PRESENT ILLNESS
[Home] : at home, [unfilled] , at the time of the visit. [Medical Office: ([facility])___] : at the medical office located in  [Verbal consent obtained from patient] : the patient, [unfilled] [FreeTextEntry1] : Recently discharged from [facility] on 10/18, having been admitted for hyponatremia (118) and hypotension (SBP 90s)\par Feels secondary to diuretics. \par Has not had labs rechecked since d/c.\par Feeling weak but walking better ad soon to start home PT.\par Regaining weight; reports lost about 25# while an inpatient. \par Has not been checking sugars. \par No chest pain, palpitations, RICARDO, orthopnea, PND, lightheadedness or edema.\par \par

## 2021-11-08 PROBLEM — R91.1 LUNG NODULE: Status: ACTIVE | Noted: 2021-01-01

## 2021-11-08 NOTE — HISTORY OF PRESENT ILLNESS
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  His medical history is significant for rheumatoid arthritis which was diagnosed decades ago.  He has followed regularly with a physician and about 18 years ago he was diagnosed with diabetes, HTN.  He also describes having had a heart attack in the past.  He reports that his diabetes has recently been well controlled on insulin pump.  He say he has neuropathy from diabetes but no retinopathy.  He says he has been aware of an abnormality in his kidney function for years and upon review of available records he has had a creatinine of about 1.5 in 2015 and has intermittently had microalbuminuria usually about 30.  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  His creatinine has gone up, his proteinuria has increased.  Since stopping biologics his complements, hapto, and dsDNA have improved and yet his proteinuria and creatinine continue to worsen.  Of note the patient has also recently had worsening swelling and worsening hypertension over the last few months.  His dose of lasix was recently increased by his cardiologist and this has subsequently improved.  On evaluation today he denies any sob or cp, he has no urinary complaints and voids normally, he has some intermittent joint pains for which he continues to take celecoxib.  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  His steroids are being tapered down.  We have been working on his swelling he was on lasix 80 BID with metolazone as needed to get weight down to 157 at home.  With this reduction in weight his edema is better, he can get on his shoes.  He has not had any dyspnea, CP, NVD, he has not been taking any NSAIDs.  His creatinine checked recently with Dr. Polo has been stable.  He continues to maintain a low potassium diet.  \par \par 10/19/18 Since our last meeting Julián is feeling markedly better and his edema is vastly improved.  His blood pressure at home has been ranging 120 to 130 / 60 to 70.  He has not been using NSAIDs at all.  He has not had any chest pain or urination issues.  He is still smoking.  He is thinking about trying to taper himself off of dilaudid.\par \par 1/25/19 -- Julián is doing really well today.  His weight has been very stable recently.  He has some body aches which is chronic for him.  He reports that his edema is substantially improved.\par \par 5/28/19 -- feels well.  had hand surgery.  swelling improved.\par \par 12/6/19 -- feels well.  recently stopped xeljanz and is on steroids at the moment.  no increase in swelling no sob no cp no nausea or vomiting.  no urinary complaints.\par \par 6/18/20 -- I saw Julián today and he is feeling well overall staying mostly distanced due to covid 19.  he reports that his edema almost completely resolved.  however in the last few weeks the swelling returned worse on the right.  he is also recently having some cramping in his hands and calves.\par \par \par 11/16/20 -- Since our last visit Julián feels ok.  He has noted some weight gain and swelling around his eyes but has no increased dyspnea.  Still has a "smoker's cough."  Having some GI upset.\par \par 3/12/21 -- Since our last visit Julián has been admitted and was found to have lung CA and was resected now considering additional chemo which he is leaning against.  He has been having worsening osteoarthritis pain and so he has been taking ibuprofen 200 mg three days per week.\par \par 11/8/21 -- Since our last visit Julián was admitted again for hyponatremia and failure to thrive .  Serum sodium was 117 and then more recently after discharge was 125.  He was briefly started on metolazone before that admission.  The patient reports poor appetite overall low energy.  No chest pain no shortness of breath.

## 2021-11-08 NOTE — ASSESSMENT
[FreeTextEntry1] : The patient is a 66 years old man with a history of CAD, DM, and RA here for follow up of CKD. and Hyponatremia\par \par Chronic Kidney Disease Stage IV -- The etiology on biopsy proven diabetic nephropathy and the patient is at high risk for long term progression.  We explicitly discussed the the risk of chemotherapy to his kidneys and I previously explained that the proposed regimen is usually considered to be less kidney toxic but still occurs.  He is carefully considering his options but is leaning against chemo but would consider it.  Next step for him is lung radiation.  He is taking NSAIDs because he cannot bear the pain and its the only thing that helps.  He will use sparingly and due to the concern for kidney toxicity will have to monitor renal panel closely (is having blood work today).\par \par Hypertension -- BP elevated above goal.  Recommended continue to monitor at home.  If continues to be elevated at 150 to 160 and above will need to reintroduce valsartan which was held in the hospital.  Alternatively if he is hyponatremic may increase lasix depending on BP\par \par Hyponatremia -- Will repeat blood work today.\par \par Hyperkalemia -- Most recent potassium acceptable.  Stay on bicarbonate and low k diet.  holding valsartan for now\par \par RTO 3 to 4 months.

## 2021-11-08 NOTE — REVIEW OF SYSTEMS
[Fever] : no fever [Chills] : no chills [Feeling Poorly] : feeling poorly [Feeling Tired] : feeling tired [Eyesight Problems] : no eyesight problems [Nosebleeds] : no nosebleeds [Chest Pain] : no chest pain [Palpitations] : no palpitations [Lower Ext Edema] : lower extremity edema [Shortness Of Breath] : no shortness of breath [Wheezing] : no wheezing [Cough] : no cough [Abdominal Pain] : no abdominal pain [Vomiting] : no vomiting [Diarrhea] : no diarrhea [Dysuria] : no dysuria [Incontinence] : no incontinence [Arthralgias] : arthralgias [Dizziness] : no dizziness [Fainting] : no fainting [Easy Bleeding] : no tendency for easy bleeding [Easy Bruising] : no tendency for easy bruising

## 2021-11-08 NOTE — PHYSICAL EXAM
[General Appearance - Alert] : alert [General Appearance - In No Acute Distress] : in no acute distress [Sclera] : the sclera and conjunctiva were normal [Examination Of The Oral Cavity] : the lips and gums were normal [Neck Appearance] : the appearance of the neck was normal [Neck Cervical Mass (___cm)] : no neck mass was observed [Jugular Venous Distention Increased] : there was no jugular-venous distention [] : no respiratory distress [Respiration, Rhythm And Depth] : normal respiratory rhythm and effort [Exaggerated Use Of Accessory Muscles For Inspiration] : no accessory muscle use [Auscultation Breath Sounds / Voice Sounds] : lungs were clear to auscultation bilaterally [Heart Sounds] : normal S1 and S2 [Heart Sounds Gallop] : no gallops [Murmurs] : no murmurs [Heart Sounds Pericardial Friction Rub] : no pericardial rub [FreeTextEntry1] : bilateral lower extremity edema seem similar if not improved compared to previous.   [No CVA Tenderness] : no ~M costovertebral angle tenderness [No Spinal Tenderness] : no spinal tenderness [Abnormal Walk] : normal gait [Oriented To Time, Place, And Person] : oriented to person, place, and time [Impaired Insight] : insight and judgment were intact [Affect] : the affect was normal [Mood] : the mood was normal

## 2021-11-10 NOTE — CONSULT LETTER
[Consult Letter:] : I had the pleasure of evaluating your patient, [unfilled]. [( Thank you for referring [unfilled] for consultation for _____ )] : Thank you for referring [unfilled] for consultation for [unfilled] [Please see my note below.] : Please see my note below. [Consult Closing:] : Thank you very much for allowing me to participate in the care of this patient.  If you have any questions, please do not hesitate to contact me. [Sincerely,] : Sincerely, [FreeTextEntry2] : Dr. Cr Kidd (MED)\par Dr Jay Lisker ( Cardiology) \par Dr. Maurisio Baldwin (Hem/Onc) \par Dr. Tushar Macias (Rad/Onc) [FreeTextEntry3] : Maurisio Lucia MD \par Attending Surgeon \par Division of Thoracic Surgery \par , Cardiovascular and Thoracic Surgery \par [facility] [employer] [facility]\par

## 2021-11-10 NOTE — HISTORY OF PRESENT ILLNESS
[FreeTextEntry1] : Mr. NORM DEAN, 66 year old male, current smoker, former ETOH abuse, w/ hx of MI (2002), CKD (Stage III), HTN, chronic pancreatitis, DM2, Left Carotid Stenosis, RA, OA , who presented to [facility] ED with complaints of Dyspnea and b/l LE edema. During workup, CT scan of chest revealing several > 2 cm nodules (RLL, RUL, LLL), new and increased in size compared to 2014, in setting of extensive smoking history, and imaging characteristics very concerning for malignancy. \par \par Patient is s/p Right VATS, RLLobectomy, MLND and intercostal nerve block on 02/09/2021. Path of RLLobectomy revealed adenocarcinoma, micropapillary predominant, solid and complex gland formation, 2.5 cm, G3, + GERARD, + lymphovascular invasion, + visceral pleura invasion, + hilar LN, pT2aN1, Stage IIB. \par \par Post-op c/b delayed chest tube removal due to subcutaneous air and a small apical pneumothorax and high output. Patient discharged home with miniatrium. Miniatrium removed in office on 02/22/2021. \par \par Patient was evaluated by Dr. Baldwin on 03/02/2021. Molecular testing was negative, Dr. Baldwin recommended adjuvant chemotherapy for 4 cycles. However, patient prefers surveillance at current time secondary to kidney risk he discussed with his nephrologist Dr. José Luis Castro. \par \par Patient followed up with Dr. Dang Porter for PET/CT with FDG-avid lucency, left maxillary alveolus TMJ, surgery was recommended but patient opted against.\par \par He followed up with Dr. Maurisio Baldwin, no tx recommended.\par He followed up with Dr. Tushar Macias, who recommended CT-Simulation.\par \par He called on 9/2/21 and reported he fell 8/27 walking into building for scheduled PET/CT. He fell face first, denies hitting head, has an abrasion on his lip, sore breast bone, and right foot bruising and swelling. He said he was evaluated by a doctor on site but no interventions. We instructed him to go to [facility] ED, he prefers to the emergency room at [facility]. He was admitted and CT Chest w/o contrast on 9/3/21 showed increasing size small Rt-sided pleural effusion, he was instructed to f/u with us after discharge. \par \par Patient call back on 09/16/2021 c/o SOB, call back on 09/22/2021 c/o SOB, loss of appetite, losing weight, and vomiting. Contacted patient on 09/24/2021, patient was started on Metolazone and SOB improved. Instructed patient to call back if SOB gets worse, will arrange for therapeutic thoracentesis. \par \par Patient has hyponatremia, has prolonged anorexia/weight loss, s/p hospital stay for dehydration. Glucose 744 on 10/27/2021, patient was out of insulin pump. \par \par CT chest on 10/13/2021:\par - Status post right lower lobectomy. \par - Again is noted a semisolid irregular 2.6 x 1.5 cm lesion in the superior segment of the left lower lobe with mild retraction of the adjacent fissure, suspicious for a synchronous lung cancer.\par - There is stable 10 x 8 mm reticulonodular opacity in the left upper lobe on image 31.\par - There is a stable reticular density at the right lung apex on image 24. 2 mm nodule in the right upper lobe on image 43. \par - Trace centrilobular emphysematous changes.\par \par Patient is followed today via Telephonic visit.

## 2021-11-10 NOTE — ASSESSMENT
[FreeTextEntry1] : Mr. NORM DEAN, 66 year old male, current smoker, former ETOH abuse, w/ hx of MI (2002), CKD (Stage III), HTN, chronic pancreatitis, DM2, Left Carotid Stenosis, RA, OA , who presented to [facility] ED with complaints of Dyspnea and b/l LE edema. During workup, CT scan of chest revealing several > 2 cm nodules (RLL, RUL, LLL), new and increased in size compared to 2014, in setting of extensive smoking history, and imaging characteristics very concerning for malignancy. \par \par Patient is s/p Right VATS, RLLobectomy, MLND and intercostal nerve block on 02/09/2021. Path of RLLobectomy revealed adenocarcinoma, micropapillary predominant, solid and complex gland formation, 2.5 cm, G3, + GERARD, + lymphovascular invasion, + visceral pleura invasion, + hilar LN, pT2aN1, Stage IIB. \par \par Post-op c/b delayed chest tube removal due to subcutaneous air and a small apical pneumothorax and high output. Patient discharged home with miniatrium. Miniatrium removed in office on 02/22/2021. \par \par Patient was evaluated by Dr. Baldwin on 03/02/2021. Molecular testing was negative, Dr. Baldwin recommended adjuvant chemotherapy for 4 cycles. However, patient prefers surveillance at current time secondary to kidney risk he discussed with his nephrologist Dr. José Luis Castro. \par \par I have independently reviewed patient's CT on 10/13/2021 and have indicated my interpretations below:\par - CT chest reviewed and explained to patient, Trace residual right pleural effusion, I recommended patient to RTC in 6 months with CT chest w/o contrast.\par - F/u with Rad/Onc\par - F/u with Nephrologist. \par \par I have spent 35 minutes on the phone discussing above result and plan of care with patient.\par \par I personally performed the services described in the documentation, reviewed the documentation recorded by the scribe in my presence and it accurately and completely records my words and actions.\par \par I, Gabi Schwab, NP, am scribing for and the presence of TIFFANIE Johnson, the following sections HISTORY OF PRESENT ILLNESS, PAST MEDICAL/FAMILY/SOCIAL HISTORY; REVIEW OF SYSTEMS; VITAL SIGNS; PHYSICAL EXAM; DISPOSITION. \par \par

## 2021-11-16 PROBLEM — D50.9 IRON DEFICIENCY ANEMIA, UNSPECIFIED IRON DEFICIENCY ANEMIA TYPE: Status: ACTIVE | Noted: 2021-01-01

## 2021-11-16 PROBLEM — R91.8 PULMONARY NODULES: Status: ACTIVE | Noted: 2021-01-01

## 2021-11-22 PROBLEM — Z96.41 INSULIN PUMP IN PLACE: Status: ACTIVE | Noted: 2021-01-01

## 2021-11-22 PROBLEM — I10 HYPERTENSION: Status: ACTIVE | Noted: 2021-01-01

## 2021-11-22 NOTE — ASSESSMENT
[FreeTextEntry1] : 66 year old man with T2DM and chronic pancreatitis, with lung ca, diabetes has recently been uncontrolled (improved in past week)\par  - pump and sensor downloaded today, pt with postprandial hyperglycemia, then takes correctional insulin.  50% TIR, 0% hypoglycemia.  Change I:C to 1:4, continue remaining pump settings for now, send download in 2 weeks\par  - Pt will call before starting RTx (not yet scheduled) to decide on doses for insulin injections\par  - continue farxiga\par  - DUe for  Retinopathy screening \par  - had  covid vaccine\par - had flu shot\par \par \par HTN -, Blood pressure controlled today, ARB on hold, Blood pressure has been elevated in general,  followed by nephrology for ckd ( may resume ARB)\par \par Hyperlipidemia- continue atorvastatin\par \par \par vitamin D def'y - continue vitamin D supplementation\par \par \par \par f/u 2-3 months with MD, send download in 2 weeks, call prior to RTx

## 2021-11-22 NOTE — PHYSICAL EXAM
[Alert] : alert [No Acute Distress] : no acute distress [No LAD] : no lymphadenopathy [Thyroid Not Enlarged] : the thyroid was not enlarged [No Respiratory Distress] : no respiratory distress [Clear to Auscultation] : lungs were clear to auscultation bilaterally [Normal S1, S2] : normal S1 and S2 [Regular Rhythm] : with a regular rhythm [Normal Bowel Sounds] : normal bowel sounds [Soft] : abdomen soft [No Spinal Tenderness] : no spinal tenderness [No Involuntary Movements] : no involuntary movements were seen [Normal Reflexes] : deep tendon reflexes were 2+ and symmetric [No Tremors] : no tremors [Normal Affect] : the affect was normal [Normal Mood] : the mood was normal [de-identified] : b/l le edema

## 2021-11-22 NOTE — HISTORY OF PRESENT ILLNESS
[FreeTextEntry1] : cc: diabetes\par \par 66 year old man with T2DM (diagnosed in 2002, on insulin pump since late 2015), chronic pancreatitis, with reasonable control.  Undergoing tx for lung CA, plan is for RTx, has had recent hospitalizations for shortness of breath and for hyponatremia.  Has lost ~ 35 pounds, gained back ~20.  Using omnipod pump (with U100 Humalog) and dexcom sensor (G6).  Values have been elevated ( has not been focusing on his diabetes) starting to get better   Taking methylprednisolone 4 mg twice daily.   STarted farxiga 3 weeks ago.   Limited exercise.  Trying to eat more than normal to regain weight (protein, protein shakes)\par Had covid vaccine, pfizer, will schedule booster\par has neuropathy\par Saw opthalmologist  ~ 9/2020, No retinopathy, needs to reschedule visit\par + h/o MI\par had flu shot\par \par has had anemia, will start iron infusion tomorrow\par \par \par HTN - blood pressure has been mildly elevated, Off ACE/ARB, had hyperkalemia in past and ARB stopped during most recent hospitalization\par \par Hyperlipidemia - taking statin\par \par \par Also with osteoporosis, managed by rheumatology, on prolia\par

## 2021-12-09 PROBLEM — E87.1 HYPONATREMIA: Status: ACTIVE | Noted: 2021-01-01

## 2021-12-09 PROBLEM — R76.8 DS DNA ANTIBODY POSITIVE: Status: ACTIVE | Noted: 2018-04-17

## 2021-12-09 PROBLEM — M81.0 OSTEOPOROSIS, UNSPECIFIED OSTEOPOROSIS TYPE, UNSPECIFIED PATHOLOGICAL FRACTURE PRESENCE: Status: ACTIVE | Noted: 2020-10-29

## 2021-12-10 PROBLEM — C34.91 ADENOCARCINOMA OF LUNG, RIGHT: Status: ACTIVE | Noted: 2021-01-01

## 2021-12-10 PROBLEM — L40.50 PSORIATIC ARTHRITIS: Status: ACTIVE | Noted: 2018-09-27

## 2021-12-22 NOTE — ED PROVIDER NOTE - PHYSICAL EXAMINATION
General: NAD, good hygiene, well developed  HENT: Atraumatic, EOMI, no conjunctivae injection, moist mucosa.  Neck: normal ROM and trachea midline   Cardiovascular: RRR, S1&2, no M or R, radial pulses equal and b/l  Respiratory: CTABL, no wheezes or crackles, no decreased breath sounds  Abdominal: soft and non-tender non distended, neg for guarding, no CVA tenderness   Extremities: +2 pitting edema of the legs/feet, distal pulses equal and b/l   Skin: warm, well perfused  Neurologic: nonfocal, AAOx3, following commands   Psych: normal mood and affect

## 2021-12-22 NOTE — CHART NOTE - NSCHARTNOTEFT_GEN_A_CORE
Admitted to MICU for hypoglycemia. STAT clean ordered, pt expedited to MICU room, unfortunately not able to make it to MICU. Pt had cardiac arrest, received 2 amps of bicarb, epi. ROSC achieved 3 times. Also received 4 grams of calcium chloride and 2g magnesium. Started on dobutamine, epi, norepi gtt. Bilateral IO access placed. Total CPR time 25 min. End tidal CO2 after 3rd ROSC read <10, CPR stopped due to futility. Family informed on phone, en route, spoken to in person by MICU team, informed of events, understanding. Intubated by Emergency Department. Family declined autopsy. Admitted to MICU for hypoglycemia. STAT clean ordered, pt expedited to MICU room, unfortunately not able to make it to MICU. Pt had cardiac arrest, received 2 amps of bicarb, epi. ROSC achieved 3 times. Also received 4 grams of calcium chloride and 2g magnesium. Started on dobutamine, epi, norepi gtt. Bilateral IO access placed. Total CPR time 25 min. End tidal CO2 after 3rd ROSC read <10, CPR stopped due to non-effective chest compressions with 0 end tidal CO2 and inability to achieve hemodynamically stable ROSC. Family informed on phone, en route, spoken to in person by MICU team, informed of events, understanding. Intubated by Emergency Department. Family declined autopsy.

## 2021-12-22 NOTE — CHART NOTE - NSCHARTNOTEFT_GEN_A_CORE
DEATH NOTE    Called to bedside to evaluate the patient for hypoxia.     On physical exam, patient did not respond to verbal or noxious stimuli.  No spontaneous respirations.  Absent heart and breath sounds.  Absent radial and carotid pulses.   Pupils are fixed and dilated, no corneal reflex.  EKG rhythm strip shows asystole.   Patient pronounced dead at 19:18 PM. Attending notified.    Dereje Hewitt,  Internal Medicine PGY-1 DEATH NOTE    Called to bedside to evaluate the patient for hypoxia.     On physical exam, patient did not respond to verbal or noxious stimuli.  No spontaneous respirations.  Absent heart and breath sounds.  Absent radial and carotid pulses.   Pupils are fixed and dilated, no corneal reflex.  EKG rhythm strip shows asystole.   Patient pronounced dead at 19:18 PM. Attending notified. Patient's family refused autopsy.    Dereje Hewitt,  Internal Medicine PGY-1

## 2021-12-22 NOTE — ED PROVIDER NOTE - OBJECTIVE STATEMENT
h.o lung cancer s/p right lobectomy, CHF (80mg lasix), CKD, CAD stents, MI on Plavis, cirrhosis, p.w worsening dyspnea for 3 days w.o fever or productive cough. + normal dry cough, worsening LE swelling and lightheadedness. shortness of breath with exertion. no abdominal pain or chest pain, dysuria. h.o lung cancer s/p right lobectomy, CHF (80mg lasix), CKD, CAD stents, MI on Plavis, cirrhosis, p.w worsening dyspnea for 3 days w.o fever or productive cough. + normal dry cough, worsening LE swelling and lightheadedness. shortness of breath with exertion. no abdominal pain or chest pain, dysuria.    PA José: 65yo M with PMH of Lung CA s/p lobectomy, CKD4, DM2, CAD s/p stent on plavix, heart failure, cirrhosis presenting with worsening SOB and LE edema x 3 days. Reports associated lightheadedness and symptoms worse with exertion. Had near syncopal episode yesterday that resolved in a minute after sitting down. Takes 80mg lasix daily. Report chronic cough with white sputum production. Denies any fever/chills, chest pain, abdominal pain, n/v/d, urinary symptoms. Reports recent CT chest this week for radiation treatment.  No chemo. Current smoker.

## 2021-12-22 NOTE — H&P ADULT - NSHPPHYSICALEXAM_GEN_ALL_CORE
VITALS:   T(C): 37 (12-22-21 @ 18:18), Max: 37 (12-22-21 @ 18:18)  HR: 70 (12-22-21 @ 19:14) (0 - 82)  BP: 43/31 (12-22-21 @ 19:14) (43/31 - 114/77)  RR: 26 (12-22-21 @ 19:14) (12 - 26)  SpO2: 99% (12-22-21 @ 18:18) (99% - 100%)    GENERAL: NAD, lying in bed comfortably  HEAD:  Atraumatic, Normocephalic  EYES: EOMI, PERRLA, conjunctiva and sclera clear  ENT: Moist mucous membranes  NECK: Supple, No JVD  CHEST/LUNG: Clear to auscultation bilaterally; No rales, rhonchi, wheezing, or rubs. Unlabored respirations  HEART: Regular rate and rhythm; No murmurs, rubs, or gallops  ABDOMEN: BSx4; Soft, nontender, nondistended  EXTREMITIES:  2+ Peripheral Pulses, brisk capillary refill. No clubbing, cyanosis, or edema  NERVOUS SYSTEM:  A&Ox3, no focal deficits   SKIN: No rashes or lesions  psych: normal behavior, normal affect VITALS:   T(C): 37 (12-22-21 @ 18:18), Max: 37 (12-22-21 @ 18:18)  HR: 70 (12-22-21 @ 19:14) (0 - 82)  BP: 43/31 (12-22-21 @ 19:14) (43/31 - 114/77)  RR: 26 (12-22-21 @ 19:14) (12 - 26)  SpO2: 99% (12-22-21 @ 18:18) (99% - 100%)    GENERAL: no distress  HEAD:  Atraumatic, Normocephalic  EYES: EOMI, PERRL, conjunctiva and sclera clear  ENT: Moist mucous membranes  NECK: Supple, ++JVD  CHEST/LUNG: diminished bilat  HEART: Regular rate and rhythm  ABDOMEN: BSx4; Soft, nontender, nondistended  EXTREMITIES:  mildly cool, ++edema  NERVOUS SYSTEM:  A&Ox3, no focal deficits   SKIN: chronic venous static changes  psych: normal behavior, normal affect

## 2021-12-22 NOTE — DISCHARGE NOTE FOR THE EXPIRED PATIENT - HOSPITAL COURSE
67 y/o M with PMH of lung cancer s/p right lobectomy, CHF, CKD, CAD s/p stents on plavix, and liver cirrhosis presenting with SOB for the past 3 days. Pt reports he has had lightheadedness and SOB that worsens with exertion. Also reports he had a near syncopal episode yesterday, resolved in a minute after sitting down. Has chronic cough with white sputum production. Pt denied fever, chills, PC, abd pain, n/v/c/d, dysuria, hematuria. Had recent CT Chest sometime this week for radiation treatment.     Labs: Calcium 4.1 -> 4.2  AB.26/37/27/17 -> 7.33/33/31/17 -> 6.93/66/74/14; ionized Ca: 0.55 -> 0.62 -> 2.09  Lactate: 2.5 -> 1.9 -> 13.9    Pt lost pulse and began desatting, code blue was called in ED. Code blue commenced. Pt received 2 amps of bicarb, epi. ROSC achieved 3 times. Also received 4 grams of calcium chloride and 2g magnesium. Started on dobutamine, epi, norepi gtt. Bilateral IO access placed. Total CPR time 25 min. End tidal CO2 after 3rd ROSC read <10, CPR stopped due to non-effective chest compressions with 0 end tidal CO2 and inability to achieve hemodynamically stable ROSC. Family informed on phone, en route, spoken to in person by MICU team, informed of events, understanding. Intubated by Emergency Department. Family declined autopsy.

## 2021-12-22 NOTE — H&P ADULT - ATTENDING COMMENTS
66M Hx HTN, T2DM, Lung CA s/p Rt Lobectomy, CAD Stents, ROMERO Cirrhosis, Osteoporosis s/p Denosumab, CKD4, HFrEF p/w ED for SOB and RICARDO x 3 days and Near Syncope yesterday found Hypoglycemia, Sever Hypocalcemia (Ca 4.2, iCa 0.5), HAG-MA and ARF-IVA. He received Glucose, Ca Supplement in ED and MICU Consult accepted for bed.   - Code Blue called for Cardiac Arrest just before ICU Transfer   - ROSC x 3 with prolonged Cardiac Arrest in ED   - Pt received 6 gm IV CaCl, Mg 2 gm, along with ACLS Medications, Pressors and Cardiac Inotrope   - He was pronounced Death at 19:18PM on 12/22/21   - Family - Wife informed and arrived bedside after pt passed away     Patient seen and examined with ICU Resident/Fellow at bedside after lab data, medical records and radiology reports reviewed. I have read and agreeable in general with resident's Documented Note, Assessment and Management Plan which reflected my opinions from bedside round and discussion.   Total Critical Care Time = 55 Min excluding teaching and procedure activity.

## 2021-12-22 NOTE — H&P ADULT - ASSESSMENT
Assessment:      Plan  #Neuro      #Cardiovascular  Pump    Coronaries    EP    #Pulmonary      #Renal      #Infectious disease      #Hematology/Oncology      #Gastrointestinal      #Endocrine      #Psychiatric      #FEN      #PPx      Dereje Hewitt MD  Internal Medicine Resident  [phone] Assessment:      Plan  #Neuro      #Cardiovascular    #Pulmonary      #Renal      #Infectious disease      #Hematology/Oncology      #Gastrointestinal      #Endocrine      #Psychiatric      #FEN      #PPx      Dereje Hewitt MD  Internal Medicine Resident  [phone] Assessment:    67 y/o M with PMH of lung cancer s/p right lobectomy, CHF, CKD, CAD s/p stents on plavix, and liver cirrhosis presenting with SOB for the past 3 days.     Admitted to MICU for hypoglycemia. STAT clean ordered, pt expedited to MICU room, unfortunately not able to make it to MICU. Pt had cardiac arrest, received 2 amps of bicarb, epi. ROSC achieved 3 times. Also received 4 grams of calcium chloride and 2g magnesium. Started on dobutamine, epi, norepi gtt. Bilateral IO access placed. Total CPR time 25 min. End tidal CO2 after 3rd ROSC read <10, CPR stopped due to futility. Family informed on phone, en route, spoken to in person by MICU team, informed of events, understanding. Intubated by Emergency Department. Family declined autopsy.       Dereje Hewitt MD  Internal Medicine Resident  [phone] Assessment:  65 y/o M with PMH of lung cancer s/p right lobectomy, CHF, CKD, CAD s/p stents on plavix, and liver cirrhosis presenting with SOB for the past 3 days.     Admitted to MICU for hypoglycemia. STAT clean ordered, pt expedited to MICU room, unfortunately not able to make it to MICU. Pt had cardiac arrest, received 2 amps of bicarb, epi. ROSC achieved 3 times. Also received 4 grams of calcium chloride and 2g magnesium. Started on dobutamine, epi, norepi gtt. Bilateral IO access placed. Total CPR time 25 min. End tidal CO2 after 3rd ROSC read <10, CPR stopped due to non-effective CPR 2/2 lack of chest recoil 2/2 frail/fractured ribs. Family informed on phone, en route, spoken to in person by MICU team, informed of events, understanding. Intubated by Emergency Department. Family declined autopsy.     Plan  #Neuro  - prior intact intubated s/p cardiac arrest    #Cardiovascular  POCUS revealing severe global dysfunction  cardiac arrest 2/2 HFrEF and hypocalcemia    #Pulmonary  intubated during cardiac arrest    #Renal  as per above  severe hypocalcemia 2/2 Prolia    #Infectious disease  s/p abx, cultures  ?pneumonia but not hypoxic    #Endocrine  severe hypocalcemia - plan to admit to micu but experienced cardiac arrest    GOC  - underwnt CPR and ROSc ultiamtely unsuccessful  - pt  19:18 2021

## 2021-12-22 NOTE — H&P ADULT - HISTORY OF PRESENT ILLNESS
65 y/o M with PMH of lung cancer s/p right lobectomy, CHF, CKD, CAD s/p stents on plavix, and liver cirrhosis presenting with SOB for the past 3 days. Pt reports he has had lightheadedness and SOB that worsens with exertion. Also reports he had a near syncopal episode yesterday, resolved in a minute after sitting down. Has chronic cough with white sputum production. Pt denied fever, chills, PC, abd pain, n/v/c/d, dysuria, hematuria.    Had recent CT Chest sometime this week for radiation treatment.  67 y/o M with PMH of lung cancer s/p right lobectomy, CHF, CKD, CAD s/p stents on plavix, and liver cirrhosis presenting with SOB for the past 3 days. Pt reports he has had lightheadedness and SOB that worsens with exertion. Also reports he had a near syncopal episode yesterday, resolved in a minute after sitting down. Has chronic cough with white sputum production. Pt denied fever, chills, PC, abd pain, n/v/c/d, dysuria, hematuria.    Had recent CT Chest sometime this week for radiation treatment.     Labs: Calcium 4.1 -> 4.2  AB.26/37/27/17 -> 7.33/33/31/17 -> 6.93/66/74/14; ionized Ca: 0.55 -> 0.62 -> 2.09  Lactate: 2.5 -> 1.9 -> 13.9    Pt lost pulse and began desatting, code blue was called in ED. Pt received 3 rounds of CPR, 3 rounds of epi. ROSC was achieved 3x but pt  at 19:18

## 2021-12-22 NOTE — ED PROVIDER NOTE - ADMIT DISPOSITION PRESENT ON ADMISSION SEPSIS
If you are a smoker, it is important for your health to stop smoking. Please be aware that second hand smoke is also harmful.
If you are a smoker, it is important for your health to stop smoking. Please be aware that second hand smoke is also harmful.
No

## 2021-12-22 NOTE — ED ADULT NURSE REASSESSMENT NOTE - NS ED NURSE REASSESS COMMENT FT1
WILLIAM room called with a noted arrythmia    pt found unresponsive and CPR started  see cardiac arrest sheet

## 2021-12-22 NOTE — ED PROVIDER NOTE - PROGRESS NOTE DETAILS
Bo Zimmerman MD, Attending: prolonged QTc, concerning for metabolic derangement hospitalist requesting MICU eval 2/2 hypocalcemia. MICU to see. - Supriya Kumar PA-C pt was waiting for a micu bed on the cr monitor when he went into asystole found to be unresponsive, cpr started, micu called, resp. I intubated patient, [place] started, amps of CA given x4, multiple rounds of epi, epi drip, levo drip with rosc but then lost his pulses again, micu here, spoke with wife.   valery cazares

## 2021-12-22 NOTE — ED PROVIDER NOTE - CLINICAL SUMMARY MEDICAL DECISION MAKING FREE TEXT BOX
h.o cad, chf, lung ca s/p resection, htn, smoker, p.w worsening exertional shortness of breath for 3 days w. lightheadedness. nontoxic appearing, NAD, non tachycardiac, non hypoxic, on chart review, had hyponatremia and on lasix. will get comprehensive labs to evaluate for hematologic abnormality, renal function, electrolyte derangement for possible symptom etiology. will eval for acs and worsening CHF. worsening LE edema possible pleural effusion. cxr. TTE. will likely need admission. low suspicion for PE at this time. possible worsening lung cancer. will get rvp for viral infection.

## 2021-12-22 NOTE — H&P ADULT - NSHPREVIEWOFSYSTEMS_GEN_ALL_CORE
REVIEW OF SYSTEMS:    CONSTITUTIONAL: No weakness, fevers or chills  EYES: No visual changes; no sclera icterics, no pain or drainage  ENT:  No vertigo or throat pain   NECK: No pain or stiffness  RESPIRATORY: +SOB, cough No wheezing, hemoptysis;   CARDIOVASCULAR: No chest pain or palpitations  GASTROINTESTINAL: No abdominal or epigastric pain. No nausea, vomiting, or hematemesis; No diarrhea or constipation. No melena or hematochezia.  GENITOURINARY: No dysuria, frequency or hematuria  NEUROLOGICAL: No numbness or weakness  SKIN: No itching, rashes  Psych: No anxiety or depression REVIEW OF SYSTEMS:    CONSTITUTIONAL: No weakness, fevers or chills  EYES: No visual changes; no sclera icterics, no pain or drainage  ENT:  No vertigo or throat pain   NECK: No pain or stiffness  RESPIRATORY: +SOB, cough  CARDIOVASCULAR: No chest pain or palpitations  GASTROINTESTINAL: No abdominal or epigastric pain. No nausea, vomiting, or hematemesis; No diarrhea or constipation. No melena or hematochezia.  GENITOURINARY: No dysuria, frequency or hematuria  NEUROLOGICAL: gen weakness  SKIN: No itching, rashes  Psych: No anxiety or depression

## 2021-12-22 NOTE — ED ADULT NURSE NOTE - OBJECTIVE STATEMENT
pt here for SOB   he has CHF and his feet are swollen.  pulses and refill are without deficit.  pt is alert and active.  SOB is workse on ambulation

## 2021-12-22 NOTE — H&P ADULT - NSHPLABSRESULTS_GEN_ALL_CORE
Personally reviewed labs, imaging, ekg                           7.9    12.30 )-----------( 260      ( 22 Dec 2021 17:39 )             24.7       12-22    134<L>  |  100  |  39<H>  ----------------------------<  187<H>  4.7   |  16<L>  |  2.30<H>    Ca    4.2<LL>      22 Dec 2021 15:15  Phos  4.9     12-22  Mg     1.6     12-22    TPro  6.2  /  Alb  2.9<L>  /  TBili  0.2  /  DBili  x   /  AST  32  /  ALT  22  /  AlkPhos  131<H>  12-22                      Lactate Trend            CAPILLARY BLOOD GLUCOSE      POCT Blood Glucose.: 217 mg/dL (22 Dec 2021 19:04)            EKG:

## 2021-12-27 LAB
CULTURE RESULTS: SIGNIFICANT CHANGE UP
CULTURE RESULTS: SIGNIFICANT CHANGE UP
SPECIMEN SOURCE: SIGNIFICANT CHANGE UP
SPECIMEN SOURCE: SIGNIFICANT CHANGE UP

## 2022-01-05 ENCOUNTER — APPOINTMENT (OUTPATIENT)
Dept: HEMATOLOGY ONCOLOGY | Facility: CLINIC | Age: 67
End: 2022-01-05

## 2022-01-24 ENCOUNTER — APPOINTMENT (OUTPATIENT)
Dept: INTERNAL MEDICINE | Facility: CLINIC | Age: 67
End: 2022-01-24

## 2022-03-14 ENCOUNTER — APPOINTMENT (OUTPATIENT)
Dept: ENDOCRINOLOGY | Facility: CLINIC | Age: 67
End: 2022-03-14

## 2022-04-11 ENCOUNTER — APPOINTMENT (OUTPATIENT)
Dept: RHEUMATOLOGY | Facility: CLINIC | Age: 67
End: 2022-04-11

## 2022-04-15 NOTE — PROVIDER CONTACT NOTE (OTHER) - RECOMMENDATIONS
pt to be straight cath
md made aware  assess patient at bedside  hold am bp meds
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 This is seen in developing worlds. It transmitted vial oral fecal route. If patient is asymptomatic, E.i no diarrhea , no treatment is warranted.   - would recommend outpatient O&P study to r/o other parasites

## 2022-05-26 NOTE — ED ADULT NURSE NOTE - NSSEPSISSUSPECTED_ED_A_ED
[facility]  Pediatrics  [address] [place] [address]  Phone: [phone]  Fax: [phone]  Established Patient  Follow Up Time: 1-3 Days  
No

## 2022-11-17 NOTE — PROGRESS NOTE ADULT - PROBLEM SELECTOR PLAN 5
Creatinine 2.88  Baseline 2.4-2.6  + Rouleaux formation  Pending SPEP UPEP to r/o MM (álvaro + anemia)
FEVER
Creatinine 2.88  Baseline 2.4-2.6  + Rouleaux formation  Pending SPEP UPEP to r/o MM (álvaro + anemia)

## 2023-01-06 NOTE — ED ADULT NURSE NOTE - PAIN: PRESENCE, MLM
denies pain/discomfort PAST SURGICAL HISTORY:  H/O hernia repair     History of tonsillectomy     S/P cataract surgery

## 2023-01-29 NOTE — CHART NOTE - NSCHARTNOTEFT_GEN_A_CORE
Confidential Drug Utilization Report  Search Terms: Julián Marin, 1955 Search Date: 10/18/2021 17:05:09 PM  Searching on behalf of: [id number]  The Drug Utilization Report below displays all of the controlled substance prescriptions, if any, that your patient has filled in the last twelve months. The information displayed on this report is compiled from pharmacy submissions to the Department, and accurately reflects the information as submitted by the pharmacies.    This report was requested by: Renny Vo | Reference #: [id number]    Others' Prescriptions  Patient Name: Julián MarinBirth Date: 1955  Address: [address] [place] [id number]: Male  Rx Written	Rx Dispensed	Drug	Quantity	Days Supply	Prescriber Name	Prescriber Rakel #	Payment Method	Dispenser  02/14/2021	02/15/2021	oxycodone hcl 5 mg tablet	12	3	Saleem James	[id number]	Insurance	[facility]  * - Drugs marked with an asterisk are compound drugs. If the compound drug is made up of more than one controlled substance, then each controlled substance will be a separate row in the table.          †Click the ‘Report Suspicious Activity’ button to report information related to controlled substance suspicious activity to the [place] of Narcotic Enforcement.    ††Click the ‘Send Questions/Comments’ button to send questions about this report to the [place] of Narcotic Enforcement, or call [phone].    †††Click the ‘Substance Use Disorder Treatment’ button to go to the Office of Addiction Services and Supports website, www.[place].gov or call 1-370.698.3204.    © 2017 [facility] Department of Health -[place] of Narcotic Viypbztvnic69/18/2021 17:05  . Patient/Caregiver provided printed discharge information.

## 2023-06-18 NOTE — CONSULT NOTE ADULT - ASSESSMENT
[facility] today [facility] [address] [facility] [place]    65 year old man with PMH MI (2002), CKD, HTN, chronic pancreatitis, Hx of past EtOH abuse, T2DM on insulin pump complicated by CKD/peripheral neuropathy/CAD, RA, OA, and current smoker who presents with dyspnea.   65 year old man with PMH MI (2002), CKD IV, HTN, chronic pancreatitis, Hx of past EtOH abuse, T2DM on insulin pump complicated by CKD/peripheral neuropathy/CAD, RA, OA, and current smoker who presents with dyspnea.  65 year old man with PMH MI (2002), CKD IV, HTN, chronic pancreatitis, Hx of past EtOH abuse, T2DM on insulin pump complicated by CKD/peripheral neuropathy/CAD, RA, OA, and current smoker who presents with dyspnea.  Meningioma

## 2023-06-19 NOTE — ED PROVIDER NOTE - ATTENDING WITH...
ACP Aklief Pregnancy And Lactation Text: It is unknown if this medication is safe to use during pregnancy.  It is unknown if this medication is excreted in breast milk.  Breastfeeding women should use the topical cream on the smallest area of the skin for the shortest time needed while breastfeeding.  Do not apply to nipple and areola.

## 2023-06-30 NOTE — H&P ADULT - NSICDXPASTSURGICALHX_GEN_ALL_CORE_FT
Imaging Studies/Medications PAST SURGICAL HISTORY:  H/O arthroscopy of right knee 1988    H/O coronary angiogram     H/O umbilical hernia repair 2014    H/O vasectomy 1994    History of inguinal herniorrhaphy right and left, total of 6    S/P carotid endarterectomy left 11/9/2016    S/P cholecystectomy 2003, laparoscopic    S/P colonoscopy 2014, benign polyps    S/P hip replacement, right     S/P insertion of intrathecal pump placed in 2007  removed in 2008    S/P insertion of spinal cord stimulator inserted 2006 and removed 2007    S/P tonsillectomy and adenoidectomy age 16

## 2023-09-06 NOTE — ED ADULT NURSE NOTE - NURSING SKIN WOUND AREA #1
medial/lower Orbicularis Oris Muscle Flap Text: The defect edges were debeveled with a #15 scalpel blade.  Given that the defect affected the competency of the oral sphincter an orbicularis oris muscle flap was deemed most appropriate to restore this competency and normal muscle function.  Using a sterile surgical marker, an appropriate flap was drawn incorporating the defect. The area thus outlined was incised with a #15 scalpel blade.

## 2023-10-13 NOTE — PROGRESS NOTE ADULT - SUBJECTIVE AND OBJECTIVE BOX
Anesthesia Pain Management Service    SUBJECTIVE: Patient is doing well with IV PCA and no significant problems reported.    Pain Scale Score	At rest: __2/10_ 	With Activity: ___ 	[X ] Refer to charted pain scores    THERAPY:    [ ] IV PCA Morphine		[ ] 5 mg/mL	[ ] 1 mg/mL  [X ] IV PCA Hydromorphone	[ ] 5 mg/mL	[X ] 1 mg/mL  [ ] IV PCA Fentanyl		[ ] 50 micrograms/mL    Demand dose __0.2_ lockout __6_ (minutes) Continuous Rate _0__ Total: _5.6__   mg used (in past 24 hrs)      MEDICATIONS  (STANDING):  aspirin enteric coated 81 milliGRAM(s) Oral <User Schedule>  atorvastatin 80 milliGRAM(s) Oral at bedtime  dextrose 50% Injectable 25 Gram(s) IV Push once  furosemide    Tablet 80 milliGRAM(s) Oral two times a day  heparin   Injectable 5000 Unit(s) SubCutaneous every 8 hours  insulin lispro (HumaLOG) Pump 1 Each SubCutaneous Continuous Pump  lactated ringers. 1000 milliLiter(s) (30 mL/Hr) IV Continuous <Continuous>  lidocaine   Patch 1 Patch Transdermal daily  methylPREDNISolone 4 milliGRAM(s) Oral daily  metoprolol succinate ER 50 milliGRAM(s) Oral daily  pantoprazole    Tablet 40 milliGRAM(s) Oral before breakfast  senna 2 Tablet(s) Oral at bedtime  sodium bicarbonate 1300 milliGRAM(s) Oral three times a day    MEDICATIONS  (PRN):  HYDROmorphone  Injectable 0.3 milliGRAM(s) IV Push every 3 hours PRN Severe Breakthrough Pain  HYDROmorphone PCA (1 mG/mL) Rescue Clinician Bolus 0.5 milliGRAM(s) IV Push every 15 minutes PRN for Pain Scale GREATER THAN 6  metoclopramide 10 milliGRAM(s) Oral every 6 hours PRN nausea  ondansetron Injectable 4 milliGRAM(s) IV Push once PRN Nausea and/or Vomiting  oxyCODONE    IR 5 milliGRAM(s) Oral every 3 hours PRN Moderate to Severe Pain (4-10)      OBJECTIVE:  Patient is sitting up in chair with CT.     Sedation Score:	[ X] Alert	[ ] Drowsy 	[ ] Arousable	[ ] Asleep	[ ] Unresponsive    Side Effects:	[X ] None	[ ] Nausea	[ ] Vomiting	[ ] Pruritus  		[ ] Other:    Vital Signs Last 24 Hrs  T(C): 36.2 (11 Feb 2021 08:00), Max: 36.2 (11 Feb 2021 08:00)  T(F): 97.2 (11 Feb 2021 08:00), Max: 97.2 (11 Feb 2021 08:00)  HR: 78 (11 Feb 2021 08:00) (58 - 82)  BP: --  BP(mean): --  RR: 16 (11 Feb 2021 08:00) (12 - 24)  SpO2: 93% (11 Feb 2021 08:00) (92% - 97%)    ASSESSMENT/ PLAN    Therapy to  be:	[ ] Continue   [ X] Discontinued   [X ] Change to prn Analgesics    Documentation and Verification of current medications:   [X] Done	[ ] Not done, not elligible    Comments: IV Dilaudid PCA discontinued. CTICU team made aware. PRN Oral/IV opioids and/or Adjuvant non-opioid medication to be ordered at this point.     20.4

## 2024-04-18 NOTE — ED PROVIDER NOTE - NS ED ROS FT
Dr. Weiler: please advise on refill request. Patient is out of medication.        GENERAL: No fever or chills, weight changes, nightsweats  EYES: no change in vision  HEENT: no dysplasia, odynophagia, ear pain, rhinorrhea, epistasis   CARDIAC: no chest pain, palpitation   PULMONARY: HPI   GI: no abdominal pain, no nausea or no vomiting, no diarrhea or constipation  : No changes in urination for pain/freq.   SKIN: no rashes, abnormal bruising or bleeding  NEURO: no headache, numbness/tingling, extremity weakness   MSK: No joint pain

## 2024-06-15 NOTE — PHYSICAL EXAM
Gastroenterology H & P        Chief Complaint colon cancer screening    History Of Present Illness  Otis is a 69 year old male presenting with colon cancer screening.    Past Medical History  Past Medical History:   Diagnosis Date    Diabetes mellitus  (CMD)     Essential (primary) hypertension     TIA (transient ischemic attack)         Surgical History  Past Surgical History:   Procedure Laterality Date    Fracture surgery Left     femur & knee        Social History  Social History     Tobacco Use    Smoking status: Former     Current packs/day: 0.00     Average packs/day: 0.5 packs/day for 44.0 years (22.0 ttl pk-yrs)     Types: Cigarettes     Start date:      Quit date:      Years since quittin.4    Smokeless tobacco: Never   Vaping Use    Vaping status: never used   Substance Use Topics    Alcohol use: Yes     Comment: social    Drug use: Never       Family History  History reviewed. No pertinent family history.     Allergies  ALLERGIES:  Shellfish allergy   (food or med)    Medications  (Not in a hospital admission)      Physical Exam  HEENT: EOMI PERRLA  ABD: soft NT ND + BS  Ext: No C C E  CVS: S1 S2        Last Recorded Vitals  Visit Vitals  BP (!) 149/88 (BP Location: LUE - Left upper extremity, Patient Position: Supine)   Pulse 72   Temp 98.6 °F (37 °C)   Resp 15   Ht 6' (1.829 m)   Wt 86.2 kg (190 lb)   SpO2 98%   BMI 25.77 kg/m²       Labs  Hospital Outpatient Visit on 06/15/2024   Component Date Value Ref Range Status    GLUCOSE, BEDSIDE - POINT OF CARE 06/15/2024 282 (H)  70 - 99 mg/dL Final       Imaging  Colonoscopy    (Results Pending)       Assessment  Colon cancer screening    PLAN  Colonoscopy    Hector Perkins MD  6/15/2024           [Alert] : alert [Well Nourished] : well nourished [No Acute Distress] : no acute distress [Normal Sclera/Conjunctiva] : normal sclera/conjunctiva [Well Developed] : well developed [No Proptosis] : no proptosis [No LAD] : no lymphadenopathy [No Respiratory Distress] : no respiratory distress [Thyroid Not Enlarged] : the thyroid was not enlarged [Clear to Auscultation] : lungs were clear to auscultation bilaterally [Normal S1, S2] : normal S1 and S2 [Regular Rhythm] : with a regular rhythm [No Edema] : there was no peripheral edema [Not Tender] : non-tender [Normal Bowel Sounds] : normal bowel sounds [No Spinal Tenderness] : no spinal tenderness [Soft] : abdomen soft [Normal Reflexes] : deep tendon reflexes were 2+ and symmetric [Normal Strength/Tone] : muscle strength and tone were normal [Normal Mood] : the mood was normal [Normal Affect] : the affect was normal [de-identified] : fine resting tremor

## 2024-08-15 NOTE — DISCHARGE NOTE NURSING/CASE MANAGEMENT/SOCIAL WORK - FLU SEASON?
OPERATIVE REPORT    PATIENT:   Kaivta Freeman   1969       PREOPERATIVE DIAGNOSES/INDICATIONS: GI bleeding, intermittent melena.     POSTOPERATIVE DIAGNOSIS:   GERD grade C on top of moderate-sized esophageal varices, it is the source of bleeding in the last 2 week. No active bleeding now, s/p banding x 7.     PROCEDURE:  ESOPHAGOGASTRODUODENOSCOPY    PHYSICIAN:  Bahman Vences MD. MPH.    ANESTHESIA:  Per anesthesiologist or ICU/ED team.     LOCATION: [facility]    CONSENT:  OBTAINED. The risks, benefits and alternatives of the procedure were discussed in details. The risks include and are not limited to bleeding, infection, perforation, missed lesions, and sedations risks (cardiopulmonary compromise and allergic reaction to medications).    DESCRIPTION: Scope team at bedside  Patient was placed on his/her left lateral decubitus position. A bite block was placed in patient's mouth. Patient was sedated by anesthesia.  Vital signs were monitored throughout the procedure.  Oxygenation support was provided throughout the procedure. Upper endoscope was inserted into patient's mouth and advanced to the second portion of the duodenum under direct visualization.      Once the site was reached and examined, the upper endoscope was withdrawn.  Retroflexion was made within the stomach.  The stomach was decompressed, scope was withdrawn and the procedure was terminated.    The patient tolerated the procedure well.  There were no immediate complications.    OPERATIVE FINDINGS:    1. Esophagus:   GERD grade C on top of moderate-sized esophageal varices, it is the source of bleeding in the last 2 week. No active bleeding now, s/p banding x 7.   2. Stomach: Mild PHG.   3. Duodenum: Grossly normal.     RECOMMENDATIONS:  Keep PPI bid dose with sucrafate.   No need for octreotide.   keep cirrhotic GI bleeding antibiotics.   Okay to resume clear liquid today.     Inpatient GI team will continue to follow up.       This note has  been transcribed with digital voice recognition software and although it has been reviewed may contain grammatical or word errors     Yes...

## 2024-09-03 NOTE — H&P PST ADULT - NSALCOHOLTYPE_GEN__A_CORE_SD
Alert-The patient is alert, awake and responds to voice. The patient is oriented to time, place, and person. The triage nurse is able to obtain subjective information.
beer/wine/liquor